# Patient Record
Sex: FEMALE | Race: WHITE | Employment: UNEMPLOYED | URBAN - METROPOLITAN AREA
[De-identification: names, ages, dates, MRNs, and addresses within clinical notes are randomized per-mention and may not be internally consistent; named-entity substitution may affect disease eponyms.]

---

## 2017-01-11 RX ORDER — MIRTAZAPINE 45 MG/1
TABLET, FILM COATED ORAL
Qty: 90 TABLET | Refills: 0 | Status: SHIPPED | OUTPATIENT
Start: 2017-01-11 | End: 2017-04-24 | Stop reason: SDUPTHER

## 2017-01-13 ENCOUNTER — TELEPHONE (OUTPATIENT)
Dept: FAMILY MEDICINE CLINIC | Age: 52
End: 2017-01-13

## 2017-01-18 RX ORDER — OMEPRAZOLE 40 MG/1
CAPSULE, DELAYED RELEASE ORAL
Qty: 90 CAPSULE | Refills: 1 | Status: SHIPPED | OUTPATIENT
Start: 2017-01-18 | End: 2017-07-27 | Stop reason: SDUPTHER

## 2017-01-24 RX ORDER — NABUMETONE 750 MG/1
TABLET, FILM COATED ORAL
Qty: 60 TABLET | Refills: 5 | Status: SHIPPED | OUTPATIENT
Start: 2017-01-24 | End: 2017-07-25 | Stop reason: SDUPTHER

## 2017-04-15 ENCOUNTER — OFFICE VISIT (OUTPATIENT)
Dept: FAMILY MEDICINE CLINIC | Age: 52
End: 2017-04-15

## 2017-04-15 VITALS
TEMPERATURE: 97.8 F | SYSTOLIC BLOOD PRESSURE: 120 MMHG | BODY MASS INDEX: 26.85 KG/M2 | WEIGHT: 156.4 LBS | OXYGEN SATURATION: 99 % | HEART RATE: 98 BPM | DIASTOLIC BLOOD PRESSURE: 86 MMHG

## 2017-04-15 DIAGNOSIS — B35.9 TINEA: Primary | ICD-10-CM

## 2017-04-15 DIAGNOSIS — M19.90 ARTHRITIS: ICD-10-CM

## 2017-04-15 DIAGNOSIS — F41.9 ANXIETY: ICD-10-CM

## 2017-04-15 DIAGNOSIS — J01.00 ACUTE NON-RECURRENT MAXILLARY SINUSITIS: ICD-10-CM

## 2017-04-15 PROCEDURE — 99214 OFFICE O/P EST MOD 30 MIN: CPT | Performed by: FAMILY MEDICINE

## 2017-04-15 RX ORDER — DOXYCYCLINE 100 MG/1
100 CAPSULE ORAL 2 TIMES DAILY
Qty: 20 CAPSULE | Refills: 0 | Status: SHIPPED | OUTPATIENT
Start: 2017-04-15 | End: 2017-06-19 | Stop reason: ALTCHOICE

## 2017-04-15 RX ORDER — CLOTRIMAZOLE AND BETAMETHASONE DIPROPIONATE 10; .64 MG/G; MG/G
CREAM TOPICAL
Qty: 15 G | Refills: 2 | Status: SHIPPED | OUTPATIENT
Start: 2017-04-15 | End: 2017-06-19 | Stop reason: ALTCHOICE

## 2017-04-15 RX ORDER — HYDROCODONE BITARTRATE AND ACETAMINOPHEN 5; 325 MG/1; MG/1
1 TABLET ORAL 2 TIMES DAILY PRN
Qty: 60 TABLET | Refills: 0 | Status: SHIPPED | OUTPATIENT
Start: 2017-04-15 | End: 2017-04-15 | Stop reason: SDUPTHER

## 2017-04-15 RX ORDER — HYDROCODONE BITARTRATE AND ACETAMINOPHEN 5; 325 MG/1; MG/1
1 TABLET ORAL 2 TIMES DAILY PRN
Qty: 60 TABLET | Refills: 0 | Status: SHIPPED | OUTPATIENT
Start: 2017-04-15 | End: 2018-07-02 | Stop reason: SDUPTHER

## 2017-04-19 RX ORDER — DULOXETIN HYDROCHLORIDE 60 MG/1
CAPSULE, DELAYED RELEASE ORAL
Qty: 60 CAPSULE | Refills: 5 | Status: SHIPPED | OUTPATIENT
Start: 2017-04-19 | End: 2017-10-28 | Stop reason: SDUPTHER

## 2017-04-24 RX ORDER — MIRTAZAPINE 45 MG/1
TABLET, FILM COATED ORAL
Qty: 90 TABLET | Refills: 0 | Status: SHIPPED | OUTPATIENT
Start: 2017-04-24 | End: 2017-07-25 | Stop reason: SDUPTHER

## 2017-04-24 RX ORDER — AZITHROMYCIN 250 MG/1
TABLET, FILM COATED ORAL
Qty: 1 PACKET | Refills: 0 | Status: SHIPPED | OUTPATIENT
Start: 2017-04-24 | End: 2017-05-04

## 2017-06-19 ENCOUNTER — OFFICE VISIT (OUTPATIENT)
Dept: FAMILY MEDICINE CLINIC | Age: 52
End: 2017-06-19

## 2017-06-19 VITALS
SYSTOLIC BLOOD PRESSURE: 102 MMHG | TEMPERATURE: 97.6 F | DIASTOLIC BLOOD PRESSURE: 68 MMHG | BODY MASS INDEX: 25.23 KG/M2 | WEIGHT: 147 LBS

## 2017-06-19 DIAGNOSIS — J02.0 PHARYNGITIS DUE TO STREPTOCOCCUS SPECIES: Primary | ICD-10-CM

## 2017-06-19 PROCEDURE — 99213 OFFICE O/P EST LOW 20 MIN: CPT | Performed by: FAMILY MEDICINE

## 2017-06-19 RX ORDER — AMOXICILLIN 500 MG/1
1000 CAPSULE ORAL 2 TIMES DAILY
Qty: 40 CAPSULE | Refills: 0 | Status: SHIPPED | OUTPATIENT
Start: 2017-06-19 | End: 2017-06-29

## 2017-07-05 ENCOUNTER — COMMUNITY OUTREACH (OUTPATIENT)
Dept: OTHER | Facility: CLINIC | Age: 52
End: 2017-07-05

## 2017-07-06 ENCOUNTER — HOSPITAL ENCOUNTER (OUTPATIENT)
Dept: MAMMOGRAPHY | Age: 52
Discharge: HOME | End: 2017-07-06
Attending: FAMILY MEDICINE | Admitting: FAMILY MEDICINE

## 2017-07-06 DIAGNOSIS — Z12.31 ENCOUNTER FOR SCREENING MAMMOGRAM FOR BREAST CANCER: ICD-10-CM

## 2017-07-25 RX ORDER — MIRTAZAPINE 45 MG/1
TABLET, FILM COATED ORAL
Qty: 30 TABLET | Refills: 5 | Status: SHIPPED | OUTPATIENT
Start: 2017-07-25 | End: 2018-02-08 | Stop reason: SDUPTHER

## 2017-07-25 RX ORDER — NABUMETONE 750 MG/1
TABLET, FILM COATED ORAL
Qty: 60 TABLET | Refills: 5 | Status: SHIPPED | OUTPATIENT
Start: 2017-07-25 | End: 2017-08-19

## 2017-07-27 RX ORDER — OMEPRAZOLE 40 MG/1
CAPSULE, DELAYED RELEASE ORAL
Qty: 90 CAPSULE | Refills: 1 | Status: SHIPPED | OUTPATIENT
Start: 2017-07-27 | End: 2017-12-22 | Stop reason: SDUPTHER

## 2017-08-19 ENCOUNTER — OFFICE VISIT (OUTPATIENT)
Dept: FAMILY MEDICINE CLINIC | Age: 52
End: 2017-08-19

## 2017-08-19 VITALS
OXYGEN SATURATION: 96 % | DIASTOLIC BLOOD PRESSURE: 76 MMHG | BODY MASS INDEX: 25.92 KG/M2 | WEIGHT: 151.8 LBS | HEART RATE: 93 BPM | TEMPERATURE: 97.8 F | HEIGHT: 64 IN | SYSTOLIC BLOOD PRESSURE: 120 MMHG

## 2017-08-19 DIAGNOSIS — M19.90 ARTHRITIS: ICD-10-CM

## 2017-08-19 DIAGNOSIS — F41.9 ANXIETY: Primary | ICD-10-CM

## 2017-08-19 DIAGNOSIS — K21.9 GASTROESOPHAGEAL REFLUX DISEASE WITHOUT ESOPHAGITIS: ICD-10-CM

## 2017-08-19 DIAGNOSIS — F51.01 PRIMARY INSOMNIA: ICD-10-CM

## 2017-08-19 DIAGNOSIS — Z51.81 MEDICATION MONITORING ENCOUNTER: ICD-10-CM

## 2017-08-19 LAB
ALT SERPL-CCNC: 25 U/L (ref 10–40)
ANION GAP SERPL CALCULATED.3IONS-SCNC: 13 MMOL/L (ref 3–16)
BUN BLDV-MCNC: 12 MG/DL (ref 7–20)
CALCIUM SERPL-MCNC: 9.9 MG/DL (ref 8.3–10.6)
CHLORIDE BLD-SCNC: 104 MMOL/L (ref 99–110)
CHOLESTEROL, TOTAL: 313 MG/DL (ref 0–199)
CO2: 25 MMOL/L (ref 21–32)
CREAT SERPL-MCNC: 0.7 MG/DL (ref 0.6–1.1)
GFR AFRICAN AMERICAN: >60
GFR NON-AFRICAN AMERICAN: >60
GLUCOSE BLD-MCNC: 93 MG/DL (ref 70–99)
HDLC SERPL-MCNC: 53 MG/DL (ref 40–60)
LDL CHOLESTEROL CALCULATED: 222 MG/DL
POTASSIUM SERPL-SCNC: 5.1 MMOL/L (ref 3.5–5.1)
SODIUM BLD-SCNC: 142 MMOL/L (ref 136–145)
TRIGL SERPL-MCNC: 188 MG/DL (ref 0–150)
VLDLC SERPL CALC-MCNC: 38 MG/DL

## 2017-08-19 PROCEDURE — 36415 COLL VENOUS BLD VENIPUNCTURE: CPT | Performed by: FAMILY MEDICINE

## 2017-08-19 PROCEDURE — 99214 OFFICE O/P EST MOD 30 MIN: CPT | Performed by: FAMILY MEDICINE

## 2017-08-20 PROBLEM — E78.00 HYPERCHOLESTEREMIA: Status: ACTIVE | Noted: 2017-08-20

## 2017-10-28 RX ORDER — DULOXETIN HYDROCHLORIDE 60 MG/1
CAPSULE, DELAYED RELEASE ORAL
Qty: 60 CAPSULE | Refills: 2 | Status: SHIPPED | OUTPATIENT
Start: 2017-10-28 | End: 2018-01-31 | Stop reason: SDUPTHER

## 2017-10-28 NOTE — TELEPHONE ENCOUNTER
Refilled medication per verbal order from MD.  Future appt scheduled 01/13/2018  Last appt 08/19/2017

## 2017-11-21 ENCOUNTER — TELEPHONE (OUTPATIENT)
Dept: FAMILY MEDICINE CLINIC | Age: 52
End: 2017-11-21

## 2017-11-21 NOTE — TELEPHONE ENCOUNTER
Patient has not had a menstrual period in a year. She is having vaginal dryness and pain during intercourse. Is there anything she can try?

## 2017-11-22 NOTE — TELEPHONE ENCOUNTER
Pharmacy calling need to know how many gms of the premarin the patient is suppose to use 2 times a week

## 2017-11-27 ENCOUNTER — TELEPHONE (OUTPATIENT)
Dept: FAMILY MEDICINE CLINIC | Age: 52
End: 2017-11-27

## 2017-12-05 ENCOUNTER — COMMUNITY OUTREACH (OUTPATIENT)
Dept: OTHER | Facility: CLINIC | Age: 52
End: 2017-12-05

## 2017-12-05 NOTE — PROGRESS NOTES
YOLI was contacted by Jewels Lerma at the patient's PCP office regarding assistance with a medication fill that the patient reports was denied at the pharmacy. MAURO placed a call to our [employer] contact to research and await a return call. MAURO made the office aware that we will follow up with them on 12/6/17.

## 2017-12-06 RX ORDER — ESTRADIOL 0.1 MG/G
CREAM VAGINAL
Qty: 1 TUBE | Refills: 5 | Status: SHIPPED | OUTPATIENT
Start: 2017-12-06 | End: 2017-12-07

## 2017-12-06 NOTE — TELEPHONE ENCOUNTER
Spoke to Neyda Patricia from [facility] and the price of this medication is very expensive so they will let the patient know. Do you know if there is an alternative that you want to try?     Please advise    Neyda Patricia: [phone]

## 2017-12-06 NOTE — TELEPHONE ENCOUNTER
Rx sent to pharmacy per Dr. Pichardo Call' instructions. I spoke with Dixie Waldrop at [facility] and she will let us know if it is covered or if there are any issues before the patient is contacted.

## 2017-12-06 NOTE — TELEPHONE ENCOUNTER
According to [facility] - patient would have to pay $56 out of pocket every month for this medication. With this copay the patient will use up her benefit faster. [facility] will call the patient to explain this to her as well but we wanted to find out what you feel would be an option if she cannot afford these medications. Please advise. Thank you!

## 2017-12-07 ENCOUNTER — COMMUNITY OUTREACH (OUTPATIENT)
Dept: OTHER | Facility: CLINIC | Age: 52
End: 2017-12-07

## 2017-12-07 NOTE — PROGRESS NOTES
MARISOL is working with patient and patient's PCP  to assist patient in applying for prescription assistance for Premarin cream.  Based on patient's stated household income, she is likely eligible for [employer]'s program which would provide patient ongoing access to this medication at no cost.  Patient will need to reapply annually for the program.  APRYL spoke this date with both patient and [facility] , Joe Mcfadden, regarding the process. [employer] will send a program application to patient's home. Once application is fully completed and returned to [facility], it will be processed within 10 days. Patient encouraged to contact [facility] if in need of further assistance in this matter.

## 2017-12-22 RX ORDER — OMEPRAZOLE 40 MG/1
CAPSULE, DELAYED RELEASE ORAL
Qty: 90 CAPSULE | Refills: 1 | Status: SHIPPED | OUTPATIENT
Start: 2017-12-22 | End: 2018-02-08

## 2018-01-20 ENCOUNTER — OFFICE VISIT (OUTPATIENT)
Dept: FAMILY MEDICINE CLINIC | Age: 53
End: 2018-01-20

## 2018-01-20 VITALS
TEMPERATURE: 97.9 F | WEIGHT: 158 LBS | HEART RATE: 93 BPM | SYSTOLIC BLOOD PRESSURE: 122 MMHG | OXYGEN SATURATION: 97 % | BODY MASS INDEX: 27.55 KG/M2 | DIASTOLIC BLOOD PRESSURE: 82 MMHG

## 2018-01-20 DIAGNOSIS — J20.9 ACUTE BRONCHITIS, UNSPECIFIED ORGANISM: Primary | ICD-10-CM

## 2018-01-20 DIAGNOSIS — B35.4 TINEA CORPORIS: ICD-10-CM

## 2018-01-20 PROCEDURE — 99213 OFFICE O/P EST LOW 20 MIN: CPT | Performed by: FAMILY MEDICINE

## 2018-01-20 RX ORDER — KETOCONAZOLE 20 MG/G
CREAM TOPICAL
Qty: 30 G | Refills: 1 | Status: SHIPPED | OUTPATIENT
Start: 2018-01-20 | End: 2018-11-05

## 2018-01-20 RX ORDER — AZITHROMYCIN 250 MG/1
TABLET, FILM COATED ORAL
Qty: 6 TABLET | Refills: 0 | Status: SHIPPED | OUTPATIENT
Start: 2018-01-20 | End: 2018-07-16 | Stop reason: ALTCHOICE

## 2018-01-31 RX ORDER — DULOXETIN HYDROCHLORIDE 60 MG/1
CAPSULE, DELAYED RELEASE ORAL
Qty: 60 CAPSULE | Refills: 1 | Status: SHIPPED | OUTPATIENT
Start: 2018-01-31 | End: 2018-04-02 | Stop reason: SDUPTHER

## 2018-02-08 ENCOUNTER — OFFICE VISIT (OUTPATIENT)
Dept: FAMILY MEDICINE CLINIC | Age: 53
End: 2018-02-08

## 2018-02-08 VITALS
OXYGEN SATURATION: 95 % | SYSTOLIC BLOOD PRESSURE: 136 MMHG | DIASTOLIC BLOOD PRESSURE: 82 MMHG | HEART RATE: 108 BPM | WEIGHT: 158 LBS | BODY MASS INDEX: 27.55 KG/M2 | TEMPERATURE: 98.3 F

## 2018-02-08 DIAGNOSIS — J01.90 ACUTE BACTERIAL SINUSITIS: Primary | ICD-10-CM

## 2018-02-08 DIAGNOSIS — Z12.11 COLON CANCER SCREENING: ICD-10-CM

## 2018-02-08 DIAGNOSIS — E78.00 HYPERCHOLESTEREMIA: ICD-10-CM

## 2018-02-08 DIAGNOSIS — B96.89 ACUTE BACTERIAL SINUSITIS: Primary | ICD-10-CM

## 2018-02-08 DIAGNOSIS — S46.819A STRAIN OF TRAPEZIUS MUSCLE, UNSPECIFIED LATERALITY, INITIAL ENCOUNTER: ICD-10-CM

## 2018-02-08 DIAGNOSIS — F41.9 ANXIETY: ICD-10-CM

## 2018-02-08 DIAGNOSIS — K21.9 GASTROESOPHAGEAL REFLUX DISEASE WITHOUT ESOPHAGITIS: ICD-10-CM

## 2018-02-08 PROCEDURE — 99214 OFFICE O/P EST MOD 30 MIN: CPT | Performed by: FAMILY MEDICINE

## 2018-02-08 RX ORDER — PANTOPRAZOLE SODIUM 40 MG/1
40 TABLET, DELAYED RELEASE ORAL DAILY
Qty: 30 TABLET | Refills: 3 | Status: SHIPPED | OUTPATIENT
Start: 2018-02-08 | End: 2018-06-18 | Stop reason: SDUPTHER

## 2018-02-08 RX ORDER — MIRTAZAPINE 45 MG/1
TABLET, FILM COATED ORAL
Qty: 30 TABLET | Refills: 5 | Status: SHIPPED | OUTPATIENT
Start: 2018-02-08 | End: 2018-08-18 | Stop reason: SDUPTHER

## 2018-02-08 RX ORDER — AMOXICILLIN 500 MG/1
1000 CAPSULE ORAL 2 TIMES DAILY
Qty: 40 CAPSULE | Refills: 0 | Status: SHIPPED | OUTPATIENT
Start: 2018-02-08 | End: 2018-02-18

## 2018-02-14 ENCOUNTER — NURSE ONLY (OUTPATIENT)
Dept: FAMILY MEDICINE CLINIC | Age: 53
End: 2018-02-14

## 2018-02-14 DIAGNOSIS — Z12.11 COLON CANCER SCREENING: ICD-10-CM

## 2018-02-14 LAB
CONTROL: NORMAL
HEMOCCULT STL QL: NEGATIVE

## 2018-02-14 PROCEDURE — 82274 ASSAY TEST FOR BLOOD FECAL: CPT | Performed by: FAMILY MEDICINE

## 2018-04-02 RX ORDER — DULOXETIN HYDROCHLORIDE 60 MG/1
CAPSULE, DELAYED RELEASE ORAL
Qty: 60 CAPSULE | Refills: 3 | Status: SHIPPED | OUTPATIENT
Start: 2018-04-02 | End: 2018-07-29 | Stop reason: SDUPTHER

## 2018-06-18 RX ORDER — PANTOPRAZOLE SODIUM 40 MG/1
TABLET, DELAYED RELEASE ORAL
Qty: 30 TABLET | Refills: 2 | Status: SHIPPED | OUTPATIENT
Start: 2018-06-18 | End: 2018-07-02

## 2018-06-20 ENCOUNTER — HOSPITAL ENCOUNTER (OUTPATIENT)
Dept: MAMMOGRAPHY | Age: 53
Discharge: HOME | End: 2018-06-20
Attending: FAMILY MEDICINE | Admitting: FAMILY MEDICINE

## 2018-06-20 DIAGNOSIS — Z12.31 ENCOUNTER FOR SCREENING MAMMOGRAM FOR BREAST CANCER: ICD-10-CM

## 2018-06-21 ENCOUNTER — COMMUNITY OUTREACH (OUTPATIENT)
Dept: OTHER | Facility: CLINIC | Age: 53
End: 2018-06-21

## 2018-06-21 DIAGNOSIS — R92.8 ABNORMAL MAMMOGRAM OF LEFT BREAST: Primary | ICD-10-CM

## 2018-06-25 ENCOUNTER — HOSPITAL ENCOUNTER (OUTPATIENT)
Dept: MAMMOGRAPHY | Age: 53
Discharge: HOME | End: 2018-06-25
Admitting: FAMILY MEDICINE

## 2018-06-25 DIAGNOSIS — R92.8 OTHER ABNORMAL AND INCONCLUSIVE FINDINGS ON DIAGNOSTIC IMAGING OF BREAST: ICD-10-CM

## 2018-06-25 DIAGNOSIS — R92.8 ABNORMAL MAMMOGRAM: ICD-10-CM

## 2018-06-25 DIAGNOSIS — R92.8 ABNORMAL MAMMOGRAM OF LEFT BREAST: ICD-10-CM

## 2018-06-26 DIAGNOSIS — R92.8 ABNORMAL MAMMOGRAM: Primary | ICD-10-CM

## 2018-07-02 ENCOUNTER — OFFICE VISIT (OUTPATIENT)
Dept: FAMILY MEDICINE CLINIC | Age: 53
End: 2018-07-02

## 2018-07-02 VITALS
OXYGEN SATURATION: 98 % | HEIGHT: 63 IN | BODY MASS INDEX: 27.96 KG/M2 | WEIGHT: 157.8 LBS | HEART RATE: 98 BPM | SYSTOLIC BLOOD PRESSURE: 120 MMHG | TEMPERATURE: 97.4 F | DIASTOLIC BLOOD PRESSURE: 82 MMHG

## 2018-07-02 DIAGNOSIS — G89.29 CHRONIC BILATERAL LOW BACK PAIN WITHOUT SCIATICA: ICD-10-CM

## 2018-07-02 DIAGNOSIS — F41.9 ANXIETY: ICD-10-CM

## 2018-07-02 DIAGNOSIS — M19.90 ARTHRITIS: Primary | ICD-10-CM

## 2018-07-02 DIAGNOSIS — M54.50 CHRONIC BILATERAL LOW BACK PAIN WITHOUT SCIATICA: ICD-10-CM

## 2018-07-02 DIAGNOSIS — K21.9 GASTROESOPHAGEAL REFLUX DISEASE WITHOUT ESOPHAGITIS: ICD-10-CM

## 2018-07-02 PROCEDURE — 99214 OFFICE O/P EST MOD 30 MIN: CPT | Performed by: FAMILY MEDICINE

## 2018-07-02 RX ORDER — HYDROCODONE BITARTRATE AND ACETAMINOPHEN 5; 325 MG/1; MG/1
1 TABLET ORAL 2 TIMES DAILY PRN
Qty: 60 TABLET | Refills: 0 | Status: SHIPPED | OUTPATIENT
Start: 2018-07-02 | End: 2018-11-05 | Stop reason: SDUPTHER

## 2018-07-02 RX ORDER — ESOMEPRAZOLE MAGNESIUM 40 MG/1
40 CAPSULE, DELAYED RELEASE ORAL DAILY
Qty: 30 CAPSULE | Refills: 5 | Status: SHIPPED | OUTPATIENT
Start: 2018-07-02 | End: 2019-01-15 | Stop reason: SDUPTHER

## 2018-07-02 RX ORDER — CYCLOBENZAPRINE HCL 10 MG
10 TABLET ORAL NIGHTLY
Qty: 30 TABLET | Refills: 5 | Status: SHIPPED | OUTPATIENT
Start: 2018-07-02 | End: 2019-07-02 | Stop reason: SDUPTHER

## 2018-07-02 ASSESSMENT — PATIENT HEALTH QUESTIONNAIRE - PHQ9
2. FEELING DOWN, DEPRESSED OR HOPELESS: 0
SUM OF ALL RESPONSES TO PHQ9 QUESTIONS 1 & 2: 0
SUM OF ALL RESPONSES TO PHQ QUESTIONS 1-9: 0
1. LITTLE INTEREST OR PLEASURE IN DOING THINGS: 0

## 2018-07-02 NOTE — PROGRESS NOTES
indicated    Cathnoe Armstrong MD    This note was transcribed using a voice recognition software system. Proper technique and careful oversight were used to increase transcription accuracy but inadvertent errors may be present.

## 2018-07-12 ENCOUNTER — TELEPHONE (OUTPATIENT)
Dept: PRIMARY CARE CLINIC | Age: 53
End: 2018-07-12

## 2018-07-16 ENCOUNTER — OFFICE VISIT (OUTPATIENT)
Dept: FAMILY MEDICINE CLINIC | Age: 53
End: 2018-07-16

## 2018-07-16 VITALS
WEIGHT: 155.25 LBS | DIASTOLIC BLOOD PRESSURE: 80 MMHG | OXYGEN SATURATION: 98 % | BODY MASS INDEX: 27.5 KG/M2 | HEART RATE: 105 BPM | TEMPERATURE: 97.8 F | SYSTOLIC BLOOD PRESSURE: 116 MMHG

## 2018-07-16 DIAGNOSIS — F17.200 SMOKER: ICD-10-CM

## 2018-07-16 DIAGNOSIS — Z01.419 WELL WOMAN EXAM: Primary | ICD-10-CM

## 2018-07-16 PROCEDURE — 99396 PREV VISIT EST AGE 40-64: CPT | Performed by: NURSE PRACTITIONER

## 2018-07-16 ASSESSMENT — ENCOUNTER SYMPTOMS
ABDOMINAL PAIN: 0
BACK PAIN: 0

## 2018-07-16 NOTE — PATIENT INSTRUCTIONS
forms, many of them available over-the-counter:  ¨ Nicotine patches  ¨ Nicotine gum and lozenges  ¨ Nicotine inhaler  · Ask your doctor about bupropion (Wellbutrin) or varenicline (Chantix), which are prescription medicines. They do not contain nicotine. They help you by reducing withdrawal symptoms, such as stress and anxiety. · Some people find hypnosis, acupuncture, and massage helpful for ending the smoking habit. · Eat a healthy diet and get regular exercise. Having healthy habits will help your body move past its craving for nicotine. · Be prepared to keep trying. Most people are not successful the first few times they try to quit. Do not get mad at yourself if you smoke again. Make a list of things you learned and think about when you want to try again, such as next week, next month, or next year. Where can you learn more? Go to [email or web address]://[email or web address].[email or web address] org and sign in to your [employer] account. Enter F160 in the [employer] box to learn more about \"Stopping Smoking: Care Instructions. \"     If you do not have an account, please click on the \"Sign Up Now\" link. Current as of: November 29, 2017  Content Version: 11.6  © 2345-1999 [employer] Inc., Incorporated. Care instructions adapted under license by [facility] [employer] [facility] ([facility]). If you have questions about a medical condition or this instruction, always ask your healthcare professional. [address] any warranty or liability for your use of this information.

## 2018-07-16 NOTE — PROGRESS NOTES
Subjective:    Roxanne Hooper is a 50 y.o. female presents today for a well woman exam. She has no new complaints or  concerns. Last pelvic exam was 3 years and was normal. Last mammogram was 2018 and was positive for 5 mm left breast complex cyst versus fibroadenoma. She is to have repeat ultrasound in 6 months. Last menstrual period was 18 months ago. She is  postmenopausal.  A0. She is sexually active. Gender preference is male. 1 lifetime sexual partners. No history or concerns of STI. Does not use birth control. No personal history of cancers. No family history of gynecologic cancers. She does not have any concerns during this visit    No past medical history on file. Outpatient Prescriptions Marked as Taking for the 18 encounter (Office Visit) with SUJATA Martin CNP   Medication Sig Dispense Refill    cyclobenzaprine (FLEXERIL) 10 MG tablet Take 1 tablet by mouth nightly 30 tablet 5    HYDROcodone-acetaminophen (NORCO) 5-325 MG per tablet Take 1 tablet by mouth 2 times daily as needed for Pain for up to 30 days. . 60 tablet 0    esomeprazole (NEXIUM) 40 MG delayed release capsule Take 1 capsule by mouth daily 30 capsule 5    piroxicam (FELDENE) 20 MG capsule Take 1 capsule by mouth daily 30 capsule 5    DULoxetine (CYMBALTA) 60 MG extended release capsule TAKE ONE CAPSULE BY MOUTH TWICE A DAY 60 capsule 3    mirtazapine (REMERON) 45 MG tablet TAKE ONE TABLET BY MOUTH ONCE NIGHTLY 30 tablet 5    ketoconazole (NIZORAL) 2 % cream Apply topically daily. 30 g 1        No Known Allergies     Social History   Substance Use Topics    Smoking status: Current Every Day Smoker     Packs/day: 1.00     Years: 10.00     Types: Cigarettes    Smokeless tobacco: Never Used    Alcohol use No         Review of Systems   Constitutional: Negative for fatigue and unexpected weight change. Cardiovascular: Negative for chest pain and palpitations.    Gastrointestinal: Negative for efforts to edit errors.

## 2018-07-18 LAB
HPV COMMENT: NORMAL
HPV TYPE 16: NOT DETECTED
HPV TYPE 18: NOT DETECTED
HPVOH (OTHER TYPES): NOT DETECTED

## 2018-07-30 RX ORDER — DULOXETIN HYDROCHLORIDE 60 MG/1
CAPSULE, DELAYED RELEASE ORAL
Qty: 60 CAPSULE | Refills: 2 | Status: SHIPPED | OUTPATIENT
Start: 2018-07-30 | End: 2018-10-31 | Stop reason: SDUPTHER

## 2018-08-20 RX ORDER — MIRTAZAPINE 45 MG/1
TABLET, FILM COATED ORAL
Qty: 30 TABLET | Refills: 5 | Status: SHIPPED | OUTPATIENT
Start: 2018-08-20 | End: 2019-05-07 | Stop reason: SDUPTHER

## 2018-08-30 ENCOUNTER — OFFICE VISIT (OUTPATIENT)
Dept: FAMILY MEDICINE CLINIC | Age: 53
End: 2018-08-30

## 2018-08-30 VITALS
DIASTOLIC BLOOD PRESSURE: 78 MMHG | TEMPERATURE: 98.1 F | BODY MASS INDEX: 27.1 KG/M2 | OXYGEN SATURATION: 97 % | WEIGHT: 153 LBS | HEART RATE: 87 BPM | SYSTOLIC BLOOD PRESSURE: 114 MMHG

## 2018-08-30 DIAGNOSIS — M54.42 ACUTE LEFT-SIDED LOW BACK PAIN WITH LEFT-SIDED SCIATICA: Primary | ICD-10-CM

## 2018-08-30 PROCEDURE — 99213 OFFICE O/P EST LOW 20 MIN: CPT | Performed by: NURSE PRACTITIONER

## 2018-08-30 PROCEDURE — 96372 THER/PROPH/DIAG INJ SC/IM: CPT | Performed by: NURSE PRACTITIONER

## 2018-08-30 RX ORDER — PREDNISONE 20 MG/1
20 TABLET ORAL DAILY
Qty: 10 TABLET | Refills: 0 | Status: SHIPPED | OUTPATIENT
Start: 2018-08-30 | End: 2018-09-09

## 2018-08-30 RX ORDER — METHYLPREDNISOLONE ACETATE 40 MG/ML
40 INJECTION, SUSPENSION INTRA-ARTICULAR; INTRALESIONAL; INTRAMUSCULAR; SOFT TISSUE ONCE
Status: COMPLETED | OUTPATIENT
Start: 2018-08-30 | End: 2018-08-30

## 2018-08-30 RX ADMIN — METHYLPREDNISOLONE ACETATE 40 MG: 40 INJECTION, SUSPENSION INTRA-ARTICULAR; INTRALESIONAL; INTRAMUSCULAR; SOFT TISSUE at 10:40

## 2018-08-30 NOTE — PATIENT INSTRUCTIONS
forms, many of them available over-the-counter:  ¨ Nicotine patches  ¨ Nicotine gum and lozenges  ¨ Nicotine inhaler  · Ask your doctor about bupropion (Wellbutrin) or varenicline (Chantix), which are prescription medicines. They do not contain nicotine. They help you by reducing withdrawal symptoms, such as stress and anxiety. · Some people find hypnosis, acupuncture, and massage helpful for ending the smoking habit. · Eat a healthy diet and get regular exercise. Having healthy habits will help your body move past its craving for nicotine. · Be prepared to keep trying. Most people are not successful the first few times they try to quit. Do not get mad at yourself if you smoke again. Make a list of things you learned and think about when you want to try again, such as next week, next month, or next year. Where can you learn more? Go to https://[email or web address] Group.Sharely.Us. org and sign in to your [employer] account. Enter A881 in the [employer] box to learn more about \"Stopping Smoking: Care Instructions. \"     If you do not have an account, please click on the \"Sign Up Now\" link. Current as of: November 29, 2017  Content Version: 11.7  © 4715-1130 [employer]. Care instructions adapted under license by [facility] ([facility]). If you have questions about a medical condition or this instruction, always ask your healthcare professional. [place] [address] any warranty or liability for your use of this information. Patient Education        Back Pain: Care Instructions  Your Care Instructions    Back pain has many possible causes. It is often related to problems with muscles and ligaments of the back. It may also be related to problems with the nerves, discs, or bones of the back. Moving, lifting, standing, sitting, or sleeping in an awkward way can strain the back. Sometimes you don't notice the injury until later. Arthritis is another common cause of back pain.   Although it may a 90-degree angle (like the letter L). How to do the exercises  Seated position on ball    1. Use this exercise to get used to moving on the ball and to find your best sitting position. 2. Sit comfortably on the ball with your feet about hip-width apart. If you feel unsteady, rest your hands on the ball near your hips. 3. As you do this exercise, try to keep your shoulders and upper body relaxed and still. 4. Using your stomach and back muscles to move your pelvis, roll the ball forward. This will round your back. 5. Still using your stomach and back muscles, roll the ball back. You will arch your back. 6. Repeat this rounding-arching motion a few times. 7. Stop in between the two positions, where your back is not rounded or arched. This is called your neutral position. Pelvic rotation    1. Sit tall on the ball. 2. Slowly rotate your hips in a [place] pattern. Keep the movement focused at your hips. 3. Repeat, but [place] in the other direction. 4. Repeat 8 to 12 times. Postural sitting    1. Use this position to find a stable, relaxed posture on the ball. You can use this position as your starting point for other ball exercises. If you feel unsteady on the ball, start on a chair first.  2. Sit on a ball or chair, with your feet planted straight in front of you. 3. Imagine that a string at the top of your head is pulling you straight up. Think of yourself as 2 inches taller than you are.  4. Slightly tuck your chin. 5. Keep your shoulders back and relaxed. Knee extension    1. Sit tall on the ball with your feet planted in front of you, hip-width apart. As you do this exercise, avoid slumping your shoulders and arching your back. 2. Rest your hands on the ball near your hip or a steady object next to you. (If you feel very stable on the ball, rest your hands in your lap or at your side.)  3. Slowly straighten one leg at the knee. Slowly lower it back down. Repeat with the other leg.   4. Repeat this exercise 8 to 12 times. Roll-ups    1. Lie on your back with your knees bent, feet resting on the floor. 2. Lay the ball on your thighs. Rest your hands up high on the ball. 3. Raising your head and shoulder blades, roll the ball up your thighs. Exhale as you roll up. 4. If this is hard on your neck, gently support your lower head and upper neck with one hand. Don't use that hand to pull your head up. 5. Repeat 8 to 12 times. Ball curls    1. Lie on your back with your ankles resting on the ball, knees straight. 2. Use your legs to roll the exercise ball toward you. Allow your knees to bend and move closer to your chest.  3. Pause briefly, and then roll the ball to the starting position. Try to keep the ball rolling straight. You will feel the muscles in your lower belly working. 4. Repeat 8 to 12 times. Bridge with ball under legs    1. Lie on your back with your legs up, calves resting on the ball. For more challenge, rest your heels on the ball. 2. Look up at the ceiling, and keep your chin relaxed. You can place a small pillow under your head or neck for comfort. 3. With your arms by your side, press your hands onto the floor for stability. 4. Tighten your belly muscles by pulling in your belly button toward your spine. 5. Push your heels down toward the floor, squeeze your buttocks, and lift your hips off the floor until your shoulders, hips, and knees are all in a straight line. 6. Try to keep the ball steady. Hold for about 6 seconds as you continue to breathe normally. 7. Slowly lower your hips back down to the floor. 8. Repeat 8 to 12 times. Ball curls with bridge    1. Start flat on your back with your ankles resting on the ball. 2. Look up at the ceiling, and keep your chin relaxed. You can place a small pillow under your head or neck for comfort. 3. With your arms by your side, press your hands onto the floor for stability.   4. Tighten your belly muscles by pulling in your belly button you are having problems. It's also a good idea to know your test results and keep a list of the medicines you take. Where can you learn more? Go to https://Montalvo [email or web address].Peacock Parade. org and sign in to your S[employer]urance account. Enter R446 in the [employer] box to learn more about \"Therapeutic Ball: Back Exercises. \"     If you do not have an account, please click on the \"Sign Up Now\" link. Current as of: November 29, 2017  Content Version: 11.7  © 9155-9725 [employer]ition, Incorporated. Care instructions adapted under license by [facility] ([facility]). If you have questions about a medical condition or this instruction, always ask your healthcare professional. [address] any warranty or liability for your use of this information.

## 2018-10-04 ENCOUNTER — OFFICE VISIT (OUTPATIENT)
Dept: FAMILY MEDICINE CLINIC | Age: 53
End: 2018-10-04

## 2018-10-04 VITALS
WEIGHT: 153.38 LBS | SYSTOLIC BLOOD PRESSURE: 118 MMHG | OXYGEN SATURATION: 96 % | BODY MASS INDEX: 27.17 KG/M2 | HEART RATE: 96 BPM | DIASTOLIC BLOOD PRESSURE: 80 MMHG | TEMPERATURE: 98.3 F

## 2018-10-04 DIAGNOSIS — B02.9 HERPES ZOSTER WITHOUT COMPLICATION: Primary | ICD-10-CM

## 2018-10-04 PROCEDURE — 99214 OFFICE O/P EST MOD 30 MIN: CPT | Performed by: NURSE PRACTITIONER

## 2018-10-04 RX ORDER — PREDNISONE 20 MG/1
20 TABLET ORAL DAILY
Qty: 14 TABLET | Refills: 0 | Status: SHIPPED | OUTPATIENT
Start: 2018-10-04 | End: 2018-10-18

## 2018-10-04 NOTE — PATIENT INSTRUCTIONS
Please read the healthy family handout that you were given and share it with your family. Please compare this printed medication list with your medications at home to be sure they are the same. If you have any medications that are different please contact us immediately at [phone]. Also review your allergies that we have listed, these may also include medications that you have not been able to tolerate, make sure everything listed is correct. If you have any allergies that are different please contact us immediately at [phone]. Patient Education        Stopping Smoking: Care Instructions  Your Care Instructions  Cigarette smokers crave the nicotine in cigarettes. Giving it up is much harder than simply changing a habit. Your body has to stop craving the nicotine. It is hard to quit, but you can do it. There are many tools that people use to quit smoking. You may find that combining tools works best for you. There are several steps to quitting. First you get ready to quit. Then you get support to help you. After that, you learn new skills and behaviors to become a nonsmoker. For many people, a necessary step is getting and using medicine. Your doctor will help you set up the plan that best meets your needs. You may want to attend a smoking cessation program to help you quit smoking. When you choose a program, look for one that has proven success. Ask your doctor for ideas. You will greatly increase your chances of success if you take medicine as well as get counseling or join a cessation program.  Some of the changes you feel when you first quit tobacco are uncomfortable. Your body will miss the nicotine at first, and you may feel short-tempered and grumpy. You may have trouble sleeping or concentrating. Medicine can help you deal with these symptoms. You may struggle with changing your smoking habits and rituals. The last step is the tricky one:  Be prepared for the smoking urge to continue for a time. This is a lot to deal with, but keep at it. You will feel better. Follow-up care is a key part of your treatment and safety. Be sure to make and go to all appointments, and call your doctor if you are having problems. It's also a good idea to know your test results and keep a list of the medicines you take. How can you care for yourself at home? · Ask your family, friends, and coworkers for support. You have a better chance of quitting if you have help and support. · Join a support group, such as Nicotine Anonymous, for people who are trying to quit smoking. · Consider signing up for a smoking cessation program, such as the American Lung Association's Freedom from Smoking program.  · Get text messaging support. Go to the website at www.smokefree. gov to sign up for the [facility] program.  · Set a quit date. Pick your date carefully so that it is not right in the middle of a big deadline or stressful time. Once you quit, do not even take a puff. Get rid of all ashtrays and lighters after your last cigarette. Clean your house and your clothes so that they do not smell of smoke. · Learn how to be a nonsmoker. Think about ways you can avoid those things that make you reach for a cigarette. ¨ Avoid situations that put you at greatest risk for smoking. For some people, it is hard to have a drink with friends without smoking. For others, they might skip a coffee break with coworkers who smoke. ¨ Change your daily routine. Take a different route to work or eat a meal in a different place. · Cut down on stress. Calm yourself or release tension by doing an activity you enjoy, such as reading a book, taking a hot bath, or gardening. · Talk to your doctor or pharmacist about nicotine replacement therapy, which replaces the nicotine in your body. You still get nicotine but you do not use tobacco. Nicotine replacement products help you slowly reduce the amount of nicotine you need.  These products come in several you feel better and may help prevent more serious problems caused by shingles. Shingles is caused by the same virus that causes chickenpox. When you have chickenpox, the virus gets into your nerve roots and stays there (becomes dormant) long after you get over the chickenpox. If the virus becomes active again, it can cause shingles. Follow-up care is a key part of your treatment and safety. Be sure to make and go to all appointments, and call your doctor if you are having problems. It's also a good idea to know your test results and keep a list of the medicines you take. How can you care for yourself at home? · Be safe with medicines. Take your medicines exactly as prescribed. Call your doctor if you think you are having a problem with your medicine. Antiviral medicine helps you get better faster. · Try not to scratch or pick at the blisters. They will crust over and fall off on their own if you leave them alone. · Put cool, wet cloths on the area to relieve pain and itching. You can also use calamine lotion. Try not to use so much lotion that it cakes and is hard to get off. · Put cornstarch or baking soda on the sores to help dry them out so they heal faster. · Do not use thick ointment, such as petroleum jelly, on the sores. This will keep them from drying and healing. · To help remove loose crusts, soak them in tap water. This can help decrease oozing, and dry and soothe the skin. · Take an over-the-counter pain medicine, such as acetaminophen (Tylenol), ibuprofen (Advil, Motrin), or naproxen (Aleve). Read and follow all instructions on the label. · Avoid close contact with people until the blisters have healed. It is very important for you to avoid contact with anyone who has never had chickenpox or the chickenpox vaccine. Pregnant women, young babies, and anyone else who has a hard time fighting infection (such as someone with HIV, diabetes, or cancer) is especially at risk.   When should you call for help? Call your doctor now or seek immediate medical care if:    · You have a new or higher fever.     · You have a severe headache and a stiff neck.     · You lose the ability to think clearly.     · The rash spreads to your forehead, nose, eyes, or eyelids.     · You have eye pain, or your vision gets worse.     · You have new pain in your face, or you cannot move the muscles in your face.     · Blisters spread to new parts of your body.    Watch closely for changes in your health, and be sure to contact your doctor if:    · The rash has not healed after 2 to 4 weeks.     · You still have pain after the rash has healed. Where can you learn more? Go to https://chpepiceweb.PCT International. org and sign in to your [employer] account. Jay Kwok in the [facility] box to learn more about \"Shingles: Care Instructions. \"     If you do not have an account, please click on the \"Sign Up Now\" link. Current as of: November 18, 2017  Content Version: 11.7  © 4554-8120 [employer]ure[employer], Incorporated. Care instructions adapted under license by [facility] ([facility]). If you have questions about a medical condition or this instruction, always ask your healthcare professional. [address] any warranty or liability for your use of this information.

## 2018-10-05 ENCOUNTER — TELEPHONE (OUTPATIENT)
Dept: FAMILY MEDICINE CLINIC | Age: 53
End: 2018-10-05

## 2018-10-31 RX ORDER — DULOXETIN HYDROCHLORIDE 60 MG/1
CAPSULE, DELAYED RELEASE ORAL
Qty: 60 CAPSULE | Refills: 1 | Status: SHIPPED | OUTPATIENT
Start: 2018-10-31 | End: 2019-01-10 | Stop reason: SDUPTHER

## 2018-11-05 ENCOUNTER — OFFICE VISIT (OUTPATIENT)
Dept: FAMILY MEDICINE CLINIC | Age: 53
End: 2018-11-05

## 2018-11-05 VITALS
TEMPERATURE: 97.7 F | HEART RATE: 95 BPM | OXYGEN SATURATION: 95 % | WEIGHT: 160.6 LBS | BODY MASS INDEX: 28.45 KG/M2 | DIASTOLIC BLOOD PRESSURE: 77 MMHG | SYSTOLIC BLOOD PRESSURE: 119 MMHG

## 2018-11-05 DIAGNOSIS — F41.9 ANXIETY: ICD-10-CM

## 2018-11-05 DIAGNOSIS — M19.90 ARTHRITIS: Primary | ICD-10-CM

## 2018-11-05 DIAGNOSIS — J40 BRONCHITIS: ICD-10-CM

## 2018-11-05 DIAGNOSIS — B02.9 HERPES ZOSTER WITHOUT COMPLICATION: ICD-10-CM

## 2018-11-05 DIAGNOSIS — G89.29 CHRONIC BILATERAL LOW BACK PAIN WITHOUT SCIATICA: ICD-10-CM

## 2018-11-05 DIAGNOSIS — M54.50 CHRONIC BILATERAL LOW BACK PAIN WITHOUT SCIATICA: ICD-10-CM

## 2018-11-05 PROCEDURE — 99214 OFFICE O/P EST MOD 30 MIN: CPT | Performed by: FAMILY MEDICINE

## 2018-11-05 RX ORDER — AZITHROMYCIN 250 MG/1
TABLET, FILM COATED ORAL
Qty: 6 TABLET | Refills: 0 | Status: SHIPPED | OUTPATIENT
Start: 2018-11-05 | End: 2018-11-15

## 2018-11-05 RX ORDER — BENZONATATE 200 MG/1
200 CAPSULE ORAL 3 TIMES DAILY PRN
Qty: 21 CAPSULE | Refills: 1 | Status: SHIPPED | OUTPATIENT
Start: 2018-11-05 | End: 2018-11-12

## 2018-11-05 RX ORDER — GABAPENTIN 100 MG/1
100-200 CAPSULE ORAL 3 TIMES DAILY PRN
Qty: 120 CAPSULE | Refills: 3 | Status: SHIPPED | OUTPATIENT
Start: 2018-11-05 | End: 2019-02-12 | Stop reason: SDUPTHER

## 2018-11-05 RX ORDER — HYDROCODONE BITARTRATE AND ACETAMINOPHEN 5; 325 MG/1; MG/1
1 TABLET ORAL 2 TIMES DAILY PRN
Qty: 60 TABLET | Refills: 0 | Status: SHIPPED | OUTPATIENT
Start: 2018-11-05 | End: 2018-12-05

## 2019-01-10 RX ORDER — DULOXETIN HYDROCHLORIDE 60 MG/1
CAPSULE, DELAYED RELEASE ORAL
Qty: 60 CAPSULE | Refills: 5 | Status: SHIPPED | OUTPATIENT
Start: 2019-01-10 | End: 2019-07-24 | Stop reason: SDUPTHER

## 2019-01-15 RX ORDER — ESOMEPRAZOLE MAGNESIUM 40 MG/1
CAPSULE, DELAYED RELEASE ORAL
Qty: 30 CAPSULE | Refills: 5 | Status: SHIPPED | OUTPATIENT
Start: 2019-01-15 | End: 2019-07-02 | Stop reason: SDUPTHER

## 2019-01-25 ENCOUNTER — OFFICE VISIT (OUTPATIENT)
Dept: FAMILY MEDICINE CLINIC | Age: 54
End: 2019-01-25

## 2019-01-25 VITALS
WEIGHT: 155 LBS | HEART RATE: 120 BPM | SYSTOLIC BLOOD PRESSURE: 134 MMHG | BODY MASS INDEX: 27.46 KG/M2 | DIASTOLIC BLOOD PRESSURE: 78 MMHG | TEMPERATURE: 98.2 F | OXYGEN SATURATION: 97 %

## 2019-01-25 DIAGNOSIS — K52.9 ACUTE GASTROENTERITIS: ICD-10-CM

## 2019-01-25 DIAGNOSIS — J01.00 ACUTE NON-RECURRENT MAXILLARY SINUSITIS: Primary | ICD-10-CM

## 2019-01-25 LAB — HAV IGM SER IA-ACNC: NORMAL

## 2019-01-25 PROCEDURE — 99214 OFFICE O/P EST MOD 30 MIN: CPT | Performed by: NURSE PRACTITIONER

## 2019-01-25 RX ORDER — AZITHROMYCIN 250 MG/1
250 TABLET, FILM COATED ORAL DAILY
Qty: 6 TABLET | Refills: 0 | Status: SHIPPED | OUTPATIENT
Start: 2019-01-25 | End: 2019-09-13 | Stop reason: ALTCHOICE

## 2019-01-25 RX ORDER — ONDANSETRON 4 MG/1
4 TABLET, FILM COATED ORAL DAILY PRN
Qty: 30 TABLET | Refills: 0 | Status: SHIPPED | OUTPATIENT
Start: 2019-01-25 | End: 2020-03-19

## 2019-01-28 LAB — HAV AB SERPL IA-ACNC: POSITIVE

## 2019-02-06 ENCOUNTER — COMMUNITY OUTREACH (OUTPATIENT)
Dept: OTHER | Age: 54
End: 2019-02-06

## 2019-02-12 RX ORDER — GABAPENTIN 100 MG/1
CAPSULE ORAL
Qty: 120 CAPSULE | Refills: 2 | Status: SHIPPED | OUTPATIENT
Start: 2019-02-12 | End: 2019-05-10 | Stop reason: SDUPTHER

## 2019-05-07 RX ORDER — MIRTAZAPINE 45 MG/1
TABLET, FILM COATED ORAL
Qty: 30 TABLET | Refills: 1 | Status: SHIPPED | OUTPATIENT
Start: 2019-05-07 | End: 2019-07-09 | Stop reason: SDUPTHER

## 2019-05-07 NOTE — TELEPHONE ENCOUNTER
Refilled medication per verbal order from provider.   Future appt scheduled 05/10/2019  Last appt 11/05/2018

## 2019-05-10 ENCOUNTER — HOSPITAL ENCOUNTER (OUTPATIENT)
Dept: GENERAL RADIOLOGY | Age: 54
Discharge: HOME | End: 2019-05-10

## 2019-05-10 ENCOUNTER — HOSPITAL ENCOUNTER (OUTPATIENT)
Age: 54
Discharge: HOME | End: 2019-05-10

## 2019-05-10 ENCOUNTER — OFFICE VISIT (OUTPATIENT)
Dept: FAMILY MEDICINE CLINIC | Age: 54
End: 2019-05-10

## 2019-05-10 VITALS
HEIGHT: 64 IN | OXYGEN SATURATION: 96 % | TEMPERATURE: 97.7 F | HEART RATE: 103 BPM | WEIGHT: 163 LBS | DIASTOLIC BLOOD PRESSURE: 81 MMHG | SYSTOLIC BLOOD PRESSURE: 123 MMHG | BODY MASS INDEX: 27.83 KG/M2

## 2019-05-10 DIAGNOSIS — M54.16 LUMBAR RADICULOPATHY: Primary | ICD-10-CM

## 2019-05-10 DIAGNOSIS — M54.16 LUMBAR RADICULOPATHY: ICD-10-CM

## 2019-05-10 PROCEDURE — 99213 OFFICE O/P EST LOW 20 MIN: CPT | Performed by: FAMILY MEDICINE

## 2019-05-10 PROCEDURE — 72100 X-RAY EXAM L-S SPINE 2/3 VWS: CPT

## 2019-05-10 RX ORDER — GABAPENTIN 100 MG/1
CAPSULE ORAL
Qty: 180 CAPSULE | Refills: 5 | Status: SHIPPED | OUTPATIENT
Start: 2019-05-10 | End: 2019-09-13

## 2019-05-10 RX ORDER — PREDNISONE 20 MG/1
40 TABLET ORAL DAILY
Qty: 20 TABLET | Refills: 0 | Status: SHIPPED | OUTPATIENT
Start: 2019-05-10 | End: 2019-09-13 | Stop reason: SDUPTHER

## 2019-05-10 ASSESSMENT — PATIENT HEALTH QUESTIONNAIRE - PHQ9
2. FEELING DOWN, DEPRESSED OR HOPELESS: 1
SUM OF ALL RESPONSES TO PHQ9 QUESTIONS 1 & 2: 1
SUM OF ALL RESPONSES TO PHQ QUESTIONS 1-9: 1
SUM OF ALL RESPONSES TO PHQ QUESTIONS 1-9: 1
1. LITTLE INTEREST OR PLEASURE IN DOING THINGS: 0

## 2019-05-10 NOTE — PROGRESS NOTES
Subjective:  Mireya Vasquez is here to discuss the following issues. She has about one year of lumbar area back pain and radiation down her right leg to the foot. No bowel or bladder incontinence no fever sweats or chills no history of cancer no weight loss. She is using Neurontin 100 mg t.i.d. with minimal improvement. No sedation or other side effects. Social History     Tobacco Use   Smoking Status Current Every Day Smoker    Packs/day: 1.00    Years: 10.00    Pack years: 10.00    Types: Cigarettes   Smokeless Tobacco Never Used   Allergies:     Patient has no known allergies. Objective:  /81   Pulse 103   Temp 97.7 °F (36.5 °C) (Oral)   Ht 5' 4\" (1.626 m) Comment: with shoes  Wt 163 lb (73.9 kg)   LMP 11/01/2017 (Approximate)   SpO2 96%   BMI 27.98 kg/m²    No acute distress, heart regular rate and rhythm without murmur, lungs clear to auscultation easy effort, abdomen soft nondistended, no clubbing or cyanosis bilateral lumbar muscle spasm and tenderness positive right leg raise test    Assessment:  1. Lumbar radiculopathy            Plan:  Prednisone for 10 days  Increased Neurontin as needed for pain to maximum of 800 mg t.i.d. We discussed the possibility of sedation and other side effects  Advise me of effective dose of Neurontin for additional refills  Discuss the possibility of future lumbar MRI or epidural injections considering the severity of her pain  Follow-up in 4 months or p.r.n. \"Healthy Family Handout\" provided  Avoid exposure to all tobacco products.   Read and consider all information provided by the pharmacy regarding prescribed medications before use  Careful medical compliance  Proper diet and weight management   Otherwise continue current treatment plan  Call or return if symptoms are not well controlled  Go to ED if severe/significant symptoms occur    All medical conditions for this patient are stable unless otherwise indicated    Lizzie Olson MD    This note was transcribed using a voice recognition software system. Proper technique and careful oversight were used to increase transcription accuracy but inadvertent errors may be present.

## 2019-06-03 ENCOUNTER — TELEPHONE (OUTPATIENT)
Dept: FAMILY MEDICINE CLINIC | Age: 54
End: 2019-06-03

## 2019-06-03 NOTE — TELEPHONE ENCOUNTER
Noted. Please clarify that her pain is all in the low back and whether or not the Neurontin has helped at all.

## 2019-06-04 NOTE — TELEPHONE ENCOUNTER
Spoke to patient and she was taking the neurontin 100mg 6-8 tablets all at once in the morning.  I advised her she needs to take as instructed 1-2 capsules three times a day

## 2019-06-04 NOTE — TELEPHONE ENCOUNTER
Noted  Please send neurontin 100   May take up to 6 tid prn #100 plus 2  Higher doses may cause sedation

## 2019-06-27 ENCOUNTER — TELEPHONE (OUTPATIENT)
Dept: FAMILY MEDICINE CLINIC | Age: 54
End: 2019-06-27

## 2019-06-27 DIAGNOSIS — M54.16 LUMBAR RADICULOPATHY: Primary | ICD-10-CM

## 2019-06-27 DIAGNOSIS — M54.5 LOW BACK PAIN, UNSPECIFIED BACK PAIN LATERALITY, UNSPECIFIED CHRONICITY, WITH SCIATICA PRESENCE UNSPECIFIED: Primary | ICD-10-CM

## 2019-06-27 RX ORDER — HYDROCODONE BITARTRATE AND ACETAMINOPHEN 5; 325 MG/1; MG/1
1 TABLET ORAL EVERY 6 HOURS PRN
Qty: 20 TABLET | Refills: 0 | Status: SHIPPED | OUTPATIENT
Start: 2019-06-27 | End: 2019-07-02

## 2019-06-27 RX ORDER — TRAMADOL HYDROCHLORIDE 50 MG/1
50 TABLET ORAL 4 TIMES DAILY PRN
Qty: 40 TABLET | Refills: 0 | OUTPATIENT
Start: 2019-06-27 | End: 2019-06-27

## 2019-06-27 NOTE — TELEPHONE ENCOUNTER
Patient called back after I told her you were prescribing Tramadol for her and she said she has tried it in the past and it didn't help her. The last time it was prescribed was back in 2016.

## 2019-06-27 NOTE — TELEPHONE ENCOUNTER
Noted  Please add ultram 50 qid prn pain for 10 days   # 40   Please order lumbar mri without contrast

## 2019-06-27 NOTE — TELEPHONE ENCOUNTER
Patient said the Gabapentin is not helping her at all. She said she is taking 6 tablets tid.   Please advise

## 2019-07-02 RX ORDER — CYCLOBENZAPRINE HCL 10 MG
TABLET ORAL
Qty: 30 TABLET | Refills: 5 | Status: SHIPPED | OUTPATIENT
Start: 2019-07-02 | End: 2020-03-19 | Stop reason: ALTCHOICE

## 2019-07-02 RX ORDER — ESOMEPRAZOLE MAGNESIUM 40 MG/1
CAPSULE, DELAYED RELEASE ORAL
Qty: 30 CAPSULE | Refills: 5 | Status: SHIPPED | OUTPATIENT
Start: 2019-07-02 | End: 2019-12-30

## 2019-07-09 RX ORDER — MIRTAZAPINE 45 MG/1
TABLET, FILM COATED ORAL
Qty: 30 TABLET | Refills: 5 | Status: SHIPPED | OUTPATIENT
Start: 2019-07-09 | End: 2020-05-14

## 2019-07-11 ENCOUNTER — HOSPITAL ENCOUNTER (OUTPATIENT)
Dept: MRI IMAGING | Age: 54
Discharge: HOME | End: 2019-07-11

## 2019-07-11 DIAGNOSIS — M54.16 LUMBAR RADICULOPATHY: ICD-10-CM

## 2019-07-11 PROCEDURE — 72148 MRI LUMBAR SPINE W/O DYE: CPT

## 2019-07-15 ENCOUNTER — TELEPHONE (OUTPATIENT)
Dept: OTHER | Age: 54
End: 2019-07-15

## 2019-07-17 ENCOUNTER — TELEPHONE (OUTPATIENT)
Dept: OTHER | Age: 54
End: 2019-07-17

## 2019-07-17 DIAGNOSIS — M54.16 LUMBAR RADICULOPATHY: Primary | ICD-10-CM

## 2019-07-24 RX ORDER — DULOXETIN HYDROCHLORIDE 60 MG/1
CAPSULE, DELAYED RELEASE ORAL
Qty: 60 CAPSULE | Refills: 5 | Status: SHIPPED | OUTPATIENT
Start: 2019-07-24 | End: 2020-03-19 | Stop reason: ALTCHOICE

## 2019-08-13 ENCOUNTER — TELEPHONE (OUTPATIENT)
Dept: FAMILY MEDICINE CLINIC | Age: 54
End: 2019-08-13

## 2019-09-03 ENCOUNTER — HOSPITAL ENCOUNTER (OUTPATIENT)
Dept: MAMMOGRAPHY | Age: 54
Discharge: HOME | End: 2019-09-03

## 2019-09-03 DIAGNOSIS — Z12.31 ENCOUNTER FOR SCREENING MAMMOGRAM FOR BREAST CANCER: ICD-10-CM

## 2019-09-03 PROCEDURE — 77063 BREAST TOMOSYNTHESIS BI: CPT

## 2019-09-13 ENCOUNTER — OFFICE VISIT (OUTPATIENT)
Dept: FAMILY MEDICINE CLINIC | Age: 54
End: 2019-09-13

## 2019-09-13 VITALS
TEMPERATURE: 98.7 F | HEART RATE: 97 BPM | OXYGEN SATURATION: 92 % | DIASTOLIC BLOOD PRESSURE: 82 MMHG | SYSTOLIC BLOOD PRESSURE: 125 MMHG | BODY MASS INDEX: 27.46 KG/M2 | WEIGHT: 160 LBS

## 2019-09-13 DIAGNOSIS — S46.819A STRAIN OF TRAPEZIUS MUSCLE, UNSPECIFIED LATERALITY, INITIAL ENCOUNTER: ICD-10-CM

## 2019-09-13 DIAGNOSIS — Z12.11 COLON CANCER SCREENING: ICD-10-CM

## 2019-09-13 DIAGNOSIS — M54.16 LUMBAR RADICULOPATHY: Primary | ICD-10-CM

## 2019-09-13 PROCEDURE — 99213 OFFICE O/P EST LOW 20 MIN: CPT | Performed by: FAMILY MEDICINE

## 2019-09-13 RX ORDER — PREDNISONE 20 MG/1
40 TABLET ORAL DAILY
Qty: 20 TABLET | Refills: 0 | Status: SHIPPED | OUTPATIENT
Start: 2019-09-13 | End: 2020-03-19 | Stop reason: SDUPTHER

## 2019-09-13 NOTE — PROGRESS NOTES
Subjective:  Flex Lozada is here to discuss the following issues. She has lumbar radiculopathy and is working with [employer] toward epidural injections. She is aware she may require surgery. She would like to take prednisone again because it was beneficial in her pain currently is fairly severe. No incontinence. She has some trapezius muscle spasm bilaterally with no radicular arm symptoms  Social History     Tobacco Use   Smoking Status Current Every Day Smoker    Packs/day: 1.00    Years: 10.00    Pack years: 10.00    Types: Cigarettes   Smokeless Tobacco Never Used   Allergies:     Patient has no known allergies. Objective:  /82   Pulse 97   Temp 98.7 °F (37.1 °C) (Oral)   Wt 160 lb (72.6 kg)   LMP 11/01/2017 (Approximate)   SpO2 92%   BMI 27.46 kg/m²    Bilateral lumbar muscle spasm and tenderness bilateral trapezius spasm and tenderness    Assessment:  1. Lumbar radiculopathy    2. Strain of trapezius muscle, unspecified laterality, initial encounter            Plan:  Repeat course of prednisone  Continue to work with [employer] toward epidural injections and potentially surgery for her low back pain and radiculopathy  Educated on proper trapezius stretching  Follow-up in 6 months fasting or as needed  [employer]" provided  Avoid exposure to all tobacco products. Read and consider all information provided by the pharmacy regarding prescribed medications before use  Careful medical compliance  Proper diet and weight management   Otherwise continue current treatment plan  Call or return if symptoms are not well controlled  Go to ED if severe/significant symptoms occur    All medical conditions for this patient are stable unless otherwise indicated    Tram Sanchez MD    This note was transcribed using a voice recognition software system.   Proper technique and careful oversight were used to increase transcription accuracy but inadvertent

## 2019-09-13 NOTE — PATIENT INSTRUCTIONS
Please read the healthy family handout that you were given and share it with your family. Please compare this printed medication list with your medications at home to be sure they are the same. If you have any medications that are different please contact us immediately at [phone]. Also review your allergies that we have listed, these may also include medications that you have not been able to tolerate, make sure everything listed is correct. If you have any allergies that are different please contact us immediately at [phone].

## 2019-10-01 ENCOUNTER — TELEPHONE (OUTPATIENT)
Dept: FAMILY MEDICINE CLINIC | Age: 54
End: 2019-10-01

## 2019-10-04 ENCOUNTER — NURSE ONLY (OUTPATIENT)
Dept: FAMILY MEDICINE CLINIC | Age: 54
End: 2019-10-04

## 2019-10-04 DIAGNOSIS — Z12.11 COLON CANCER SCREENING: ICD-10-CM

## 2019-10-04 LAB
CONTROL: NORMAL
HEMOCCULT STL QL: NEGATIVE

## 2019-10-04 PROCEDURE — 82274 ASSAY TEST FOR BLOOD FECAL: CPT | Performed by: FAMILY MEDICINE

## 2019-12-30 RX ORDER — ESOMEPRAZOLE MAGNESIUM 40 MG/1
CAPSULE, DELAYED RELEASE ORAL
Qty: 30 CAPSULE | Refills: 4 | Status: SHIPPED | OUTPATIENT
Start: 2019-12-30 | End: 2020-03-19

## 2020-03-19 ENCOUNTER — OFFICE VISIT (OUTPATIENT)
Dept: FAMILY MEDICINE CLINIC | Age: 55
End: 2020-03-19

## 2020-03-19 VITALS
WEIGHT: 140 LBS | SYSTOLIC BLOOD PRESSURE: 111 MMHG | DIASTOLIC BLOOD PRESSURE: 73 MMHG | HEIGHT: 62 IN | HEART RATE: 96 BPM | BODY MASS INDEX: 25.76 KG/M2 | TEMPERATURE: 97.7 F | OXYGEN SATURATION: 96 %

## 2020-03-19 PROBLEM — F33.1 MODERATE EPISODE OF RECURRENT MAJOR DEPRESSIVE DISORDER (HCC): Status: ACTIVE | Noted: 2020-03-19

## 2020-03-19 PROCEDURE — G0444 DEPRESSION SCREEN ANNUAL: HCPCS | Performed by: FAMILY MEDICINE

## 2020-03-19 PROCEDURE — 99214 OFFICE O/P EST MOD 30 MIN: CPT | Performed by: FAMILY MEDICINE

## 2020-03-19 RX ORDER — TIZANIDINE 4 MG/1
4 TABLET ORAL 3 TIMES DAILY
COMMUNITY
Start: 2019-12-24 | End: 2020-06-15

## 2020-03-19 RX ORDER — DEXLANSOPRAZOLE 60 MG/1
60 CAPSULE, DELAYED RELEASE ORAL DAILY
Qty: 30 CAPSULE | Refills: 5 | Status: SHIPPED | OUTPATIENT
Start: 2020-03-19 | End: 2020-04-10

## 2020-03-19 RX ORDER — TRAMADOL HYDROCHLORIDE 50 MG/1
50-100 TABLET ORAL NIGHTLY
Qty: 60 TABLET | Refills: 3 | Status: SHIPPED | OUTPATIENT
Start: 2020-03-19 | End: 2020-04-18

## 2020-03-19 RX ORDER — FLUOXETINE HYDROCHLORIDE 20 MG/1
20 CAPSULE ORAL DAILY
Qty: 30 CAPSULE | Refills: 5 | Status: SHIPPED | OUTPATIENT
Start: 2020-03-19 | End: 2020-04-10 | Stop reason: SDUPTHER

## 2020-03-19 RX ORDER — PREDNISONE 20 MG/1
40 TABLET ORAL DAILY
Qty: 20 TABLET | Refills: 0 | Status: SHIPPED | OUTPATIENT
Start: 2020-03-19 | End: 2020-03-29

## 2020-03-19 ASSESSMENT — PATIENT HEALTH QUESTIONNAIRE - PHQ9
6. FEELING BAD ABOUT YOURSELF - OR THAT YOU ARE A FAILURE OR HAVE LET YOURSELF OR YOUR FAMILY DOWN: 1
5. POOR APPETITE OR OVEREATING: 2
SUM OF ALL RESPONSES TO PHQ9 QUESTIONS 1 & 2: 5
4. FEELING TIRED OR HAVING LITTLE ENERGY: 3
SUM OF ALL RESPONSES TO PHQ QUESTIONS 1-9: 13
SUM OF ALL RESPONSES TO PHQ QUESTIONS 1-9: 13
3. TROUBLE FALLING OR STAYING ASLEEP: 2
1. LITTLE INTEREST OR PLEASURE IN DOING THINGS: 2
9. THOUGHTS THAT YOU WOULD BE BETTER OFF DEAD, OR OF HURTING YOURSELF: 0
2. FEELING DOWN, DEPRESSED OR HOPELESS: 3
7. TROUBLE CONCENTRATING ON THINGS, SUCH AS READING THE NEWSPAPER OR WATCHING TELEVISION: 0
10. IF YOU CHECKED OFF ANY PROBLEMS, HOW DIFFICULT HAVE THESE PROBLEMS MADE IT FOR YOU TO DO YOUR WORK, TAKE CARE OF THINGS AT HOME, OR GET ALONG WITH OTHER PEOPLE: 3

## 2020-03-19 NOTE — PROGRESS NOTES
Subjective:  Alfonso Bowers is here to discuss the following issues. She has chronic bilateral low back pain and radiculopathy. Her radiculopathy has improved but her back pain continues and is severe and greatly limits her ADLs. She is no longer able to work. She cannot attend events with her grandchildren. She has received several epidural injections without much improvement. Gabapentin was not helpful. She has GERD and is tried numerous medications. Currently she is on Nexium. Occasionally she will have reflux so severe she vomits. No dysphasia. No fever sweats or chills. She has a history of depression and feels down and sad and tearful. No suicidal homicidal thoughts. Previous prescription medicines were not sufficiently beneficial  Social History     Tobacco Use   Smoking Status Current Every Day Smoker    Packs/day: 1.00    Years: 10.00    Pack years: 10.00    Types: Cigarettes   Smokeless Tobacco Never Used   Allergies:     Patient has no known allergies. Objective:  /73   Pulse 96   Temp 97.7 °F (36.5 °C) (Oral)   Ht 5' 2.25\" (1.581 m)   Wt 140 lb (63.5 kg)   LMP 11/01/2017 (Approximate)   SpO2 96%   BMI 25.40 kg/m²    No acute distress, heart regular rate and rhythm without murmur, lungs clear to auscultation easy effort, abdomen soft nondistended, no clubbing or cyanosis mood happy affect reactive bilateral lumbar muscle spasm and tenderness    Assessment:  1. Lumbar radiculopathy    2. Chronic bilateral low back pain without sciatica    3. Gastroesophageal reflux disease without esophagitis    4. Moderate episode of recurrent major depressive disorder ([address]            Plan:  Continue to work with pain specialist.  Epidural injections have not been sufficiently helpful. She will consider radioablation therapy.   If not she will agree to surgery referral  Tramadol 1 or 2 tablets at bedtime due to insomnia from pain  Prozac  Prednisone for 10 days which is typically

## 2020-03-24 ENCOUNTER — TELEPHONE (OUTPATIENT)
Dept: FAMILY MEDICINE CLINIC | Age: 55
End: 2020-03-24

## 2020-04-01 ENCOUNTER — TELEPHONE (OUTPATIENT)
Dept: FAMILY MEDICINE CLINIC | Age: 55
End: 2020-04-01

## 2020-04-01 RX ORDER — LANSOPRAZOLE 30 MG/1
30 CAPSULE, DELAYED RELEASE ORAL DAILY
Qty: 30 CAPSULE | Refills: 5 | Status: ON HOLD
Start: 2020-04-01 | End: 2020-06-05 | Stop reason: HOSPADM

## 2020-04-01 RX ORDER — FAMOTIDINE 40 MG/1
40 TABLET, FILM COATED ORAL EVERY EVENING
Qty: 30 TABLET | Refills: 5 | Status: SHIPPED | OUTPATIENT
Start: 2020-04-01 | End: 2020-08-18 | Stop reason: SDUPTHER

## 2020-04-10 ENCOUNTER — VIRTUAL VISIT (OUTPATIENT)
Dept: FAMILY MEDICINE CLINIC | Age: 55
End: 2020-04-10

## 2020-04-10 PROCEDURE — 99214 OFFICE O/P EST MOD 30 MIN: CPT | Performed by: FAMILY MEDICINE

## 2020-04-10 RX ORDER — FLUOXETINE HYDROCHLORIDE 40 MG/1
40 CAPSULE ORAL DAILY
Qty: 30 CAPSULE | Refills: 5 | Status: ON HOLD
Start: 2020-04-10 | End: 2020-06-05 | Stop reason: HOSPADM

## 2020-04-10 NOTE — PROGRESS NOTES
Drug use: No            PHYSICAL EXAMINATION:  [ INSTRUCTIONS:  \"[x]\" Indicates a positive item  \"[]\" Indicates a negative item  -- DELETE ALL ITEMS NOT EXAMINED]  Vital Signs: (As obtained by patient/caregiver or practitioner observation)    Blood pressure-  Heart rate-    Respiratory rate-    Temperature-  Pulse oximetry-     Constitutional: [x] Appears well-developed and well-nourished [] No apparent distress      [] Abnormal-   Mental status  [x] Alert and awake  [x] Oriented to person/place/time []Able to follow commands      Eyes:  EOM    [x]  Normal  [] Abnormal-  Sclera  [x]  Normal  [] Abnormal -         Discharge []  None visible  [] Abnormal -    HENT:   [] Normocephalic, atraumatic. [] Abnormal   [] Mouth/Throat: Mucous membranes are moist.     External Ears [] Normal  [] Abnormal-     Neck: [] No visualized mass     Pulmonary/Chest: [x] Respiratory effort normal.  [x] No visualized signs of difficulty breathing or respiratory distress        [] Abnormal-      Musculoskeletal:   [] Normal gait with no signs of ataxia         [] Normal range of motion of neck        [] Abnormal-       Neurological:        [x] No Facial Asymmetry (Cranial nerve 7 motor function) (limited exam to video visit)          [] No gaze palsy        [] Abnormal-         Skin:        [] No significant exanthematous lesions or discoloration noted on facial skin         [] Abnormal-            Psychiatric:       [x] Normal Affect [] No Hallucinations        [] Abnormal-     Other pertinent observable physical exam findings-     ASSESSMENT/PLAN:  1. Lumbar radiculopathy    2. Gastroesophageal reflux disease without esophagitis    3.  Moderate episode of recurrent major depressive disorder ([address]    Increase Prozac  Advise me if Prevacid plus Pepcid does not adequately control reflux  Keep upcoming appointment with pain specialist to consider ablation treatment since epidural injections were not especially beneficial  Continue tramadol and other medications  Call with an update in 3 weeks and follow-up in 2 months or as needed    Return in about 2 months (around 6/10/2020). Temitope Gregory is a 48 y.o. female being evaluated by a Virtual Visit (video visit) encounter to address concerns as mentioned above. A caregiver was present when appropriate. Due to this being a TeleHealth encounter (During GQW-92 public health emergency), evaluation of the following organ systems was limited: Vitals/Constitutional/EENT/Resp/CV/GI//MS/Neuro/Skin/Heme-Lymph-Imm. Pursuant to the emergency declaration under the [address] [place] [address], [address] [place] [address] authority and the [employer] and [employer] Act, this Virtual Visit was conducted with patient's (and/or legal guardian's) consent, to reduce the patient's risk of exposure to COVID-19 and provide necessary medical care. The patient (and/or legal guardian) has also been advised to contact this office for worsening conditions or problems, and seek emergency medical treatment and/or call 911 if deemed necessary. Services were provided through a video synchronous discussion virtually to substitute for in-person clinic visit. Patient and provider were located at their individual homes. --Ella Valdez MD on 4/10/2020 at 4:24 PM    An electronic signature was used to authenticate this note.

## 2020-05-14 ENCOUNTER — TELEPHONE (OUTPATIENT)
Dept: FAMILY MEDICINE CLINIC | Age: 55
End: 2020-05-14

## 2020-05-14 RX ORDER — MIRTAZAPINE 45 MG/1
TABLET, FILM COATED ORAL
Qty: 30 TABLET | Refills: 5 | Status: ON HOLD
Start: 2020-05-14 | End: 2020-06-05 | Stop reason: HOSPADM

## 2020-05-14 NOTE — TELEPHONE ENCOUNTER
Patient was started on Prevacid and Pepcid but she states it is not helping, wanting to know if there is something else you can prescribe for her

## 2020-05-19 ENCOUNTER — APPOINTMENT (OUTPATIENT)
Dept: GENERAL RADIOLOGY | Age: 55
End: 2020-05-19
Payer: MEDICAID

## 2020-05-19 ENCOUNTER — HOSPITAL ENCOUNTER (EMERGENCY)
Age: 55
Discharge: ANOTHER HOSPITAL | End: 2020-05-19
Payer: MEDICAID

## 2020-05-19 ENCOUNTER — HOSPITAL ENCOUNTER (INPATIENT)
Age: 55
LOS: 3 days | Discharge: HOME | DRG: 174 | End: 2020-05-22
Attending: INTERNAL MEDICINE | Admitting: INTERNAL MEDICINE
Payer: MEDICAID

## 2020-05-19 VITALS
OXYGEN SATURATION: 97 % | SYSTOLIC BLOOD PRESSURE: 116 MMHG | HEIGHT: 63 IN | RESPIRATION RATE: 20 BRPM | HEART RATE: 81 BPM | WEIGHT: 137 LBS | TEMPERATURE: 97.5 F | BODY MASS INDEX: 24.27 KG/M2 | DIASTOLIC BLOOD PRESSURE: 84 MMHG

## 2020-05-19 PROBLEM — I21.3 STEMI (ST ELEVATION MYOCARDIAL INFARCTION) (HCC): Status: ACTIVE | Noted: 2020-05-19

## 2020-05-19 LAB
A/G RATIO: 1.6 (ref 1.1–2.2)
ALBUMIN SERPL-MCNC: 4.6 G/DL (ref 3.4–5)
ALP BLD-CCNC: 101 U/L (ref 40–129)
ALT SERPL-CCNC: 21 U/L (ref 10–40)
ANION GAP SERPL CALCULATED.3IONS-SCNC: 16 MMOL/L (ref 3–16)
APTT: 28.7 SEC (ref 24.2–36.2)
AST SERPL-CCNC: 42 U/L (ref 15–37)
BILIRUB SERPL-MCNC: 0.5 MG/DL (ref 0–1)
BUN BLDV-MCNC: 15 MG/DL (ref 7–20)
CALCIUM SERPL-MCNC: 10.2 MG/DL (ref 8.3–10.6)
CHLORIDE BLD-SCNC: 101 MMOL/L (ref 99–110)
CO2: 24 MMOL/L (ref 21–32)
CREAT SERPL-MCNC: 0.6 MG/DL (ref 0.6–1.1)
EKG ATRIAL RATE: 71 BPM
EKG ATRIAL RATE: 73 BPM
EKG ATRIAL RATE: 76 BPM
EKG DIAGNOSIS: NORMAL
EKG P AXIS: 62 DEGREES
EKG P AXIS: 63 DEGREES
EKG P AXIS: 66 DEGREES
EKG P-R INTERVAL: 162 MS
EKG P-R INTERVAL: 168 MS
EKG P-R INTERVAL: 170 MS
EKG Q-T INTERVAL: 378 MS
EKG Q-T INTERVAL: 416 MS
EKG Q-T INTERVAL: 436 MS
EKG QRS DURATION: 90 MS
EKG QRS DURATION: 92 MS
EKG QRS DURATION: 96 MS
EKG QTC CALCULATION (BAZETT): 416 MS
EKG QTC CALCULATION (BAZETT): 452 MS
EKG QTC CALCULATION (BAZETT): 490 MS
EKG R AXIS: -8 DEGREES
EKG R AXIS: 33 DEGREES
EKG R AXIS: 45 DEGREES
EKG T AXIS: 107 DEGREES
EKG T AXIS: 111 DEGREES
EKG T AXIS: 56 DEGREES
EKG VENTRICULAR RATE: 71 BPM
EKG VENTRICULAR RATE: 73 BPM
EKG VENTRICULAR RATE: 76 BPM
GFR AFRICAN AMERICAN: >60
GFR NON-AFRICAN AMERICAN: >60
GLOBULIN: 2.8 G/DL
GLUCOSE BLD-MCNC: 167 MG/DL (ref 70–99)
HCT VFR BLD CALC: 47.1 % (ref 36–48)
HEMATOLOGY PATH CONSULT: NO
HEMOGLOBIN: 15.7 G/DL (ref 12–16)
LEFT VENTRICULAR EJECTION FRACTION MODE: NORMAL
LIPASE: 14 U/L (ref 13–60)
LV EF: 35 %
LV EF: 35 %
LV EF: 40 %
LVEF MODALITY: NORMAL
LVEF MODALITY: NORMAL
MCH RBC QN AUTO: 29.5 PG (ref 26–34)
MCHC RBC AUTO-ENTMCNC: 33.4 G/DL (ref 31–36)
MCV RBC AUTO: 88.5 FL (ref 80–100)
PDW BLD-RTO: 13.1 % (ref 12.4–15.4)
PLATELET # BLD: 184 K/UL (ref 135–450)
PLATELET SLIDE REVIEW: ADEQUATE
PMV BLD AUTO: 11.7 FL (ref 5–10.5)
POC ACT LR: 191 SEC
POC ACT LR: 250 SEC
POC ACT LR: 365 SEC
POTASSIUM SERPL-SCNC: 4 MMOL/L (ref 3.5–5.1)
PRO-BNP: 671 PG/ML (ref 0–124)
RBC # BLD: 5.32 M/UL (ref 4–5.2)
SLIDE REVIEW: ABNORMAL
SODIUM BLD-SCNC: 141 MMOL/L (ref 136–145)
TOTAL PROTEIN: 7.4 G/DL (ref 6.4–8.2)
TROPONIN: 0.25 NG/ML
TROPONIN: 0.46 NG/ML
TROPONIN: 11.1 NG/ML
WBC # BLD: 11.9 K/UL (ref 4–11)

## 2020-05-19 PROCEDURE — 71045 X-RAY EXAM CHEST 1 VIEW: CPT

## 2020-05-19 PROCEDURE — C1874 STENT, COATED/COV W/DEL SYS: HCPCS

## 2020-05-19 PROCEDURE — 2500000003 HC RX 250 WO HCPCS: Performed by: NURSE PRACTITIONER

## 2020-05-19 PROCEDURE — 85730 THROMBOPLASTIN TIME PARTIAL: CPT

## 2020-05-19 PROCEDURE — 36415 COLL VENOUS BLD VENIPUNCTURE: CPT

## 2020-05-19 PROCEDURE — 6370000000 HC RX 637 (ALT 250 FOR IP): Performed by: INTERNAL MEDICINE

## 2020-05-19 PROCEDURE — 6370000000 HC RX 637 (ALT 250 FOR IP): Performed by: NURSE PRACTITIONER

## 2020-05-19 PROCEDURE — 84484 ASSAY OF TROPONIN QUANT: CPT

## 2020-05-19 PROCEDURE — 027035Z DILATION OF CORONARY ARTERY, ONE ARTERY WITH TWO DRUG-ELUTING INTRALUMINAL DEVICES, PERCUTANEOUS APPROACH: ICD-10-PCS | Performed by: INTERNAL MEDICINE

## 2020-05-19 PROCEDURE — 6360000002 HC RX W HCPCS: Performed by: INTERNAL MEDICINE

## 2020-05-19 PROCEDURE — C1753 CATH, INTRAVAS ULTRASOUND: HCPCS

## 2020-05-19 PROCEDURE — 93458 L HRT ARTERY/VENTRICLE ANGIO: CPT | Performed by: INTERNAL MEDICINE

## 2020-05-19 PROCEDURE — C1887 CATHETER, GUIDING: HCPCS

## 2020-05-19 PROCEDURE — 99255 IP/OBS CONSLTJ NEW/EST HI 80: CPT | Performed by: THORACIC SURGERY (CARDIOTHORACIC VASCULAR SURGERY)

## 2020-05-19 PROCEDURE — B2111ZZ FLUOROSCOPY OF MULTIPLE CORONARY ARTERIES USING LOW OSMOLAR CONTRAST: ICD-10-PCS | Performed by: INTERNAL MEDICINE

## 2020-05-19 PROCEDURE — 99153 MOD SED SAME PHYS/QHP EA: CPT

## 2020-05-19 PROCEDURE — 92978 ENDOLUMINL IVUS OCT C 1ST: CPT

## 2020-05-19 PROCEDURE — 83690 ASSAY OF LIPASE: CPT

## 2020-05-19 PROCEDURE — 2100000000 HC CCU R&B

## 2020-05-19 PROCEDURE — 93010 ELECTROCARDIOGRAM REPORT: CPT | Performed by: INTERNAL MEDICINE

## 2020-05-19 PROCEDURE — 93005 ELECTROCARDIOGRAM TRACING: CPT | Performed by: NURSE PRACTITIONER

## 2020-05-19 PROCEDURE — 83880 ASSAY OF NATRIURETIC PEPTIDE: CPT

## 2020-05-19 PROCEDURE — 6360000004 HC RX CONTRAST MEDICATION: Performed by: INTERNAL MEDICINE

## 2020-05-19 PROCEDURE — C8929 TTE W OR WO FOL WCON,DOPPLER: HCPCS

## 2020-05-19 PROCEDURE — 85347 COAGULATION TIME ACTIVATED: CPT

## 2020-05-19 PROCEDURE — 96375 TX/PRO/DX INJ NEW DRUG ADDON: CPT

## 2020-05-19 PROCEDURE — 94761 N-INVAS EAR/PLS OXIMETRY MLT: CPT

## 2020-05-19 PROCEDURE — 2720000010 HC SURG SUPPLY STERILE

## 2020-05-19 PROCEDURE — 02C03ZZ EXTIRPATION OF MATTER FROM CORONARY ARTERY, ONE ARTERY, PERCUTANEOUS APPROACH: ICD-10-PCS | Performed by: INTERNAL MEDICINE

## 2020-05-19 PROCEDURE — B2151ZZ FLUOROSCOPY OF LEFT HEART USING LOW OSMOLAR CONTRAST: ICD-10-PCS | Performed by: INTERNAL MEDICINE

## 2020-05-19 PROCEDURE — 92973 PRQ TRLUML C MCHN ASP THRMBC: CPT

## 2020-05-19 PROCEDURE — 4A023N7 MEASUREMENT OF CARDIAC SAMPLING AND PRESSURE, LEFT HEART, PERCUTANEOUS APPROACH: ICD-10-PCS | Performed by: INTERNAL MEDICINE

## 2020-05-19 PROCEDURE — 85027 COMPLETE CBC AUTOMATED: CPT

## 2020-05-19 PROCEDURE — B240ZZ3 ULTRASONOGRAPHY OF SINGLE CORONARY ARTERY, INTRAVASCULAR: ICD-10-PCS | Performed by: INTERNAL MEDICINE

## 2020-05-19 PROCEDURE — 2580000003 HC RX 258: Performed by: INTERNAL MEDICINE

## 2020-05-19 PROCEDURE — C1894 INTRO/SHEATH, NON-LASER: HCPCS

## 2020-05-19 PROCEDURE — 99291 CRITICAL CARE FIRST HOUR: CPT | Performed by: INTERNAL MEDICINE

## 2020-05-19 PROCEDURE — 6360000002 HC RX W HCPCS: Performed by: NURSE PRACTITIONER

## 2020-05-19 PROCEDURE — 2709999900 HC NON-CHARGEABLE SUPPLY

## 2020-05-19 PROCEDURE — 6360000002 HC RX W HCPCS

## 2020-05-19 PROCEDURE — C1725 CATH, TRANSLUMIN NON-LASER: HCPCS

## 2020-05-19 PROCEDURE — 80053 COMPREHEN METABOLIC PANEL: CPT

## 2020-05-19 PROCEDURE — 96365 THER/PROPH/DIAG IV INF INIT: CPT

## 2020-05-19 PROCEDURE — 92978 ENDOLUMINL IVUS OCT C 1ST: CPT | Performed by: INTERNAL MEDICINE

## 2020-05-19 PROCEDURE — C1769 GUIDE WIRE: HCPCS

## 2020-05-19 PROCEDURE — 93458 L HRT ARTERY/VENTRICLE ANGIO: CPT

## 2020-05-19 PROCEDURE — 92973 PRQ TRLUML C MCHN ASP THRMBC: CPT | Performed by: INTERNAL MEDICINE

## 2020-05-19 PROCEDURE — 99152 MOD SED SAME PHYS/QHP 5/>YRS: CPT

## 2020-05-19 PROCEDURE — 2580000003 HC RX 258

## 2020-05-19 PROCEDURE — 6360000004 HC RX CONTRAST MEDICATION

## 2020-05-19 PROCEDURE — 92941 PRQ TRLML REVSC TOT OCCL AMI: CPT

## 2020-05-19 PROCEDURE — 92941 PRQ TRLML REVSC TOT OCCL AMI: CPT | Performed by: INTERNAL MEDICINE

## 2020-05-19 PROCEDURE — 2580000003 HC RX 258: Performed by: EMERGENCY MEDICINE

## 2020-05-19 PROCEDURE — 2700000000 HC OXYGEN THERAPY PER DAY

## 2020-05-19 PROCEDURE — 93005 ELECTROCARDIOGRAM TRACING: CPT | Performed by: INTERNAL MEDICINE

## 2020-05-19 PROCEDURE — 99291 CRITICAL CARE FIRST HOUR: CPT

## 2020-05-19 RX ORDER — POLYETHYLENE GLYCOL 3350 17 G/17G
17 POWDER, FOR SOLUTION ORAL DAILY PRN
Status: DISCONTINUED | OUTPATIENT
Start: 2020-05-19 | End: 2020-05-22 | Stop reason: HOSPADM

## 2020-05-19 RX ORDER — LISINOPRIL 2.5 MG/1
2.5 TABLET ORAL DAILY
Status: DISCONTINUED | OUTPATIENT
Start: 2020-05-19 | End: 2020-05-21

## 2020-05-19 RX ORDER — CARVEDILOL 3.12 MG/1
3.12 TABLET ORAL 2 TIMES DAILY WITH MEALS
Status: DISCONTINUED | OUTPATIENT
Start: 2020-05-19 | End: 2020-05-22 | Stop reason: HOSPADM

## 2020-05-19 RX ORDER — MIDAZOLAM HYDROCHLORIDE 5 MG/ML
INJECTION INTRAMUSCULAR; INTRAVENOUS
Status: COMPLETED | OUTPATIENT
Start: 2020-05-19 | End: 2020-05-19

## 2020-05-19 RX ORDER — FLUOXETINE HYDROCHLORIDE 20 MG/1
40 CAPSULE ORAL DAILY
Status: DISCONTINUED | OUTPATIENT
Start: 2020-05-19 | End: 2020-05-22 | Stop reason: HOSPADM

## 2020-05-19 RX ORDER — PROMETHAZINE HYDROCHLORIDE 25 MG/1
12.5 TABLET ORAL EVERY 6 HOURS PRN
Status: DISCONTINUED | OUTPATIENT
Start: 2020-05-19 | End: 2020-05-22 | Stop reason: HOSPADM

## 2020-05-19 RX ORDER — HEPARIN SODIUM 1000 [USP'U]/ML
30 INJECTION, SOLUTION INTRAVENOUS; SUBCUTANEOUS PRN
Status: DISCONTINUED | OUTPATIENT
Start: 2020-05-19 | End: 2020-05-19 | Stop reason: HOSPADM

## 2020-05-19 RX ORDER — FENTANYL CITRATE 50 UG/ML
INJECTION, SOLUTION INTRAMUSCULAR; INTRAVENOUS
Status: COMPLETED | OUTPATIENT
Start: 2020-05-19 | End: 2020-05-19

## 2020-05-19 RX ORDER — SODIUM CHLORIDE 0.9 % (FLUSH) 0.9 %
10 SYRINGE (ML) INJECTION PRN
Status: DISCONTINUED | OUTPATIENT
Start: 2020-05-19 | End: 2020-05-22 | Stop reason: HOSPADM

## 2020-05-19 RX ORDER — SODIUM CHLORIDE 0.9 % (FLUSH) 0.9 %
10 SYRINGE (ML) INJECTION EVERY 12 HOURS SCHEDULED
Status: DISCONTINUED | OUTPATIENT
Start: 2020-05-19 | End: 2020-05-22 | Stop reason: HOSPADM

## 2020-05-19 RX ORDER — SODIUM CHLORIDE 0.9 % (FLUSH) 0.9 %
10 SYRINGE (ML) INJECTION EVERY 12 HOURS SCHEDULED
Status: DISCONTINUED | OUTPATIENT
Start: 2020-05-19 | End: 2020-05-19 | Stop reason: SDUPTHER

## 2020-05-19 RX ORDER — ASPIRIN 81 MG/1
324 TABLET, CHEWABLE ORAL ONCE
Status: COMPLETED | OUTPATIENT
Start: 2020-05-19 | End: 2020-05-19

## 2020-05-19 RX ORDER — SODIUM CHLORIDE 0.9 % (FLUSH) 0.9 %
10 SYRINGE (ML) INJECTION PRN
Status: DISCONTINUED | OUTPATIENT
Start: 2020-05-19 | End: 2020-05-19 | Stop reason: SDUPTHER

## 2020-05-19 RX ORDER — NITROGLYCERIN 20 MG/100ML
5 INJECTION INTRAVENOUS CONTINUOUS
Status: DISCONTINUED | OUTPATIENT
Start: 2020-05-19 | End: 2020-05-19 | Stop reason: HOSPADM

## 2020-05-19 RX ORDER — ASPIRIN 81 MG/1
81 TABLET, CHEWABLE ORAL DAILY
Status: DISCONTINUED | OUTPATIENT
Start: 2020-05-20 | End: 2020-05-22 | Stop reason: HOSPADM

## 2020-05-19 RX ORDER — HEPARIN SODIUM 1000 [USP'U]/ML
INJECTION, SOLUTION INTRAVENOUS; SUBCUTANEOUS
Status: COMPLETED | OUTPATIENT
Start: 2020-05-19 | End: 2020-05-19

## 2020-05-19 RX ORDER — ACETAMINOPHEN 325 MG/1
650 TABLET ORAL EVERY 6 HOURS PRN
Status: DISCONTINUED | OUTPATIENT
Start: 2020-05-19 | End: 2020-05-22 | Stop reason: HOSPADM

## 2020-05-19 RX ORDER — ATORVASTATIN CALCIUM 80 MG/1
80 TABLET, FILM COATED ORAL NIGHTLY
Status: DISCONTINUED | OUTPATIENT
Start: 2020-05-19 | End: 2020-05-22 | Stop reason: HOSPADM

## 2020-05-19 RX ORDER — ASPIRIN 81 MG/1
81 TABLET, CHEWABLE ORAL DAILY
Status: DISCONTINUED | OUTPATIENT
Start: 2020-05-19 | End: 2020-05-19 | Stop reason: SDUPTHER

## 2020-05-19 RX ORDER — HEPARIN SODIUM 1000 [USP'U]/ML
60 INJECTION, SOLUTION INTRAVENOUS; SUBCUTANEOUS PRN
Status: DISCONTINUED | OUTPATIENT
Start: 2020-05-19 | End: 2020-05-19 | Stop reason: HOSPADM

## 2020-05-19 RX ORDER — ACETAMINOPHEN 650 MG/1
650 SUPPOSITORY RECTAL EVERY 6 HOURS PRN
Status: DISCONTINUED | OUTPATIENT
Start: 2020-05-19 | End: 2020-05-22 | Stop reason: HOSPADM

## 2020-05-19 RX ORDER — ATORVASTATIN CALCIUM 40 MG/1
40 TABLET, FILM COATED ORAL NIGHTLY
Status: DISCONTINUED | OUTPATIENT
Start: 2020-05-19 | End: 2020-05-19

## 2020-05-19 RX ORDER — HEPARIN SODIUM 10000 [USP'U]/100ML
12 INJECTION, SOLUTION INTRAVENOUS CONTINUOUS
Status: DISCONTINUED | OUTPATIENT
Start: 2020-05-19 | End: 2020-05-19 | Stop reason: HOSPADM

## 2020-05-19 RX ORDER — ONDANSETRON 2 MG/ML
4 INJECTION INTRAMUSCULAR; INTRAVENOUS ONCE
Status: COMPLETED | OUTPATIENT
Start: 2020-05-19 | End: 2020-05-19

## 2020-05-19 RX ORDER — ONDANSETRON 2 MG/ML
4 INJECTION INTRAMUSCULAR; INTRAVENOUS EVERY 6 HOURS PRN
Status: DISCONTINUED | OUTPATIENT
Start: 2020-05-19 | End: 2020-05-22 | Stop reason: HOSPADM

## 2020-05-19 RX ORDER — PANTOPRAZOLE SODIUM 40 MG/1
40 TABLET, DELAYED RELEASE ORAL
Status: DISCONTINUED | OUTPATIENT
Start: 2020-05-20 | End: 2020-05-22 | Stop reason: HOSPADM

## 2020-05-19 RX ORDER — 0.9 % SODIUM CHLORIDE 0.9 %
INTRAVENOUS SOLUTION INTRAVENOUS CONTINUOUS PRN
Status: COMPLETED | OUTPATIENT
Start: 2020-05-19 | End: 2020-05-19

## 2020-05-19 RX ORDER — 0.9 % SODIUM CHLORIDE 0.9 %
500 INTRAVENOUS SOLUTION INTRAVENOUS ONCE
Status: COMPLETED | OUTPATIENT
Start: 2020-05-19 | End: 2020-05-19

## 2020-05-19 RX ORDER — HEPARIN SODIUM 5000 [USP'U]/ML
60 INJECTION, SOLUTION INTRAVENOUS; SUBCUTANEOUS ONCE
Status: COMPLETED | OUTPATIENT
Start: 2020-05-19 | End: 2020-05-19

## 2020-05-19 RX ORDER — ACETAMINOPHEN 325 MG/1
650 TABLET ORAL EVERY 4 HOURS PRN
Status: DISCONTINUED | OUTPATIENT
Start: 2020-05-19 | End: 2020-05-19 | Stop reason: SDUPTHER

## 2020-05-19 RX ADMIN — Medication 500 ML: at 13:24

## 2020-05-19 RX ADMIN — CARVEDILOL 3.12 MG: 3.12 TABLET, FILM COATED ORAL at 17:24

## 2020-05-19 RX ADMIN — FENTANYL CITRATE 50 MCG: 50 INJECTION, SOLUTION INTRAMUSCULAR; INTRAVENOUS at 13:17

## 2020-05-19 RX ADMIN — MIDAZOLAM HYDROCHLORIDE 2 MG: 5 INJECTION INTRAMUSCULAR; INTRAVENOUS at 13:03

## 2020-05-19 RX ADMIN — TICAGRELOR 90 MG: 90 TABLET ORAL at 21:05

## 2020-05-19 RX ADMIN — FLUOXETINE HYDROCHLORIDE 40 MG: 20 CAPSULE ORAL at 17:23

## 2020-05-19 RX ADMIN — FENTANYL CITRATE 25 MCG: 50 INJECTION, SOLUTION INTRAMUSCULAR; INTRAVENOUS at 13:04

## 2020-05-19 RX ADMIN — MIDAZOLAM HYDROCHLORIDE 1 MG: 5 INJECTION INTRAMUSCULAR; INTRAVENOUS at 13:04

## 2020-05-19 RX ADMIN — HEPARIN SODIUM AND DEXTROSE 12 UNITS/KG/HR: 10000; 5 INJECTION INTRAVENOUS at 11:19

## 2020-05-19 RX ADMIN — LIDOCAINE HYDROCHLORIDE: 20 SOLUTION ORAL; TOPICAL at 10:09

## 2020-05-19 RX ADMIN — TICAGRELOR 180 MG: 90 TABLET ORAL at 12:05

## 2020-05-19 RX ADMIN — ONDANSETRON 4 MG: 2 INJECTION INTRAMUSCULAR; INTRAVENOUS at 20:20

## 2020-05-19 RX ADMIN — ONDANSETRON HYDROCHLORIDE 4 MG: 2 INJECTION, SOLUTION INTRAMUSCULAR; INTRAVENOUS at 10:09

## 2020-05-19 RX ADMIN — HEPARIN SODIUM 3750 UNITS: 5000 INJECTION INTRAVENOUS; SUBCUTANEOUS at 11:19

## 2020-05-19 RX ADMIN — HEPARIN SODIUM 2000 UNITS: 1000 INJECTION, SOLUTION INTRAVENOUS; SUBCUTANEOUS at 13:23

## 2020-05-19 RX ADMIN — Medication 10 ML: at 20:21

## 2020-05-19 RX ADMIN — PERFLUTREN 2 MG: 6.52 INJECTION, SUSPENSION INTRAVENOUS at 15:39

## 2020-05-19 RX ADMIN — SODIUM CHLORIDE 500 ML: 9 INJECTION, SOLUTION INTRAVENOUS at 11:18

## 2020-05-19 RX ADMIN — MIDAZOLAM HYDROCHLORIDE 1 MG: 5 INJECTION INTRAMUSCULAR; INTRAVENOUS at 13:17

## 2020-05-19 RX ADMIN — HEPARIN SODIUM 4000 UNITS: 1000 INJECTION, SOLUTION INTRAVENOUS; SUBCUTANEOUS at 13:09

## 2020-05-19 RX ADMIN — LISINOPRIL 2.5 MG: 2.5 TABLET ORAL at 17:23

## 2020-05-19 RX ADMIN — FENTANYL CITRATE 25 MCG: 50 INJECTION, SOLUTION INTRAMUSCULAR; INTRAVENOUS at 13:03

## 2020-05-19 RX ADMIN — NITROGLYCERIN 5 MCG/MIN: 20 INJECTION INTRAVENOUS at 12:05

## 2020-05-19 RX ADMIN — Medication 250 ML: at 13:06

## 2020-05-19 RX ADMIN — ASPIRIN 81 MG 324 MG: 81 TABLET ORAL at 10:09

## 2020-05-19 ASSESSMENT — PAIN DESCRIPTION - FREQUENCY
FREQUENCY: CONTINUOUS
FREQUENCY: CONTINUOUS

## 2020-05-19 ASSESSMENT — PAIN DESCRIPTION - ONSET
ONSET: SUDDEN
ONSET: SUDDEN

## 2020-05-19 ASSESSMENT — PAIN DESCRIPTION - DESCRIPTORS
DESCRIPTORS: ACHING;CONSTANT
DESCRIPTORS: ACHING;CONSTANT

## 2020-05-19 ASSESSMENT — PAIN DESCRIPTION - LOCATION
LOCATION: CHEST
LOCATION: CHEST

## 2020-05-19 ASSESSMENT — PAIN DESCRIPTION - PAIN TYPE
TYPE: ACUTE PAIN
TYPE: ACUTE PAIN

## 2020-05-19 ASSESSMENT — PAIN DESCRIPTION - ORIENTATION: ORIENTATION: MID

## 2020-05-19 ASSESSMENT — PAIN SCALES - GENERAL
PAINLEVEL_OUTOF10: 6
PAINLEVEL_OUTOF10: 0
PAINLEVEL_OUTOF10: 9

## 2020-05-19 NOTE — ED NOTES
Verbal order from Flakito Handy MD to this RN for repeat EKG and repeat trop.       Michelle Cristobal RN  05/19/20 [id number]

## 2020-05-19 NOTE — ED NOTES
Attempted to contact [facility] for interventionalist with no answer for 3 mins. Answered on second attempt.      Jossy Session  05/19/20 3100

## 2020-05-19 NOTE — PROGRESS NOTES
Provider, MD        Facility Administered Medications:    sodium chloride flush  10 mL Intravenous 2 times per day    aspirin  81 mg Oral Daily    carvedilol  3.125 mg Oral BID WC    lisinopril  2.5 mg Oral Daily    atorvastatin  80 mg Oral Nightly    ticagrelor  90 mg Oral BID       Allergies:  No Known Allergies     Social History:    Working:   Caffeine:   Lifestyle:    Social History     Socioeconomic History    Marital status:      Spouse name: Not on file    Number of children: Not on file    Years of education: Not on file    Highest education level: Not on file   Occupational History    Not on file   Social Needs    Financial resource strain: Not on file    Food insecurity     Worry: Not on file     Inability: Not on file    Transportation needs     Medical: Not on file     Non-medical: Not on file   Tobacco Use    Smoking status: Current Every Day Smoker     Packs/day: 1.00     Years: 10.00     Pack years: 10.00     Types: Cigarettes    Smokeless tobacco: Never Used   Substance and Sexual Activity    Alcohol use: No    Drug use: No    Sexual activity: Not on file   Lifestyle    Physical activity     Days per week: Not on file     Minutes per session: Not on file    Stress: Not on file   Relationships    Social connections     Talks on phone: Not on file     Gets together: Not on file     Attends Spiritism service: Not on file     Active member of club or organization: Not on file     Attends meetings of clubs or organizations: Not on file     Relationship status: Not on file    Intimate partner violence     Fear of current or ex partner: Not on file     Emotionally abused: Not on file     Physically abused: Not on file     Forced sexual activity: Not on file   Other Topics Concern    Not on file   Social History Narrative    Not on file       Family History:  No family history on file.     Review of Systems:  Reviewed, negative unless noted  Constitutional: weight change, catheterization  LVgram  Coronary angiogam  Coronary cath  Thrombectomy of RCA  IVUS of RCA  PCI of RCA with 2 drug-eluting stents              PROCEDURE DESCRIPTION   Risks/benefits/alternatives/outcomes were discussed with patient and/or family and informed consent was obtained. This was an emergency procedure. patient was prepped draped in the usual sterile fashion. Local anaesthetic was applied over puncture site. Using a back wall technique, a 6 English Terumo sheath was inserted into right radial artery. Verapamil, nitroglycerin, nicardipine were administered through the sheath. Heparin was administered. Diagnostic 5 [place] Medtronic pigtail and ultra catheters were used for diagnostic angiograms. Initially focus was on RCA angiography and PCI as noted below. At the conclusion of the procedure, a TR band was placed over the puncture site and hemostasis was obtained. There were no immediate complications. I supervised sedation with versed 4 mg/fentanyl 100 Mcg during the procedure. 180 cc contrast was utilized. <20cc EBL. I offered to call the patient's friends and family to give them updates, patient declined and says that she would prefer that we not call anybody with regard to updates.        FINDINGS         LVGRAM     LVEDP  29   GRADIENT ACROSS AORTIC VALVE  no gradient   LV FUNCTION EF 40 %   WALL MOTION  inferior hypokinesis   MITRAL REGURGITATION  mild         CORONARY ARTERIES     LM  Less than 10% proximal-mid stenosis, distally there is an eccentric 50% stenosis         LAD  Large vessel, proximal-mid 30% stenosis. Distally less than 10% stenosis.     D1 and D2 have a very high takeoff and D2 in particular has a patulous/aneurysmal segment with 70 to 80% proximal stenosis and less than 10% mid to distal stenosis. D3 has 10 to 20% mufhqbjx-cnn-upcqxp stenosis.       LCX  Small vessel, 10% ajbfwdgw-llb-yxighi stenosis.          RI  This vessel could also be described as the high first diagonal as noted above in the LAD section.         RCA Large vessel, dominant, proximal less than 10% stenosis, mid 30 to 40% stenosis, distal 90 to 100% stenosis.             PERCUTANEOUS INTERVENTION DESCRIPTION      Patient was preloaded with Brilinta, patient was given a single dose of Integrilin during the procedure and was treated otherwise with heparin for anticoagulation. A 6 [place] guiding catheter was taken upfront for PCI and a choice floppy wire was used to cross the lesion. Thrombectomy was then performed with the penumbra device and flow was restored. Lesion in the distal RCA was then dilated with a 3 mm balloon and then stented with Abbott Xience Becky 3.5 x 15 mm drug-eluting stent as well as a 3.0 x 18 mm Love Xience [address] drug-eluting stent. IVUS was performed and showed MLD was 3.5 mm. Stents were postdilated both a 3 and 3.5 mm noncompliant balloon. There was 0% residual stenosis. There was GAVIN 0 flow before and GAVIN-3 after PCI. During procedure, patient had hypotension which responded to vasopressors and these were able to be weaned partially at the end of the case and patient's EKG and symptoms had markedly improved at end of case.   Remaining CAD/ASHD was felt to be treated medically followed by possible CABG surgery for left main disease.       Echo:   Echo Complete   Order: [id number]   Status:  Final result   Visible to patient:  No (Not Released) Next appt:  06/11/2020 at 03:20 PM in Family Medicine Kenisha Saravia MD)   Details     Reading Physician Reading Date Result Priority   Elier Crain MD  [phone] 5/19/2020       Narrative & Impression     Transthoracic Echocardiography Report (TTE)      Demographics      Patient Name       Kathrin Parrish      Date of Study      05/19/2020        Gender                Female      Patient Number     [id number]        Date of Birth         1965      Visit Number       [id number]         Age                   48 limitations on driving/heavy lifting.         Denise Avila MD  Cardiothoracic Surgery

## 2020-05-19 NOTE — ED NOTES
RN rounded on pt. Second IV started, repeat trop drawn. VSS and updated, remains on cardiac monitor, pt updated on POC. Pt remains stable. Pt has no further needs at this time. Pt resting in bed, call light within reach. Pt denies any questions.       Karlee Lerma RN  05/19/20 8597

## 2020-05-19 NOTE — PROGRESS NOTES
Brief Pre-Op Note/Sedation Assessment      Reyes Pelletier  1965  Cath Pool Rm/NONE      [id number]  12:41 PM    Planned Procedure: Cardiac Catheterization Procedure    Post Procedure Plan: Return to same level of care    Consent: pt aware this is emergency procedure, consent briefly discussed, pt wishes to proceed. Chief Complaint: STEMI      Indications for Cath Procedure:  ACS <= 24 hrs  Anginal Classification within 2 weeks:  CCS IV - Inability to perform any activity without angina or angina at rest, i.e., severe limitation  NYHA Heart Failure Class within 2 weeks: No symptoms  Is Cath Lab Visit Valve-related?: No  Surgical Risk: N/A  Functional Type: N/A    Anti- Anginal Meds within 2 weeks:   Yes: Aspirin    Stress or Imaging Studies Performed:  None     Vital Signs:  LMP 2017 (Approximate)     Allergies:  No Known Allergies    Past Medical History:  Past Medical History:   Diagnosis Date    Depression     GERD (gastroesophageal reflux disease)          Surgical History:  Past Surgical History:   Procedure Laterality Date     SECTION      SINUS SURGERY           Medications:  No current facility-administered medications for this encounter. Pre-Sedation:    Pre-Sedation Documentation and Exam:  I have personally completed a history, physical exam & review of systems for this patient (see notes). Prior History of Anesthesia Complications:   none    Modified Mallampati:  III (soft palate, base of uvula visible)    ASA Classification:  Class 4 - A patient with an incapacitating systemic disease that is a constant threat to life      Mike Scale:   Activity:  2 - Able to move 4 extremities voluntarily on command  Respiration:  1 - Dyspnic, shallow, or limited breathing  Circulation:  2 - BP+/- 20mmHg of normal  Consciousness:  2 - Fully awake  Oxygen Saturation (color):  1 - Needs oxygen to maintain oxygen saturation >90%    Sedation/Anesthesia Plan:  Guard the

## 2020-05-19 NOTE — CONSULTS
[facility]  [phone]      Attending Physician: Rajwinder Escobar MD  Reason for Consultation/Chief Complaint: chest pain    Subjective   History of Present Illness:  Rico West is a 48 y.o. patient who presented to the hospital with complaints of chest pain, began in early AM hrs. Has had sob/n/v, came to [facility] ER and found to have inferior stemi on 2nd ekg at 11:30am.  Pt tx to [facility] AT [place] d/t stemi. Past Medical History:   has a past medical history of Depression and GERD (gastroesophageal reflux disease). Surgical History:   has a past surgical history that includes sinus surgery and  section. Social History:   reports that she has been smoking cigarettes. She has a 10.00 pack-year smoking history. She has never used smokeless tobacco. She reports that she does not drink alcohol or use drugs. Family History:  family history is not on file. Home Medications:  Were reviewed and are listed in nursing record and/or below  Prior to Admission medications    Medication Sig Start Date End Date Taking?  Authorizing Provider   mirtazapine (REMERON) 45 MG tablet TAKE ONE TABLET BY MOUTH ONCE NIGHTLY 20   Cris Glover MD   [facility]) 40 MG capsule Take 1 capsule by mouth daily 4/10/20   Cris Glover MD   lansoprazole (PREVACID) 30 MG delayed release capsule Take 1 capsule by mouth daily 20   Cris Glover MD   famotidine (PEPCID) 40 MG tablet Take 1 tablet by mouth every evening 20   Cris Glover MD   diclofenac (VOLTAREN) 50 MG EC tablet Take 50 mg by mouth 2 times daily 19   Historical Provider, MD   tiZANidine (ZANAFLEX) 4 MG tablet Take 4 mg by mouth 3 times daily 19   Historical Provider, MD        CURRENT Medications:  sodium chloride flush 0.9 % injection 10 mL, 2 times per day  sodium chloride flush 0.9 % injection 10 mL, PRN  acetaminophen (TYLENOL) tablet 650 mg, Q4H PRN  magnesium hydroxide (MILK OF

## 2020-05-19 NOTE — H&P
Андрей Najera MD   famotidine (PEPCID) 40 MG tablet Take 1 tablet by mouth every evening 4/1/20   Андрей Najera MD   diclofenac (VOLTAREN) 50 MG EC tablet Take 50 mg by mouth 2 times daily 12/24/19   Historical Provider, MD   tiZANidine (ZANAFLEX) 4 MG tablet Take 4 mg by mouth 3 times daily 12/24/19   Historical Provider, MD       Allergies:  Patient has no known allergies. Social History:      The patient currently lives at home    TOBACCO:   reports that she has been smoking cigarettes. She has a 10.00 pack-year smoking history. She has never used smokeless tobacco.  ETOH:   reports no history of alcohol use. E-Cigarettes Vaping or Juuling     Questions Responses    Vaping Use     Start Date     Does device contain nicotine? Quit Date     Vaping Type             Family History:       Reviewed in detail and negative for DM, CAD, Cancer, CVA. Positive as follows:    No family history on file. REVIEW OF SYSTEMS:   Pertinent positives as noted in the HPI. All other systems reviewed and negative. PHYSICAL EXAM PERFORMED:    /68   Pulse 72   Temp 97.8 °F (36.6 °C) (Oral)   Resp 18   LMP 11/01/2017 (Approximate)   SpO2 98%     General appearance:  No apparent distress, appears stated age and cooperative. On oxygen for comfort  HEENT:  Normal cephalic, atraumatic without obvious deformity. Pupils equal, round, and reactive to light. Extra ocular muscles intact. Conjunctivae/corneas clear. Neck: Supple, with full range of motion. No jugular venous distention. Trachea midline. Respiratory:  Normal respiratory effort. Clear to auscultation, bilaterally without Rales/Wheezes/Rhonchi. Cardiovascular:  Regular rate and rhythm with normal S1/S2 without murmurs, rubs or gallops. Abdomen: Soft, non-tender, non-distended with normal bowel sounds. Musculoskeletal:  No clubbing, cyanosis or edema bilaterally. Full range of motion without deformity.   Skin: Skin color, texture, turgor normal.

## 2020-05-19 NOTE — ED NOTES
Pt to ER via self, pt states constant mid sternal CP with nausea and emesis and SOB that started this AM around 0230, states nothing makes the pain worse or better, does not radiate. Pt states she ate pizza for dinner and states she tried to take her antacid this AM but was unable to keep it down. Pt alert and oriented, resps even/unlabored, lungs CTA throughout, spo2 WNL on RA, NSR on monitor, BP WNL. Pt alert and oriented and without distress, IV started and blood drawn and in lab. Pt given warm blanket, call light within reach, EKG completed.       Cheryle Steele RN  05/19/20 1008

## 2020-05-19 NOTE — ED PROVIDER NOTES
mouth daily    LANSOPRAZOLE (PREVACID) 30 MG DELAYED RELEASE CAPSULE    Take 1 capsule by mouth daily    MIRTAZAPINE (REMERON) 45 MG TABLET    TAKE ONE TABLET BY MOUTH ONCE NIGHTLY    TIZANIDINE (ZANAFLEX) 4 MG TABLET    Take 4 mg by mouth 3 times daily         ALLERGIES:    Patient has no known allergies. FAMILY HISTORY:     History reviewed. No pertinent family history.        SOCIAL HISTORY:       Social History     Socioeconomic History    Marital status:      Spouse name: None    Number of children: None    Years of education: None    Highest education level: None   Occupational History    None   Social Needs    Financial resource strain: None    Food insecurity     Worry: None     Inability: None    Transportation needs     Medical: None     Non-medical: None   Tobacco Use    Smoking status: Current Every Day Smoker     Packs/day: 1.00     Years: 10.00     Pack years: 10.00     Types: Cigarettes    Smokeless tobacco: Never Used   Substance and Sexual Activity    Alcohol use: No    Drug use: No    Sexual activity: None   Lifestyle    Physical activity     Days per week: None     Minutes per session: None    Stress: None   Relationships    Social connections     Talks on phone: None     Gets together: None     Attends Gnosticist service: None     Active member of club or organization: None     Attends meetings of clubs or organizations: None     Relationship status: None    Intimate partner violence     Fear of current or ex partner: None     Emotionally abused: None     Physically abused: None     Forced sexual activity: None   Other Topics Concern    None   Social History Narrative    None       SCREENINGS:   [place] Coma Scale  Eye Opening: Spontaneous  Best Verbal Response: Oriented  Best Motor Response: Obeys commands  [place] Coma Scale Score: 15        PHYSICAL EXAM:       ED Triage Vitals [05/19/20 0956]   BP Temp Temp Source Pulse Resp SpO2 Height Weight   105/75 97.2 °F (36.2 troponin at 0.25. I consulted the hospitalist who recommended I consult cardiology. I did place a page out to interventional cardiology, while we are waiting I did repeat EKG on the patient which now shows some elevation in lead III and aVF. Right-sided EKG does suggest some RV involvement. I consulted interventional cardiology and spoke with Dr. Sonia Fowler, he recommended nitro drip as well as Brilinta, patient already on heparin drip. We did repeated another troponin which showed an elevated troponin again at 0.46. Patient was transferred to [facility] for cardiac cath. Patient transported via ground to because air care was not flying. Patient was evaluated by the attending physician who agrees this plan of care. Vital signs are stable during ER visit. Patient laboratory studies, radiographic imaging, andassessment were all discussed with the patient and/or patient family. There was shared decision-making between myself, the attending physician, as well as the patient and/or their surrogate and we are all in agreement with transfer to [facility] Cath Lab. There was an opportunity for questions and all questions were answered to the best of my ability and to the satisfaction of the patient and/or patient family. FINAL IMPRESSION:      1. Chest pain, unspecified type    2. Elevated troponin    3. Shortness of breath    4. ST elevation myocardial infarction (STEMI), unspecified artery (HCC)          DISPOSITION/PLAN:   DISPOSITIONDecision To Transfer      PATIENT REFERRED TO:  No follow-up provider specified.     DISCHARGE MEDICATIONS:  New Prescriptions    No medications on file                  (Please note that portions of this note were completed with a voice recognition program.  Efforts were made to edit the dictations, but occasionally words are mis-transcribed.)    SUJATA Jackson CNP-C (electronically signed)        SUJATA Jackson CNP  05/19/20 [address]

## 2020-05-20 LAB
ANION GAP SERPL CALCULATED.3IONS-SCNC: 9 MMOL/L (ref 3–16)
BUN BLDV-MCNC: 8 MG/DL (ref 7–20)
CALCIUM SERPL-MCNC: 8.7 MG/DL (ref 8.3–10.6)
CHLORIDE BLD-SCNC: 106 MMOL/L (ref 99–110)
CHOLESTEROL, TOTAL: 164 MG/DL (ref 0–199)
CO2: 22 MMOL/L (ref 21–32)
CREAT SERPL-MCNC: <0.5 MG/DL (ref 0.6–1.1)
ESTIMATED AVERAGE GLUCOSE: 114 MG/DL
GFR AFRICAN AMERICAN: >60
GFR NON-AFRICAN AMERICAN: >60
GLUCOSE BLD-MCNC: 104 MG/DL (ref 70–99)
HBA1C MFR BLD: 5.6 %
HCT VFR BLD CALC: 36.1 % (ref 36–48)
HDLC SERPL-MCNC: 34 MG/DL (ref 40–60)
HEMOGLOBIN: 12.5 G/DL (ref 12–16)
LDL CHOLESTEROL CALCULATED: 107 MG/DL
MAGNESIUM: 2 MG/DL (ref 1.8–2.4)
MCH RBC QN AUTO: 30.3 PG (ref 26–34)
MCHC RBC AUTO-ENTMCNC: 34.7 G/DL (ref 31–36)
MCV RBC AUTO: 87.2 FL (ref 80–100)
PDW BLD-RTO: 13.3 % (ref 12.4–15.4)
PLATELET # BLD: 136 K/UL (ref 135–450)
PMV BLD AUTO: 11.1 FL (ref 5–10.5)
POTASSIUM REFLEX MAGNESIUM: 3.5 MMOL/L (ref 3.5–5.1)
POTASSIUM SERPL-SCNC: 3.5 MMOL/L (ref 3.5–5.1)
RBC # BLD: 4.14 M/UL (ref 4–5.2)
SODIUM BLD-SCNC: 137 MMOL/L (ref 136–145)
TRIGL SERPL-MCNC: 113 MG/DL (ref 0–150)
TROPONIN: 3.74 NG/ML
VLDLC SERPL CALC-MCNC: 23 MG/DL
WBC # BLD: 7.6 K/UL (ref 4–11)

## 2020-05-20 PROCEDURE — 83036 HEMOGLOBIN GLYCOSYLATED A1C: CPT

## 2020-05-20 PROCEDURE — 83735 ASSAY OF MAGNESIUM: CPT

## 2020-05-20 PROCEDURE — 99233 SBSQ HOSP IP/OBS HIGH 50: CPT | Performed by: NURSE PRACTITIONER

## 2020-05-20 PROCEDURE — 80061 LIPID PANEL: CPT

## 2020-05-20 PROCEDURE — 2580000003 HC RX 258: Performed by: INTERNAL MEDICINE

## 2020-05-20 PROCEDURE — 85027 COMPLETE CBC AUTOMATED: CPT

## 2020-05-20 PROCEDURE — 36415 COLL VENOUS BLD VENIPUNCTURE: CPT

## 2020-05-20 PROCEDURE — 2060000000 HC ICU INTERMEDIATE R&B

## 2020-05-20 PROCEDURE — 99232 SBSQ HOSP IP/OBS MODERATE 35: CPT | Performed by: THORACIC SURGERY (CARDIOTHORACIC VASCULAR SURGERY)

## 2020-05-20 PROCEDURE — 80048 BASIC METABOLIC PNL TOTAL CA: CPT

## 2020-05-20 PROCEDURE — 6370000000 HC RX 637 (ALT 250 FOR IP): Performed by: INTERNAL MEDICINE

## 2020-05-20 PROCEDURE — 6360000002 HC RX W HCPCS: Performed by: INTERNAL MEDICINE

## 2020-05-20 PROCEDURE — 84484 ASSAY OF TROPONIN QUANT: CPT

## 2020-05-20 RX ADMIN — LISINOPRIL 2.5 MG: 2.5 TABLET ORAL at 08:37

## 2020-05-20 RX ADMIN — Medication 10 ML: at 08:41

## 2020-05-20 RX ADMIN — Medication 10 ML: at 20:29

## 2020-05-20 RX ADMIN — ACETAMINOPHEN 650 MG: 325 TABLET ORAL at 15:30

## 2020-05-20 RX ADMIN — ASPIRIN 81 MG 81 MG: 81 TABLET ORAL at 08:38

## 2020-05-20 RX ADMIN — CARVEDILOL 3.12 MG: 3.12 TABLET, FILM COATED ORAL at 08:37

## 2020-05-20 RX ADMIN — PANTOPRAZOLE SODIUM 40 MG: 40 TABLET, DELAYED RELEASE ORAL at 08:42

## 2020-05-20 RX ADMIN — ATORVASTATIN CALCIUM 80 MG: 80 TABLET, FILM COATED ORAL at 20:29

## 2020-05-20 RX ADMIN — ENOXAPARIN SODIUM 40 MG: 40 INJECTION SUBCUTANEOUS at 08:42

## 2020-05-20 RX ADMIN — FLUOXETINE HYDROCHLORIDE 40 MG: 20 CAPSULE ORAL at 08:38

## 2020-05-20 RX ADMIN — MUPIROCIN: 20 OINTMENT TOPICAL at 08:37

## 2020-05-20 RX ADMIN — CARVEDILOL 3.12 MG: 3.12 TABLET, FILM COATED ORAL at 17:06

## 2020-05-20 RX ADMIN — TICAGRELOR 90 MG: 90 TABLET ORAL at 20:29

## 2020-05-20 RX ADMIN — TICAGRELOR 90 MG: 90 TABLET ORAL at 08:37

## 2020-05-20 ASSESSMENT — ENCOUNTER SYMPTOMS
GASTROINTESTINAL NEGATIVE: 1
RESPIRATORY NEGATIVE: 1

## 2020-05-20 ASSESSMENT — PAIN SCALES - GENERAL
PAINLEVEL_OUTOF10: 0
PAINLEVEL_OUTOF10: 3

## 2020-05-20 NOTE — PROGRESS NOTES
Hospitalist Progress Note    Date of Admission: 5/19/2020    Chief Complaint: Chest pain    Hospital Course: 48 y.o. female who presented to [facility] with chest pain. Found to have STEMI. Underwent urgent cardiac catheterization with placement of 2 drug-eluting stents in the RCA. She also had significant lesion of the left mainstem for which CT surgery was consulted. Subjective: She reports no further chest pain. Denies any shortness of breath. Labs:   Recent Labs     05/19/20  0959 05/20/20  0416   WBC 11.9* 7.6   HGB 15.7 12.5   HCT 47.1 36.1    136     Recent Labs     05/19/20  0959 05/20/20  0416    137   K 4.0 3.5  3.5    106   CO2 24 22   BUN 15 8   CREATININE 0.6 <0.5*   CALCIUM 10.2 8.7     Recent Labs     05/19/20  0959   AST 42*   ALT 21   BILITOT 0.5   ALKPHOS 101     No results for input(s): INR in the last 72 hours. Physical Exam Performed:    /73   Pulse 92   Temp 98.1 °F (36.7 °C) (Oral)   Resp 18   Ht 5' 3\" (1.6 m)   Wt 137 lb (62.1 kg)   LMP 11/01/2017 (Approximate)   SpO2 96%   BMI 24.27 kg/m²     General appearance: No apparent distress, appears stated age and cooperative. HEENT: Pupils equal, round, and reactive to light. Conjunctivae/corneas clear. Neck: Supple, no jugular venous distention. Trachea midline with full range of motion. Respiratory:  Normal respiratory effort. Clear to auscultation, bilaterally without Rales/Wheezes/Rhonchi. Cardiovascular: Regular rate and rhythm with normal S1/S2 without murmurs, rubs or gallops. Abdomen: Soft, non-tender, non-distended with normal bowel sounds. Musculoskelatal: No clubbing, cyanosis or edema bilaterally. Full range of motion without deformity. Neurologic:  Neurovascularly intact without any focal sensory/motor deficits.  Cranial nerves: II-XII intact, grossly non-focal.  Psychiatric: Alert and oriented, thought content appropriate, normal insight  Skin: Skin color, texture,

## 2020-05-20 NOTE — PROGRESS NOTES
Transfer to [facility] from [address] [place] [address] performed complete skin assessment on transfer. Assessment revealed skin intact. Upon transferring patient to ordered level of care, patients medications were gathered from the following locations and given to receiving nurse during bedside report:      Lock Box in room :     [x] Yes   [] No   Pyxis Bin:       [x] Yes   [] No      Pyxis Refrigerator:      [x] Yes   [] No   Tube System:       [] Yes   [] No   LDA's documented:  [] Yes   [] No          The following paperwork was transferred with patient:    Blue medication book:       [] Yes   [] No      12 hour chart check:       [] Yes   [] No   Patient belongings:       [x] Yes   [] No      Continuous pulse ox:   [] Yes   [] No      [] N/A      Tele monitor numberassigned to patient and placed on patient prior to transfer.     CMU Notified at Transfer: [x] Yes   [] No     Name of [facility] Staff:  ____________________________       MD notified via Perfect Serve:   [x] Yes   [] No    Family notified of transfer:    [] Yes   [x] No    Spoke with:  ___________________________________

## 2020-05-20 NOTE — PROGRESS NOTES
cor revascularization in about 1 month. Follow up in office.       Krystian Zuniga MD FACS  5/20/2020  9:26 AM

## 2020-05-20 NOTE — PROGRESS NOTES
Nightly Lyndon Grey MD        ticagrelor [facility]) tablet 90 mg  90 mg Oral BID Lyndon Grey MD   90 mg at 05/20/20 0837    FLUoxetine (PROZAC) capsule 40 mg  40 mg Oral Daily Milagros Cruz MD   40 mg at 05/20/20 0838    pantoprazole (PROTONIX) tablet 40 mg  40 mg Oral QAM AC Milagros Cruz MD   40 mg at 05/20/20 0842    sodium chloride flush 0.9 % injection 10 mL  10 mL Intravenous PRN Milagros Cruz MD        acetaminophen (TYLENOL) tablet 650 mg  650 mg Oral Q6H PRN Milagros Cruz MD        Or    acetaminophen (TYLENOL) suppository 650 mg  650 mg Rectal Q6H PRN Milagros Cruz MD        polyethylene glycol (GLYCOLAX) packet 17 g  17 g Oral Daily PRN Milagros Cruz MD        promethazine (PHENERGAN) tablet 12.5 mg  12.5 mg Oral Q6H PRN Milagros Cruz MD        Or    ondansetron (ZOFRAN) injection 4 mg  4 mg Intravenous Q6H PRN Milagros Cruz MD   4 mg at 05/19/20 2020    aspirin chewable tablet 81 mg  81 mg Oral Daily Milagros Cruz MD   81 mg at 05/20/20 0838    enoxaparin (LOVENOX) injection 40 mg  40 mg Subcutaneous Daily Milagros Cruz MD   40 mg at 05/20/20 7161     Review of Systems   Constitutional: Negative. Respiratory: Negative. Cardiovascular: Negative. Gastrointestinal: Negative. Neurological: Negative.       Objective:     Telemetry monitor: SR 70s    Physical Exam:  Constitutional:  Comfortable and alert, NAD, appears stated age  Eyes: PERRL, sclera nonicteric  Neck:  Supple, no masses, no thyroidmegaly, no JVD  Skin:  Warm and dry; no rash or lesions  Heart: Regular, normal apex, S1 and S2 normal, no M/G/R  Lungs:  Normal respiratory effort; clear; no wheezing/rhonchi/rales  Abdomen: soft, non tender, + bowel sounds  Extremities:  No edema or cyanosis; no clubbing  Neuro: alert and oriented, moves legs and arms equally, normal mood and affect  Right radial site soft, no hematoma, 2+ pulse    Data Reviewed:    Coronary angiogram 5/19/2020:  INDICATION   Acute inferior STEMI  PROCEDURES PERFORMED    Left heart catheterization  LVgram  Coronary angiogam  Coronary cath  Thrombectomy of RCA  IVUS of RCA  PCI of RCA with 2 drug-eluting stents  PROCEDURE DESCRIPTION   Risks/benefits/alternatives/outcomes were discussed with patient and/or family and informed consent was obtained. This was an emergency procedure. patient was prepped draped in the usual sterile fashion. Local anaesthetic was applied over puncture site. Using a back wall technique, a 6 Maltese Terumo sheath was inserted into right radial artery. Verapamil, nitroglycerin, nicardipine were administered through the sheath. Heparin was administered. Diagnostic 5 Serbian Medtronic pigtail and ultra catheters were used for diagnostic angiograms. Initially focus was on RCA angiography and PCI as noted below. At the conclusion of the procedure, a TR band was placed over the puncture site and hemostasis was obtained. There were no immediate complications. I supervised sedation with versed 4 mg/fentanyl 100 Mcg during the procedure. 180 cc contrast was utilized. <20cc EBL. I offered to call the patient's friends and family to give them updates, patient declined and says that she would prefer that we not call anybody with regard to updates. FINDINGS     LVEDP  29   GRADIENT ACROSS AORTIC VALVE  no gradient   LV FUNCTION EF 40 %   WALL MOTION  inferior hypokinesis   MITRAL REGURGITATION  mild      LM  Less than 10% proximal-mid stenosis, distally there is an eccentric 50% stenosis         LAD  Large vessel, proximal-mid 30% stenosis. Distally less than 10% stenosis. D1 and D2 have a very high takeoff and D2 in particular has a patulous/aneurysmal segment with 70 to 80% proximal stenosis and less than 10% mid to distal stenosis. D3 has 10 to 20% jflooehb-lby-lggevi stenosis. LCX  Small vessel, 10% ffmirzcf-lkn-vbqjkh stenosis.          RI  This vessel could also be described as the high first diagonal as noted above in the LAD section. RCA Large vessel, dominant, proximal less than 10% stenosis, mid 30 to 40% stenosis, distal 90 to 100% stenosis. PERCUTANEOUS INTERVENTION DESCRIPTION    Patient was preloaded with Brilinta, patient was given a single dose of Integrilin during the procedure and was treated otherwise with heparin for anticoagulation. A 6 Gonzales guiding catheter was taken upfront for PCI and a choice floppy wire was used to cross the lesion. Thrombectomy was then performed with the penumbra device and flow was restored. Lesion in the distal RCA was then dilated with a 3 mm balloon and then stented with Abbott Xience Becky 3.5 x 15 mm drug-eluting stent as well as a 3.0 x 18 mm Love Xience [address] drug-eluting stent. IVUS was performed and showed MLD was 3.5 mm. Stents were postdilated both a 3 and 3.5 mm noncompliant balloon. There was 0% residual stenosis. There was GAVIN 0 flow before and GAVIN-3 after PCI. During procedure, patient had hypotension which responded to vasopressors and these were able to be weaned partially at the end of the case and patient's EKG and symptoms had markedly improved at end of case. Remaining CAD/ASHD was felt to be treated medically followed by possible CABG surgery for left main disease. CONCLUSIONS:    Successful PCI of RCA with 2 drug-eluting stents  Will refer for consideration of CABG for left main disease  Addend, case d/w dr Diana Pelletier, plan for outpt cabg, order for life vest in interim, ef 35% on current review    Echo 5/19/2020: The left ventricular systolic function is moderately reduced with an   ejection fraction of 35 %. There is hypokinesis of the inferior, basal inferior and inferiolateral   walls. Grade I diastolic dysfunction with normal filling pressure. Mild mitral and aortic regurgitation.    Systolic pulmonic artery pressure (SPAP) is normal estimated at 38 mmHg   (Right atrial pressure of 8 mmHg). Lab Reviewed:     Renal Profile:  Lab Results   Component Value Date    CREATININE <0.5 05/20/2020    BUN 8 05/20/2020     05/20/2020    K 3.5 05/20/2020    K 3.5 05/20/2020     05/20/2020    CO2 22 05/20/2020     CBC:    Lab Results   Component Value Date    WBC 7.6 05/20/2020    RBC 4.14 05/20/2020    HGB 12.5 05/20/2020    HCT 36.1 05/20/2020    MCV 87.2 05/20/2020    RDW 13.3 05/20/2020     05/20/2020     BNP:    Lab Results   Component Value Date    PROBNP 671 05/19/2020     Fasting Lipid Panel:    Lab Results   Component Value Date    CHOL 313 08/19/2017    HDL 53 08/19/2017    HDL 45 05/28/2011    TRIG 188 08/19/2017     Cardiac Enzymes:  CK/MbTroponin  Lab Results   Component Value Date    TROPONINI 3.74 05/20/2020     PT/ INR No results found for: INR, PROTIME  PTT No results found for: PTT   Lab Results   Component Value Date    MG 2.00 05/20/2020      Lab Results   Component Value Date    TSH 1.47 05/28/2011     All labs and imaging reviewed today    Assessment:  STEMI/CAD: no current angina; s/p RONDA x2 RCA 5/19/2020   - LM disease; outpatient follow up with CT surgery for CABG in 1 month  Ischemic cardiomyopathy: EF 35% on echo 5/19/2020  Hypotension: improved  HLD: uncontrolled,  in 2017, statin initiated  Tobacco abuse: cessation recommended    Plan:   1. Lifevest pending  2. Continue aspirin, statin, carvedilol, lisinopril, brilinta  3. Monitor BP  4. Outpatient follow up with CT surgery  5. Ok to C4  6.  Discharge planning for tomorrow    SUJATA Myers-CNP  [facility]  [phone]

## 2020-05-21 PROCEDURE — 2580000003 HC RX 258: Performed by: INTERNAL MEDICINE

## 2020-05-21 PROCEDURE — 6360000002 HC RX W HCPCS: Performed by: INTERNAL MEDICINE

## 2020-05-21 PROCEDURE — 6370000000 HC RX 637 (ALT 250 FOR IP): Performed by: INTERNAL MEDICINE

## 2020-05-21 PROCEDURE — 99232 SBSQ HOSP IP/OBS MODERATE 35: CPT | Performed by: NURSE PRACTITIONER

## 2020-05-21 PROCEDURE — 2060000000 HC ICU INTERMEDIATE R&B

## 2020-05-21 RX ORDER — ATORVASTATIN CALCIUM 80 MG/1
80 TABLET, FILM COATED ORAL NIGHTLY
Qty: 30 TABLET | Refills: 11 | Status: SHIPPED | OUTPATIENT
Start: 2020-05-21 | End: 2021-04-29

## 2020-05-21 RX ORDER — CARVEDILOL 3.12 MG/1
3.12 TABLET ORAL 2 TIMES DAILY WITH MEALS
Qty: 60 TABLET | Refills: 11 | Status: ON HOLD
Start: 2020-05-21 | End: 2020-06-05 | Stop reason: HOSPADM

## 2020-05-21 RX ORDER — ASPIRIN 81 MG/1
81 TABLET, CHEWABLE ORAL DAILY
Qty: 30 TABLET | Refills: 11 | Status: ON HOLD
Start: 2020-05-21 | End: 2020-06-05 | Stop reason: HOSPADM

## 2020-05-21 RX ADMIN — Medication 10 ML: at 10:00

## 2020-05-21 RX ADMIN — TICAGRELOR 90 MG: 90 TABLET ORAL at 10:00

## 2020-05-21 RX ADMIN — ENOXAPARIN SODIUM 40 MG: 40 INJECTION SUBCUTANEOUS at 10:00

## 2020-05-21 RX ADMIN — ASPIRIN 81 MG 81 MG: 81 TABLET ORAL at 10:00

## 2020-05-21 RX ADMIN — ATORVASTATIN CALCIUM 80 MG: 80 TABLET, FILM COATED ORAL at 20:35

## 2020-05-21 RX ADMIN — FLUOXETINE HYDROCHLORIDE 40 MG: 20 CAPSULE ORAL at 10:00

## 2020-05-21 RX ADMIN — TICAGRELOR 90 MG: 90 TABLET ORAL at 20:35

## 2020-05-21 RX ADMIN — PANTOPRAZOLE SODIUM 40 MG: 40 TABLET, DELAYED RELEASE ORAL at 08:09

## 2020-05-21 RX ADMIN — Medication 10 ML: at 20:35

## 2020-05-21 ASSESSMENT — ENCOUNTER SYMPTOMS
GASTROINTESTINAL NEGATIVE: 1
RESPIRATORY NEGATIVE: 1

## 2020-05-21 ASSESSMENT — PAIN SCALES - GENERAL: PAINLEVEL_OUTOF10: 0

## 2020-05-21 NOTE — PROGRESS NOTES
[facility]  Cardiology  Progress Note    Admission date:  2020    Reason for follow up visit: STEMI     HPI/CC: Fermín Millan is a 48 y.o. female who presented to [place] [id number] 2020 for chest pain. Second EKG showed inferior STEMI. Emergent coronary angiography showed 100% dRCA treated with RONDA x2. LM disease noted, referred to CT surgery for possible CABG in future. Echo showed EF 35%. Rhythm overnight has been sinus. Lifevest pending. Subjective: Denies chest pain, shortness of breath, palpitations and dizziness. Vitals:  Blood pressure 94/61, pulse 86, temperature 97.4 °F (36.3 °C), temperature source Oral, resp. rate 16, height 5' 3\" (1.6 m), weight 136 lb (61.7 kg), last menstrual period 2017, SpO2 95 %.   Temp  Av.9 °F (36.6 °C)  Min: 97.3 °F (36.3 °C)  Max: 98.6 °F (37 °C)  Pulse  Av.6  Min: 86  Max: 104  BP  Min: 89/56  Max: 106/73  SpO2  Av.9 %  Min: 94 %  Max: 100 %    24 hour I/O    Intake/Output Summary (Last 24 hours) at 2020 0908  Last data filed at 2020 0800  Gross per 24 hour   Intake 960 ml   Output 2150 ml   Net -1190 ml     Current Facility-Administered Medications   Medication Dose Route Frequency Provider Last Rate Last Dose    sodium chloride flush 0.9 % injection 10 mL  10 mL Intravenous 2 times per day Ivan Zimmer MD   10 mL at 20    magnesium hydroxide (MILK OF MAGNESIA) 400 MG/5ML suspension 30 mL  30 mL Oral Daily PRN Ivan Zimmer MD        phenylephrine (JAMIN-SYNEPHRINE) 50 mg in dextrose 5 % 250 mL infusion  100 mcg/min Intravenous Continuous Ivan Zimmer MD   Stopped at 20 1724    carvedilol (COREG) tablet 3.125 mg  3.125 mg Oral BID WC Ivan Zimmer MD   3.125 mg at 20 1706    atorvastatin (LIPITOR) tablet 80 mg  80 mg Oral Nightly Ivan Zimmer MD   80 mg at 20    ticagrelor (BRILINTA) tablet 90 mg  90 mg Oral BID Ivan Zimmer MD   90 mg at 20    FLUoxetine (PROZAC) cath  Thrombectomy of RCA  IVUS of RCA  PCI of RCA with 2 drug-eluting stents  PROCEDURE DESCRIPTION   Risks/benefits/alternatives/outcomes were discussed with patient and/or family and informed consent was obtained. This was an emergency procedure. patient was prepped draped in the usual sterile fashion. Local anaesthetic was applied over puncture site. Using a back wall technique, a 6 Kenyan Terumo sheath was inserted into right radial artery. Verapamil, nitroglycerin, nicardipine were administered through the sheath. Heparin was administered. Diagnostic 5 Lao Medtronic pigtail and ultra catheters were used for diagnostic angiograms. Initially focus was on RCA angiography and PCI as noted below. At the conclusion of the procedure, a TR band was placed over the puncture site and hemostasis was obtained. There were no immediate complications. I supervised sedation with versed 4 mg/fentanyl 100 Mcg during the procedure. 180 cc contrast was utilized. <20cc EBL. I offered to call the patient's friends and family to give them updates, patient declined and says that she would prefer that we not call anybody with regard to updates. FINDINGS     LVEDP  29   GRADIENT ACROSS AORTIC VALVE  no gradient   LV FUNCTION EF 40 %   WALL MOTION  inferior hypokinesis   MITRAL REGURGITATION  mild      LM  Less than 10% proximal-mid stenosis, distally there is an eccentric 50% stenosis         LAD  Large vessel, proximal-mid 30% stenosis. Distally less than 10% stenosis. D1 and D2 have a very high takeoff and D2 in particular has a patulous/aneurysmal segment with 70 to 80% proximal stenosis and less than 10% mid to distal stenosis. D3 has 10 to 20% qrkytgga-iai-nlwuvj stenosis. LCX  Small vessel, 10% qvwkzmzq-nzu-xraxhr stenosis. RI  This vessel could also be described as the high first diagonal as noted above in the LAD section.          RCA Large vessel, dominant, proximal less than 10% stenosis, 05/20/2020     05/20/2020    K 3.5 05/20/2020    K 3.5 05/20/2020     05/20/2020    CO2 22 05/20/2020     CBC:    Lab Results   Component Value Date    WBC 7.6 05/20/2020    RBC 4.14 05/20/2020    HGB 12.5 05/20/2020    HCT 36.1 05/20/2020    MCV 87.2 05/20/2020    RDW 13.3 05/20/2020     05/20/2020     BNP:    Lab Results   Component Value Date    PROBNP 671 05/19/2020     Fasting Lipid Panel:    Lab Results   Component Value Date    CHOL 164 05/20/2020    HDL 34 05/20/2020    HDL 45 05/28/2011    TRIG 113 05/20/2020     Cardiac Enzymes:  CK/MbTroponin  Lab Results   Component Value Date    TROPONINI 3.74 05/20/2020     PT/ INR No results found for: INR, PROTIME  PTT No results found for: PTT   Lab Results   Component Value Date    MG 2.00 05/20/2020      Lab Results   Component Value Date    TSH 1.47 05/28/2011     All labs and imaging reviewed today    Assessment:  STEMI/CAD: no current angina; s/p RONDA x2 RCA 5/19/2020   - LM disease; outpatient follow up with CT surgery for CABG in 1 month  Ischemic cardiomyopathy: EF 35% on echo 5/19/2020  Hypotension: improved, asymptomatic  HLD: uncontrolled, , statin initiated  Tobacco abuse: cessation recommended    Plan:   1. Hold lisinopril due to hypotension, attempt to restart as outpatient  2. Lifevest pending  3. Continue aspirin, statin, carvedilol, brilinta  4. Outpatient follow up with CT surgery  5. Activity restrictions reviewed  6. Ok to discharge from cardiology perspective once Lifevest placed, please call with any questions. Follow up at [facility] in 1 week.      Trish Vargas, APRN-CNP  [address]  [phone]

## 2020-05-21 NOTE — FLOWSHEET NOTE
05/20/20 2024   Assessment   Charting Type Shift assessment   Neurological   Neuro (WDL) WDL   Level of Consciousness 0   [place] Coma Scale   Eye Opening 4   Best Verbal Response 5   Best Motor Response 6   [place] Coma Scale Score 15   HEENT   HEENT (WDL) WDL   Respiratory   Respiratory (WDL) WDL   Cardiac   Cardiac (WDL) X   Cardiac Regularity Regular   Heart Sounds S1, S2   Cardiac Rhythm NSR   Cardiac Monitor   Telemetry Monitor On Yes   Telemetry Audible Yes   Telemetry Alarms Set Yes   Gastrointestinal   Abdominal (WDL) WDL   RUQ Bowel Sounds Active   LUQ Bowel Sounds Active   RLQ Bowel Sounds Active   LLQ Bowel Sounds Active   Peripheral Vascular   Peripheral Vascular (WDL) WDL   RUE Neurovascular Assessment   Capillary Refill Less than/equal to 3 seconds   Color Appropriate for ethnicity   Temperature Warm   Sensation RUE Full sensation   R Radial Pulse +2   Puncture Site Assessment 1   Location Radial - right   Site Assessment No redness, drainage, swelling or hematoma   Skin Color/Condition   Skin Color/Condition (WDL) WDL   Skin Integrity   Skin Integrity (WDL) WDL   Musculoskeletal   Musculoskeletal (WDL) WDL   Genitourinary   Genitourinary (WDL) WDL   Anus/Rectum   Anus/Rectum (WDL) WDL   Psychosocial   Psychosocial (WDL) WDL

## 2020-05-22 VITALS
BODY MASS INDEX: 23.8 KG/M2 | OXYGEN SATURATION: 98 % | SYSTOLIC BLOOD PRESSURE: 95 MMHG | DIASTOLIC BLOOD PRESSURE: 60 MMHG | HEART RATE: 88 BPM | HEIGHT: 63 IN | TEMPERATURE: 97.3 F | RESPIRATION RATE: 16 BRPM | WEIGHT: 134.3 LBS

## 2020-05-22 PROCEDURE — 6370000000 HC RX 637 (ALT 250 FOR IP): Performed by: INTERNAL MEDICINE

## 2020-05-22 PROCEDURE — 2580000003 HC RX 258: Performed by: INTERNAL MEDICINE

## 2020-05-22 PROCEDURE — 99232 SBSQ HOSP IP/OBS MODERATE 35: CPT | Performed by: NURSE PRACTITIONER

## 2020-05-22 RX ADMIN — Medication 10 ML: at 08:27

## 2020-05-22 RX ADMIN — PANTOPRAZOLE SODIUM 40 MG: 40 TABLET, DELAYED RELEASE ORAL at 08:26

## 2020-05-22 RX ADMIN — TICAGRELOR 90 MG: 90 TABLET ORAL at 08:26

## 2020-05-22 RX ADMIN — CARVEDILOL 3.12 MG: 3.12 TABLET, FILM COATED ORAL at 08:26

## 2020-05-22 RX ADMIN — ASPIRIN 81 MG 81 MG: 81 TABLET ORAL at 08:27

## 2020-05-22 RX ADMIN — FLUOXETINE HYDROCHLORIDE 40 MG: 20 CAPSULE ORAL at 08:26

## 2020-05-22 ASSESSMENT — ENCOUNTER SYMPTOMS
RESPIRATORY NEGATIVE: 1
GASTROINTESTINAL NEGATIVE: 1

## 2020-05-22 ASSESSMENT — PAIN SCALES - GENERAL: PAINLEVEL_OUTOF10: 0

## 2020-05-22 NOTE — PROGRESS NOTES
cath  Thrombectomy of RCA  IVUS of RCA  PCI of RCA with 2 drug-eluting stents  PROCEDURE DESCRIPTION   Risks/benefits/alternatives/outcomes were discussed with patient and/or family and informed consent was obtained. This was an emergency procedure. patient was prepped draped in the usual sterile fashion. Local anaesthetic was applied over puncture site. Using a back wall technique, a 6 [place] Terumo sheath was inserted into right radial artery. Verapamil, nitroglycerin, nicardipine were administered through the sheath. Heparin was administered. Diagnostic 5 Yi Medtronic pigtail and ultra catheters were used for diagnostic angiograms. Initially focus was on RCA angiography and PCI as noted below. At the conclusion of the procedure, a TR band was placed over the puncture site and hemostasis was obtained. There were no immediate complications. I supervised sedation with versed 4 mg/fentanyl 100 Mcg during the procedure. 180 cc contrast was utilized. <20cc EBL. I offered to call the patient's friends and family to give them updates, patient declined and says that she would prefer that we not call anybody with regard to updates. FINDINGS     LVEDP  29   GRADIENT ACROSS AORTIC VALVE  no gradient   LV FUNCTION EF 40 %   WALL MOTION  inferior hypokinesis   MITRAL REGURGITATION  mild      LM  Less than 10% proximal-mid stenosis, distally there is an eccentric 50% stenosis         LAD  Large vessel, proximal-mid 30% stenosis. Distally less than 10% stenosis. D1 and D2 have a very high takeoff and D2 in particular has a patulous/aneurysmal segment with 70 to 80% proximal stenosis and less than 10% mid to distal stenosis. D3 has 10 to 20% otzkrrol-rqp-sgdiqx stenosis. LCX  Small vessel, 10% zwbbcsse-zib-xnzime stenosis. RI  This vessel could also be described as the high first diagonal as noted above in the LAD section.          RCA Large vessel, dominant, proximal less than 10% stenosis, mid 30 to 40% stenosis, distal 90 to 100% stenosis. PERCUTANEOUS INTERVENTION DESCRIPTION    Patient was preloaded with Brilinta, patient was given a single dose of Integrilin during the procedure and was treated otherwise with heparin for anticoagulation. A 6 [place] guiding catheter was taken upfront for PCI and a choice floppy wire was used to cross the lesion. Thrombectomy was then performed with the penumbra device and flow was restored. Lesion in the distal RCA was then dilated with a 3 mm balloon and then stented with Abbott Xience Becky 3.5 x 15 mm drug-eluting stent as well as a 3.0 x 18 mm Love Xience [facility] drug-eluting stent. IVUS was performed and showed MLD was 3.5 mm. Stents were postdilated both a 3 and 3.5 mm noncompliant balloon. There was 0% residual stenosis. There was GAVIN 0 flow before and GAVIN-3 after PCI. During procedure, patient had hypotension which responded to vasopressors and these were able to be weaned partially at the end of the case and patient's EKG and symptoms had markedly improved at end of case. Remaining CAD/ASHD was felt to be treated medically followed by possible CABG surgery for left main disease. CONCLUSIONS:    Successful PCI of RCA with 2 drug-eluting stents  Will refer for consideration of CABG for left main disease  Addend, case d/w dr Candido Montiel, plan for outpt cabg, order for life vest in interim, ef 35% on current review    Echo 5/19/2020: The left ventricular systolic function is moderately reduced with an   ejection fraction of 35 %. There is hypokinesis of the inferior, basal inferior and inferiolateral   walls. Grade I diastolic dysfunction with normal filling pressure. Mild mitral and aortic regurgitation. Systolic pulmonic artery pressure (SPAP) is normal estimated at 38 mmHg   (Right atrial pressure of 8 mmHg).      Lab Reviewed:     Renal Profile:  Lab Results   Component Value Date    CREATININE <0.5 05/20/2020    BUN 8

## 2020-05-22 NOTE — DISCHARGE SUMMARY
Disp-30 capsule, R-5Normal      famotidine (PEPCID) 40 MG tablet Take 1 tablet by mouth every evening, Disp-30 tablet, R-5Normal      diclofenac (VOLTAREN) 50 MG EC tablet Take 50 mg by mouth 2 times dailyHistorical Med      tiZANidine (ZANAFLEX) 4 MG tablet Take 4 mg by mouth 3 times dailyHistorical Med           Discharge Medication List as of 5/22/2020  2:12 PM            Significant Test Results    Xr Chest Portable    Result Date: 5/19/2020  EXAMINATION: ONE XRAY VIEW OF THE CHEST 5/19/2020 11:56 am COMPARISON: None. HISTORY: ORDERING SYSTEM PROVIDED HISTORY: pain or SOB TECHNOLOGIST PROVIDED HISTORY: Reason for exam:->pain or SOB Reason for Exam: stemi today, Acuity: Acute Type of Exam: Initial FINDINGS: There is vascular congestion. Perihilar and basilar faint reticular opacity present as well. No consolidation. No pneumothorax or evidence of pleural effusion. Cardiac size within limits. Vascular congestion possible perihilar and bibasilar edema         Consults:     IP CONSULT TO CARDIAC REHAB  IP CONSULT TO HOSPITALIST  IP CONSULT TO CARDIOTHORACIC SURGERY  IP CONSULT TO CARDIOLOGY    Labs:  For convenience and continuity at follow-up the following most recent labs are provided:    Lab Results   Component Value Date    WBC 7.6 05/20/2020    HGB 12.5 05/20/2020    HCT 36.1 05/20/2020    MCV 87.2 05/20/2020     05/20/2020     05/20/2020    K 3.5 05/20/2020    K 3.5 05/20/2020     05/20/2020    CO2 22 05/20/2020    BUN 8 05/20/2020    CREATININE <0.5 05/20/2020    CALCIUM 8.7 05/20/2020    TROPONINI 3.74 05/20/2020    ALKPHOS 101 05/19/2020    ALT 21 05/19/2020    AST 42 05/19/2020    BILITOT 0.5 05/19/2020    LABALBU 4.6 05/19/2020    LDLCALC 107 05/20/2020    TRIG 113 05/20/2020    LABA1C 5.6 05/20/2020     No results found for: INR      The patient was seen and examined on day of discharge and this discharge summary is in conjunction with any daily progress note from day of

## 2020-05-22 NOTE — FLOWSHEET NOTE
05/21/20 2110   Assessment   Charting Type Shift assessment   Neurological   Neuro (WDL) WDL   Level of Consciousness 0   [place] Coma Scale   Eye Opening 4   Best Verbal Response 5   Best Motor Response 6   [place]anu Coma Scale Score 15   HEENT   HEENT (WDL) WDL   Respiratory   Respiratory (WDL) WDL   Cardiac   Cardiac (WDL) X   Cardiac Regularity Regular   Heart Sounds S1, S2   Cardiac Rhythm NSR   Cardiac Monitor   Telemetry Monitor On Yes   Telemetry Audible Yes   Telemetry Alarms Set Yes   Gastrointestinal   Abdominal (WDL) WDL   Peripheral Vascular   Peripheral Vascular (WDL) WDL   RUE Neurovascular Assessment   Capillary Refill Less than/equal to 3 seconds   Color Appropriate for ethnicity   Temperature Warm   Sensation RUE Full sensation   R Radial Pulse +2   Puncture Site Assessment 1   Location Radial - right   Site Assessment No redness, drainage, swelling or hematoma   Skin Color/Condition   Skin Color/Condition (WDL) WDL   Skin Integrity   Skin Integrity (WDL) WDL   Musculoskeletal   Musculoskeletal (WDL) WDL   Genitourinary   Genitourinary (WDL) WDL   Anus/Rectum   Anus/Rectum (WDL) WDL   Psychosocial   Psychosocial (WDL) WDL

## 2020-05-26 ENCOUNTER — TELEPHONE (OUTPATIENT)
Dept: FAMILY MEDICINE CLINIC | Age: 55
End: 2020-05-26

## 2020-05-26 NOTE — PROGRESS NOTES
[address]   Cardiology Note              Date:  May 27, 2020  Patientname: Rico West  YOB: 1965    Chief Complaint   Patient presents with    Follow-up     STEMI       Primary Care physician: Cris Glover MD    HISTORY OF PRESENT ILLNESS: Rico West is a 48 y.o. female who presented to [address] [place] [facility] on 5/19/2020 with complaints of chest pain. Second EKG showed interior STEMI. She was transferred to [facility] and underwent emergent coronary angiography which showed 100% dRCA treated with RONDA x2. LM disease noted, referred to CT surgery for possible CABG in the future. EF 35%. Pt was discharged on [address]. Lisinopril was stopped while in the hospital due to hypotension and to re-evaluate as outpatient. Today she presents for follow up as mentioned above. This is the first time seeing this patient. Overall she stated she has her good days and bad days. She is wearing her LIFEVEST. She stated she has not heard any alarms go off on the device. She stated some days she sleeps more than others. She has not been monitoring blood pressure at home. She stated she had one episode of chest pain last night while sitting, she denies any associated symptoms with this episode of pain. It did not radiate. Stated it resolved quickly. ? Anxious at that time. Not like the pain she had when she was admitted. Pt denies any chest pain with exertion, only minimal shortness of breath with exertion, but overall improving. Her weight was 134 when she came home from the hospital and 132 today. She had many questions regarding diet and activity. She has not smoked since last Tuesday. Denies myalgias after starting Lipitor. Taking all medications as prescribed, no refills needed at this time. Rico West describes symptoms including chest pain x1 episode, dyspnea, fatigue but denies palpitations, orthopnea, PND, exertional chest pressure/discomfort, early saiety, edema, syncope. Past Medical History:   has a past medical history of Depression and GERD (gastroesophageal reflux disease). Past Surgical History:   has a past surgical history that includes sinus surgery and  section. Home Medications:    Prior to Admission medications    Medication Sig Start Date End Date Taking? Authorizing Provider   aspirin 81 MG chewable tablet Take 1 tablet by mouth daily 20  Yes SUJATA Boston CNP   atorvastatin (LIPITOR) 80 MG tablet Take 1 tablet by mouth nightly 20  Yes SUJATA Boston CNP   carvedilol (COREG) 3.125 MG tablet Take 1 tablet by mouth 2 times daily (with meals) 20  Yes SUJATA Boston CNP   ticagrelor (BRILINTA) 90 MG TABS tablet Take 1 tablet by mouth 2 times daily 20  Yes SUJATA Boston CNP   mirtazapine (REMERON) 45 MG tablet TAKE ONE TABLET BY MOUTH ONCE NIGHTLY 20  Yes Manasa Bangura MD   FLUoxetine (PROZAC) 40 MG capsule Take 1 capsule by mouth daily 4/10/20  Yes Manasa Bangura MD   famotidine (PEPCID) 40 MG tablet Take 1 tablet by mouth every evening 20  Yes Manasa Bangura MD   lansoprazole (PREVACID) 30 MG delayed release capsule Take 1 capsule by mouth daily 20   Manasa Bangura MD   diclofenac (VOLTAREN) 50 MG EC tablet Take 50 mg by mouth 2 times daily 19   Historical Provider, MD   tiZANidine (ZANAFLEX) 4 MG tablet Take 4 mg by mouth 3 times daily 19   Historical Provider, MD       Allergies:  Patient has no known allergies. Social History:   reports that she has quit smoking. Her smoking use included cigarettes. She has a 10.00 pack-year smoking history. She has never used smokeless tobacco. She reports that she does not drink alcohol or use drugs. Family History: family history is not on file. Review of Systems   Review of Systems   Constitutional: Positive for activity change and fatigue. Respiratory: Positive for shortness of breath. Negative for cough.     Cardiovascular: TR band was placed over the puncture site and hemostasis was obtained. There were no immediate complications. I supervised sedation with versed 4 mg/fentanyl 100 Mcg during the procedure. 180 cc contrast was utilized. <20cc EBL. I offered to call the patient's friends and family to give them updates, patient declined and says that she would prefer that we not call anybody with regard to updates. FINDINGS      LVEDP  29   GRADIENT ACROSS AORTIC VALVE  no gradient   LV FUNCTION EF 40 %   WALL MOTION  inferior hypokinesis   MITRAL REGURGITATION  mild      LM  Less than 10% proximal-mid stenosis, distally there is an eccentric 50% stenosis         LAD  Large vessel, proximal-mid 30% stenosis. Distally less than 10% stenosis. D1 and D2 have a very high takeoff and D2 in particular has a patulous/aneurysmal segment with 70 to 80% proximal stenosis and less than 10% mid to distal stenosis. D3 has 10 to 20% oglipdik-jzq-iehxam stenosis. LCX  Small vessel, 10% bnfeelvf-ors-ohbpkd stenosis. RI  This vessel could also be described as the high first diagonal as noted above in the LAD section. RCA Large vessel, dominant, proximal less than 10% stenosis, mid 30 to 40% stenosis, distal 90 to 100% stenosis. PERCUTANEOUS INTERVENTION DESCRIPTION    Patient was preloaded with Brilinta, patient was given a single dose of Integrilin during the procedure and was treated otherwise with heparin for anticoagulation. A 6 [place] guiding catheter was taken upfront for PCI and a choice floppy wire was used to cross the lesion. Thrombectomy was then performed with the penumbra device and flow was restored. Lesion in the distal RCA was then dilated with a 3 mm balloon and then stented with Abbott Xience Becky 3.5 x 15 mm drug-eluting stent as well as a 3.0 x 18 mm Love Xience [address] drug-eluting stent. IVUS was performed and showed MLD was 3.5 mm.   Stents were postdilated both a 3 and 3.5 mm noncompliant balloon. There was 0% residual stenosis. There was GAVIN 0 flow before and GAVIN-3 after PCI. During procedure, patient had hypotension which responded to vasopressors and these were able to be weaned partially at the end of the case and patient's EKG and symptoms had markedly improved at end of case. Remaining CAD/ASHD was felt to be treated medically followed by possible CABG surgery for left main disease. CONCLUSIONS:    Successful PCI of RCA with 2 drug-eluting stents  Will refer for consideration of CABG for left main disease  Addend, case d/w dr Ted Dang, plan for outpt cabg, order for life vest in interim, ef 35% on current review     Echo 5/19/2020: The left ventricular systolic function is moderately reduced with an   ejection fraction of 35 %. There is hypokinesis of the inferior, basal inferior and inferiolateral   walls. Grade I diastolic dysfunction with normal filling pressure. Mild mitral and aortic regurgitation. Systolic pulmonic artery pressure (SPAP) is normal estimated at 38 mmHg   (Right atrial pressure of 8 mmHg).        Cardiology Labs Reviewed:     Lab Data:  Most recent lab results below reviewed in office    CBC:   Lab Results   Component Value Date    WBC 7.6 05/20/2020    WBC 11.9 05/19/2020    WBC 9.1 06/30/2012    RBC 4.14 05/20/2020    RBC 5.32 05/19/2020    RBC 4.07 06/30/2012    HGB 12.5 05/20/2020    HGB 15.7 05/19/2020    HGB 12.8 06/30/2012    HCT 36.1 05/20/2020    HCT 47.1 05/19/2020    HCT 37.4 06/30/2012    MCV 87.2 05/20/2020    MCV 88.5 05/19/2020    MCV 92.0 06/30/2012    RDW 13.3 05/20/2020    RDW 13.1 05/19/2020    RDW 12.9 06/30/2012     05/20/2020     05/19/2020     06/30/2012     BMP:  Lab Results   Component Value Date     05/20/2020     05/19/2020     08/19/2017    K 3.5 05/20/2020    K 3.5 05/20/2020    K 4.0 05/19/2020    K 5.1 08/19/2017     05/20/2020     05/19/2020     08/19/2017 CO2 22 05/20/2020    CO2 24 05/19/2020    CO2 25 08/19/2017    BUN 8 05/20/2020    BUN 15 05/19/2020    BUN 12 08/19/2017    CREATININE <0.5 05/20/2020    CREATININE 0.6 05/19/2020    CREATININE 0.7 08/19/2017     BNP:   Lab Results   Component Value Date    PROBNP 671 05/19/2020     FASTING LIPID PANEL:  Lab Results   Component Value Date    HDL 34 05/20/2020    HDL 45 05/28/2011    LDLCALC 107 05/20/2020    TRIG 113 05/20/2020     LIVER PROFILE:No results for input(s): AST, ALT, ALB in the last 72 hours. Reviewed all labs and imaging today    Assessment:   1. CAD/STEMI: stable    -s/p RONDA x2 to RCA 5/19/2020   -LM disease, outpatient follow up with CT surgery for CABG  2. Ischemic cardiomyopathy: EF 35% on echo 5/19/2020   -LifeVest in place   -not on diuretic, not needed at this time  3. Hypotension: ongoing- unable to add ACE/ARB/ARNI, pt asymptomatic   4. HLD: uncontrolled, , statin started while in hospital   5. Tobacco abuse: stopped last Tuesday, continued cessation dicussed    Plan:   1. Unable to start Lisinopril due to pt blood pressure remaining low at this time,  monitor blood pressure x1 week and call me with readings. [phone]  If systolic consistently over 100- can consider adding low dose Lisinopril at night. 2. No change in medication at this time  3. Will discuss cardiac rehab after CABG- has appointment with Dr. Monica Ormond on 6/16, please call office after appointment to update. 4. Daily weights, low sodium diet, 2000 ml fluid restriction, if you gain 3 lbs in a day 5 lbs in a week, increased swelling, or increased shortness of breath please call the office. No diuretic needed at this time  5. Follow up after CABG, or sooner if needed. Please call with any questions or concerns  6. Continue LifeVest as ordered, discussed reason for LifeVest, when to call 911 or go to ER  7. Diet and activity reviewed with patient.    8.  Continue to not smoke    Blenda Folds, APRN-CNP  [facility]

## 2020-05-27 ENCOUNTER — OFFICE VISIT (OUTPATIENT)
Dept: CARDIOLOGY CLINIC | Age: 55
End: 2020-05-27
Payer: MEDICAID

## 2020-05-27 VITALS
BODY MASS INDEX: 23.39 KG/M2 | HEART RATE: 80 BPM | TEMPERATURE: 98 F | HEIGHT: 63 IN | DIASTOLIC BLOOD PRESSURE: 60 MMHG | WEIGHT: 132 LBS | SYSTOLIC BLOOD PRESSURE: 92 MMHG | OXYGEN SATURATION: 97 %

## 2020-05-27 PROCEDURE — 99214 OFFICE O/P EST MOD 30 MIN: CPT | Performed by: NURSE PRACTITIONER

## 2020-05-27 ASSESSMENT — ENCOUNTER SYMPTOMS
GASTROINTESTINAL NEGATIVE: 1
SHORTNESS OF BREATH: 1
COUGH: 0

## 2020-05-27 NOTE — LETTER
[id number]  [address] [address]  Phone: [phone]  Fax: [phone]     Denise Dial APRN - CNP     5/28/2020     MD V[address] [address]     Patient: Sulma Centeno   MR Number: [id number]   YOB: 1965   Date of Visit: 5/27/2020     Dear Dr. Efe Harris     Today I saw our mutual patient named above. Below are the relevant portions of my assessment and plan of care. If you have questions, please do not hesitate to call me. I look forward to following Yung Brumfield along with you. [facility]   Cardiology Note              Date:  May 27, 2020  Patientname: Sulma Centeno  YOB: 1965    Chief Complaint   Patient presents with    Follow-up     STEMI       Primary Care physician: Efe Harris MD    HISTORY OF PRESENT ILLNESS: Sulma Centeno is a 48 y.o. female who presented to [facility] ER on 5/19/2020 with complaints of chest pain. Second EKG showed interior STEMI. She was transferred to [facility] and underwent emergent coronary angiography which showed 100% dRCA treated with RONDA x2. LM disease noted, referred to CT surgery for possible CABG in the future. EF 35%. Pt was discharged on [address]. Lisinopril was stopped while in the hospital due to hypotension and to re-evaluate as outpatient. Today she presents for follow up as mentioned above. This is the first time seeing this patient. Overall she stated she has her good days and bad days. She is wearing her LIFEVEST. She stated she has not heard any alarms go off on the device. She stated some days she sleeps more than others. She has not been monitoring blood pressure at home. She stated she had one episode of chest pain last night while sitting, she denies any associated symptoms with this episode of pain. It did not radiate. Stated it resolved quickly. ? Anxious at that time.  Not like the pain she had Risks/benefits/alternatives/outcomes were discussed with patient and/or family and informed consent was obtained. This was an emergency procedure. patient was prepped draped in the usual sterile fashion. Local anaesthetic was applied over puncture site. Using a back wall technique, a 6 [place] Terumo sheath was inserted into right radial artery. Verapamil, nitroglycerin, nicardipine were administered through the sheath. Heparin was administered. Diagnostic 5 [place] Medtronic pigtail and ultra catheters were used for diagnostic angiograms. Initially focus was on RCA angiography and PCI as noted below. At the conclusion of the procedure, a TR band was placed over the puncture site and hemostasis was obtained. There were no immediate complications. I supervised sedation with versed 4 mg/fentanyl 100 Mcg during the procedure. 180 cc contrast was utilized. <20cc EBL. I offered to call the patient's friends and family to give them updates, patient declined and says that she would prefer that we not call anybody with regard to updates. FINDINGS      LVEDP  29   GRADIENT ACROSS AORTIC VALVE  no gradient   LV FUNCTION EF 40 %   WALL MOTION  inferior hypokinesis   MITRAL REGURGITATION  mild      LM  Less than 10% proximalmid stenosis, distally there is an eccentric 50% stenosis         LAD  Large vessel, proximalmid 30% stenosis. Distally less than 10% stenosis. D1 and D2 have a very high takeoff and D2 in particular has a patulous/aneurysmal segment with 70 to 80% proximal stenosis and less than 10% mid to distal stenosis. D3 has 10 to 20% proximalmiddistal stenosis. LCX  Small vessel, 10% proximalmiddistal stenosis. RI  This vessel could also be described as the high first diagonal as noted above in the LAD section. RCA Large vessel, dominant, proximal less than 10% stenosis, mid 30 to 40% stenosis, distal 90 to 100% stenosis.       PERCUTANEOUS INTERVENTION DESCRIPTION 2. No change in medication at this time  3. Will discuss cardiac rehab after CABG- has appointment with Dr. Monica Ormond on 6/16, please call office after appointment to update. 4. Daily weights, low sodium diet, 2000 ml fluid restriction, if you gain 3 lbs in a day 5 lbs in a week, increased swelling, or increased shortness of breath please call the office. No diuretic needed at this time  5. Follow up after CABG, or sooner if needed. Please call with any questions or concerns  6. Continue LifeVest as ordered, discussed reason for LifeVest, when to call 911 or go to ER  7. Diet and activity reviewed with patient. 8.  Continue to not smoke    Nolberto Michele, [address] [address]  [phone]      QUALITY MEASURES  1. Tobacco Cessation Counseling: Yes  2. Retake of BP if >140/90:   NA  3. Documentation to PCP/referring for new patient:  Sent to PCP at close of office visit  4. CAD patient on anti-platelet: Yes  5. CAD patient on STATIN therapy:  Yes  6.  Patient with CHF and aFib on anticoagulation:  NA      Sincerely,      Nolberto Michele, APRN - CNP

## 2020-05-28 ENCOUNTER — OFFICE VISIT (OUTPATIENT)
Dept: FAMILY MEDICINE CLINIC | Age: 55
End: 2020-05-28
Payer: MEDICAID

## 2020-05-28 VITALS
DIASTOLIC BLOOD PRESSURE: 68 MMHG | SYSTOLIC BLOOD PRESSURE: 102 MMHG | OXYGEN SATURATION: 97 % | HEART RATE: 73 BPM | TEMPERATURE: 97.8 F

## 2020-05-28 PROBLEM — I25.10 CORONARY ARTERY DISEASE INVOLVING NATIVE CORONARY ARTERY OF NATIVE HEART: Status: ACTIVE | Noted: 2020-05-28

## 2020-05-28 PROBLEM — I50.41 ACUTE COMBINED SYSTOLIC AND DIASTOLIC CONGESTIVE HEART FAILURE (HCC): Status: ACTIVE | Noted: 2020-05-28

## 2020-05-28 PROCEDURE — 99214 OFFICE O/P EST MOD 30 MIN: CPT | Performed by: FAMILY MEDICINE

## 2020-05-28 RX ORDER — NITROGLYCERIN 0.4 MG/1
0.4 TABLET SUBLINGUAL EVERY 5 MIN PRN
Qty: 25 TABLET | Refills: 3 | Status: ON HOLD
Start: 2020-05-28 | End: 2020-06-05 | Stop reason: HOSPADM

## 2020-05-28 NOTE — PROGRESS NOTES
Subjective:  Vinicio Luz is here to discuss the following issues. She has coronary artery disease. She had a recent hospitalization for chest pain vomiting and diaphoresis and ER evaluation revealed an acute MI. She had no prior history of heart disease. She does have an extensive family history. Her evaluation included catheterization and placement of 2 drug-eluting stents. Additional significant obstructions were noted and cardiothoracic surgery was consulted. An echo showed a diminished ejection fraction and surgery was deferred until an echo can be repeated as an outpatient in hopes that will show some improvement. She has an appointment scheduled with her surgeon. She had some diastolic dysfunction as well. She is not on a diuretic and having no edema orthopnea. No further chest pain or pressure. No lightheadedness or syncope. No fever sweats or chills. She has GERD and on a combination of medications has no mid chest burning. She has chronic bilateral low back pain and radiculopathy and was scheduled for an epidural steroid injection. She has postponed this due to the above. No bowel or bladder incontinence. Social History     Tobacco Use   Smoking Status Former Smoker    Packs/day: 1.00    Years: 10.00    Pack years: 10.00    Types: Cigarettes   Smokeless Tobacco Never Used   Allergies:     Patient has no known allergies. Objective:  /68   Pulse 73   Temp 97.8 °F (36.6 °C) (Oral)   LMP 11/01/2017 (Approximate)   SpO2 97%    No acute distress, heart regular rate and rhythm without murmur, lungs clear to auscultation easy effort, abdomen soft nondistended, no clubbing or cyanosis    Assessment:  1. Coronary artery disease involving native coronary artery of native heart without angina pectoris    2. Acute combined systolic and diastolic congestive heart failure ([address]    3. Gastroesophageal reflux disease without esophagitis    4.  Chronic bilateral low back pain without sciatica            Plan:  Discussed pathophysiology and treatment options for coronary artery disease post MI  Continue plans for repeat echo and eventual cardiothoracic intervention  Continue current medications  Follow-up in 1 month or as needed  \"Healthy Family Handout\" provided  Avoid exposure to all tobacco products. Read and consider all information provided by the pharmacy regarding prescribed medications before use  Careful medical compliance  Proper diet and weight management   Otherwise continue current treatment plan  Call or return if symptoms are not well controlled  Go to ED if severe/significant symptoms occur    All medical conditions for this patient are stable unless otherwise indicated    Deanna Iniguez MD    This note was transcribed using a voice recognition software system. Proper technique and careful oversight were used to increase transcription accuracy but inadvertent errors may be present.

## 2020-05-31 ENCOUNTER — HOSPITAL ENCOUNTER (INPATIENT)
Age: 55
LOS: 5 days | Discharge: HOME HEALTH | DRG: 166 | End: 2020-06-05
Attending: EMERGENCY MEDICINE | Admitting: THORACIC SURGERY (CARDIOTHORACIC VASCULAR SURGERY)
Payer: MEDICAID

## 2020-05-31 ENCOUNTER — APPOINTMENT (OUTPATIENT)
Dept: GENERAL RADIOLOGY | Age: 55
DRG: 166 | End: 2020-05-31
Payer: MEDICAID

## 2020-05-31 PROBLEM — I24.9 ACS (ACUTE CORONARY SYNDROME) (HCC): Status: ACTIVE | Noted: 2020-05-31

## 2020-05-31 LAB
A/G RATIO: 1.4 (ref 1.1–2.2)
ABO/RH: NORMAL
ALBUMIN SERPL-MCNC: 4.2 G/DL (ref 3.4–5)
ALP BLD-CCNC: 115 U/L (ref 40–129)
ALT SERPL-CCNC: 22 U/L (ref 10–40)
ANION GAP SERPL CALCULATED.3IONS-SCNC: 11 MMOL/L (ref 3–16)
ANTIBODY SCREEN: NORMAL
APTT: 29.4 SEC (ref 24.2–36.2)
APTT: 48 SEC (ref 24.2–36.2)
APTT: 59.7 SEC (ref 24.2–36.2)
AST SERPL-CCNC: 25 U/L (ref 15–37)
BASOPHILS ABSOLUTE: 0.2 K/UL (ref 0–0.2)
BASOPHILS RELATIVE PERCENT: 1.4 %
BILIRUB SERPL-MCNC: 0.5 MG/DL (ref 0–1)
BUN BLDV-MCNC: 11 MG/DL (ref 7–20)
CALCIUM SERPL-MCNC: 9.7 MG/DL (ref 8.3–10.6)
CHLORIDE BLD-SCNC: 106 MMOL/L (ref 99–110)
CO2: 21 MMOL/L (ref 21–32)
CREAT SERPL-MCNC: 0.6 MG/DL (ref 0.6–1.1)
EKG ATRIAL RATE: 76 BPM
EKG DIAGNOSIS: NORMAL
EKG P AXIS: 57 DEGREES
EKG P-R INTERVAL: 156 MS
EKG Q-T INTERVAL: 388 MS
EKG QRS DURATION: 90 MS
EKG QTC CALCULATION (BAZETT): 436 MS
EKG R AXIS: -3 DEGREES
EKG T AXIS: -41 DEGREES
EKG VENTRICULAR RATE: 76 BPM
EOSINOPHILS ABSOLUTE: 0.3 K/UL (ref 0–0.6)
EOSINOPHILS RELATIVE PERCENT: 2.5 %
GFR AFRICAN AMERICAN: >60
GFR NON-AFRICAN AMERICAN: >60
GLOBULIN: 2.9 G/DL
GLUCOSE BLD-MCNC: 122 MG/DL (ref 70–99)
HCT VFR BLD CALC: 43.3 % (ref 36–48)
HCT VFR BLD CALC: 43.8 % (ref 36–48)
HEMOGLOBIN: 14.6 G/DL (ref 12–16)
HEMOGLOBIN: 15 G/DL (ref 12–16)
INR BLD: 1.09 (ref 0.86–1.14)
LYMPHOCYTES ABSOLUTE: 1.6 K/UL (ref 1–5.1)
LYMPHOCYTES RELATIVE PERCENT: 14 %
MCH RBC QN AUTO: 29.6 PG (ref 26–34)
MCH RBC QN AUTO: 30.1 PG (ref 26–34)
MCHC RBC AUTO-ENTMCNC: 33.6 G/DL (ref 31–36)
MCHC RBC AUTO-ENTMCNC: 34.3 G/DL (ref 31–36)
MCV RBC AUTO: 87.9 FL (ref 80–100)
MCV RBC AUTO: 88.2 FL (ref 80–100)
MONOCYTES ABSOLUTE: 1.2 K/UL (ref 0–1.3)
MONOCYTES RELATIVE PERCENT: 10.4 %
NEUTROPHILS ABSOLUTE: 8 K/UL (ref 1.7–7.7)
NEUTROPHILS RELATIVE PERCENT: 71.7 %
PDW BLD-RTO: 13.1 % (ref 12.4–15.4)
PDW BLD-RTO: 13.2 % (ref 12.4–15.4)
PLATELET # BLD: 223 K/UL (ref 135–450)
PLATELET # BLD: 234 K/UL (ref 135–450)
PMV BLD AUTO: 11 FL (ref 5–10.5)
PMV BLD AUTO: 11.4 FL (ref 5–10.5)
POTASSIUM SERPL-SCNC: 3.8 MMOL/L (ref 3.5–5.1)
PRO-BNP: 2240 PG/ML (ref 0–124)
PROTHROMBIN TIME: 12.7 SEC (ref 10–13.2)
RBC # BLD: 4.91 M/UL (ref 4–5.2)
RBC # BLD: 4.98 M/UL (ref 4–5.2)
SODIUM BLD-SCNC: 138 MMOL/L (ref 136–145)
TOTAL PROTEIN: 7.1 G/DL (ref 6.4–8.2)
TROPONIN: 0.01 NG/ML
WBC # BLD: 11.1 K/UL (ref 4–11)
WBC # BLD: 11.2 K/UL (ref 4–11)

## 2020-05-31 PROCEDURE — 36415 COLL VENOUS BLD VENIPUNCTURE: CPT

## 2020-05-31 PROCEDURE — 2709999900 HC NON-CHARGEABLE SUPPLY

## 2020-05-31 PROCEDURE — 99222 1ST HOSP IP/OBS MODERATE 55: CPT | Performed by: THORACIC SURGERY (CARDIOTHORACIC VASCULAR SURGERY)

## 2020-05-31 PROCEDURE — 96374 THER/PROPH/DIAG INJ IV PUSH: CPT

## 2020-05-31 PROCEDURE — 33967 INSERT I-AORT PERCUT DEVICE: CPT

## 2020-05-31 PROCEDURE — 6370000000 HC RX 637 (ALT 250 FOR IP): Performed by: INTERNAL MEDICINE

## 2020-05-31 PROCEDURE — C9113 INJ PANTOPRAZOLE SODIUM, VIA: HCPCS | Performed by: THORACIC SURGERY (CARDIOTHORACIC VASCULAR SURGERY)

## 2020-05-31 PROCEDURE — 6360000002 HC RX W HCPCS

## 2020-05-31 PROCEDURE — 86850 RBC ANTIBODY SCREEN: CPT

## 2020-05-31 PROCEDURE — 6360000002 HC RX W HCPCS: Performed by: INTERNAL MEDICINE

## 2020-05-31 PROCEDURE — 6370000000 HC RX 637 (ALT 250 FOR IP): Performed by: EMERGENCY MEDICINE

## 2020-05-31 PROCEDURE — 85347 COAGULATION TIME ACTIVATED: CPT

## 2020-05-31 PROCEDURE — 2580000003 HC RX 258

## 2020-05-31 PROCEDURE — 99291 CRITICAL CARE FIRST HOUR: CPT | Performed by: INTERNAL MEDICINE

## 2020-05-31 PROCEDURE — 5A02210 ASSISTANCE WITH CARDIAC OUTPUT USING BALLOON PUMP, CONTINUOUS: ICD-10-PCS | Performed by: INTERNAL MEDICINE

## 2020-05-31 PROCEDURE — 85027 COMPLETE CBC AUTOMATED: CPT

## 2020-05-31 PROCEDURE — 2100000000 HC CCU R&B

## 2020-05-31 PROCEDURE — 84484 ASSAY OF TROPONIN QUANT: CPT

## 2020-05-31 PROCEDURE — 83880 ASSAY OF NATRIURETIC PEPTIDE: CPT

## 2020-05-31 PROCEDURE — 80053 COMPREHEN METABOLIC PANEL: CPT

## 2020-05-31 PROCEDURE — 33967 INSERT I-AORT PERCUT DEVICE: CPT | Performed by: INTERNAL MEDICINE

## 2020-05-31 PROCEDURE — 86900 BLOOD TYPING SEROLOGIC ABO: CPT

## 2020-05-31 PROCEDURE — 93458 L HRT ARTERY/VENTRICLE ANGIO: CPT

## 2020-05-31 PROCEDURE — 85610 PROTHROMBIN TIME: CPT

## 2020-05-31 PROCEDURE — 6360000002 HC RX W HCPCS: Performed by: THORACIC SURGERY (CARDIOTHORACIC VASCULAR SURGERY)

## 2020-05-31 PROCEDURE — 85730 THROMBOPLASTIN TIME PARTIAL: CPT

## 2020-05-31 PROCEDURE — 71045 X-RAY EXAM CHEST 1 VIEW: CPT

## 2020-05-31 PROCEDURE — 99291 CRITICAL CARE FIRST HOUR: CPT

## 2020-05-31 PROCEDURE — C1725 CATH, TRANSLUMIN NON-LASER: HCPCS

## 2020-05-31 PROCEDURE — 96375 TX/PRO/DX INJ NEW DRUG ADDON: CPT

## 2020-05-31 PROCEDURE — C1769 GUIDE WIRE: HCPCS

## 2020-05-31 PROCEDURE — 2580000003 HC RX 258: Performed by: INTERNAL MEDICINE

## 2020-05-31 PROCEDURE — P9035 PLATELET PHERES LEUKOREDUCED: HCPCS

## 2020-05-31 PROCEDURE — 93458 L HRT ARTERY/VENTRICLE ANGIO: CPT | Performed by: INTERNAL MEDICINE

## 2020-05-31 PROCEDURE — C1894 INTRO/SHEATH, NON-LASER: HCPCS

## 2020-05-31 PROCEDURE — 94761 N-INVAS EAR/PLS OXIMETRY MLT: CPT

## 2020-05-31 PROCEDURE — B2111ZZ FLUOROSCOPY OF MULTIPLE CORONARY ARTERIES USING LOW OSMOLAR CONTRAST: ICD-10-PCS | Performed by: INTERNAL MEDICINE

## 2020-05-31 PROCEDURE — 2500000003 HC RX 250 WO HCPCS

## 2020-05-31 PROCEDURE — 6360000002 HC RX W HCPCS: Performed by: NURSE PRACTITIONER

## 2020-05-31 PROCEDURE — 86901 BLOOD TYPING SEROLOGIC RH(D): CPT

## 2020-05-31 PROCEDURE — 93005 ELECTROCARDIOGRAM TRACING: CPT | Performed by: EMERGENCY MEDICINE

## 2020-05-31 PROCEDURE — 85025 COMPLETE CBC W/AUTO DIFF WBC: CPT

## 2020-05-31 PROCEDURE — 99153 MOD SED SAME PHYS/QHP EA: CPT

## 2020-05-31 PROCEDURE — B2151ZZ FLUOROSCOPY OF LEFT HEART USING LOW OSMOLAR CONTRAST: ICD-10-PCS | Performed by: INTERNAL MEDICINE

## 2020-05-31 PROCEDURE — 86923 COMPATIBILITY TEST ELECTRIC: CPT

## 2020-05-31 PROCEDURE — 4A023N7 MEASUREMENT OF CARDIAC SAMPLING AND PRESSURE, LEFT HEART, PERCUTANEOUS APPROACH: ICD-10-PCS | Performed by: INTERNAL MEDICINE

## 2020-05-31 PROCEDURE — 6360000002 HC RX W HCPCS: Performed by: EMERGENCY MEDICINE

## 2020-05-31 PROCEDURE — 99152 MOD SED SAME PHYS/QHP 5/>YRS: CPT

## 2020-05-31 PROCEDURE — C9460 INJECTION, CANGRELOR: HCPCS | Performed by: INTERNAL MEDICINE

## 2020-05-31 PROCEDURE — 93010 ELECTROCARDIOGRAM REPORT: CPT | Performed by: INTERNAL MEDICINE

## 2020-05-31 RX ORDER — PROMETHAZINE HYDROCHLORIDE 25 MG/1
12.5 TABLET ORAL EVERY 6 HOURS PRN
Status: DISCONTINUED | OUTPATIENT
Start: 2020-05-31 | End: 2020-06-02

## 2020-05-31 RX ORDER — CARVEDILOL 3.12 MG/1
3.12 TABLET ORAL 2 TIMES DAILY WITH MEALS
Status: DISCONTINUED | OUTPATIENT
Start: 2020-05-31 | End: 2020-06-02

## 2020-05-31 RX ORDER — HEPARIN SODIUM 10000 [USP'U]/100ML
8.4 INJECTION, SOLUTION INTRAVENOUS CONTINUOUS
Status: DISCONTINUED | OUTPATIENT
Start: 2020-05-31 | End: 2020-06-02

## 2020-05-31 RX ORDER — FUROSEMIDE 10 MG/ML
20 INJECTION INTRAMUSCULAR; INTRAVENOUS ONCE
Status: COMPLETED | OUTPATIENT
Start: 2020-05-31 | End: 2020-05-31

## 2020-05-31 RX ORDER — TIZANIDINE 4 MG/1
4 TABLET ORAL 3 TIMES DAILY
Status: DISCONTINUED | OUTPATIENT
Start: 2020-05-31 | End: 2020-06-02

## 2020-05-31 RX ORDER — ATORVASTATIN CALCIUM 80 MG/1
80 TABLET, FILM COATED ORAL NIGHTLY
Status: DISCONTINUED | OUTPATIENT
Start: 2020-05-31 | End: 2020-06-02

## 2020-05-31 RX ORDER — ASPIRIN 81 MG/1
243 TABLET, CHEWABLE ORAL ONCE
Status: COMPLETED | OUTPATIENT
Start: 2020-05-31 | End: 2020-05-31

## 2020-05-31 RX ORDER — HEPARIN SODIUM 1000 [USP'U]/ML
60 INJECTION, SOLUTION INTRAVENOUS; SUBCUTANEOUS PRN
Status: DISCONTINUED | OUTPATIENT
Start: 2020-05-31 | End: 2020-06-02

## 2020-05-31 RX ORDER — ONDANSETRON 2 MG/ML
4 INJECTION INTRAMUSCULAR; INTRAVENOUS
Status: COMPLETED | OUTPATIENT
Start: 2020-05-31 | End: 2020-05-31

## 2020-05-31 RX ORDER — PANTOPRAZOLE SODIUM 40 MG/1
40 TABLET, DELAYED RELEASE ORAL
Status: DISCONTINUED | OUTPATIENT
Start: 2020-06-01 | End: 2020-05-31

## 2020-05-31 RX ORDER — ASPIRIN 81 MG/1
81 TABLET, CHEWABLE ORAL DAILY
Status: DISCONTINUED | OUTPATIENT
Start: 2020-05-31 | End: 2020-05-31 | Stop reason: SDUPTHER

## 2020-05-31 RX ORDER — ONDANSETRON 2 MG/ML
4 INJECTION INTRAMUSCULAR; INTRAVENOUS EVERY 6 HOURS PRN
Status: DISCONTINUED | OUTPATIENT
Start: 2020-05-31 | End: 2020-06-02

## 2020-05-31 RX ORDER — FENTANYL CITRATE 50 UG/ML
INJECTION, SOLUTION INTRAMUSCULAR; INTRAVENOUS
Status: COMPLETED | OUTPATIENT
Start: 2020-05-31 | End: 2020-05-31

## 2020-05-31 RX ORDER — FLUOXETINE HYDROCHLORIDE 20 MG/1
40 CAPSULE ORAL DAILY
Status: DISCONTINUED | OUTPATIENT
Start: 2020-05-31 | End: 2020-06-02

## 2020-05-31 RX ORDER — HEPARIN SODIUM 1000 [USP'U]/ML
30 INJECTION, SOLUTION INTRAVENOUS; SUBCUTANEOUS PRN
Status: DISCONTINUED | OUTPATIENT
Start: 2020-05-31 | End: 2020-06-02

## 2020-05-31 RX ORDER — MORPHINE SULFATE 2 MG/ML
2 INJECTION, SOLUTION INTRAMUSCULAR; INTRAVENOUS
Status: DISCONTINUED | OUTPATIENT
Start: 2020-05-31 | End: 2020-06-02

## 2020-05-31 RX ORDER — MORPHINE SULFATE 4 MG/ML
4 INJECTION, SOLUTION INTRAMUSCULAR; INTRAVENOUS
Status: COMPLETED | OUTPATIENT
Start: 2020-05-31 | End: 2020-05-31

## 2020-05-31 RX ORDER — HALOPERIDOL 5 MG/ML
INJECTION INTRAMUSCULAR
Status: DISPENSED
Start: 2020-05-31 | End: 2020-05-31

## 2020-05-31 RX ORDER — ASPIRIN 81 MG/1
81 TABLET, CHEWABLE ORAL DAILY
Status: DISCONTINUED | OUTPATIENT
Start: 2020-05-31 | End: 2020-06-02

## 2020-05-31 RX ORDER — HEPARIN SODIUM 10000 [USP'U]/100ML
12 INJECTION, SOLUTION INTRAVENOUS CONTINUOUS
Status: DISCONTINUED | OUTPATIENT
Start: 2020-05-31 | End: 2020-05-31

## 2020-05-31 RX ORDER — MIDAZOLAM HYDROCHLORIDE 5 MG/ML
INJECTION INTRAMUSCULAR; INTRAVENOUS
Status: COMPLETED | OUTPATIENT
Start: 2020-05-31 | End: 2020-05-31

## 2020-05-31 RX ORDER — HEPARIN SODIUM 1000 [USP'U]/ML
4000 INJECTION, SOLUTION INTRAVENOUS; SUBCUTANEOUS ONCE
Status: COMPLETED | OUTPATIENT
Start: 2020-05-31 | End: 2020-05-31

## 2020-05-31 RX ORDER — PANTOPRAZOLE SODIUM 40 MG/10ML
40 INJECTION, POWDER, LYOPHILIZED, FOR SOLUTION INTRAVENOUS 2 TIMES DAILY
Status: DISCONTINUED | OUTPATIENT
Start: 2020-05-31 | End: 2020-06-02

## 2020-05-31 RX ADMIN — FENTANYL CITRATE 25 MCG: 50 INJECTION, SOLUTION INTRAMUSCULAR; INTRAVENOUS at 08:02

## 2020-05-31 RX ADMIN — ATORVASTATIN CALCIUM 80 MG: 80 TABLET, FILM COATED ORAL at 20:04

## 2020-05-31 RX ADMIN — ONDANSETRON 4 MG: 2 INJECTION INTRAMUSCULAR; INTRAVENOUS at 09:16

## 2020-05-31 RX ADMIN — TIZANIDINE 4 MG: 4 TABLET ORAL at 20:04

## 2020-05-31 RX ADMIN — ASPIRIN 81 MG 243 MG: 81 TABLET ORAL at 07:28

## 2020-05-31 RX ADMIN — PANTOPRAZOLE SODIUM 40 MG: 40 INJECTION, POWDER, FOR SOLUTION INTRAVENOUS at 20:04

## 2020-05-31 RX ADMIN — CARVEDILOL 3.12 MG: 3.12 TABLET, FILM COATED ORAL at 11:28

## 2020-05-31 RX ADMIN — ONDANSETRON 4 MG: 2 INJECTION INTRAMUSCULAR; INTRAVENOUS at 07:30

## 2020-05-31 RX ADMIN — CANGRELOR 0.75 MCG/KG/MIN: 50 INJECTION, POWDER, LYOPHILIZED, FOR SOLUTION INTRAVENOUS at 11:12

## 2020-05-31 RX ADMIN — FUROSEMIDE 20 MG: 10 INJECTION, SOLUTION INTRAVENOUS at 11:28

## 2020-05-31 RX ADMIN — HEPARIN SODIUM 8.4 ML/HR: 10000 INJECTION, SOLUTION INTRAVENOUS at 16:41

## 2020-05-31 RX ADMIN — ASPIRIN 81 MG 81 MG: 81 TABLET ORAL at 11:28

## 2020-05-31 RX ADMIN — MORPHINE SULFATE 4 MG: 4 INJECTION, SOLUTION INTRAMUSCULAR; INTRAVENOUS at 07:30

## 2020-05-31 RX ADMIN — PROMETHAZINE HYDROCHLORIDE 12.5 MG: 25 TABLET ORAL at 20:06

## 2020-05-31 RX ADMIN — PANTOPRAZOLE SODIUM 40 MG: 40 INJECTION, POWDER, FOR SOLUTION INTRAVENOUS at 09:51

## 2020-05-31 RX ADMIN — HEPARIN SODIUM 12 UNITS/KG/HR: 10000 INJECTION, SOLUTION INTRAVENOUS at 09:04

## 2020-05-31 RX ADMIN — FLUOXETINE 40 MG: 20 CAPSULE ORAL at 11:28

## 2020-05-31 RX ADMIN — CARVEDILOL 3.12 MG: 3.12 TABLET, FILM COATED ORAL at 16:25

## 2020-05-31 RX ADMIN — MIDAZOLAM HYDROCHLORIDE 2 MG: 5 INJECTION INTRAMUSCULAR; INTRAVENOUS at 08:02

## 2020-05-31 RX ADMIN — MORPHINE SULFATE 2 MG: 2 INJECTION, SOLUTION INTRAMUSCULAR; INTRAVENOUS at 22:07

## 2020-05-31 RX ADMIN — TIZANIDINE 4 MG: 4 TABLET ORAL at 13:29

## 2020-05-31 RX ADMIN — HEPARIN SODIUM 1800 UNITS: 1000 INJECTION INTRAVENOUS; SUBCUTANEOUS at 16:25

## 2020-05-31 RX ADMIN — TICAGRELOR 180 MG: 90 TABLET ORAL at 07:29

## 2020-05-31 RX ADMIN — HEPARIN SODIUM 4000 UNITS: 1000 INJECTION INTRAVENOUS; SUBCUTANEOUS at 07:28

## 2020-05-31 RX ADMIN — MORPHINE SULFATE 4 MG: 4 INJECTION, SOLUTION INTRAMUSCULAR; INTRAVENOUS at 16:26

## 2020-05-31 ASSESSMENT — PAIN DESCRIPTION - LOCATION
LOCATION: CHEST
LOCATION: CHEST

## 2020-05-31 ASSESSMENT — PAIN - FUNCTIONAL ASSESSMENT: PAIN_FUNCTIONAL_ASSESSMENT: ACTIVITIES ARE NOT PREVENTED

## 2020-05-31 ASSESSMENT — PAIN SCALES - GENERAL
PAINLEVEL_OUTOF10: 0
PAINLEVEL_OUTOF10: 5
PAINLEVEL_OUTOF10: 6
PAINLEVEL_OUTOF10: 6
PAINLEVEL_OUTOF10: 0
PAINLEVEL_OUTOF10: 1
PAINLEVEL_OUTOF10: 0

## 2020-05-31 ASSESSMENT — PAIN DESCRIPTION - ONSET: ONSET: ON-GOING

## 2020-05-31 ASSESSMENT — PAIN DESCRIPTION - DESCRIPTORS: DESCRIPTORS: ACHING

## 2020-05-31 ASSESSMENT — PAIN DESCRIPTION - FREQUENCY: FREQUENCY: CONTINUOUS

## 2020-05-31 ASSESSMENT — PAIN DESCRIPTION - PAIN TYPE: TYPE: ACUTE PAIN

## 2020-05-31 ASSESSMENT — PAIN DESCRIPTION - PROGRESSION: CLINICAL_PROGRESSION: GRADUALLY IMPROVING

## 2020-05-31 ASSESSMENT — PAIN DESCRIPTION - ORIENTATION: ORIENTATION: MID

## 2020-05-31 NOTE — PLAN OF CARE
Problem: Falls - Risk of:  Goal: Will remain free from falls  Description: Will remain free from falls  Outcome: Ongoing     Problem: Safety:  Goal: Free from accidental physical injury  Description: Free from accidental physical injury  Outcome: Ongoing     Problem: Daily Care:  Goal: Daily care needs are met  Description: Daily care needs are met  Outcome: Ongoing     Problem: Skin Integrity:  Goal: Skin integrity will stabilize  Description: Skin integrity will stabilize  Outcome: Ongoing     Problem: Cardiac:  Goal: Hemodynamic stability will improve  Description: Hemodynamic stability will improve  Outcome: Ongoing

## 2020-05-31 NOTE — H&P
strain: Not on file    Food insecurity     Worry: Not on file     Inability: Not on file    Transportation needs     Medical: Not on file     Non-medical: Not on file   Tobacco Use    Smoking status: Former Smoker     Packs/day: 1.00     Years: 10.00     Pack years: 10.00     Types: Cigarettes    Smokeless tobacco: Never Used   Substance and Sexual Activity    Alcohol use: No    Drug use: No    Sexual activity: Not on file   Lifestyle    Physical activity     Days per week: Not on file     Minutes per session: Not on file    Stress: Not on file   Relationships    Social connections     Talks on phone: Not on file     Gets together: Not on file     Attends Jainism service: Not on file     Active member of club or organization: Not on file     Attends meetings of clubs or organizations: Not on file     Relationship status: Not on file    Intimate partner violence     Fear of current or ex partner: Not on file     Emotionally abused: Not on file     Physically abused: Not on file     Forced sexual activity: Not on file   Other Topics Concern    Not on file   Social History Narrative    Not on file        Family History:  No evidence for sudden cardiac death or premature CAD. History reviewed. No pertinent family history. Medications:  Prior to Admission medications    Medication Sig Start Date End Date Taking? Authorizing Provider   nitroGLYCERIN (NITROSTAT) 0.4 MG SL tablet Place 1 tablet under the tongue every 5 minutes as needed for Chest pain up to max of 3 total doses.  If no relief after 1 dose, call 911. 5/28/20   Danae Parks MD   aspirin 81 MG chewable tablet Take 1 tablet by mouth daily 5/21/20   Roseann Mendez APRN - CNP   atorvastatin (LIPITOR) 80 MG tablet Take 1 tablet by mouth nightly 5/21/20   Roseann Mendez APRN - CNP   carvedilol (COREG) 3.125 MG tablet Take 1 tablet by mouth 2 times daily (with meals) 5/21/20   Roseann Mendez APRN - GIL   ticagrelor (BRILINTA) 90 MG TABS tablet Take 1 tablet by mouth 2 times daily 5/21/20   Wan Ignacio, APRN - CNP   mirtazapine (REMERON) 45 MG tablet TAKE ONE TABLET BY MOUTH ONCE NIGHTLY 5/14/20   Puneet Parker MD   FLUoxetine [facility]) 40 MG capsule Take 1 capsule by mouth daily 4/10/20   Puneet Parker MD   lansoprazole (PREVACID) 30 MG delayed release capsule Take 1 capsule by mouth daily 4/1/20   Puneet Parker MD   famotidine (PEPCID) 40 MG tablet Take 1 tablet by mouth every evening 4/1/20   Puneet Parker MD   diclofenac (VOLTAREN) 50 MG EC tablet Take 50 mg by mouth 2 times daily 12/24/19   Historical Provider, MD   tiZANidine (ZANAFLEX) 4 MG tablet Take 4 mg by mouth 3 times daily 12/24/19   Historical Provider, MD       Allergies:  Patient has no known allergies.      Review of Systems:   ?Full ROS obtained and negative except as mentioned in HPI    Physical Examination:    /89   Pulse 82   Temp 98 °F (36.7 °C) (Oral)   Resp 20   Ht 5' 3\" (1.6 m)   Wt 132 lb (59.9 kg)   LMP 11/01/2017 (Approximate)   SpO2 99%   BMI 23.38 kg/m²   Wt Readings from Last 3 Encounters:   05/31/20 132 lb (59.9 kg)   05/27/20 132 lb (59.9 kg)   05/22/20 134 lb 4.8 oz (60.9 kg)       GENERAL: Well developed, well nourished, no acute distress  NEUROLOGICAL: Alert and oriented x3  PSYCH: Normal mood and affect   SKIN: Warm and dry, without lesions  HEENT: Normocephalic, atraumatic, Sclera non-icteric, mucous membranes moist  NECK: supple, JVP normal, thyroid not enlarged   CAROTID: Normal upstroke, no bruits  CARDIAC: Normal PMI, regular rate and rhythm, normal S1S2, no murmur, rub  RESPIRATORY: Normal respiratory effort, clear to auscultation bilaterally  EXTREMITIES: No cyanosis, clubbing or edema, palpable pulses bilaterally   MUSCULOSKELETAL: No joint swelling or tenderness, no chest wall tenderness  GASTROINTESTINAL:  soft, non-tender, no bruit    Labs:  Lab Review   Lab Results   Component Value Date     05/31/2020    K 3.8 05/31/2020    K 3.5 30 to 40% stenosis, distal 90 to 100% stenosis.       Impression/Recommendations    Ms. Vandana Baron is a 48 y.o. female patient    ACS  CAD:Distal RCA stents 5/19/20 in setting of Inferior STEMI; residual disease notable for 50% distal LM, 70-80% proximal D2  ICMP  HFrEF: LVEF 35-40%   Hypercholesterolemia  GERD  Former tobacco        Risks, benefits, goals, and alternative of cardiac catheterization discussed with patient. All questions answered and informed consent obtained. Further recommendations pending emergent coronary angiography and clinical course. Discussed with Dr. Everardo Santos, Cardiothoracic Surgery in advance. Critical care time spent in direct management of this patient was 45 minutes, exclusive of separately documented procedures. Time spent includes but was not limited to directly managing the unstable patient, reviewing diagnostics, speaking with medical staff, and developing a treatment plan.       Ari Cooley D.O., [facility]  Interventional Cardiology     o: [phone]  [address] [place] [address]., [address], [address] [place] [address]

## 2020-05-31 NOTE — PRE SEDATION
Documentation and Exam:  I have assessed the patient and agree with the H&P present on the chart. Prior History of Anesthesia Complications:   none    Modified Mallampati:  II (soft palate, uvula, fauces visible)    ASA Classification:  E Status - the procedure is performed on an Emergency basis      Mike Scale: Activity:  2 - Able to move 4 extremities voluntarily on command  Respiration:  2 - Able to breathe deeply and cough freely  Circulation:  2 - BP+/- 20mmHg of normal  Consciousness:  2 - Fully awake  Oxygen Saturation (color):  2 - Able to maintain oxygen saturation >92% on room air    Sedation/Anesthesia Plan:  Guard the patient's safety and welfare. Minimize physical discomfort and pain. Minimize negative psychological responses to treatment by providing sedation and analgesia and maximize the potential amnesia. Patient to meet pre-procedure discharge plan. Medication Planned:  midazolam intravenously and fentanyl intravenously    Patient is an appropriate candidate for plan of sedation: yes    The risks, benefits, goals, and alternatives of the procedure were discussed in detail with the patient. Informed consent was obtained and further recommendations will be made following the procedure.      Britt Bearden D.O., [facility]  Interventional Cardiology     o: [phone]  [address] [place], [place] [address]olia [facility] [address], [address] [facility]

## 2020-05-31 NOTE — CONSULTS
Consultation H&P    Date of Admission:  2020  6:54 AM  Date of Consultation:  2020    PCP:  Yogesh Negron MD      Chief Complaint: ST MI, complex coronary artery disease    History of Present Illness: We are asked to see this patient in consultation by Dr. tafoya    MsLucian Aguilar is a 48 y.o. female patient with known CAD and recent RCA stenting in the setting of inferior STEMI on May 19, 2020, presented to emergency room this morning with recurrent angina. Eugene Rainey was seen by cardio thoracic surgery during her admission 2 weeks ago. Discharged on  [facility] after stent placement planned coronary artery bypass in 6-week came in through the emergency room with ST MI T wave inversions anterolateral underwent catheterizations showed no significant change from previous disease   Past Medical History:  Past Medical History:   Diagnosis Date    CAD (coronary artery disease)     Depression     GERD (gastroesophageal reflux disease)        Past Surgical History:  Past Surgical History:   Procedure Laterality Date     SECTION      SINUS SURGERY         Home Medications:   Prior to Admission medications    Medication Sig Start Date End Date Taking? Authorizing Provider   nitroGLYCERIN (NITROSTAT) 0.4 MG SL tablet Place 1 tablet under the tongue every 5 minutes as needed for Chest pain up to max of 3 total doses.  If no relief after 1 dose, call 911. 20   Yogesh Negron MD   aspirin 81 MG chewable tablet Take 1 tablet by mouth daily 20   SUJATA Tierney CNP   atorvastatin (LIPITOR) 80 MG tablet Take 1 tablet by mouth nightly 20   SUJATA Tierney CNP   carvedilol (COREG) 3.125 MG tablet Take 1 tablet by mouth 2 times daily (with meals) 20   SUJATA Tierney CNP   ticagrelor (BRILINTA) 90 MG TABS tablet Take 1 tablet by mouth 2 times daily 20   SUJATA Tierney CNP   mirtazapine (REMERON) 45 MG tablet TAKE ONE TABLET BY MOUTH ONCE NIGHTLY member of club or organization: Not on file     Attends meetings of clubs or organizations: Not on file     Relationship status: Not on file    Intimate partner violence     Fear of current or ex partner: Not on file     Emotionally abused: Not on file     Physically abused: Not on file     Forced sexual activity: Not on file   Other Topics Concern    Not on file   Social History Narrative    Not on file       Family History:  Heart Disease:   Stroke:   Cancer:   Diabetes:   Hypertension:   Aneurysm/PVD:     History reviewed. No pertinent family history. Review of Systems:  Constitutional:  No night sweats, headaches, weight loss. Eyes:  No glaucoma, cataracts. ENMT:  No nosebleeds, deviated septum. Cardiac:  No arrhythmias, previous MI. Vascular:  No claudication, varicosities. GI:  No PUD, heartburn. :  No kidney stones, frequent UTIs  Musculoskeletal:  No arthritis, gout. Respiratory:  No SOB, emphysema, asthma. Integumentary:  No dermatitis, itching, rash. Neurological:  No stroke, TIAs, seizures. Psychiatric:  No depression, anxiety. Endocrine: No diabetes, thyroid issues. Hematologic:  No bleeding, easy bruising. Immunologic:  No known cancer, steroid therapies. Physical Examination:    /85   Pulse 75   Temp 97.9 °F (36.6 °C) (Oral)   Resp 18   Ht 5' 3\" (1.6 m)   Wt 132 lb (59.9 kg)   LMP 11/01/2017 (Approximate)   SpO2 98%   BMI 23.38 kg/m²      BP RUE:  BP LUE:   Admission Weight: 132 lb (59.9 kg)   Hand dominance:    General appearance: NAD, well nourished  Eyes: anicteric, PERRLA  ENMT: no scars or lesions, no nasal deformity, normal dentition, no cyanosis of oral mucosa  Neck: no masses, no thyroid enlargement, no JVD. Respiratory: effort is unlabored, symmetric, no crackles, wheezes or rubs. No palpable/percussable abnormalities. Cardiovascular: regular, no murmur. PMI normal, no thrill. No carotid bruits. No edema or varicosities.  Abdominal aorta cannot be appreciated given body habitus. GI: abdomen soft, nondistended, no organomegaly. No masses. Lymphatic: no cervical/supraclavicular adenopathy  Musculoskeletal: strength and tone normal. Full ROM. No scoliosis. Extremities: warm and pink. No clubbing or petechiae. Skin: no dermatitis or ulceration. No nodularity or induration. Neuro: CN grossly intact. Sensation and motor function grossly intact. Psychiatric: oriented, appropriate mood/affect. MEDICAL DECISION MAKING/TESTING  Studies personally reviewed. Cath:  LM  Less than 10% proximal-mid stenosis, distally there is an   eccentric 50% stenosis       LAD  Large vessel, proximal-mid 30% stenosis.  Distally less than   10% stenosis. D1 and D2 have a very high takeoff and D2 in particular has a   patulous/aneurysmal segment with 70 to 80% proximal stenosis and   less than 10% mid to distal stenosis. D3 has 10 to 20% [id number]-[id number]-rexpkd stenosis.       LCX  Small vessel, 10% elpkkgzl-yvj-ffktap stenosis.     RI  This vessel could also be described as the high first   diagonal as noted above in the LAD section.       RCA Large vessel, dominant, proximal less than 10% stenosis, mid   30 to 40% stenosis, distal 90 to 100% stenosis. Echo:   Summary   The left ventricular systolic function is moderately reduced with an   ejection fraction of 35 %. There is hypokinesis of the inferior, basal inferior and inferiolateral   walls. Grade I diastolic dysfunction with normal filling pressure. Mild mitral and aortic regurgitation. Systolic pulmonic artery pressure (SPAP) is normal estimated at 38 mmHg   (Right atrial pressure of 8 mmHg).       Signature  CXR:     CT:     Carotid duplex:     PFT      Labs:   CBC:   Recent Labs     05/31/20  0730   WBC 11.2*   HGB 15.0   HCT 43.8   MCV 87.9        BMP:   Recent Labs     05/31/20  0730      K 3.8      CO2 21   BUN 11   CREATININE 0.6   CALCIUM 9.7     Cardiac Enzymes:   Recent Labs 05/31/20  0730   TROPONINI 0.01     PT/INR:   Recent Labs     05/31/20  0730   PROTIME 12.7   INR 1.09     APTT:   Recent Labs     05/31/20  0730   APTT 29.4     Liver Profile:  Lab Results   Component Value Date    AST 25 05/31/2020    ALT 22 05/31/2020    BILITOT 0.5 05/31/2020    ALKPHOS 115 05/31/2020     Lab Results   Component Value Date    CHOL 164 05/20/2020    HDL 34 05/20/2020    HDL 45 05/28/2011    TRIG 113 05/20/2020     UA:   Lab Results   Component Value Date    NITRITE neg 07/17/2015    COLORU dark yellow 07/17/2015    PHUR 7.0 07/17/2015    CLARITYU cloudy 07/17/2015    SPECGRAV 1.020 07/17/2015    LEUKOCYTESUR 2+ 07/17/2015    BILIRUBINUR neg 07/17/2015    BLOODU trace 07/17/2015    GLUCOSEU neg 07/17/2015       History obtained: chart, pt    Risk factors: Smoker hypercholesterolemia previous PCI low ejection fraction and left main disease female    STS Cardiac Surgery Risk profile:  CABG     Mortality: 5.941%  Morbidity and Mortality:   31.256%    Diagnosis: Complex coronary artery disease status post stent placement 2 weeks ago    Plan: Left main, PCI 2 weeks ago right coronary, smoker, hypercholesteremia, reflux disease,    Patient will require coronary artery bypass currently with intra-aortic balloon pump I will plan to do LIMA to LAD off-pump Tuesday although he understand the risk of bleeding a little bit higher on the present of Brilinta but I think it is worth the risk especially if it is off-pump with her unstable left main disease    Typical periop/postop course reviewed including initial limitations on driving/heavy lifting. Risks, benefits and postoperative complications discussed including bleeding, infection, stroke, death, postop pulmonary and renal issues. I spent 60 minutes of care and visit with this patient, greater than 50% of time was spent in counseling and coordinating care, image interpretation, discussion with other caregiver.   And future planning    Gerhard Sahu MD FACS

## 2020-05-31 NOTE — ED PROVIDER NOTES
Occupational History    Not on file   Social Needs    Financial resource strain: Not on file    Food insecurity     Worry: Not on file     Inability: Not on file    Transportation needs     Medical: Not on file     Non-medical: Not on file   Tobacco Use    Smoking status: Former Smoker     Packs/day: 1.00     Years: 10.00     Pack years: 10.00     Types: Cigarettes    Smokeless tobacco: Never Used   Substance and Sexual Activity    Alcohol use: No    Drug use: No    Sexual activity: Not on file   Lifestyle    Physical activity     Days per week: Not on file     Minutes per session: Not on file    Stress: Not on file   Relationships    Social connections     Talks on phone: Not on file     Gets together: Not on file     Attends Shinto service: Not on file     Active member of club or organization: Not on file     Attends meetings of clubs or organizations: Not on file     Relationship status: Not on file    Intimate partner violence     Fear of current or ex partner: Not on file     Emotionally abused: Not on file     Physically abused: Not on file     Forced sexual activity: Not on file   Other Topics Concern    Not on file   Social History Narrative    Not on file     Current Facility-Administered Medications   Medication Dose Route Frequency Provider Last Rate Last Dose    morphine (PF) injection 4 mg  4 mg Intravenous Q1H PRN Isaías Don MD   4 mg at 05/31/20 0730    ondansetron (ZOFRAN) injection 4 mg  4 mg Intravenous Q1H PRN Isaías Don MD   4 mg at 05/31/20 0730     No Known Allergies    REVIEW OF SYSTEMS  10 systems reviewed, pertinent positives per HPI otherwise noted to be negative. PHYSICAL EXAM  /89   Pulse 82   Temp 98 °F (36.7 °C) (Oral)   Resp 20   Ht 5' 3\" (1.6 m)   Wt 132 lb (59.9 kg)   LMP 11/01/2017 (Approximate)   SpO2 99%   BMI 23.38 kg/m²    GENERAL APPEARANCE: Awake and alert. Cooperative. No distress. HENT: Normocephalic. Atraumatic. Mucous membranes are moist.  NECK: Supple. EYES: PERRL. EOM's grossly intact. HEART/CHEST: RRR. No murmurs. No chest wall tenderness. LUNGS: Respirations unlabored. CTAB. Good air exchange. Speaking comfortably in full sentences. ABDOMEN: No tenderness. Soft. Non-distended. No masses. No organomegaly. No guarding or rebound. Normal bowel sounds throughout. MUSCULOSKELETAL: No extremity edema. Compartments soft. No deformity. No tenderness in the extremities. All extremities neurovascularly intact. SKIN: Warm and dry. No acute rashes. NEUROLOGICAL: Alert and oriented. CN's 2-12 intact. No gross facial drooping. Strength 5/5, sensation intact. 2 plus DTR's in knees bilaterally. Gait normal.  PSYCHIATRIC: Normal mood and affect. LABS  I have reviewed all labs for this visit.    Results for orders placed or performed during the hospital encounter of 05/31/20   CBC Auto Differential   Result Value Ref Range    WBC 11.2 (H) 4.0 - 11.0 K/uL    RBC 4.98 4.00 - 5.20 M/uL    Hemoglobin 15.0 12.0 - 16.0 g/dL    Hematocrit 43.8 36.0 - 48.0 %    MCV 87.9 80.0 - 100.0 fL    MCH 30.1 26.0 - 34.0 pg    MCHC 34.3 31.0 - 36.0 g/dL    RDW 13.1 12.4 - 15.4 %    Platelets 600 901 - 895 K/uL    MPV 11.0 (H) 5.0 - 10.5 fL    Neutrophils % 71.7 %    Lymphocytes % 14.0 %    Monocytes % 10.4 %    Eosinophils % 2.5 %    Basophils % 1.4 %    Neutrophils Absolute 8.0 (H) 1.7 - 7.7 K/uL    Lymphocytes Absolute 1.6 1.0 - 5.1 K/uL    Monocytes Absolute 1.2 0.0 - 1.3 K/uL    Eosinophils Absolute 0.3 0.0 - 0.6 K/uL    Basophils Absolute 0.2 0.0 - 0.2 K/uL   Comprehensive Metabolic Panel   Result Value Ref Range    Sodium 138 136 - 145 mmol/L    Potassium 3.8 3.5 - 5.1 mmol/L    Chloride 106 99 - 110 mmol/L    CO2 21 21 - 32 mmol/L    Anion Gap 11 3 - 16    Glucose 122 (H) 70 - 99 mg/dL    BUN 11 7 - 20 mg/dL    CREATININE 0.6 0.6 - 1.1 mg/dL    GFR Non-African American >60 >60    GFR African American >60 >60    Calcium 9.7 8.3 - 10.6 mg/dL    Total Protein 7.1 6.4 - 8.2 g/dL    Alb 4.2 3.4 - 5.0 g/dL    Albumin/Globulin Ratio 1.4 1.1 - 2.2    Total Bilirubin 0.5 0.0 - 1.0 mg/dL    Alkaline Phosphatase 115 40 - 129 U/L    ALT 22 10 - 40 U/L    AST 25 15 - 37 U/L    Globulin 2.9 g/dL   Troponin   Result Value Ref Range    Troponin 0.01 <0.01 ng/mL   Protime-INR   Result Value Ref Range    Protime 12.7 10.0 - 13.2 sec    INR 1.09 0.86 - 1.14   APTT   Result Value Ref Range    aPTT 29.4 24.2 - 36.2 sec   Brain Natriuretic Peptide   Result Value Ref Range    Pro-BNP 2,240 (H) 0 - 124 pg/mL   EKG 12 Lead   Result Value Ref Range    Ventricular Rate 76 BPM    Atrial Rate 76 BPM    P-R Interval 156 ms    QRS Duration 90 ms    Q-T Interval 388 ms    QTc Calculation (Bazett) 436 ms    P Axis 57 degrees    R Axis -3 degrees    T Axis -41 degrees    Diagnosis       ** Age and gender specific ECG analysis **Normal sinus rhythmInferior infarct (cited on or before 19-MAY-2020)T wave abnormality, consider lateral ischemia** ** ** ** * ACUTE MI  ** ** ** **Consider right ventricular involvement in acute inferior infarctAbnormal ECGWhen compared with ECG of 19-MAY-2020 15:45,Significant changes have occurred       The 12 lead EKG was interpreted by me as follows:  Rate: normal with a rate of 76  Rhythm: sinus  Axis: normal  Intervals: normal ME, narrow QRS, normal QTc  ST segments: ST elevations inferiorly with lateral and anterior subtle ST depressions  T waves: inverted inferolaterally  Non-specific T wave changes: present  Prior EKG comparison: EKG dated 5/19/20 is significantly different due to lateral TWI. Prior EKG (which is only available for comparison) is also a STEMI    RADIOLOGY    Xr Chest Portable    Result Date: 5/31/2020  EXAMINATION: ONE XRAY VIEW OF THE CHEST 5/31/2020 7:07 am COMPARISON: 05/19/2020.  HISTORY: ORDERING SYSTEM PROVIDED HISTORY: chest pain TECHNOLOGIST PROVIDED HISTORY: Reason for exam:->chest pain Reason for Exam: Patient had multiple monitors on - going to cardiac cath lab FINDINGS: The cardiac silhouette and mediastinal contours are normal.  The lungs are clear. No focal lung infiltrate. No pleural effusion or pneumothorax. The visualized osseous structures are unremarkable. 1. No acute cardiopulmonary disease. Xr Chest Portable    Result Date: 5/19/2020  EXAMINATION: ONE XRAY VIEW OF THE CHEST 5/19/2020 11:56 am COMPARISON: None. HISTORY: ORDERING SYSTEM PROVIDED HISTORY: pain or SOB TECHNOLOGIST PROVIDED HISTORY: Reason for exam:->pain or SOB Reason for Exam: stemi today, Acuity: Acute Type of Exam: Initial FINDINGS: There is vascular congestion. Perihilar and basilar faint reticular opacity present as well. No consolidation. No pneumothorax or evidence of pleural effusion. Cardiac size within limits. Vascular congestion possible perihilar and bibasilar edema     ED COURSE/MDM  Patient seen and evaluated. Old records reviewed. Labs and imaging reviewed and results discussed with patient. After initial evaluation, differential diagnostic considerations included: acute coronary syndrome, pulmonary embolism, COPD/asthma, pneumonia, musculoskeletal, reflux/PUD/gastritis, pneumothorax, CHF, thoracic aortic dissection, anxiety    The patient's ED workup was notable for EKG initially showing inferior STEMI changes. Although the patient did have a recent inferior STEMI also in May 19 this is the only previous EKG. Lateral T wave inversions are new. Based on her history, I do still have a high clinical suspicion for STEMI after evaluating the patient and therefore did activate the STEMI alert protocol. Dr. Rosana Hernández from cardiology was consulted about the patient's ED history, physical, workup, and course so far. Recommendations from this consultant included care for the equivalent of 180 mg of Brilinta, 4000 units of heparin, and she will activate the Cath Lab.   These medications were ordered in addition to

## 2020-06-01 ENCOUNTER — APPOINTMENT (OUTPATIENT)
Dept: VASCULAR LAB | Age: 55
DRG: 166 | End: 2020-06-01
Payer: MEDICAID

## 2020-06-01 ENCOUNTER — ANESTHESIA EVENT (OUTPATIENT)
Dept: OPERATING ROOM | Age: 55
DRG: 166 | End: 2020-06-01
Payer: MEDICAID

## 2020-06-01 LAB
A/G RATIO: 1.4 (ref 1.1–2.2)
ALBUMIN SERPL-MCNC: 3.7 G/DL (ref 3.4–5)
ALP BLD-CCNC: 196 U/L (ref 40–129)
ALT SERPL-CCNC: 68 U/L (ref 10–40)
ANION GAP SERPL CALCULATED.3IONS-SCNC: 9 MMOL/L (ref 3–16)
APTT: 51.9 SEC (ref 24.2–36.2)
APTT: 54.4 SEC (ref 24.2–36.2)
AST SERPL-CCNC: 89 U/L (ref 15–37)
BACTERIA: ABNORMAL /HPF
BILIRUB SERPL-MCNC: 0.7 MG/DL (ref 0–1)
BILIRUBIN URINE: NEGATIVE
BLOOD, URINE: ABNORMAL
BUN BLDV-MCNC: 7 MG/DL (ref 7–20)
CALCIUM SERPL-MCNC: 9 MG/DL (ref 8.3–10.6)
CHLORIDE BLD-SCNC: 99 MMOL/L (ref 99–110)
CHOLESTEROL, TOTAL: 139 MG/DL (ref 0–199)
CLARITY: CLEAR
CO2: 23 MMOL/L (ref 21–32)
COLOR: YELLOW
CREAT SERPL-MCNC: <0.5 MG/DL (ref 0.6–1.1)
EKG ATRIAL RATE: 77 BPM
EKG DIAGNOSIS: NORMAL
EKG P AXIS: 63 DEGREES
EKG P-R INTERVAL: 158 MS
EKG Q-T INTERVAL: 400 MS
EKG QRS DURATION: 92 MS
EKG QTC CALCULATION (BAZETT): 452 MS
EKG R AXIS: 4 DEGREES
EKG T AXIS: -45 DEGREES
EKG VENTRICULAR RATE: 77 BPM
EPITHELIAL CELLS, UA: ABNORMAL /HPF (ref 0–5)
FIBRINOGEN: 642 MG/DL (ref 200–397)
GFR AFRICAN AMERICAN: >60
GFR NON-AFRICAN AMERICAN: >60
GLOBULIN: 2.7 G/DL
GLUCOSE BLD-MCNC: 142 MG/DL (ref 70–99)
GLUCOSE URINE: NEGATIVE MG/DL
HCT VFR BLD CALC: 39.8 % (ref 36–48)
HCT VFR BLD CALC: 41.4 % (ref 36–48)
HDLC SERPL-MCNC: 48 MG/DL (ref 40–60)
HEMOGLOBIN: 13.6 G/DL (ref 12–16)
HEMOGLOBIN: 14.1 G/DL (ref 12–16)
INR BLD: 1.14 (ref 0.86–1.14)
KETONES, URINE: NEGATIVE MG/DL
LDL CHOLESTEROL CALCULATED: 71 MG/DL
LEUKOCYTE ESTERASE, URINE: ABNORMAL
MAGNESIUM: 1.9 MG/DL (ref 1.8–2.4)
MCH RBC QN AUTO: 30.3 PG (ref 26–34)
MCH RBC QN AUTO: 30.3 PG (ref 26–34)
MCHC RBC AUTO-ENTMCNC: 34.2 G/DL (ref 31–36)
MCHC RBC AUTO-ENTMCNC: 34.3 G/DL (ref 31–36)
MCV RBC AUTO: 88.3 FL (ref 80–100)
MCV RBC AUTO: 88.6 FL (ref 80–100)
MICROSCOPIC EXAMINATION: YES
NITRITE, URINE: NEGATIVE
PDW BLD-RTO: 12.9 % (ref 12.4–15.4)
PDW BLD-RTO: 13.7 % (ref 12.4–15.4)
PH UA: 7.5 (ref 5–8)
PLATELET # BLD: 207 K/UL (ref 135–450)
PLATELET # BLD: 231 K/UL (ref 135–450)
PMV BLD AUTO: 10.9 FL (ref 5–10.5)
PMV BLD AUTO: 11.2 FL (ref 5–10.5)
POTASSIUM REFLEX MAGNESIUM: 3.8 MMOL/L (ref 3.5–5.1)
PRO-BNP: 1567 PG/ML (ref 0–124)
PROTEIN UA: NEGATIVE MG/DL
PROTHROMBIN TIME: 13.3 SEC (ref 10–13.2)
RBC # BLD: 4.5 M/UL (ref 4–5.2)
RBC # BLD: 4.67 M/UL (ref 4–5.2)
RBC UA: ABNORMAL /HPF (ref 0–4)
SODIUM BLD-SCNC: 131 MMOL/L (ref 136–145)
SPECIFIC GRAVITY UA: <=1.005 (ref 1–1.03)
TOTAL PROTEIN: 6.4 G/DL (ref 6.4–8.2)
TRIGL SERPL-MCNC: 102 MG/DL (ref 0–150)
URINE TYPE: ABNORMAL
UROBILINOGEN, URINE: 1 E.U./DL
VLDLC SERPL CALC-MCNC: 20 MG/DL
WBC # BLD: 11.2 K/UL (ref 4–11)
WBC # BLD: 9.8 K/UL (ref 4–11)
WBC UA: ABNORMAL /HPF (ref 0–5)

## 2020-06-01 PROCEDURE — 87186 SC STD MICRODIL/AGAR DIL: CPT

## 2020-06-01 PROCEDURE — 94799 UNLISTED PULMONARY SVC/PX: CPT

## 2020-06-01 PROCEDURE — 6360000002 HC RX W HCPCS: Performed by: THORACIC SURGERY (CARDIOTHORACIC VASCULAR SURGERY)

## 2020-06-01 PROCEDURE — 2580000003 HC RX 258: Performed by: INTERNAL MEDICINE

## 2020-06-01 PROCEDURE — 85610 PROTHROMBIN TIME: CPT

## 2020-06-01 PROCEDURE — 81001 URINALYSIS AUTO W/SCOPE: CPT

## 2020-06-01 PROCEDURE — 6360000002 HC RX W HCPCS: Performed by: NURSE PRACTITIONER

## 2020-06-01 PROCEDURE — 6370000000 HC RX 637 (ALT 250 FOR IP): Performed by: THORACIC SURGERY (CARDIOTHORACIC VASCULAR SURGERY)

## 2020-06-01 PROCEDURE — 6360000002 HC RX W HCPCS: Performed by: INTERNAL MEDICINE

## 2020-06-01 PROCEDURE — 99233 SBSQ HOSP IP/OBS HIGH 50: CPT | Performed by: INTERNAL MEDICINE

## 2020-06-01 PROCEDURE — 2100000000 HC CCU R&B

## 2020-06-01 PROCEDURE — 36415 COLL VENOUS BLD VENIPUNCTURE: CPT

## 2020-06-01 PROCEDURE — 2580000003 HC RX 258: Performed by: THORACIC SURGERY (CARDIOTHORACIC VASCULAR SURGERY)

## 2020-06-01 PROCEDURE — 80061 LIPID PANEL: CPT

## 2020-06-01 PROCEDURE — 93971 EXTREMITY STUDY: CPT

## 2020-06-01 PROCEDURE — 80053 COMPREHEN METABOLIC PANEL: CPT

## 2020-06-01 PROCEDURE — 93010 ELECTROCARDIOGRAM REPORT: CPT | Performed by: INTERNAL MEDICINE

## 2020-06-01 PROCEDURE — 85730 THROMBOPLASTIN TIME PARTIAL: CPT

## 2020-06-01 PROCEDURE — 87077 CULTURE AEROBIC IDENTIFY: CPT

## 2020-06-01 PROCEDURE — C9113 INJ PANTOPRAZOLE SODIUM, VIA: HCPCS | Performed by: THORACIC SURGERY (CARDIOTHORACIC VASCULAR SURGERY)

## 2020-06-01 PROCEDURE — 83735 ASSAY OF MAGNESIUM: CPT

## 2020-06-01 PROCEDURE — 83036 HEMOGLOBIN GLYCOSYLATED A1C: CPT

## 2020-06-01 PROCEDURE — 83880 ASSAY OF NATRIURETIC PEPTIDE: CPT

## 2020-06-01 PROCEDURE — C9460 INJECTION, CANGRELOR: HCPCS | Performed by: INTERNAL MEDICINE

## 2020-06-01 PROCEDURE — 87086 URINE CULTURE/COLONY COUNT: CPT

## 2020-06-01 PROCEDURE — 6370000000 HC RX 637 (ALT 250 FOR IP): Performed by: INTERNAL MEDICINE

## 2020-06-01 PROCEDURE — 85027 COMPLETE CBC AUTOMATED: CPT

## 2020-06-01 PROCEDURE — 93005 ELECTROCARDIOGRAM TRACING: CPT | Performed by: INTERNAL MEDICINE

## 2020-06-01 PROCEDURE — 85384 FIBRINOGEN ACTIVITY: CPT

## 2020-06-01 PROCEDURE — 93880 EXTRACRANIAL BILAT STUDY: CPT

## 2020-06-01 RX ORDER — SODIUM CHLORIDE 0.9 % (FLUSH) 0.9 %
10 SYRINGE (ML) INJECTION PRN
Status: DISCONTINUED | OUTPATIENT
Start: 2020-06-01 | End: 2020-06-02

## 2020-06-01 RX ORDER — SODIUM CHLORIDE, SODIUM LACTATE, POTASSIUM CHLORIDE, CALCIUM CHLORIDE 600; 310; 30; 20 MG/100ML; MG/100ML; MG/100ML; MG/100ML
INJECTION, SOLUTION INTRAVENOUS CONTINUOUS
Status: DISCONTINUED | OUTPATIENT
Start: 2020-06-01 | End: 2020-06-01

## 2020-06-01 RX ORDER — SODIUM CHLORIDE, SODIUM LACTATE, POTASSIUM CHLORIDE, CALCIUM CHLORIDE 600; 310; 30; 20 MG/100ML; MG/100ML; MG/100ML; MG/100ML
INJECTION, SOLUTION INTRAVENOUS CONTINUOUS
Status: DISCONTINUED | OUTPATIENT
Start: 2020-06-02 | End: 2020-06-02

## 2020-06-01 RX ORDER — CHLORHEXIDINE GLUCONATE 4 G/100ML
SOLUTION TOPICAL ONCE
Status: COMPLETED | OUTPATIENT
Start: 2020-06-02 | End: 2020-06-02

## 2020-06-01 RX ORDER — SODIUM CHLORIDE 0.9 % (FLUSH) 0.9 %
10 SYRINGE (ML) INJECTION EVERY 12 HOURS SCHEDULED
Status: DISCONTINUED | OUTPATIENT
Start: 2020-06-01 | End: 2020-06-02

## 2020-06-01 RX ORDER — MIDAZOLAM HYDROCHLORIDE 1 MG/ML
2 INJECTION INTRAMUSCULAR; INTRAVENOUS ONCE
Status: COMPLETED | OUTPATIENT
Start: 2020-06-02 | End: 2020-06-02

## 2020-06-01 RX ORDER — CHLORHEXIDINE GLUCONATE 4 G/100ML
SOLUTION TOPICAL ONCE
Status: COMPLETED | OUTPATIENT
Start: 2020-06-01 | End: 2020-06-02

## 2020-06-01 RX ORDER — LISINOPRIL 2.5 MG/1
2.5 TABLET ORAL DAILY
Status: DISCONTINUED | OUTPATIENT
Start: 2020-06-01 | End: 2020-06-02

## 2020-06-01 RX ADMIN — MORPHINE SULFATE 2 MG: 2 INJECTION, SOLUTION INTRAMUSCULAR; INTRAVENOUS at 00:10

## 2020-06-01 RX ADMIN — Medication 10 ML: at 20:43

## 2020-06-01 RX ADMIN — PANTOPRAZOLE SODIUM 40 MG: 40 INJECTION, POWDER, FOR SOLUTION INTRAVENOUS at 08:37

## 2020-06-01 RX ADMIN — TIZANIDINE 4 MG: 4 TABLET ORAL at 20:41

## 2020-06-01 RX ADMIN — PANTOPRAZOLE SODIUM 40 MG: 40 INJECTION, POWDER, FOR SOLUTION INTRAVENOUS at 20:41

## 2020-06-01 RX ADMIN — CARVEDILOL 3.12 MG: 3.12 TABLET, FILM COATED ORAL at 08:36

## 2020-06-01 RX ADMIN — TIZANIDINE 4 MG: 4 TABLET ORAL at 08:35

## 2020-06-01 RX ADMIN — HEPARIN SODIUM 8.4 ML/HR: 10000 INJECTION, SOLUTION INTRAVENOUS at 13:06

## 2020-06-01 RX ADMIN — MUPIROCIN: 20 OINTMENT TOPICAL at 11:15

## 2020-06-01 RX ADMIN — ASPIRIN 81 MG 81 MG: 81 TABLET ORAL at 08:35

## 2020-06-01 RX ADMIN — Medication 10 ML: at 20:42

## 2020-06-01 RX ADMIN — CANGRELOR 0.75 MCG/KG/MIN: 50 INJECTION, POWDER, LYOPHILIZED, FOR SOLUTION INTRAVENOUS at 06:13

## 2020-06-01 RX ADMIN — FLUOXETINE 40 MG: 20 CAPSULE ORAL at 08:35

## 2020-06-01 RX ADMIN — ATORVASTATIN CALCIUM 80 MG: 80 TABLET, FILM COATED ORAL at 20:41

## 2020-06-01 RX ADMIN — MORPHINE SULFATE 2 MG: 2 INJECTION, SOLUTION INTRAMUSCULAR; INTRAVENOUS at 02:00

## 2020-06-01 RX ADMIN — MUPIROCIN: 20 OINTMENT TOPICAL at 20:41

## 2020-06-01 ASSESSMENT — PAIN DESCRIPTION - PROGRESSION
CLINICAL_PROGRESSION: NOT CHANGED

## 2020-06-01 ASSESSMENT — PAIN DESCRIPTION - ONSET
ONSET: UNABLE TO TELL
ONSET: AWAKENED FROM SLEEP

## 2020-06-01 ASSESSMENT — PAIN DESCRIPTION - ORIENTATION
ORIENTATION: MID
ORIENTATION: MID

## 2020-06-01 ASSESSMENT — PAIN SCALES - GENERAL
PAINLEVEL_OUTOF10: 6
PAINLEVEL_OUTOF10: 0
PAINLEVEL_OUTOF10: 4
PAINLEVEL_OUTOF10: 0
PAINLEVEL_OUTOF10: 0

## 2020-06-01 ASSESSMENT — PAIN DESCRIPTION - PAIN TYPE
TYPE: ACUTE PAIN
TYPE: ACUTE PAIN

## 2020-06-01 ASSESSMENT — PAIN DESCRIPTION - DESCRIPTORS
DESCRIPTORS: ACHING
DESCRIPTORS: ACHING

## 2020-06-01 ASSESSMENT — PAIN DESCRIPTION - LOCATION
LOCATION: CHEST
LOCATION: CHEST

## 2020-06-01 ASSESSMENT — PAIN DESCRIPTION - FREQUENCY
FREQUENCY: INTERMITTENT
FREQUENCY: CONTINUOUS

## 2020-06-01 NOTE — PROGRESS NOTES
SpO2 99%   BMI 23.43 kg/m²    Vitals:    06/01/20 0400 06/01/20 0500 06/01/20 0600 06/01/20 0700   BP: (!) 111/53 (!) 109/57 (!) 110/48 (!) 113/49   Pulse: 81 77 87 89   Resp: 18      Temp: 98 °F (36.7 °C)      TempSrc: Oral      SpO2: 98% 95% 96% 99%   Weight:  132 lb 4.4 oz (60 kg)     Height:             Intake/Output Summary (Last 24 hours) at 6/1/2020 0830  Last data filed at 6/1/2020 0600  Gross per 24 hour   Intake 1139.7 ml   Output 3105 ml   Net -1965.3 ml     Wt Readings from Last 3 Encounters:   06/01/20 132 lb 4.4 oz (60 kg)   05/27/20 132 lb (59.9 kg)   05/22/20 134 lb 4.8 oz (60.9 kg)         Gen: Patient in NAD, resting comfortably  Neck: No JVD or bruits  Respiratory: CTAB no WRR  Chest: normal without deformity  Cardiovascular:RRR, S1S2, no mrg, normal PMI, auscultation done while balloon pump was in pause mode and then balloon pump was restarted thereafter  Abdomen: Soft, NTND, Normal BS, right groin has a balloon pump and there is no bleeding or hematoma noted  Extremities: No clubbing, cyanosis, or edema  Neurological/Psychiatric: AxO x4, No gross motors/sensory deficits  Skin:  Warm and dry      Labs:  CBC:   Recent Labs     05/31/20  0730 05/31/20  1046 06/01/20  0433   WBC 11.2* 11.1* 11.2*   HGB 15.0 14.6 13.6   HCT 43.8 43.3 39.8   MCV 87.9 88.2 88.3    234 207     BMP:   Recent Labs     05/31/20  0730 06/01/20  0433    131*   K 3.8 3.8    99   CO2 21 23   BUN 11 7   CREATININE 0.6 <0.5*     MG:  No results for input(s): MG in the last 72 hours.    PT/INR:   Recent Labs     05/31/20  0730   PROTIME 12.7   INR 1.09     APTT:   Recent Labs     05/31/20  1529 05/31/20  2143 06/01/20  0433   APTT 48.0* 59.7* 51.9*     Cardiac Enzymes:   Recent Labs     05/31/20  0730   TROPONINI 0.01       Cardiac Studies:    Cath May 31, 2020:    Cath  Dominance:Right      LM: 50% distal stenosis unchanged from prior  LAD: larger vessel with mild plaquing; gives off three diagonals of which the second is with aneursym and 70% proximal stenosis  LCx: smaller caliber with minimal plaque disease   RCA: mild plaquing proximal to mid vessel with widely patent distal stents and no significant disease of RPLB or RPDA      LVEDP: 19 mmHg , no gradient upon pullback   LVEF: 35%     Impression/Recommendations:     Discussed films and distal LM disease with CTS Dr. Effie Jaimes in person postprocedure. Plan is for continuation of IABP with Heparin and Cangrelor gtts as bridge to CABG this week. Last dose of Ticagrelor earlier this morning. Continue aspirin, high intensity statin, low dose beta blocker, and diurese as see fit.        I have reviewed labs and imaging/xray/diagnostic testing in this note. Assessment and Plan       Patient Active Problem List   Diagnosis    Anxiety    Primary insomnia    Arthritis    Gastroesophageal reflux disease without esophagitis    Hypercholesteremia    Chronic bilateral low back pain without sciatica    Lumbar radiculopathy    Moderate episode of recurrent major depressive disorder (HCC)    [facility] [address]    STEMI (ST elevation myocardial infarction) ([facility] [address]    Coronary artery disease involving native coronary artery of native heart    Acute combined systolic and diastolic congestive heart failure (HCC)    ACS (acute coronary syndrome) (HCC)     Unstable angina, status post cath balloon pump placement, plan for CABG tomorrow with CT surgery, will defer further management as per their service, will ask hospitalist to be involved in care of patient for admission to hospital as well. Continue anticoagulation in interim. Patient is on aspirin, Cangrelor, beta-blocker and statin, and will add ACE inhibitor. We will sign off as patient will be transitioning to CABG, and expect further management as per CT surgery. Thank you for allowing me to participate in the care of your patient. Please call me with any questions [phone].       Kenia Marsh

## 2020-06-01 NOTE — ANESTHESIA PRE PROCEDURE
Lab Results   Component Value Date     06/01/2020    K 3.8 06/01/2020    CL 99 06/01/2020    CO2 23 06/01/2020    BUN 7 06/01/2020    CREATININE <0.5 06/01/2020    GFRAA >60 06/01/2020    GFRAA >60 05/28/2011    AGRATIO 1.4 06/01/2020    LABGLOM >60 06/01/2020    GLUCOSE 142 06/01/2020    PROT 6.4 06/01/2020    CALCIUM 9.0 06/01/2020    BILITOT 0.7 06/01/2020    ALKPHOS 196 06/01/2020    AST 89 06/01/2020    ALT 68 06/01/2020       POC Tests: No results for input(s): POCGLU, POCNA, POCK, POCCL, POCBUN, POCHEMO, POCHCT in the last 72 hours.     Coags:   Lab Results   Component Value Date    PROTIME 12.7 05/31/2020    INR 1.09 05/31/2020    APTT 51.9 06/01/2020       HCG (If Applicable): No results found for: PREGTESTUR, PREGSERUM, HCG, HCGQUANT     ABGs: No results found for: PHART, PO2ART, QQN4ANX, XNQ2JEO, BEART, G5VOYITJ     Type & Screen (If Applicable):  No results found for: LABABO, LABRH    Drug/Infectious Status (If Applicable):  No results found for: HIV, HEPCAB    COVID-19 Screening (If Applicable): No results found for: COVID19      Anesthesia Evaluation    Airway: Mallampati: I  TM distance: >3 FB   Neck ROM: full  Mouth opening: > = 3 FB Dental:    (+) upper dentures and lower dentures      Pulmonary:                             ROS comment: Social History    Tobacco Use      Smoking status: Former Smoker        Packs/day: 1.00        Years: 10.00        Pack years: 10        Types: Cigarettes      Smokeless tobacco: Never Used     Cardiovascular:    (+) past MI: < 1 month, CAD: obstructive, CHF: systolic and diastolic, hyperlipidemia                  Neuro/Psych:   (+) neuromuscular disease:, psychiatric history:            GI/Hepatic/Renal:   (+) GERD:,           Endo/Other:                     Abdominal:           Vascular:                                    Anesthesia Plan      general     ASA 4     (  [address] [address] EVALUATION FORM       Name:  Wally Hernandez Age:  48 y.o. MRN:  [id number]           I discussed intravenous with inhalational endotracheal anesthesia with pulmonary artery catheter, radial ( or other artery if required) catheter, possible transesophageal echocardiography and post-operative ventilation including risks and alternatives with the patient . The patient has no further questions. Maxx Morgan MD  June 1, 2020  9:35 AM)  Induction: inhalational and intravenous. arterial line, BIS, PA catheter, DINH and central line    Anesthetic plan and risks discussed with patient.                       Maxx Morgan MD   6/1/2020

## 2020-06-02 ENCOUNTER — APPOINTMENT (OUTPATIENT)
Dept: GENERAL RADIOLOGY | Age: 55
DRG: 166 | End: 2020-06-02
Payer: MEDICAID

## 2020-06-02 ENCOUNTER — ANESTHESIA (OUTPATIENT)
Dept: OPERATING ROOM | Age: 55
DRG: 166 | End: 2020-06-02
Payer: MEDICAID

## 2020-06-02 VITALS — OXYGEN SATURATION: 97 % | TEMPERATURE: 98.6 F | DIASTOLIC BLOOD PRESSURE: 59 MMHG | SYSTOLIC BLOOD PRESSURE: 91 MMHG

## 2020-06-02 LAB
ACTIVATED CLOTTING TIME: 103 SEC (ref 99–130)
ACTIVATED CLOTTING TIME: 115 SEC (ref 99–130)
ACTIVATED CLOTTING TIME: 363 SEC (ref 99–130)
ANION GAP SERPL CALCULATED.3IONS-SCNC: 9 MMOL/L (ref 3–16)
ANION GAP SERPL CALCULATED.3IONS-SCNC: 9 MMOL/L (ref 3–16)
APTT: 48.5 SEC (ref 24.2–36.2)
BASE EXCESS ARTERIAL: -1 (ref -3–3)
BASE EXCESS ARTERIAL: -3 (ref -3–3)
BASE EXCESS ARTERIAL: -3 (ref -3–3)
BASE EXCESS ARTERIAL: -4 (ref -3–3)
BASE EXCESS ARTERIAL: -6 (ref -3–3)
BASE EXCESS ARTERIAL: 0 (ref -3–3)
BASE EXCESS ARTERIAL: 2 (ref -3–3)
BLOOD BANK DISPENSE STATUS: NORMAL
BLOOD BANK PRODUCT CODE: NORMAL
BPU ID: NORMAL
BUN BLDV-MCNC: 6 MG/DL (ref 7–20)
BUN BLDV-MCNC: 7 MG/DL (ref 7–20)
CALCIUM IONIZED: 1.17 MMOL/L (ref 1.12–1.32)
CALCIUM SERPL-MCNC: 7.8 MG/DL (ref 8.3–10.6)
CALCIUM SERPL-MCNC: 7.9 MG/DL (ref 8.3–10.6)
CHLORIDE BLD-SCNC: 103 MMOL/L (ref 99–110)
CHLORIDE BLD-SCNC: 104 MMOL/L (ref 99–110)
CO2: 22 MMOL/L (ref 21–32)
CO2: 27 MMOL/L (ref 21–32)
CREAT SERPL-MCNC: <0.5 MG/DL (ref 0.6–1.1)
CREAT SERPL-MCNC: <0.5 MG/DL (ref 0.6–1.1)
DESCRIPTION BLOOD BANK: NORMAL
EKG ATRIAL RATE: 77 BPM
EKG DIAGNOSIS: NORMAL
EKG P AXIS: 67 DEGREES
EKG P-R INTERVAL: 172 MS
EKG Q-T INTERVAL: 352 MS
EKG QRS DURATION: 90 MS
EKG QTC CALCULATION (BAZETT): 398 MS
EKG R AXIS: 31 DEGREES
EKG T AXIS: 116 DEGREES
EKG VENTRICULAR RATE: 77 BPM
GFR AFRICAN AMERICAN: >60
GFR AFRICAN AMERICAN: >60
GFR NON-AFRICAN AMERICAN: >60
GFR NON-AFRICAN AMERICAN: >60
GLUCOSE BLD-MCNC: 107 MG/DL (ref 70–99)
GLUCOSE BLD-MCNC: 108 MG/DL (ref 70–99)
GLUCOSE BLD-MCNC: 112 MG/DL (ref 70–99)
GLUCOSE BLD-MCNC: 117 MG/DL (ref 70–99)
GLUCOSE BLD-MCNC: 143 MG/DL (ref 70–99)
GLUCOSE BLD-MCNC: 147 MG/DL (ref 70–99)
GLUCOSE BLD-MCNC: 157 MG/DL (ref 70–99)
GLUCOSE BLD-MCNC: 158 MG/DL (ref 70–99)
GLUCOSE BLD-MCNC: 160 MG/DL (ref 70–99)
GLUCOSE BLD-MCNC: 171 MG/DL (ref 70–99)
GLUCOSE BLD-MCNC: 99 MG/DL (ref 70–99)
HCO3 ARTERIAL: 19.8 MMOL/L (ref 21–29)
HCO3 ARTERIAL: 21.5 MMOL/L (ref 21–29)
HCO3 ARTERIAL: 22.1 MMOL/L (ref 21–29)
HCO3 ARTERIAL: 23 MMOL/L (ref 21–29)
HCO3 ARTERIAL: 23.7 MMOL/L (ref 21–29)
HCO3 ARTERIAL: 25.1 MMOL/L (ref 21–29)
HCO3 ARTERIAL: 26.2 MMOL/L (ref 21–29)
HCT VFR BLD CALC: 29.7 % (ref 36–48)
HCT VFR BLD CALC: 33 % (ref 36–48)
HEMOGLOBIN: 10.1 G/DL (ref 12–16)
HEMOGLOBIN: 11.2 G/DL (ref 12–16)
HEMOGLOBIN: 11.6 GM/DL (ref 12–16)
HEMOGLOBIN: 12.4 GM/DL (ref 12–16)
HEMOGLOBIN: 9.8 GM/DL (ref 12–16)
LACTATE: 0.58 MMOL/L (ref 0.4–2)
LACTATE: 1.04 MMOL/L (ref 0.4–2)
LACTATE: 1.91 MMOL/L (ref 0.4–2)
MAGNESIUM: 1.5 MG/DL (ref 1.8–2.4)
MAGNESIUM: 2.7 MG/DL (ref 1.8–2.4)
MAGNESIUM: 3.3 MG/DL (ref 1.8–2.4)
MCH RBC QN AUTO: 29.6 PG (ref 26–34)
MCH RBC QN AUTO: 30 PG (ref 26–34)
MCHC RBC AUTO-ENTMCNC: 33.9 G/DL (ref 31–36)
MCHC RBC AUTO-ENTMCNC: 33.9 G/DL (ref 31–36)
MCV RBC AUTO: 87.4 FL (ref 80–100)
MCV RBC AUTO: 88.5 FL (ref 80–100)
O2 SAT, ARTERIAL: 100 % (ref 93–100)
O2 SAT, ARTERIAL: 95 % (ref 93–100)
O2 SAT, ARTERIAL: 97 % (ref 93–100)
O2 SAT, ARTERIAL: 98 % (ref 93–100)
O2 SAT, ARTERIAL: 98 % (ref 93–100)
PCO2 ARTERIAL: 33.9 MM HG (ref 35–45)
PCO2 ARTERIAL: 37.8 MM HG (ref 35–45)
PCO2 ARTERIAL: 38.2 MM HG (ref 35–45)
PCO2 ARTERIAL: 40.2 MM HG (ref 35–45)
PCO2 ARTERIAL: 41.5 MM HG (ref 35–45)
PCO2 ARTERIAL: 41.7 MM HG (ref 35–45)
PCO2 ARTERIAL: 43.4 MM HG (ref 35–45)
PDW BLD-RTO: 12.9 % (ref 12.4–15.4)
PDW BLD-RTO: 13.3 % (ref 12.4–15.4)
PERFORMED ON: ABNORMAL
PH ARTERIAL: 7.32 (ref 7.35–7.45)
PH ARTERIAL: 7.35 (ref 7.35–7.45)
PH ARTERIAL: 7.35 (ref 7.35–7.45)
PH ARTERIAL: 7.37 (ref 7.35–7.45)
PH ARTERIAL: 7.41 (ref 7.35–7.45)
PLATELET # BLD: 202 K/UL (ref 135–450)
PLATELET # BLD: 213 K/UL (ref 135–450)
PLATELET # BLD: 233 K/UL (ref 135–450)
PMV BLD AUTO: 11 FL (ref 5–10.5)
PMV BLD AUTO: 11 FL (ref 5–10.5)
PO2 ARTERIAL: 112.1 MM HG (ref 75–108)
PO2 ARTERIAL: 173.6 MM HG (ref 75–108)
PO2 ARTERIAL: 436.9 MM HG (ref 75–108)
PO2 ARTERIAL: 490 MM HG (ref 75–108)
PO2 ARTERIAL: 77 MM HG (ref 75–108)
PO2 ARTERIAL: 94.2 MM HG (ref 75–108)
PO2 ARTERIAL: 98.6 MM HG (ref 75–108)
POC ACT LR: 203 SEC
POC HEMATOCRIT: 29 % (ref 36–48)
POC HEMATOCRIT: 34 % (ref 36–48)
POC HEMATOCRIT: 36 % (ref 36–48)
POC POTASSIUM: 3.4 MMOL/L (ref 3.5–5.1)
POC POTASSIUM: 3.5 MMOL/L (ref 3.5–5.1)
POC POTASSIUM: 3.6 MMOL/L (ref 3.5–5.1)
POC SAMPLE TYPE: ABNORMAL
POC SODIUM: 135 MMOL/L (ref 136–145)
POC SODIUM: 137 MMOL/L (ref 136–145)
POC SODIUM: 141 MMOL/L (ref 136–145)
POTASSIUM SERPL-SCNC: 3.6 MMOL/L (ref 3.5–5.1)
POTASSIUM SERPL-SCNC: 4.4 MMOL/L (ref 3.5–5.1)
POTASSIUM SERPL-SCNC: 4.8 MMOL/L (ref 3.5–5.1)
RBC # BLD: 3.4 M/UL (ref 4–5.2)
RBC # BLD: 3.73 M/UL (ref 4–5.2)
SODIUM BLD-SCNC: 134 MMOL/L (ref 136–145)
SODIUM BLD-SCNC: 140 MMOL/L (ref 136–145)
TCO2 ARTERIAL: 21 MMOL/L
TCO2 ARTERIAL: 23 MMOL/L
TCO2 ARTERIAL: 23 MMOL/L
TCO2 ARTERIAL: 24 MMOL/L
TCO2 ARTERIAL: 25 MMOL/L
TCO2 ARTERIAL: 27 MMOL/L
TCO2 ARTERIAL: 28 MMOL/L
WBC # BLD: 15.8 K/UL (ref 4–11)
WBC # BLD: 17 K/UL (ref 4–11)

## 2020-06-02 PROCEDURE — 6370000000 HC RX 637 (ALT 250 FOR IP): Performed by: THORACIC SURGERY (CARDIOTHORACIC VASCULAR SURGERY)

## 2020-06-02 PROCEDURE — 83735 ASSAY OF MAGNESIUM: CPT

## 2020-06-02 PROCEDURE — 83605 ASSAY OF LACTIC ACID: CPT

## 2020-06-02 PROCEDURE — 94669 MECHANICAL CHEST WALL OSCILL: CPT

## 2020-06-02 PROCEDURE — 6360000002 HC RX W HCPCS: Performed by: THORACIC SURGERY (CARDIOTHORACIC VASCULAR SURGERY)

## 2020-06-02 PROCEDURE — 3E0T3BZ INTRODUCTION OF ANESTHETIC AGENT INTO PERIPHERAL NERVES AND PLEXI, PERCUTANEOUS APPROACH: ICD-10-PCS | Performed by: THORACIC SURGERY (CARDIOTHORACIC VASCULAR SURGERY)

## 2020-06-02 PROCEDURE — 3600000008 HC SURGERY OHS BASE: Performed by: THORACIC SURGERY (CARDIOTHORACIC VASCULAR SURGERY)

## 2020-06-02 PROCEDURE — 2500000003 HC RX 250 WO HCPCS: Performed by: ANESTHESIOLOGY

## 2020-06-02 PROCEDURE — 2500000003 HC RX 250 WO HCPCS: Performed by: THORACIC SURGERY (CARDIOTHORACIC VASCULAR SURGERY)

## 2020-06-02 PROCEDURE — 85049 AUTOMATED PLATELET COUNT: CPT

## 2020-06-02 PROCEDURE — 80048 BASIC METABOLIC PNL TOTAL CA: CPT

## 2020-06-02 PROCEDURE — 94640 AIRWAY INHALATION TREATMENT: CPT

## 2020-06-02 PROCEDURE — P9045 ALBUMIN (HUMAN), 5%, 250 ML: HCPCS | Performed by: THORACIC SURGERY (CARDIOTHORACIC VASCULAR SURGERY)

## 2020-06-02 PROCEDURE — 2580000003 HC RX 258: Performed by: THORACIC SURGERY (CARDIOTHORACIC VASCULAR SURGERY)

## 2020-06-02 PROCEDURE — 85347 COAGULATION TIME ACTIVATED: CPT

## 2020-06-02 PROCEDURE — 85730 THROMBOPLASTIN TIME PARTIAL: CPT

## 2020-06-02 PROCEDURE — 82947 ASSAY GLUCOSE BLOOD QUANT: CPT

## 2020-06-02 PROCEDURE — C1729 CATH, DRAINAGE: HCPCS | Performed by: THORACIC SURGERY (CARDIOTHORACIC VASCULAR SURGERY)

## 2020-06-02 PROCEDURE — 36415 COLL VENOUS BLD VENIPUNCTURE: CPT

## 2020-06-02 PROCEDURE — 2100000000 HC CCU R&B

## 2020-06-02 PROCEDURE — 3700000001 HC ADD 15 MINUTES (ANESTHESIA): Performed by: THORACIC SURGERY (CARDIOTHORACIC VASCULAR SURGERY)

## 2020-06-02 PROCEDURE — 3600000018 HC SURGERY OHS ADDTL 15MIN: Performed by: THORACIC SURGERY (CARDIOTHORACIC VASCULAR SURGERY)

## 2020-06-02 PROCEDURE — B24BZZ4 ULTRASONOGRAPHY OF HEART WITH AORTA, TRANSESOPHAGEAL: ICD-10-PCS | Performed by: THORACIC SURGERY (CARDIOTHORACIC VASCULAR SURGERY)

## 2020-06-02 PROCEDURE — 85014 HEMATOCRIT: CPT

## 2020-06-02 PROCEDURE — 71045 X-RAY EXAM CHEST 1 VIEW: CPT

## 2020-06-02 PROCEDURE — 2700000000 HC OXYGEN THERAPY PER DAY

## 2020-06-02 PROCEDURE — 33533 CABG ARTERIAL SINGLE: CPT | Performed by: THORACIC SURGERY (CARDIOTHORACIC VASCULAR SURGERY)

## 2020-06-02 PROCEDURE — 85027 COMPLETE CBC AUTOMATED: CPT

## 2020-06-02 PROCEDURE — 82803 BLOOD GASES ANY COMBINATION: CPT

## 2020-06-02 PROCEDURE — 2709999900 HC NON-CHARGEABLE SUPPLY: Performed by: THORACIC SURGERY (CARDIOTHORACIC VASCULAR SURGERY)

## 2020-06-02 PROCEDURE — 3700000000 HC ANESTHESIA ATTENDED CARE: Performed by: THORACIC SURGERY (CARDIOTHORACIC VASCULAR SURGERY)

## 2020-06-02 PROCEDURE — 2720000010 HC SURG SUPPLY STERILE: Performed by: THORACIC SURGERY (CARDIOTHORACIC VASCULAR SURGERY)

## 2020-06-02 PROCEDURE — 94761 N-INVAS EAR/PLS OXIMETRY MLT: CPT

## 2020-06-02 PROCEDURE — 84132 ASSAY OF SERUM POTASSIUM: CPT

## 2020-06-02 PROCEDURE — 2580000003 HC RX 258: Performed by: ANESTHESIOLOGY

## 2020-06-02 PROCEDURE — 82330 ASSAY OF CALCIUM: CPT

## 2020-06-02 PROCEDURE — 6360000002 HC RX W HCPCS: Performed by: ANESTHESIOLOGY

## 2020-06-02 PROCEDURE — 02100Z9 BYPASS CORONARY ARTERY, ONE ARTERY FROM LEFT INTERNAL MAMMARY, OPEN APPROACH: ICD-10-PCS | Performed by: THORACIC SURGERY (CARDIOTHORACIC VASCULAR SURGERY)

## 2020-06-02 PROCEDURE — C9290 INJ, BUPIVACAINE LIPOSOME: HCPCS | Performed by: THORACIC SURGERY (CARDIOTHORACIC VASCULAR SURGERY)

## 2020-06-02 PROCEDURE — 84295 ASSAY OF SERUM SODIUM: CPT

## 2020-06-02 PROCEDURE — 37799 UNLISTED PX VASCULAR SURGERY: CPT

## 2020-06-02 PROCEDURE — 33968 REMOVE AORTIC ASSIST DEVICE: CPT | Performed by: THORACIC SURGERY (CARDIOTHORACIC VASCULAR SURGERY)

## 2020-06-02 RX ORDER — KETAMINE HYDROCHLORIDE 100 MG/ML
INJECTION, SOLUTION INTRAMUSCULAR; INTRAVENOUS PRN
Status: DISCONTINUED | OUTPATIENT
Start: 2020-06-02 | End: 2020-06-02 | Stop reason: SDUPTHER

## 2020-06-02 RX ORDER — FENTANYL CITRATE 50 UG/ML
INJECTION, SOLUTION INTRAMUSCULAR; INTRAVENOUS PRN
Status: DISCONTINUED | OUTPATIENT
Start: 2020-06-02 | End: 2020-06-02 | Stop reason: SDUPTHER

## 2020-06-02 RX ORDER — ALBUMIN, HUMAN INJ 5% 5 %
25 SOLUTION INTRAVENOUS PRN
Status: DISCONTINUED | OUTPATIENT
Start: 2020-06-02 | End: 2020-06-03

## 2020-06-02 RX ORDER — DOBUTAMINE HYDROCHLORIDE 200 MG/100ML
INJECTION INTRAVENOUS CONTINUOUS PRN
Status: DISCONTINUED | OUTPATIENT
Start: 2020-06-02 | End: 2020-06-02 | Stop reason: SDUPTHER

## 2020-06-02 RX ORDER — SODIUM CHLORIDE 0.9 % (FLUSH) 0.9 %
10 SYRINGE (ML) INJECTION PRN
Status: DISCONTINUED | OUTPATIENT
Start: 2020-06-02 | End: 2020-06-05 | Stop reason: HOSPADM

## 2020-06-02 RX ORDER — HYDRALAZINE HYDROCHLORIDE 20 MG/ML
INJECTION INTRAMUSCULAR; INTRAVENOUS PRN
Status: DISCONTINUED | OUTPATIENT
Start: 2020-06-02 | End: 2020-06-02 | Stop reason: SDUPTHER

## 2020-06-02 RX ORDER — ALBUMIN, HUMAN INJ 5% 5 %
25 SOLUTION INTRAVENOUS ONCE
Status: COMPLETED | OUTPATIENT
Start: 2020-06-02 | End: 2020-06-02

## 2020-06-02 RX ORDER — NEOSTIGMINE METHYLSULFATE 5 MG/5 ML
SYRINGE (ML) INTRAVENOUS PRN
Status: DISCONTINUED | OUTPATIENT
Start: 2020-06-02 | End: 2020-06-02 | Stop reason: SDUPTHER

## 2020-06-02 RX ORDER — NICOTINE POLACRILEX 4 MG
15 LOZENGE BUCCAL PRN
Status: DISCONTINUED | OUTPATIENT
Start: 2020-06-02 | End: 2020-06-05 | Stop reason: HOSPADM

## 2020-06-02 RX ORDER — ASPIRIN 300 MG/1
300 SUPPOSITORY RECTAL DAILY
Status: COMPLETED | OUTPATIENT
Start: 2020-06-02 | End: 2020-06-02

## 2020-06-02 RX ORDER — INSULIN GLARGINE 100 [IU]/ML
0.25 INJECTION, SOLUTION SUBCUTANEOUS NIGHTLY
Status: DISCONTINUED | OUTPATIENT
Start: 2020-06-03 | End: 2020-06-03

## 2020-06-02 RX ORDER — DEXTROSE AND SODIUM CHLORIDE 5; .45 G/100ML; G/100ML
INJECTION, SOLUTION INTRAVENOUS CONTINUOUS
Status: DISCONTINUED | OUTPATIENT
Start: 2020-06-02 | End: 2020-06-04

## 2020-06-02 RX ORDER — DEXTROSE MONOHYDRATE 25 G/50ML
12.5 INJECTION, SOLUTION INTRAVENOUS PRN
Status: DISCONTINUED | OUTPATIENT
Start: 2020-06-02 | End: 2020-06-05 | Stop reason: HOSPADM

## 2020-06-02 RX ORDER — SODIUM BICARBONATE 42 MG/ML
5 INJECTION, SOLUTION INTRAVENOUS ONCE
Status: DISCONTINUED | OUTPATIENT
Start: 2020-06-02 | End: 2020-06-02

## 2020-06-02 RX ORDER — CHLORHEXIDINE GLUCONATE 0.12 MG/ML
15 RINSE ORAL 2 TIMES DAILY
Status: DISCONTINUED | OUTPATIENT
Start: 2020-06-02 | End: 2020-06-05 | Stop reason: HOSPADM

## 2020-06-02 RX ORDER — OXYCODONE HYDROCHLORIDE 5 MG/1
10 TABLET ORAL EVERY 4 HOURS PRN
Status: DISCONTINUED | OUTPATIENT
Start: 2020-06-02 | End: 2020-06-05 | Stop reason: HOSPADM

## 2020-06-02 RX ORDER — ASPIRIN 81 MG/1
81 TABLET ORAL DAILY
Status: DISCONTINUED | OUTPATIENT
Start: 2020-06-03 | End: 2020-06-05 | Stop reason: HOSPADM

## 2020-06-02 RX ORDER — FAMOTIDINE 20 MG/1
20 TABLET, FILM COATED ORAL 2 TIMES DAILY
Status: DISCONTINUED | OUTPATIENT
Start: 2020-06-03 | End: 2020-06-05 | Stop reason: HOSPADM

## 2020-06-02 RX ORDER — POTASSIUM CHLORIDE 29.8 MG/ML
20 INJECTION INTRAVENOUS PRN
Status: DISCONTINUED | OUTPATIENT
Start: 2020-06-02 | End: 2020-06-05 | Stop reason: HOSPADM

## 2020-06-02 RX ORDER — MIDAZOLAM HYDROCHLORIDE 1 MG/ML
1 INJECTION INTRAMUSCULAR; INTRAVENOUS
Status: DISCONTINUED | OUTPATIENT
Start: 2020-06-02 | End: 2020-06-04

## 2020-06-02 RX ORDER — MAGNESIUM SULFATE IN WATER 40 MG/ML
2 INJECTION, SOLUTION INTRAVENOUS PRN
Status: DISCONTINUED | OUTPATIENT
Start: 2020-06-02 | End: 2020-06-05 | Stop reason: HOSPADM

## 2020-06-02 RX ORDER — ONDANSETRON 2 MG/ML
4 INJECTION INTRAMUSCULAR; INTRAVENOUS EVERY 8 HOURS PRN
Status: DISCONTINUED | OUTPATIENT
Start: 2020-06-02 | End: 2020-06-05 | Stop reason: HOSPADM

## 2020-06-02 RX ORDER — ESMOLOL HYDROCHLORIDE 10 MG/ML
INJECTION INTRAVENOUS PRN
Status: DISCONTINUED | OUTPATIENT
Start: 2020-06-02 | End: 2020-06-02 | Stop reason: SDUPTHER

## 2020-06-02 RX ORDER — ALBUTEROL SULFATE 2.5 MG/3ML
2.5 SOLUTION RESPIRATORY (INHALATION)
Status: DISCONTINUED | OUTPATIENT
Start: 2020-06-02 | End: 2020-06-05 | Stop reason: HOSPADM

## 2020-06-02 RX ORDER — MORPHINE SULFATE 2 MG/ML
2 INJECTION, SOLUTION INTRAMUSCULAR; INTRAVENOUS
Status: DISCONTINUED | OUTPATIENT
Start: 2020-06-02 | End: 2020-06-05 | Stop reason: HOSPADM

## 2020-06-02 RX ORDER — PROTAMINE SULFATE 10 MG/ML
50 INJECTION, SOLUTION INTRAVENOUS
Status: ACTIVE | OUTPATIENT
Start: 2020-06-02 | End: 2020-06-02

## 2020-06-02 RX ORDER — DEXTROSE MONOHYDRATE 50 MG/ML
100 INJECTION, SOLUTION INTRAVENOUS PRN
Status: DISCONTINUED | OUTPATIENT
Start: 2020-06-02 | End: 2020-06-05 | Stop reason: HOSPADM

## 2020-06-02 RX ORDER — ACETAMINOPHEN 650 MG/1
650 SUPPOSITORY RECTAL EVERY 4 HOURS PRN
Status: DISCONTINUED | OUTPATIENT
Start: 2020-06-02 | End: 2020-06-05 | Stop reason: HOSPADM

## 2020-06-02 RX ORDER — METOPROLOL TARTRATE 5 MG/5ML
2.5 INJECTION INTRAVENOUS EVERY 10 MIN PRN
Status: DISCONTINUED | OUTPATIENT
Start: 2020-06-02 | End: 2020-06-05 | Stop reason: HOSPADM

## 2020-06-02 RX ORDER — POTASSIUM CHLORIDE 750 MG/1
10 TABLET, EXTENDED RELEASE ORAL
Status: DISCONTINUED | OUTPATIENT
Start: 2020-06-03 | End: 2020-06-05 | Stop reason: HOSPADM

## 2020-06-02 RX ORDER — SODIUM CHLORIDE, SODIUM LACTATE, POTASSIUM CHLORIDE, CALCIUM CHLORIDE 600; 310; 30; 20 MG/100ML; MG/100ML; MG/100ML; MG/100ML
INJECTION, SOLUTION INTRAVENOUS CONTINUOUS PRN
Status: DISCONTINUED | OUTPATIENT
Start: 2020-06-02 | End: 2020-06-02 | Stop reason: SDUPTHER

## 2020-06-02 RX ORDER — DOBUTAMINE HYDROCHLORIDE 200 MG/100ML
2 INJECTION INTRAVENOUS CONTINUOUS PRN
Status: DISCONTINUED | OUTPATIENT
Start: 2020-06-02 | End: 2020-06-04

## 2020-06-02 RX ORDER — OXYCODONE HYDROCHLORIDE 5 MG/1
5 TABLET ORAL EVERY 4 HOURS PRN
Status: DISCONTINUED | OUTPATIENT
Start: 2020-06-02 | End: 2020-06-05 | Stop reason: HOSPADM

## 2020-06-02 RX ORDER — ATORVASTATIN CALCIUM 40 MG/1
40 TABLET, FILM COATED ORAL NIGHTLY
Status: DISCONTINUED | OUTPATIENT
Start: 2020-06-03 | End: 2020-06-05 | Stop reason: HOSPADM

## 2020-06-02 RX ORDER — MIDAZOLAM HYDROCHLORIDE 1 MG/ML
INJECTION INTRAMUSCULAR; INTRAVENOUS PRN
Status: DISCONTINUED | OUTPATIENT
Start: 2020-06-02 | End: 2020-06-02 | Stop reason: SDUPTHER

## 2020-06-02 RX ORDER — CISATRACURIUM BESYLATE 2 MG/ML
INJECTION, SOLUTION INTRAVENOUS PRN
Status: DISCONTINUED | OUTPATIENT
Start: 2020-06-02 | End: 2020-06-02 | Stop reason: SDUPTHER

## 2020-06-02 RX ORDER — FUROSEMIDE 10 MG/ML
20 INJECTION INTRAMUSCULAR; INTRAVENOUS 4 TIMES DAILY
Status: DISCONTINUED | OUTPATIENT
Start: 2020-06-03 | End: 2020-06-04

## 2020-06-02 RX ORDER — AMINOCAPROIC ACID 250 MG/ML
INJECTION, SOLUTION INTRAVENOUS PRN
Status: DISCONTINUED | OUTPATIENT
Start: 2020-06-02 | End: 2020-06-02 | Stop reason: SDUPTHER

## 2020-06-02 RX ORDER — CLOPIDOGREL BISULFATE 75 MG/1
75 TABLET ORAL DAILY
Status: DISCONTINUED | OUTPATIENT
Start: 2020-06-03 | End: 2020-06-04

## 2020-06-02 RX ORDER — ACETAMINOPHEN 325 MG/1
650 TABLET ORAL EVERY 4 HOURS PRN
Status: DISCONTINUED | OUTPATIENT
Start: 2020-06-02 | End: 2020-06-05 | Stop reason: HOSPADM

## 2020-06-02 RX ORDER — MORPHINE SULFATE 4 MG/ML
4 INJECTION, SOLUTION INTRAMUSCULAR; INTRAVENOUS
Status: DISCONTINUED | OUTPATIENT
Start: 2020-06-02 | End: 2020-06-05 | Stop reason: HOSPADM

## 2020-06-02 RX ORDER — GLYCOPYRROLATE 0.2 MG/ML
INJECTION INTRAMUSCULAR; INTRAVENOUS PRN
Status: DISCONTINUED | OUTPATIENT
Start: 2020-06-02 | End: 2020-06-02 | Stop reason: SDUPTHER

## 2020-06-02 RX ORDER — ACETAMINOPHEN 10 MG/ML
INJECTION, SOLUTION INTRAVENOUS PRN
Status: DISCONTINUED | OUTPATIENT
Start: 2020-06-02 | End: 2020-06-02 | Stop reason: SDUPTHER

## 2020-06-02 RX ORDER — HYDRALAZINE HYDROCHLORIDE 20 MG/ML
5 INJECTION INTRAMUSCULAR; INTRAVENOUS EVERY 5 MIN PRN
Status: DISCONTINUED | OUTPATIENT
Start: 2020-06-02 | End: 2020-06-05 | Stop reason: HOSPADM

## 2020-06-02 RX ORDER — PROTAMINE SULFATE 10 MG/ML
INJECTION, SOLUTION INTRAVENOUS PRN
Status: DISCONTINUED | OUTPATIENT
Start: 2020-06-02 | End: 2020-06-02 | Stop reason: SDUPTHER

## 2020-06-02 RX ORDER — POLYETHYLENE GLYCOL 3350 17 G/17G
17 POWDER, FOR SOLUTION ORAL DAILY
Status: DISCONTINUED | OUTPATIENT
Start: 2020-06-03 | End: 2020-06-05 | Stop reason: HOSPADM

## 2020-06-02 RX ORDER — SODIUM CHLORIDE 9 MG/ML
INJECTION, SOLUTION INTRAVENOUS CONTINUOUS PRN
Status: DISCONTINUED | OUTPATIENT
Start: 2020-06-02 | End: 2020-06-02 | Stop reason: SDUPTHER

## 2020-06-02 RX ORDER — HEPARIN SODIUM 1000 [USP'U]/ML
INJECTION, SOLUTION INTRAVENOUS; SUBCUTANEOUS PRN
Status: DISCONTINUED | OUTPATIENT
Start: 2020-06-02 | End: 2020-06-02 | Stop reason: SDUPTHER

## 2020-06-02 RX ORDER — SODIUM CHLORIDE 0.9 % (FLUSH) 0.9 %
10 SYRINGE (ML) INJECTION EVERY 12 HOURS SCHEDULED
Status: DISCONTINUED | OUTPATIENT
Start: 2020-06-02 | End: 2020-06-05 | Stop reason: HOSPADM

## 2020-06-02 RX ADMIN — MORPHINE SULFATE 4 MG: 4 INJECTION, SOLUTION INTRAMUSCULAR; INTRAVENOUS at 17:54

## 2020-06-02 RX ADMIN — SODIUM CHLORIDE, POTASSIUM CHLORIDE, SODIUM LACTATE AND CALCIUM CHLORIDE: 600; 310; 30; 20 INJECTION, SOLUTION INTRAVENOUS at 07:25

## 2020-06-02 RX ADMIN — ALBUMIN (HUMAN) 12.5 G: 12.5 INJECTION, SOLUTION INTRAVENOUS at 23:00

## 2020-06-02 RX ADMIN — MAGNESIUM SULFATE HEPTAHYDRATE 2 G: 40 INJECTION, SOLUTION INTRAVENOUS at 11:19

## 2020-06-02 RX ADMIN — CISATRACURIUM BESYLATE 5 MG: 2 INJECTION INTRAVENOUS at 08:25

## 2020-06-02 RX ADMIN — AMINOCAPROIC ACID 2500 MG: 250 INJECTION, SOLUTION INTRAVENOUS at 08:38

## 2020-06-02 RX ADMIN — MIDAZOLAM HYDROCHLORIDE 4 MG: 2 INJECTION, SOLUTION INTRAMUSCULAR; INTRAVENOUS at 07:30

## 2020-06-02 RX ADMIN — ALBUTEROL SULFATE 2.5 MG: 2.5 SOLUTION RESPIRATORY (INHALATION) at 16:14

## 2020-06-02 RX ADMIN — ESMOLOL HYDROCHLORIDE 20 MG: 10 INJECTION, SOLUTION INTRAVENOUS at 09:42

## 2020-06-02 RX ADMIN — DEXTROSE AND SODIUM CHLORIDE: 5; 450 INJECTION, SOLUTION INTRAVENOUS at 11:15

## 2020-06-02 RX ADMIN — ACETAMINOPHEN 1000 MG: 10 INJECTION, SOLUTION INTRAVENOUS at 07:45

## 2020-06-02 RX ADMIN — CEFAZOLIN 2 G: 10 INJECTION, POWDER, FOR SOLUTION INTRAVENOUS at 06:01

## 2020-06-02 RX ADMIN — SODIUM CHLORIDE 3 UNITS/HR: 9 INJECTION, SOLUTION INTRAVENOUS at 16:01

## 2020-06-02 RX ADMIN — ALBUMIN (HUMAN) 12.5 G: 12.5 INJECTION, SOLUTION INTRAVENOUS at 10:42

## 2020-06-02 RX ADMIN — GLYCOPYRROLATE 0.4 MG: 0.2 INJECTION, SOLUTION INTRAMUSCULAR; INTRAVENOUS at 09:45

## 2020-06-02 RX ADMIN — Medication 10 ML: at 10:52

## 2020-06-02 RX ADMIN — ONDANSETRON 4 MG: 2 INJECTION INTRAMUSCULAR; INTRAVENOUS at 10:58

## 2020-06-02 RX ADMIN — POTASSIUM CHLORIDE 20 MEQ: 400 INJECTION, SOLUTION INTRAVENOUS at 11:32

## 2020-06-02 RX ADMIN — POTASSIUM CHLORIDE 20 MEQ: 400 INJECTION, SOLUTION INTRAVENOUS at 12:32

## 2020-06-02 RX ADMIN — CEFAZOLIN 1 G: 10 INJECTION, POWDER, FOR SOLUTION INTRAVENOUS at 07:45

## 2020-06-02 RX ADMIN — MAGNESIUM SULFATE HEPTAHYDRATE 2 G: 40 INJECTION, SOLUTION INTRAVENOUS at 12:50

## 2020-06-02 RX ADMIN — ALBUTEROL SULFATE 2.5 MG: 2.5 SOLUTION RESPIRATORY (INHALATION) at 11:49

## 2020-06-02 RX ADMIN — MIDAZOLAM 2 MG: 1 INJECTION INTRAMUSCULAR; INTRAVENOUS at 06:06

## 2020-06-02 RX ADMIN — CEFAZOLIN SODIUM 2 G: 10 INJECTION, POWDER, FOR SOLUTION INTRAVENOUS at 16:30

## 2020-06-02 RX ADMIN — HEPARIN SODIUM 10000 UNITS: 1000 INJECTION, SOLUTION INTRAVENOUS; SUBCUTANEOUS at 08:33

## 2020-06-02 RX ADMIN — FENTANYL CITRATE 250 MCG: 50 INJECTION, SOLUTION INTRAMUSCULAR; INTRAVENOUS at 08:10

## 2020-06-02 RX ADMIN — Medication 10 ML: at 21:30

## 2020-06-02 RX ADMIN — MUPIROCIN: 20 OINTMENT TOPICAL at 21:30

## 2020-06-02 RX ADMIN — FENTANYL CITRATE 250 MCG: 50 INJECTION, SOLUTION INTRAMUSCULAR; INTRAVENOUS at 07:30

## 2020-06-02 RX ADMIN — DEXTROSE MONOHYDRATE 0.07 MCG/KG/MIN: 50 INJECTION, SOLUTION INTRAVENOUS at 08:27

## 2020-06-02 RX ADMIN — DEXTROSE MONOHYDRATE 0.26 MCG/KG/MIN: 50 INJECTION, SOLUTION INTRAVENOUS at 08:53

## 2020-06-02 RX ADMIN — VANCOMYCIN HYDROCHLORIDE 1 G: 10 INJECTION, POWDER, LYOPHILIZED, FOR SOLUTION INTRAVENOUS at 07:45

## 2020-06-02 RX ADMIN — DOBUTAMINE HYDROCHLORIDE 2 MCG/KG/MIN: 200 INJECTION INTRAVENOUS at 07:45

## 2020-06-02 RX ADMIN — HYDRALAZINE HYDROCHLORIDE 4 MG: 20 INJECTION INTRAMUSCULAR; INTRAVENOUS at 09:35

## 2020-06-02 RX ADMIN — PROTAMINE SULFATE 25 MG: 10 INJECTION, SOLUTION INTRAVENOUS at 09:20

## 2020-06-02 RX ADMIN — PROTAMINE SULFATE 150 MG: 10 INJECTION, SOLUTION INTRAVENOUS at 09:12

## 2020-06-02 RX ADMIN — ALBUTEROL SULFATE 2.5 MG: 2.5 SOLUTION RESPIRATORY (INHALATION) at 20:13

## 2020-06-02 RX ADMIN — ALBUMIN (HUMAN) 12.5 G: 12.5 INJECTION, SOLUTION INTRAVENOUS at 10:40

## 2020-06-02 RX ADMIN — SODIUM BICARBONATE 100 MEQ: 84 INJECTION INTRAVENOUS at 10:41

## 2020-06-02 RX ADMIN — SODIUM CHLORIDE, POTASSIUM CHLORIDE, SODIUM LACTATE AND CALCIUM CHLORIDE: 600; 310; 30; 20 INJECTION, SOLUTION INTRAVENOUS at 09:35

## 2020-06-02 RX ADMIN — ANTISEPTIC SURGICAL SCRUB: 0.04 SOLUTION TOPICAL at 05:00

## 2020-06-02 RX ADMIN — FAMOTIDINE 20 MG: 10 INJECTION, SOLUTION INTRAVENOUS at 23:00

## 2020-06-02 RX ADMIN — CISATRACURIUM BESYLATE 20 MG: 2 INJECTION INTRAVENOUS at 07:31

## 2020-06-02 RX ADMIN — ALBUMIN (HUMAN) 12.5 G: 12.5 INJECTION, SOLUTION INTRAVENOUS at 20:30

## 2020-06-02 RX ADMIN — FENTANYL CITRATE 250 MCG: 50 INJECTION, SOLUTION INTRAMUSCULAR; INTRAVENOUS at 08:20

## 2020-06-02 RX ADMIN — VANCOMYCIN HYDROCHLORIDE 1000 MG: 10 INJECTION, POWDER, LYOPHILIZED, FOR SOLUTION INTRAVENOUS at 19:10

## 2020-06-02 RX ADMIN — ASPIRIN 300 MG: 300 SUPPOSITORY RECTAL at 16:37

## 2020-06-02 RX ADMIN — CHLORHEXIDINE GLUCONATE: 4 SOLUTION TOPICAL at 04:35

## 2020-06-02 RX ADMIN — POTASSIUM CHLORIDE 20 MEQ: 400 INJECTION, SOLUTION INTRAVENOUS at 15:59

## 2020-06-02 RX ADMIN — SODIUM CHLORIDE: 9 INJECTION, SOLUTION INTRAVENOUS at 07:45

## 2020-06-02 RX ADMIN — SODIUM CHLORIDE, POTASSIUM CHLORIDE, SODIUM LACTATE AND CALCIUM CHLORIDE: 600; 310; 30; 20 INJECTION, SOLUTION INTRAVENOUS at 04:15

## 2020-06-02 RX ADMIN — ESMOLOL HYDROCHLORIDE 30 MG: 10 INJECTION, SOLUTION INTRAVENOUS at 08:55

## 2020-06-02 RX ADMIN — KETAMINE HYDROCHLORIDE 75 MG: 100 INJECTION, SOLUTION INTRAMUSCULAR; INTRAVENOUS at 07:30

## 2020-06-02 RX ADMIN — Medication 4 MG: at 09:45

## 2020-06-02 ASSESSMENT — PULMONARY FUNCTION TESTS
PIF_VALUE: 19
PIF_VALUE: 17
PIF_VALUE: 18
PIF_VALUE: 25
PIF_VALUE: 1
PIF_VALUE: 23
PIF_VALUE: 18
PIF_VALUE: 6
PIF_VALUE: 23
PIF_VALUE: 22
PIF_VALUE: 18
PIF_VALUE: 17
PIF_VALUE: 18
PIF_VALUE: 0
PIF_VALUE: 0
PIF_VALUE: 17
PIF_VALUE: 24
PIF_VALUE: 22
PIF_VALUE: 20
PIF_VALUE: 0
PIF_VALUE: 18
PIF_VALUE: 17
PIF_VALUE: 21
PIF_VALUE: 17
PIF_VALUE: 21
PIF_VALUE: 23
PIF_VALUE: 19
PIF_VALUE: 17
PIF_VALUE: 18
PIF_VALUE: 17
PIF_VALUE: 20
PIF_VALUE: 21
PIF_VALUE: 19
PIF_VALUE: 21
PIF_VALUE: 0
PIF_VALUE: 18
PIF_VALUE: 22
PIF_VALUE: 23
PIF_VALUE: 21
PIF_VALUE: 20
PIF_VALUE: 24
PIF_VALUE: 25
PIF_VALUE: 16
PIF_VALUE: 24
PIF_VALUE: 0
PIF_VALUE: 17
PIF_VALUE: 18
PIF_VALUE: 0
PIF_VALUE: 23
PIF_VALUE: 19
PIF_VALUE: 18
PIF_VALUE: 18
PIF_VALUE: 16
PIF_VALUE: 22
PIF_VALUE: 16
PIF_VALUE: 19
PIF_VALUE: 0
PIF_VALUE: 18
PIF_VALUE: 27
PIF_VALUE: 20
PIF_VALUE: 16
PIF_VALUE: 17
PIF_VALUE: 3
PIF_VALUE: 24
PIF_VALUE: 17
PIF_VALUE: 19
PIF_VALUE: 21
PIF_VALUE: 20
PIF_VALUE: 18
PIF_VALUE: 24
PIF_VALUE: 18
PIF_VALUE: 23
PIF_VALUE: 19
PIF_VALUE: 20
PIF_VALUE: 17
PIF_VALUE: 15
PIF_VALUE: 18
PIF_VALUE: 20
PIF_VALUE: 18
PIF_VALUE: 3
PIF_VALUE: 24
PIF_VALUE: 19
PIF_VALUE: 18
PIF_VALUE: 19
PIF_VALUE: 17
PIF_VALUE: 0
PIF_VALUE: 24
PIF_VALUE: 19
PIF_VALUE: 19
PIF_VALUE: 22
PIF_VALUE: 18
PIF_VALUE: 17
PIF_VALUE: 25
PIF_VALUE: 18
PIF_VALUE: 17
PIF_VALUE: 21
PIF_VALUE: 17
PIF_VALUE: 16
PIF_VALUE: 19
PIF_VALUE: 20
PIF_VALUE: 17
PIF_VALUE: 23
PIF_VALUE: 17
PIF_VALUE: 19
PIF_VALUE: 18
PIF_VALUE: 19
PIF_VALUE: 0
PIF_VALUE: 16
PIF_VALUE: 23
PIF_VALUE: 24
PIF_VALUE: 16
PIF_VALUE: 21
PIF_VALUE: 19
PIF_VALUE: 17
PIF_VALUE: 24
PIF_VALUE: 18
PIF_VALUE: 24
PIF_VALUE: 21
PIF_VALUE: 17
PIF_VALUE: 19
PIF_VALUE: 20
PIF_VALUE: 20
PIF_VALUE: 19
PIF_VALUE: 20
PIF_VALUE: 18
PIF_VALUE: 19
PIF_VALUE: 19
PIF_VALUE: 16
PIF_VALUE: 25
PIF_VALUE: 3
PIF_VALUE: 16
PIF_VALUE: 23
PIF_VALUE: 18
PIF_VALUE: 19
PIF_VALUE: 21
PIF_VALUE: 20
PIF_VALUE: 17
PIF_VALUE: 19
PIF_VALUE: 24
PIF_VALUE: 23
PIF_VALUE: 1
PIF_VALUE: 18
PIF_VALUE: 7

## 2020-06-02 ASSESSMENT — PAIN DESCRIPTION - PAIN TYPE: TYPE: SURGICAL PAIN

## 2020-06-02 ASSESSMENT — PAIN SCALES - GENERAL
PAINLEVEL_OUTOF10: 2
PAINLEVEL_OUTOF10: 7

## 2020-06-02 NOTE — PROGRESS NOTES
4 Eyes Skin Assessment     The patient is being assess for   Shift Handoff    I agree that 2 RN's have performed a thorough Head to Toe Skin Assessment on the patient. ALL assessment sites listed below have been assessed. Areas assessed by both nurses:   [x]   Head, Face, and Ears   [x]   Shoulders, Back, and Chest, Abdomen  [x]   Arms, Elbows, and Hands   [x]   Coccyx, Sacrum, and Ischium  [x]   Legs, Feet, and Heels            **SHARE this note so that the co-signing nurse is able to place an eSignature**    Co-signer eSignature: Electronically signed by Araceli Morgan RN on 6/1/20 at 8:28 PM EDT    Does the Patient have Skin Breakdown?   No          Robby Prevention initiated:  Yes   Wound Care Orders initiated:  No      C nurse consulted for Pressure Injury (Stage 3,4, Unstageable, DTI, NWPT, Complex wounds)and New or Established Ostomies:  No      Primary Nurse eSignature: Electronically signed by Araceli Morgan RN on 6/1/20 at 8:28 PM EDT

## 2020-06-02 NOTE — PROGRESS NOTES
Mrs. Jun Steiner a 48 y. o. female patient with known CAD and recent RCA stenting in the setting of inferior STEMI on May 19, 2020, presented to emergency room this morning with recurrent angina. Eloise was seen by cardio thoracic surgery during her admission 2 weeks ago. Discharged on Brilinta after stent placement planned coronary artery bypass in 6-week came in through the emergency room with ST MI T wave inversions anterolateral underwent catheterizations showed no significant change from previous disease. Pt currently on Balloon Pump Therapy, pt due to have minimally invasive CT procedure in the A.M. Pt resting comfortably. Will continue to monitor.

## 2020-06-03 ENCOUNTER — APPOINTMENT (OUTPATIENT)
Dept: GENERAL RADIOLOGY | Age: 55
DRG: 166 | End: 2020-06-03
Payer: MEDICAID

## 2020-06-03 LAB
ANION GAP SERPL CALCULATED.3IONS-SCNC: 11 MMOL/L (ref 3–16)
BUN BLDV-MCNC: 8 MG/DL (ref 7–20)
CALCIUM SERPL-MCNC: 8.1 MG/DL (ref 8.3–10.6)
CHLORIDE BLD-SCNC: 100 MMOL/L (ref 99–110)
CO2: 21 MMOL/L (ref 21–32)
CREAT SERPL-MCNC: <0.5 MG/DL (ref 0.6–1.1)
ESTIMATED AVERAGE GLUCOSE: 111.2 MG/DL
GFR AFRICAN AMERICAN: >60
GFR NON-AFRICAN AMERICAN: >60
GLUCOSE BLD-MCNC: 107 MG/DL (ref 70–99)
GLUCOSE BLD-MCNC: 107 MG/DL (ref 70–99)
GLUCOSE BLD-MCNC: 123 MG/DL (ref 70–99)
GLUCOSE BLD-MCNC: 124 MG/DL (ref 70–99)
GLUCOSE BLD-MCNC: 125 MG/DL (ref 70–99)
GLUCOSE BLD-MCNC: 129 MG/DL (ref 70–99)
GLUCOSE BLD-MCNC: 56 MG/DL (ref 70–99)
GLUCOSE BLD-MCNC: 98 MG/DL (ref 70–99)
HBA1C MFR BLD: 5.5 %
HCT VFR BLD CALC: 24.5 % (ref 36–48)
HEMOGLOBIN: 8.5 G/DL (ref 12–16)
MAGNESIUM: 2.3 MG/DL (ref 1.8–2.4)
MCH RBC QN AUTO: 30.4 PG (ref 26–34)
MCHC RBC AUTO-ENTMCNC: 34.7 G/DL (ref 31–36)
MCV RBC AUTO: 87.6 FL (ref 80–100)
ORGANISM: ABNORMAL
PDW BLD-RTO: 13 % (ref 12.4–15.4)
PERFORMED ON: ABNORMAL
PERFORMED ON: NORMAL
PLATELET # BLD: 166 K/UL (ref 135–450)
PMV BLD AUTO: 10.6 FL (ref 5–10.5)
POTASSIUM SERPL-SCNC: 4.1 MMOL/L (ref 3.5–5.1)
POTASSIUM SERPL-SCNC: 4.2 MMOL/L (ref 3.5–5.1)
RBC # BLD: 2.8 M/UL (ref 4–5.2)
SODIUM BLD-SCNC: 132 MMOL/L (ref 136–145)
URINE CULTURE, ROUTINE: ABNORMAL
WBC # BLD: 9.2 K/UL (ref 4–11)

## 2020-06-03 PROCEDURE — 2580000003 HC RX 258: Performed by: THORACIC SURGERY (CARDIOTHORACIC VASCULAR SURGERY)

## 2020-06-03 PROCEDURE — 7100000011 HC PHASE II RECOVERY - ADDTL 15 MIN

## 2020-06-03 PROCEDURE — 2700000000 HC OXYGEN THERAPY PER DAY

## 2020-06-03 PROCEDURE — 83735 ASSAY OF MAGNESIUM: CPT

## 2020-06-03 PROCEDURE — 80048 BASIC METABOLIC PNL TOTAL CA: CPT

## 2020-06-03 PROCEDURE — 94669 MECHANICAL CHEST WALL OSCILL: CPT

## 2020-06-03 PROCEDURE — 99024 POSTOP FOLLOW-UP VISIT: CPT | Performed by: THORACIC SURGERY (CARDIOTHORACIC VASCULAR SURGERY)

## 2020-06-03 PROCEDURE — 94761 N-INVAS EAR/PLS OXIMETRY MLT: CPT

## 2020-06-03 PROCEDURE — 6360000002 HC RX W HCPCS: Performed by: THORACIC SURGERY (CARDIOTHORACIC VASCULAR SURGERY)

## 2020-06-03 PROCEDURE — 85027 COMPLETE CBC AUTOMATED: CPT

## 2020-06-03 PROCEDURE — 71045 X-RAY EXAM CHEST 1 VIEW: CPT

## 2020-06-03 PROCEDURE — 84132 ASSAY OF SERUM POTASSIUM: CPT

## 2020-06-03 PROCEDURE — 6370000000 HC RX 637 (ALT 250 FOR IP): Performed by: THORACIC SURGERY (CARDIOTHORACIC VASCULAR SURGERY)

## 2020-06-03 PROCEDURE — P9045 ALBUMIN (HUMAN), 5%, 250 ML: HCPCS | Performed by: THORACIC SURGERY (CARDIOTHORACIC VASCULAR SURGERY)

## 2020-06-03 PROCEDURE — 2100000000 HC CCU R&B

## 2020-06-03 PROCEDURE — 7100000010 HC PHASE II RECOVERY - FIRST 15 MIN

## 2020-06-03 PROCEDURE — 94640 AIRWAY INHALATION TREATMENT: CPT

## 2020-06-03 PROCEDURE — 2580000003 HC RX 258

## 2020-06-03 RX ORDER — ALBUMIN, HUMAN INJ 5% 5 %
12.5 SOLUTION INTRAVENOUS PRN
Status: DISCONTINUED | OUTPATIENT
Start: 2020-06-03 | End: 2020-06-05 | Stop reason: HOSPADM

## 2020-06-03 RX ORDER — SODIUM CHLORIDE 9 MG/ML
INJECTION, SOLUTION INTRAVENOUS
Status: COMPLETED
Start: 2020-06-03 | End: 2020-06-03

## 2020-06-03 RX ADMIN — OXYCODONE 5 MG: 5 TABLET ORAL at 01:33

## 2020-06-03 RX ADMIN — ALBUMIN (HUMAN) 12.5 G: 12.5 INJECTION, SOLUTION INTRAVENOUS at 03:40

## 2020-06-03 RX ADMIN — FUROSEMIDE 20 MG: 10 INJECTION, SOLUTION INTRAMUSCULAR; INTRAVENOUS at 13:56

## 2020-06-03 RX ADMIN — CEFAZOLIN SODIUM 2 G: 10 INJECTION, POWDER, FOR SOLUTION INTRAVENOUS at 17:39

## 2020-06-03 RX ADMIN — SODIUM CHLORIDE: 9 INJECTION, SOLUTION INTRAVENOUS at 17:51

## 2020-06-03 RX ADMIN — FUROSEMIDE 20 MG: 10 INJECTION, SOLUTION INTRAMUSCULAR; INTRAVENOUS at 22:51

## 2020-06-03 RX ADMIN — ALBUTEROL SULFATE 2.5 MG: 2.5 SOLUTION RESPIRATORY (INHALATION) at 08:16

## 2020-06-03 RX ADMIN — POTASSIUM CHLORIDE 10 MEQ: 750 TABLET, EXTENDED RELEASE ORAL at 13:55

## 2020-06-03 RX ADMIN — VANCOMYCIN HYDROCHLORIDE 1000 MG: 10 INJECTION, POWDER, LYOPHILIZED, FOR SOLUTION INTRAVENOUS at 22:50

## 2020-06-03 RX ADMIN — POTASSIUM CHLORIDE 10 MEQ: 750 TABLET, EXTENDED RELEASE ORAL at 17:50

## 2020-06-03 RX ADMIN — MUPIROCIN: 20 OINTMENT TOPICAL at 08:54

## 2020-06-03 RX ADMIN — CEFAZOLIN SODIUM 2 G: 10 INJECTION, POWDER, FOR SOLUTION INTRAVENOUS at 01:11

## 2020-06-03 RX ADMIN — METOPROLOL TARTRATE 25 MG: 25 TABLET, FILM COATED ORAL at 22:51

## 2020-06-03 RX ADMIN — Medication 10 ML: at 08:53

## 2020-06-03 RX ADMIN — ASPIRIN 81 MG: 81 TABLET, COATED ORAL at 08:52

## 2020-06-03 RX ADMIN — FUROSEMIDE 20 MG: 10 INJECTION, SOLUTION INTRAMUSCULAR; INTRAVENOUS at 17:31

## 2020-06-03 RX ADMIN — POTASSIUM CHLORIDE 10 MEQ: 750 TABLET, EXTENDED RELEASE ORAL at 08:52

## 2020-06-03 RX ADMIN — METOPROLOL TARTRATE 25 MG: 25 TABLET, FILM COATED ORAL at 11:12

## 2020-06-03 RX ADMIN — FAMOTIDINE 20 MG: 20 TABLET, FILM COATED ORAL at 08:52

## 2020-06-03 RX ADMIN — ALBUTEROL SULFATE 2.5 MG: 2.5 SOLUTION RESPIRATORY (INHALATION) at 11:57

## 2020-06-03 RX ADMIN — POLYETHYLENE GLYCOL 3350 17 G: 17 POWDER, FOR SOLUTION ORAL at 08:53

## 2020-06-03 RX ADMIN — MUPIROCIN: 20 OINTMENT TOPICAL at 22:57

## 2020-06-03 RX ADMIN — ALBUTEROL SULFATE 2.5 MG: 2.5 SOLUTION RESPIRATORY (INHALATION) at 19:29

## 2020-06-03 RX ADMIN — FAMOTIDINE 20 MG: 20 TABLET, FILM COATED ORAL at 22:51

## 2020-06-03 RX ADMIN — Medication 10 ML: at 22:55

## 2020-06-03 RX ADMIN — CEFAZOLIN SODIUM 2 G: 10 INJECTION, POWDER, FOR SOLUTION INTRAVENOUS at 08:52

## 2020-06-03 RX ADMIN — OXYCODONE 5 MG: 5 TABLET ORAL at 12:12

## 2020-06-03 RX ADMIN — VANCOMYCIN HYDROCHLORIDE 1000 MG: 10 INJECTION, POWDER, LYOPHILIZED, FOR SOLUTION INTRAVENOUS at 06:43

## 2020-06-03 RX ADMIN — SODIUM CHLORIDE: 900 INJECTION, SOLUTION INTRAVENOUS at 17:51

## 2020-06-03 RX ADMIN — ATORVASTATIN CALCIUM 40 MG: 40 TABLET, FILM COATED ORAL at 22:52

## 2020-06-03 ASSESSMENT — PAIN DESCRIPTION - LOCATION: LOCATION: CHEST

## 2020-06-03 ASSESSMENT — PAIN SCALES - GENERAL
PAINLEVEL_OUTOF10: 6
PAINLEVEL_OUTOF10: 0
PAINLEVEL_OUTOF10: 4

## 2020-06-03 ASSESSMENT — PAIN DESCRIPTION - PAIN TYPE: TYPE: SURGICAL PAIN

## 2020-06-03 ASSESSMENT — PAIN DESCRIPTION - ORIENTATION: ORIENTATION: MID

## 2020-06-03 NOTE — PROGRESS NOTES
Spoke with Dr. Effie Jaimes to clarify BP treatment if MAP < 60. MD order for albumin 250cc x 1 prn and may repeat if MAP consistently stays low.  Wes Palacios

## 2020-06-03 NOTE — CONSULTS
Nutrition Assessment    Type and Reason for Visit: Initial, Consult    Nutrition Recommendations:   1. Continue Cardiac diet with FR per MD  2. Add High Protein ONS to promote nutrition intake  3. Education initiated, monitor further needs on f/u  4. Monitor nutrition adequacy, pertinent labs, bowel habits, wt changes, and clinical progress      Nutrition Assessment: Consult for diet education. Pt is s/p CABG x1 on 6/2/20. Currently ordered on a Cardiac Diet with 1500 mL FR. Pt seen in room. Reported decreased appetite Post-op. Endorses tiredness and nausea. Minimal PO at this time. Discussed importance of adequate nutrition and protein intake to preserve LBM. Initiated cardiac diet education. Wants to review on follow up when feeling better. Malnutrition Assessment:  · Malnutrition Status: At risk for malnutrition    Nutrition Risk Level: Moderate    Nutrient Needs:  · Estimated Daily Total Kcal: 9230-3958 kcals  · Estimated Daily Protein (g): 78-91 g   · Estimated Daily Total Fluid (ml/day): 1 mL/kcal    Nutrition Diagnosis:   · Problem: Inadequate energy intake  · Etiology: related to Insufficient energy/nutrient consumption     Signs and symptoms:  as evidenced by Intake 0-25%    Objective Information:  · Nutrition-Focused Physical Findings: Emesis on 6/2   · Wound Type: (Surgical incicions )  · Current Nutrition Therapies:  · Oral Diet Orders: Fluid Restriction   · Oral Diet intake: Unable to assess  · Oral Nutrition Supplement (ONS) Orders: None  · Anthropometric Measures:  · Ht: 5' 3\" (160 cm)   · Current Body Wt: 142 lb (64.4 kg)  · Admission Body Wt: 132 lb (59.9 kg)  · Ideal Body Wt: 115 lb (52.2 kg)   · BMI Classification: BMI 25.0 - 29.9 Overweight    Nutrition Interventions:   Continue current diet, Start ONS  Continued Inpatient Monitoring    Nutrition Evaluation:   · Evaluation: Goals set   · Goals:  Tolerate diet and consume greater than 50% of meals and ONS this admission    · Monitoring:

## 2020-06-03 NOTE — CARE COORDINATION
CASE MANAGEMENT INITIAL ASSESSMENT      Reviewed chart and completed assessment with: patient   Explained Case Management role/services. Primary contact information: Jair Brochure [phone]    Admit date/status: 5/31/20  Diagnosis: ACS  Is this a Readmission?:  No    Insurance: pending Medicaid   Precert required for SNF - no 3 night stay required -no    Living arrangements, Adls, care needs, prior to admission: pt lives in a single story house with her . Independent in ADL's     Transportation: private    [address] at home: none      Services in the home and/or outpatient, prior to admission: none    PT/OT recs: [facility] [place] Notification (HEN): not initiated    Barriers to discharge: none    Plan/comments:   Spoke to patient at bedside regarding discharge needs. Pt states her daughter is going to stay with her at discharge for her 24 hour care needs. Agreeable to [facility] without preference on agency. Referral given to Héctor Chisholm with [facility] who will follow with patient.  Dia Ramirez RN   ECOC on chart for MD signature

## 2020-06-03 NOTE — PROGRESS NOTES
polyethylene glycol  17 g Oral Daily    famotidine  20 mg Oral BID    chlorhexidine  15 mL Mouth/Throat BID    furosemide  20 mg Intravenous 4x Daily    magnesium oxide  400 mg Oral BID    mupirocin   Nasal BID    potassium chloride  10 mEq Oral TID     atorvastatin  40 mg Oral Nightly    aspirin  81 mg Oral Daily    clopidogrel  75 mg Oral Daily    albuterol  2.5 mg Nebulization Q4H WA    insulin glargine  0.25 Units/kg Subcutaneous Nightly    [START ON 6/4/2020] insulin lispro  0-6 Units Subcutaneous TID     [START ON 6/4/2020] insulin lispro  0-3 Units Subcutaneous Nightly     Continuous Infusions:    dextrose 5 % and 0.45 % NaCl 75 mL/hr at 06/02/20 1115    norepinephrine 10 mcg/min (06/03/20 0230)    niCARdipine      insulin 0.94 Units/hr (06/03/20 0900)    dextrose      DOBUTamine Stopped (06/02/20 1105)         Patient Active Problem List   Diagnosis    Anxiety    Primary insomnia    Arthritis    Gastroesophageal reflux disease without esophagitis    Hypercholesteremia    Chronic bilateral low back pain without sciatica    Lumbar radiculopathy    Moderate episode of recurrent major depressive disorder (HCC)    Acute [facility] infarction [facility] [address]    STEMI (ST elevation myocardial infarction) ([facility] [address]    Coronary artery disease involving native coronary artery of native heart    Acute combined systolic and diastolic congestive heart failure ([facility] [address]    ACS (acute coronary syndrome) ([facility]           Assessment  ST MI status post coronary artery bypass with removal of balloon pump  COPD  Acute combined systolic and diastolic congestive heart failure  PCI less than 6-week right coronary  Hypercholesterolemia      Plan:     #1 DC pacing wire, DC both chest tube 2-hour later  #2 metoprolol 25 mg twice daily  #3 we will keep art line for today  #4 out of bed as tolerated  #5 we will start Lasix late this afternoon    Allison Partida MD FACS

## 2020-06-03 NOTE — PROGRESS NOTES
This note also relates to the following rows which could not be included:  Resp - Cannot attach notes to unvalidated device data  SpO2 - Cannot attach notes to unvalidated device data       06/02/20 2013   Treatment   Treatment Type HHN   $Bronchial Hygiene $Oscillatory therapy   $Treatment Type $Inhaled Therapy/Meds   Medications Albuterol   Pre-Tx Pulse 99   Pre-Tx Resps 14   Breath Sounds Pre-Tx COY Clear   Breath Sounds Pre-Tx LLL Clear   Breath Sounds Pre-Tx RUL Clear   Breath Sounds Pre-Tx RML Clear   Breath Sounds Pre-Tx RLL Clear   Breath Sounds Post-Tx COY Clear   Breath Sounds Post-Tx LLL Clear   Breath Sounds Post-Tx RUL Clear   Breath Sounds Post-Tx RML Clear   Breath Sounds Post-Tx RLL Clear   Post-Tx Pulse 94   Delivery Source Air   Tx Tolerance Well   Duration 5   Is patient on O2?  Y   Oxygen Therapy/Pulse Ox   O2 Therapy Oxygen   O2 Device Nasal cannula   O2 Flow Rate (L/min) 3 L/min   Cough/Sputum   Sputum How Obtained Cough on request   Cough None   Patient Observation   Observations acapella x 10 breaths

## 2020-06-03 NOTE — CARE COORDINATION
[facility]    Referral received from  to follow for home care services.      [facility] unable to staff timely  [facility] accepted referral    Kate Levy RN, BSN CTN  [facility] [phone]

## 2020-06-04 ENCOUNTER — APPOINTMENT (OUTPATIENT)
Dept: GENERAL RADIOLOGY | Age: 55
DRG: 166 | End: 2020-06-04
Payer: MEDICAID

## 2020-06-04 LAB
ANION GAP SERPL CALCULATED.3IONS-SCNC: 9 MMOL/L (ref 3–16)
BLOOD BANK DISPENSE STATUS: NORMAL
BLOOD BANK DISPENSE STATUS: NORMAL
BLOOD BANK PRODUCT CODE: NORMAL
BLOOD BANK PRODUCT CODE: NORMAL
BPU ID: NORMAL
BPU ID: NORMAL
BUN BLDV-MCNC: 9 MG/DL (ref 7–20)
CALCIUM SERPL-MCNC: 8.5 MG/DL (ref 8.3–10.6)
CHLORIDE BLD-SCNC: 96 MMOL/L (ref 99–110)
CO2: 25 MMOL/L (ref 21–32)
CREAT SERPL-MCNC: <0.5 MG/DL (ref 0.6–1.1)
DESCRIPTION BLOOD BANK: NORMAL
DESCRIPTION BLOOD BANK: NORMAL
GFR AFRICAN AMERICAN: >60
GFR NON-AFRICAN AMERICAN: >60
GLUCOSE BLD-MCNC: 110 MG/DL (ref 70–99)
GLUCOSE BLD-MCNC: 113 MG/DL (ref 70–99)
GLUCOSE BLD-MCNC: 120 MG/DL (ref 70–99)
GLUCOSE BLD-MCNC: 122 MG/DL (ref 70–99)
HCT VFR BLD CALC: 22.4 % (ref 36–48)
HEMOGLOBIN: 7.9 G/DL (ref 12–16)
MAGNESIUM: 1.9 MG/DL (ref 1.8–2.4)
MCH RBC QN AUTO: 30.7 PG (ref 26–34)
MCHC RBC AUTO-ENTMCNC: 35 G/DL (ref 31–36)
MCV RBC AUTO: 87.6 FL (ref 80–100)
PDW BLD-RTO: 13.1 % (ref 12.4–15.4)
PERFORMED ON: ABNORMAL
PLATELET # BLD: 159 K/UL (ref 135–450)
PMV BLD AUTO: 10.4 FL (ref 5–10.5)
POTASSIUM SERPL-SCNC: 3.7 MMOL/L (ref 3.5–5.1)
RBC # BLD: 2.56 M/UL (ref 4–5.2)
SODIUM BLD-SCNC: 130 MMOL/L (ref 136–145)
WBC # BLD: 10.4 K/UL (ref 4–11)

## 2020-06-04 PROCEDURE — 80048 BASIC METABOLIC PNL TOTAL CA: CPT

## 2020-06-04 PROCEDURE — 94669 MECHANICAL CHEST WALL OSCILL: CPT

## 2020-06-04 PROCEDURE — 2700000000 HC OXYGEN THERAPY PER DAY

## 2020-06-04 PROCEDURE — 97530 THERAPEUTIC ACTIVITIES: CPT

## 2020-06-04 PROCEDURE — 2100000000 HC CCU R&B

## 2020-06-04 PROCEDURE — 94640 AIRWAY INHALATION TREATMENT: CPT

## 2020-06-04 PROCEDURE — 6370000000 HC RX 637 (ALT 250 FOR IP): Performed by: THORACIC SURGERY (CARDIOTHORACIC VASCULAR SURGERY)

## 2020-06-04 PROCEDURE — 2580000003 HC RX 258: Performed by: THORACIC SURGERY (CARDIOTHORACIC VASCULAR SURGERY)

## 2020-06-04 PROCEDURE — 97166 OT EVAL MOD COMPLEX 45 MIN: CPT

## 2020-06-04 PROCEDURE — 6360000002 HC RX W HCPCS: Performed by: THORACIC SURGERY (CARDIOTHORACIC VASCULAR SURGERY)

## 2020-06-04 PROCEDURE — 99024 POSTOP FOLLOW-UP VISIT: CPT | Performed by: THORACIC SURGERY (CARDIOTHORACIC VASCULAR SURGERY)

## 2020-06-04 PROCEDURE — 97116 GAIT TRAINING THERAPY: CPT

## 2020-06-04 PROCEDURE — 94761 N-INVAS EAR/PLS OXIMETRY MLT: CPT

## 2020-06-04 PROCEDURE — 71045 X-RAY EXAM CHEST 1 VIEW: CPT

## 2020-06-04 PROCEDURE — 97162 PT EVAL MOD COMPLEX 30 MIN: CPT

## 2020-06-04 PROCEDURE — 85027 COMPLETE CBC AUTOMATED: CPT

## 2020-06-04 PROCEDURE — 83735 ASSAY OF MAGNESIUM: CPT

## 2020-06-04 RX ORDER — FUROSEMIDE 20 MG/1
20 TABLET ORAL 2 TIMES DAILY
Status: DISCONTINUED | OUTPATIENT
Start: 2020-06-04 | End: 2020-06-05 | Stop reason: HOSPADM

## 2020-06-04 RX ADMIN — ALBUTEROL SULFATE 2.5 MG: 2.5 SOLUTION RESPIRATORY (INHALATION) at 16:07

## 2020-06-04 RX ADMIN — FAMOTIDINE 20 MG: 20 TABLET, FILM COATED ORAL at 09:37

## 2020-06-04 RX ADMIN — OXYCODONE 5 MG: 5 TABLET ORAL at 22:31

## 2020-06-04 RX ADMIN — FUROSEMIDE 20 MG: 20 TABLET ORAL at 16:50

## 2020-06-04 RX ADMIN — ALBUTEROL SULFATE 2.5 MG: 2.5 SOLUTION RESPIRATORY (INHALATION) at 20:28

## 2020-06-04 RX ADMIN — METOPROLOL TARTRATE 25 MG: 25 TABLET, FILM COATED ORAL at 22:31

## 2020-06-04 RX ADMIN — MAGNESIUM GLUCONATE 500 MG ORAL TABLET 400 MG: 500 TABLET ORAL at 09:37

## 2020-06-04 RX ADMIN — Medication 10 ML: at 09:40

## 2020-06-04 RX ADMIN — ASPIRIN 81 MG: 81 TABLET, COATED ORAL at 09:38

## 2020-06-04 RX ADMIN — POTASSIUM CHLORIDE 10 MEQ: 750 TABLET, EXTENDED RELEASE ORAL at 16:50

## 2020-06-04 RX ADMIN — CEFAZOLIN SODIUM 2 G: 10 INJECTION, POWDER, FOR SOLUTION INTRAVENOUS at 00:29

## 2020-06-04 RX ADMIN — Medication 10 ML: at 22:32

## 2020-06-04 RX ADMIN — FUROSEMIDE 20 MG: 20 TABLET ORAL at 11:01

## 2020-06-04 RX ADMIN — POTASSIUM CHLORIDE 10 MEQ: 750 TABLET, EXTENDED RELEASE ORAL at 12:13

## 2020-06-04 RX ADMIN — IRON SUCROSE 300 MG: 20 INJECTION, SOLUTION INTRAVENOUS at 13:48

## 2020-06-04 RX ADMIN — POTASSIUM CHLORIDE 10 MEQ: 750 TABLET, EXTENDED RELEASE ORAL at 08:27

## 2020-06-04 RX ADMIN — ALBUTEROL SULFATE 2.5 MG: 2.5 SOLUTION RESPIRATORY (INHALATION) at 11:48

## 2020-06-04 RX ADMIN — FAMOTIDINE 20 MG: 20 TABLET, FILM COATED ORAL at 22:31

## 2020-06-04 RX ADMIN — MAGNESIUM GLUCONATE 500 MG ORAL TABLET 400 MG: 500 TABLET ORAL at 22:31

## 2020-06-04 RX ADMIN — Medication 15 ML: at 22:31

## 2020-06-04 RX ADMIN — METOPROLOL TARTRATE 25 MG: 25 TABLET, FILM COATED ORAL at 09:38

## 2020-06-04 RX ADMIN — MUPIROCIN: 20 OINTMENT TOPICAL at 09:45

## 2020-06-04 RX ADMIN — ATORVASTATIN CALCIUM 40 MG: 40 TABLET, FILM COATED ORAL at 22:31

## 2020-06-04 RX ADMIN — Medication 15 ML: at 11:12

## 2020-06-04 RX ADMIN — CLOPIDOGREL BISULFATE 75 MG: 75 TABLET ORAL at 09:38

## 2020-06-04 RX ADMIN — POLYETHYLENE GLYCOL 3350 17 G: 17 POWDER, FOR SOLUTION ORAL at 09:44

## 2020-06-04 RX ADMIN — ACETAMINOPHEN 650 MG: 325 TABLET ORAL at 09:53

## 2020-06-04 ASSESSMENT — PAIN DESCRIPTION - LOCATION
LOCATION: STERNUM
LOCATION: CHEST;BACK
LOCATION: CHEST

## 2020-06-04 ASSESSMENT — PAIN SCALES - GENERAL
PAINLEVEL_OUTOF10: 5
PAINLEVEL_OUTOF10: 5
PAINLEVEL_OUTOF10: 3
PAINLEVEL_OUTOF10: 6
PAINLEVEL_OUTOF10: 3
PAINLEVEL_OUTOF10: 5

## 2020-06-04 ASSESSMENT — PAIN DESCRIPTION - PROGRESSION
CLINICAL_PROGRESSION: GRADUALLY IMPROVING

## 2020-06-04 ASSESSMENT — PAIN DESCRIPTION - PAIN TYPE
TYPE: SURGICAL PAIN

## 2020-06-04 ASSESSMENT — PAIN DESCRIPTION - ORIENTATION
ORIENTATION: MID

## 2020-06-04 ASSESSMENT — PAIN DESCRIPTION - ONSET: ONSET: ON-GOING

## 2020-06-04 ASSESSMENT — PAIN - FUNCTIONAL ASSESSMENT: PAIN_FUNCTIONAL_ASSESSMENT: ACTIVITIES ARE NOT PREVENTED

## 2020-06-04 ASSESSMENT — PAIN DESCRIPTION - FREQUENCY: FREQUENCY: CONTINUOUS

## 2020-06-04 NOTE — PROGRESS NOTES
Within functional limits     Subjective  General  Chart Reviewed: Yes  Patient assessed for rehabilitation services?: Yes  Family / Caregiver Present: No  Referring Practitioner: Jose Abraham MD  Referral Date : 06/02/20  Diagnosis: ACS, s/p CABG x1 6/02  Follows Commands: Within Functional Limits  General Comment  Comments: Pt sitting up in chair upon arrival. RN cleared pt for therapy. Subjective  Subjective: Pt agreeable to evaluation.   Pain Screening  Patient Currently in Pain: Yes  Pain Assessment  Pain Assessment: 0-10  Pain Level: 5  Pain Type: Surgical pain  Pain Location: Sternum  Non-Pharmaceutical Pain Intervention(s): Ambulation/Increased Activity  Vital Signs  Patient Currently in Pain: Yes     Social/Functional History  Social/Functional History  Lives With: Spouse  Type of Home: House  Home Layout: One level  Home Access: Stairs to enter with rails  Entrance Stairs - Number of Steps: 3 YENI  Entrance Stairs - Rails: Both  Bathroom Shower/Tub: Tub/Shower unit  Bathroom Toilet: Standard  ADL Assistance: Independent  Homemaking Assistance: Independent  Homemaking Responsibilities: Yes  Ambulation Assistance: Independent  Transfer Assistance: Independent  Active : Yes  Leisure & Hobbies: spend time with grandchildren  Objective  AROM RLE (degrees)  RLE AROM: WFL  AROM LLE (degrees)  LLE AROM : WFL  Strength RLE  Strength RLE: WFL  Strength LLE  Strength LLE: WFL     Sensation  Overall Sensation Status: WFL  Bed mobility  Supine to Sit: Unable to assess  Sit to Supine: Unable to assess  Comment: up in chair upon arrival and exit  Transfers  Sit to Stand: Minimal Assistance(cues for sternal precautions)  Stand to sit: Minimal Assistance(cues for sternal precautions)  Ambulation  Ambulation?: Yes  Ambulation 1  Surface: level tile  Device: Rollator  Other Apparatus: O2  Assistance: Contact guard assistance  Quality of Gait: step through gait pattern, decreased william, narrow MICHELLE, steady with

## 2020-06-04 NOTE — PROGRESS NOTES
[facility]iac, [facility] Thor[facility]geons  Progress Note    6/4/2020 12:10 PM  Surgeon: Edil Watts       POD# 1 S/P : Coronary artery bypass    Chief complaint: Postop follow-up    Subjective: No major complaint or event this morning     Hospital course:  6/3 2020 of all drips, transition, minimal chest tube output sinus tach  6/4 no major complaint or event all tubes is out, pacing wires out  Vital Signs: BP (!) 81/55   Pulse 87   Temp 98.4 °F (36.9 °C) (Oral)   Resp 18   Ht 5' 3\" (1.6 m)   Wt 140 lb 14 oz (63.9 kg)   LMP 11/01/2017 (Approximate)   SpO2 95%   BMI 24.95 kg/m²  O2 Flow Rate (L/min): 3 L/min(decreased to 2L)     I/O:      Intake/Output Summary (Last 24 hours) at 6/4/2020 1210  Last data filed at 6/4/2020 0950  Gross per 24 hour   Intake 1501 ml   Output 4125 ml   Net -2624 ml       Exam:   Cardiovascular: S1 plus S2 +0 no additional sound  Pulmonary: Bilaterally clear no additional sound      Incision: Dry and clean      Radiology  Lines and tubes are stable.       Bilateral pleural effusions with bibasilar atelectasis versus airspace   disease. Labs:   CBC:   Recent Labs     06/02/20  1455 06/03/20  0520 06/04/20  0504   WBC 15.8* 9.2 10.4   HGB 10.1* 8.5* 7.9*   HCT 29.7* 24.5* 22.4*   MCV 87.4 87.6 87.6    166 159     BMP:   Recent Labs     06/02/20  1455  06/03/20  0235 06/03/20  0520 06/04/20  0504   *  --   --  132* 130*   K 4.4   < > 4.2 4.1 3.7     --   --  100 96*   CO2 22  --   --  21 25   BUN 7  --   --  8 9   CREATININE <0.5*  --   --  <0.5* <0.5*    < > = values in this interval not displayed.      PT/INR:   Recent Labs     06/01/20  1253   PROTIME 13.3*   INR 1.14     APTT:   Recent Labs     06/01/20  1253 06/02/20  0423   APTT 54.4* 48.5*       Scheduled Meds:    furosemide  20 mg Oral BID    [START ON 6/5/2020] ticagrelor  90 mg Oral BID    metoprolol tartrate  25 mg Oral BID    insulin lispro  0-3 Units Subcutaneous Nightly    insulin lispro  0-6 Units Subcutaneous TID     sodium chloride flush  10 mL Intravenous 2 times per day    polyethylene glycol  17 g Oral Daily    famotidine  20 mg Oral BID    chlorhexidine  15 mL Mouth/Throat BID    magnesium oxide  400 mg Oral BID    mupirocin   Nasal BID    potassium chloride  10 mEq Oral TID     atorvastatin  40 mg Oral Nightly    aspirin  81 mg Oral Daily    albuterol  2.5 mg Nebulization Q4H WA     Continuous Infusions:    norepinephrine 10 mcg/min (06/03/20 0230)    dextrose           Patient Active Problem List   Diagnosis    Anxiety    Primary insomnia    Arthritis    Gastroesophageal reflux disease without esophagitis    Hypercholesteremia    Chronic bilateral low back pain without sciatica    Lumbar radiculopathy    Moderate episode of recurrent major depressive disorder (HCC)    [facility] infarction [facility] [address]    STEMI (ST elevation myocardial infarction) ([facility] [address]    Coronary artery disease involving native coronary artery of native heart    Acute combined systolic and diastolic congestive heart failure ([facility] [address]    ACS (acute coronary syndrome) ([facility])           Assessment  ST MI status post coronary artery bypass with removal of balloon pump  COPD  Acute combined systolic and diastolic congestive heart failure  PCI less than 6-week right coronary  Hypercholesterolemia      Plan:   #1 we will change her Plavix to Brilinta, decrease Lasix to twice a day  #2 iron IV  #3 wean oxygen as tolerated  #4 possible discharge home tomorrow  Vinnie Peres MD FACS

## 2020-06-04 NOTE — PROGRESS NOTES
4 Eyes Skin Assessment     The patient is being assess for   Shift Handoff    I agree that 2 RN's have performed a thorough Head to Toe Skin Assessment on the patient. ALL assessment sites listed below have been assessed. Areas assessed by both nurses:   [x]   Head, Face, and Ears   [x]   Shoulders, Back, and Chest, Abdomen  [x]   Arms, Elbows, and Hands   [x]   Coccyx, Sacrum, and Ischium  [x]   Legs, Feet, and Heels            **SHARE this note so that the co-signing nurse is able to place an eSignature**    Co-signer eSignature: Electronically signed by Jesse Gerardo RN on 6/5/20 at 7:55 AM EDT    Does the Patient have Skin Breakdown?   No          Robby Prevention initiated:  Yes   Wound Care Orders initiated:  No      WOC nurse consulted for Pressure Injury (Stage 3,4, Unstageable, DTI, NWPT, Complex wounds)and New or Established Ostomies:  NA      Primary Nurse eSignature: Electronically signed by Lani Coats RN on 6/4/20 at 7:33 PM EDT

## 2020-06-04 NOTE — PROGRESS NOTES
0720 Shift hand off done with night shift RN at bedside. [id number] Dr Edil Eppss into see patient & updated RN with new orders. Possible discharge home for 6/5/20.  Migue Kurtz

## 2020-06-04 NOTE — PROGRESS NOTES
02 sat 96% on 2L while sitting up in chair. Ambulated with rolling walker on 3L 03 & 02 sat dropped to 73%. Ambulated 125 ft. Returned to chair & on 2L & 02 sat slowly increased from 73% to 78% to 85% to 92% to 96% while sitting in chair.   Ar Deutsch

## 2020-06-04 NOTE — PROGRESS NOTES
sinus surgery;  section; and Coronary artery bypass graft (N/A, 2020). Restrictions  Restrictions/Precautions  Restrictions/Precautions: General Precautions, Fall Risk  Position Activity Restriction  Sternal Precautions: No Pushing, No Pulling, 5# Lifting Restrictions  Other position/activity restrictions: ambulate, sternal px, pierre    Subjective   General  Chart Reviewed: Yes, Imaging, Labs, Orders, History and Physical  Patient assessed for rehabilitation services?: Yes  Family / Caregiver Present: No  Referring Practitioner: Dr. Delmar Hemphill  Diagnosis: CABG x1 POD #2  Subjective  Subjective: Pt in chair, pleasant and agreeable to OT. General Comment  Comments: RN cleared pt for tx/eval  Patient Currently in Pain: Yes  Pain Assessment  Pain Assessment: 0-10  Pain Level: 5  Pain Type: Surgical pain  Pain Location: Chest  Pain Orientation: Mid  Pain Frequency: Continuous  Pain Onset: On-going  Clinical Progression: Gradually improving  Functional Pain Assessment: Activities are not prevented  Non-Pharmaceutical Pain Intervention(s): Distraction; Therapeutic touch; Therapeutic presence; Ambulation/Increased Activity;Repositioned  Response to Pain Intervention: Patient Satisfied  Pre Treatment Pain Screening  Intervention List: Patient able to continue with treatment  Vital Signs  Temp: 98.4 °F (36.9 °C)  Temp Source: Oral  Pulse: 87  Heart Rate Source: Monitor  Resp: 18  BP: (!) 81/55  BP Location: Left upper arm  BP Upper/Lower: Upper  MAP (mmHg): (!) 60  Patient Position: Sitting;Up in chair  Level of Consciousness: Alert  MEWS Score: 2  Patient Currently in Pain: Yes  Oxygen Therapy  SpO2: 95 %  Pulse Oximeter Device Mode: Continuous  Pulse Oximeter Device Location: Finger  O2 Device: Nasal cannula  O2 Flow Rate (L/min): 3 L/min(decreased to 2L)  Patient Observation  Observations: hourly rounds  Social/Functional History  Social/Functional History  Lives With: Spouse  Type of Home: [facility] Layout: One level  Home Access: Stairs to enter with rails  Entrance Stairs - Number of Steps: 3 YENI  Entrance Stairs - Rails: Both  Bathroom Shower/Tub: Tub/Shower unit  Bathroom Toilet: Standard  ADL Assistance: Independent  Homemaking Assistance: Independent  Homemaking Responsibilities: Yes  Ambulation Assistance: Independent  Transfer Assistance: Independent  Active : Yes  Leisure & Hobbies: spend time with grandchildren       Objective        Orientation  Overall Orientation Status: Within Functional Limits  Observation/Palpation  Posture: Fair  Balance  Sitting Balance: Supervision  Standing Balance: Contact guard assistance(4WW, line management)  Standing Balance  Time: x5 minutes, x7 minutes  Activity: mobility in room, mobility in hallway  Comment: 4WW, cues for safety and line management  Functional Mobility  Functional - Mobility Device: 4-Wheeled Walker  Activity: Other  Assist Level: Contact guard assistance  Functional Mobility Comments: cues for safety  ADL  Feeding: Beverage management;Setup  Grooming: Setup;Supervision; Increased time to complete  Toileting: Dependent/Total(pierre)  Additional Comments: Pt declined further ADL, setup for grooming later  Tone RUE  RUE Tone: Normotonic  Tone LUE  LUE Tone: Normotonic  Coordination  Movements Are Fluid And Coordinated: Yes     Bed mobility  Supine to Sit: Unable to assess  Sit to Supine: Unable to assess  Scooting: Unable to assess  Comment: Pt in chair upon arrival and departure from room  Transfers  Sit to stand: Contact guard assistance  Stand to sit: Contact guard assistance  Transfer Comments: cues for rocking and sternal px  Vision - Basic Assessment  Prior Vision: No visual deficits  Visual History: No significant visual history  Patient Visual Report: No visual complaint reported. Visual Field Cut: No  Oculo Motor Control:  WNL  Cognition  Overall Cognitive Status: WFL  Perception  Overall Perceptual Status: WFL     Sensation  Overall

## 2020-06-05 VITALS
SYSTOLIC BLOOD PRESSURE: 86 MMHG | OXYGEN SATURATION: 75 % | HEART RATE: 96 BPM | WEIGHT: 134.04 LBS | TEMPERATURE: 98.5 F | RESPIRATION RATE: 18 BRPM | HEIGHT: 63 IN | DIASTOLIC BLOOD PRESSURE: 67 MMHG | BODY MASS INDEX: 23.75 KG/M2

## 2020-06-05 LAB
ANION GAP SERPL CALCULATED.3IONS-SCNC: 11 MMOL/L (ref 3–16)
BUN BLDV-MCNC: 10 MG/DL (ref 7–20)
CALCIUM SERPL-MCNC: 8.8 MG/DL (ref 8.3–10.6)
CHLORIDE BLD-SCNC: 99 MMOL/L (ref 99–110)
CO2: 26 MMOL/L (ref 21–32)
CREAT SERPL-MCNC: <0.5 MG/DL (ref 0.6–1.1)
GFR AFRICAN AMERICAN: >60
GFR NON-AFRICAN AMERICAN: >60
GLUCOSE BLD-MCNC: 100 MG/DL (ref 70–99)
GLUCOSE BLD-MCNC: 111 MG/DL (ref 70–99)
GLUCOSE BLD-MCNC: 122 MG/DL (ref 70–99)
HCT VFR BLD CALC: 24.4 % (ref 36–48)
HEMOGLOBIN: 8.5 G/DL (ref 12–16)
MAGNESIUM: 2.2 MG/DL (ref 1.8–2.4)
MCH RBC QN AUTO: 30.6 PG (ref 26–34)
MCHC RBC AUTO-ENTMCNC: 34.8 G/DL (ref 31–36)
MCV RBC AUTO: 87.9 FL (ref 80–100)
PDW BLD-RTO: 12.8 % (ref 12.4–15.4)
PERFORMED ON: ABNORMAL
PERFORMED ON: ABNORMAL
PLATELET # BLD: 212 K/UL (ref 135–450)
PMV BLD AUTO: 10.6 FL (ref 5–10.5)
POTASSIUM SERPL-SCNC: 3.6 MMOL/L (ref 3.5–5.1)
POTASSIUM SERPL-SCNC: 4.1 MMOL/L (ref 3.5–5.1)
RBC # BLD: 2.78 M/UL (ref 4–5.2)
SODIUM BLD-SCNC: 136 MMOL/L (ref 136–145)
WBC # BLD: 9.6 K/UL (ref 4–11)

## 2020-06-05 PROCEDURE — 2580000003 HC RX 258: Performed by: THORACIC SURGERY (CARDIOTHORACIC VASCULAR SURGERY)

## 2020-06-05 PROCEDURE — 80048 BASIC METABOLIC PNL TOTAL CA: CPT

## 2020-06-05 PROCEDURE — 97530 THERAPEUTIC ACTIVITIES: CPT

## 2020-06-05 PROCEDURE — 2700000000 HC OXYGEN THERAPY PER DAY

## 2020-06-05 PROCEDURE — 6370000000 HC RX 637 (ALT 250 FOR IP): Performed by: THORACIC SURGERY (CARDIOTHORACIC VASCULAR SURGERY)

## 2020-06-05 PROCEDURE — 94669 MECHANICAL CHEST WALL OSCILL: CPT

## 2020-06-05 PROCEDURE — 2580000003 HC RX 258

## 2020-06-05 PROCEDURE — 97535 SELF CARE MNGMENT TRAINING: CPT

## 2020-06-05 PROCEDURE — 6360000002 HC RX W HCPCS: Performed by: THORACIC SURGERY (CARDIOTHORACIC VASCULAR SURGERY)

## 2020-06-05 PROCEDURE — 97110 THERAPEUTIC EXERCISES: CPT

## 2020-06-05 PROCEDURE — 85027 COMPLETE CBC AUTOMATED: CPT

## 2020-06-05 PROCEDURE — 94761 N-INVAS EAR/PLS OXIMETRY MLT: CPT

## 2020-06-05 PROCEDURE — 83735 ASSAY OF MAGNESIUM: CPT

## 2020-06-05 PROCEDURE — 36592 COLLECT BLOOD FROM PICC: CPT

## 2020-06-05 PROCEDURE — 97116 GAIT TRAINING THERAPY: CPT

## 2020-06-05 PROCEDURE — 84132 ASSAY OF SERUM POTASSIUM: CPT

## 2020-06-05 PROCEDURE — 99024 POSTOP FOLLOW-UP VISIT: CPT | Performed by: THORACIC SURGERY (CARDIOTHORACIC VASCULAR SURGERY)

## 2020-06-05 PROCEDURE — 94640 AIRWAY INHALATION TREATMENT: CPT

## 2020-06-05 RX ORDER — ASPIRIN 81 MG/1
81 TABLET ORAL DAILY
Qty: 30 TABLET | Refills: 3 | Status: SHIPPED | OUTPATIENT
Start: 2020-06-06 | End: 2020-09-04

## 2020-06-05 RX ORDER — SODIUM CHLORIDE 9 MG/ML
INJECTION, SOLUTION INTRAVENOUS
Status: COMPLETED
Start: 2020-06-05 | End: 2020-06-05

## 2020-06-05 RX ORDER — FUROSEMIDE 20 MG/1
20 TABLET ORAL 2 TIMES DAILY
Qty: 60 TABLET | Refills: 3 | Status: ON HOLD | OUTPATIENT
Start: 2020-06-05 | End: 2020-07-17 | Stop reason: SDUPTHER

## 2020-06-05 RX ORDER — OXYCODONE HYDROCHLORIDE 5 MG/1
5 TABLET ORAL EVERY 4 HOURS PRN
Qty: 28 TABLET | Refills: 0 | Status: SHIPPED | OUTPATIENT
Start: 2020-06-05 | End: 2020-06-12

## 2020-06-05 RX ORDER — POTASSIUM CHLORIDE 750 MG/1
10 TABLET, EXTENDED RELEASE ORAL
Qty: 60 TABLET | Refills: 3 | Status: SHIPPED | OUTPATIENT
Start: 2020-06-05 | End: 2020-07-06

## 2020-06-05 RX ADMIN — POTASSIUM CHLORIDE 10 MEQ: 750 TABLET, EXTENDED RELEASE ORAL at 12:56

## 2020-06-05 RX ADMIN — TICAGRELOR 90 MG: 90 TABLET ORAL at 09:12

## 2020-06-05 RX ADMIN — ASPIRIN 81 MG: 81 TABLET, COATED ORAL at 09:12

## 2020-06-05 RX ADMIN — MAGNESIUM GLUCONATE 500 MG ORAL TABLET 400 MG: 500 TABLET ORAL at 09:12

## 2020-06-05 RX ADMIN — POTASSIUM CHLORIDE 10 MEQ: 750 TABLET, EXTENDED RELEASE ORAL at 08:27

## 2020-06-05 RX ADMIN — FUROSEMIDE 20 MG: 20 TABLET ORAL at 08:27

## 2020-06-05 RX ADMIN — ALBUTEROL SULFATE 2.5 MG: 2.5 SOLUTION RESPIRATORY (INHALATION) at 11:57

## 2020-06-05 RX ADMIN — Medication 15 ML: at 09:12

## 2020-06-05 RX ADMIN — POTASSIUM CHLORIDE 20 MEQ: 400 INJECTION, SOLUTION INTRAVENOUS at 10:07

## 2020-06-05 RX ADMIN — FAMOTIDINE 20 MG: 20 TABLET, FILM COATED ORAL at 09:11

## 2020-06-05 RX ADMIN — POTASSIUM CHLORIDE 10 MEQ: 750 TABLET, EXTENDED RELEASE ORAL at 18:08

## 2020-06-05 RX ADMIN — MUPIROCIN: 20 OINTMENT TOPICAL at 09:12

## 2020-06-05 RX ADMIN — METOPROLOL TARTRATE 25 MG: 25 TABLET, FILM COATED ORAL at 10:52

## 2020-06-05 RX ADMIN — FUROSEMIDE 20 MG: 20 TABLET ORAL at 18:08

## 2020-06-05 RX ADMIN — SODIUM CHLORIDE: 900 INJECTION, SOLUTION INTRAVENOUS at 08:41

## 2020-06-05 RX ADMIN — Medication 10 ML: at 08:36

## 2020-06-05 RX ADMIN — POTASSIUM CHLORIDE 20 MEQ: 400 INJECTION, SOLUTION INTRAVENOUS at 08:31

## 2020-06-05 RX ADMIN — ALBUTEROL SULFATE 2.5 MG: 2.5 SOLUTION RESPIRATORY (INHALATION) at 07:52

## 2020-06-05 ASSESSMENT — PAIN DESCRIPTION - PROGRESSION

## 2020-06-05 ASSESSMENT — PAIN DESCRIPTION - LOCATION
LOCATION: CHEST
LOCATION: CHEST

## 2020-06-05 ASSESSMENT — PAIN DESCRIPTION - ORIENTATION: ORIENTATION: MID

## 2020-06-05 ASSESSMENT — PAIN SCALES - GENERAL
PAINLEVEL_OUTOF10: 5
PAINLEVEL_OUTOF10: 5

## 2020-06-05 ASSESSMENT — PAIN - FUNCTIONAL ASSESSMENT: PAIN_FUNCTIONAL_ASSESSMENT: ACTIVITIES ARE NOT PREVENTED

## 2020-06-05 ASSESSMENT — PAIN DESCRIPTION - DESCRIPTORS: DESCRIPTORS: ACHING

## 2020-06-05 ASSESSMENT — PAIN DESCRIPTION - PAIN TYPE
TYPE: SURGICAL PAIN
TYPE: ACUTE PAIN;SURGICAL PAIN

## 2020-06-05 ASSESSMENT — PAIN DESCRIPTION - ONSET: ONSET: ON-GOING

## 2020-06-05 ASSESSMENT — PAIN DESCRIPTION - FREQUENCY: FREQUENCY: CONTINUOUS

## 2020-06-05 NOTE — DISCHARGE SUMMARY
DISCHARGE SUMMARY    Admission Date:  2020  6:54 AM  Discharge Date:      PCP:  Randy Valente MD    Cardiologist: Piero Keys    Principle Diagnosis:   Non-ST MI complex coronary artery disease  Acute Pulmonary Insufficiency following surgery   Past Medical History:  Past Medical History:   Diagnosis Date    CAD (coronary artery disease)     Depression     GERD (gastroesophageal reflux disease)     On intra-aortic balloon pump assist 2020    Removed during OHS on 20       Past Surgical History:  Past Surgical History:   Procedure Laterality Date     SECTION      CORONARY ARTERY BYPASS GRAFT N/A 2020    CORONARY ARTERY BYPASS GRAFTING X1, INTERNAL MAMMARY ARTERY, OFF PUMP (LIMA TO LAD), 5 LEVEL BILATERAL INTERCOSTAL NERVE BLOCK, BALLOON PUMP REMOVAL performed by Shelly Adan MD at [place] [address]         Procedure:  Procedure(s):  CORONARY ARTERY BYPASS GRAFTING X1, INTERNAL MAMMARY ARTERY, OFF PUMP (LIMA TO LAD), 5 LEVEL BILATERAL INTERCOSTAL NERVE BLOCK, BALLOON PUMP REMOVAL [unfilled]  Stage:     History:  The patient is a 48 y.o. female non-ST MI after PCI 2 weeks ago, proximal LAD not amenable for PCI    Hospital Course:   The patient underwent Procedure(s): Coronary artery bypass x1  6/3 2020 of all drips, transition, minimal chest tube output sinus tach   no major complaint or event all tubes is out, pacing wires out   no major complaint or event patients still on oxygen require 1 L dropped to 77% without oxygen  Weight:   Preop  Day of discharge 2020    Disposition: stable home with oxygen    Patient Instructions:   Robin Hammonds \"Eloise\"   Home Medication Instructions [id number]    Printed on:20 8607   Medication Information                      aspirin 81 MG chewable tablet  Take 1 tablet by mouth daily             atorvastatin (LIPITOR) 80 MG tablet  Take 1 tablet by mouth nightly             carvedilol (COREG) 3.125 MG tablet  Take 1 tablet by mouth 2 times daily (with meals)             diclofenac (VOLTAREN) 50 MG EC tablet  Take 50 mg by mouth 2 times daily             famotidine (PEPCID) 40 MG tablet  Take 1 tablet by mouth every evening             FLUoxetine (PROZAC) 40 MG capsule  Take 1 capsule by mouth daily             lansoprazole (PREVACID) 30 MG delayed release capsule  Take 1 capsule by mouth daily             mirtazapine (REMERON) 45 MG tablet  TAKE ONE TABLET BY MOUTH ONCE NIGHTLY             nitroGLYCERIN (NITROSTAT) 0.4 MG SL tablet  Place 1 tablet under the tongue every 5 minutes as needed for Chest pain up to max of 3 total doses. If no relief after 1 dose, call 911. ticagrelor (BRILINTA) 90 MG TABS tablet  Take 1 tablet by mouth 2 times daily             tiZANidine (ZANAFLEX) 4 MG tablet  Take 4 mg by mouth 3 times daily               Activity:  No heavy lifting (over 10 lbs) or driving until seen in the office  Diet: cardiac  Wound Care: none    Follow up with Dr. Elida Rodriguez in 1 week with CXR  and 3 week . Please call the office at [phone] to make an appointment.     MD Héctor Haas MD FACS  6/5/2020  3:50 PM

## 2020-06-05 NOTE — CARE COORDINATION
Writer notified by nursing of oxygen needs. Pt decreased SpO2 with room air at rest.  Spoke to patient at bedside and no preference on DME company. Call placed and message left for [facility] with [employer] to get oxygen needs. Writer aware of potential discharge today and will make arrangements [facility] when ready to go.  Raul Sprague RN

## 2020-06-05 NOTE — PROGRESS NOTES
Cardiac, Vascular and Thoracic Surgeons  Progress Note    6/5/2020 3:14 PM  Surgeon: Eda Barron       POD# 1 S/P : Coronary artery bypass    Chief complaint: Postop follow-up    Subjective: No major complaint or event this morning     Hospital course:  6/3 2020 of all drips, transition, minimal chest tube output sinus tach  6/4 no major complaint or event all tubes is out, pacing wires out  6/5 no major complaint or event patients still on oxygen require 1 L dropped to 77% without oxygen      Vital Signs: BP (!) 90/57   Pulse 97   Temp 98.5 °F (36.9 °C)   Resp 16   Ht 5' 3\" (1.6 m)   Wt 134 lb 0.6 oz (60.8 kg)   LMP 11/01/2017 (Approximate)   SpO2 100%   BMI 23.74 kg/m²  O2 Flow Rate (L/min): 2 L/min     I/O:      Intake/Output Summary (Last 24 hours) at 6/5/2020 1514  Last data filed at 6/5/2020 1336  Gross per 24 hour   Intake 880 ml   Output 2600 ml   Net -1720 ml       Exam:   Cardiovascular: S1 plus S2 +0 no additional sound  Pulmonary: Bilaterally clear no additional sound      Incision: Dry and clean      Radiology  Lines and tubes are stable.       Bilateral pleural effusions with bibasilar atelectasis versus airspace   disease. Labs:   CBC:   Recent Labs     06/03/20  0520 06/04/20  0504 06/05/20  0550   WBC 9.2 10.4 9.6   HGB 8.5* 7.9* 8.5*   HCT 24.5* 22.4* 24.4*   MCV 87.6 87.6 87.9    159 212     BMP:   Recent Labs     06/03/20  0520 06/04/20  0504 06/05/20  0550   * 130* 136   K 4.1 3.7 3.6    96* 99   CO2 21 25 26   BUN 8 9 10   CREATININE <0.5* <0.5* <0.5*     PT/INR:   No results for input(s): PROTIME, INR in the last 72 hours. APTT:   No results for input(s): APTT in the last 72 hours.     Scheduled Meds:    furosemide  20 mg Oral BID    ticagrelor  90 mg Oral BID    metoprolol tartrate  25 mg Oral BID    insulin lispro  0-3 Units Subcutaneous Nightly    insulin lispro  0-6 Units Subcutaneous TID     sodium chloride flush  10 mL Intravenous 2 times per day

## 2020-06-05 NOTE — PROGRESS NOTES
Physical Therapy  Facility/Department: [facility] B2 - ICU  Daily Treatment Note  NAME: Faye Epps  : 1965  MRN: [id number]    Date of Service: 2020    Discharge Recommendations:  24 hour supervision or assist, Home with Home health PT   PT Equipment Recommendations  Equipment Needed: No    Assessment   Body structures, Functions, Activity limitations: Decreased functional mobility ; Decreased endurance  Assessment: Pt tolerated therapy well this date. Progressing to SBA for mobility. Desats with mobility on room air and pt asymptomatic. SPO2 >90% on 1.5L during gait training. Discussed getting home pulse oximeter to monitor oxygen levels and educated on goal to keep oxygen above 90%. Continue to recommend home with 24-hr sup and home PT at d/c. Treatment Diagnosis: impaired functional mobility  Prognosis: Good  Decision Making: Medium Complexity  PT Education: Goals;PT Role;General Safety;Precautions; Disease Specific Education;Transfer Training;Plan of Care;Gait Training  Patient Education: Pt verbalized understanding  REQUIRES PT FOLLOW UP: Yes  Activity Tolerance  Activity Tolerance: Patient Tolerated treatment well     Patient Diagnosis(es): The encounter diagnosis was Acute ST elevation myocardial infarction (STEMI) involving other coronary artery of inferior wall ([facility] [address] has a past medical history of CAD (coronary artery disease), Depression, GERD (gastroesophageal reflux disease), and On intra-aortic balloon pump assist.   has a past surgical history that includes sinus surgery;  section; and Coronary artery bypass graft (N/A, 2020).     Restrictions  Restrictions/Precautions  Restrictions/Precautions: General Precautions, Fall Risk  Position Activity Restriction  Sternal Precautions: No Pushing, No Pulling, 5# Lifting Restrictions  Other position/activity restrictions: ambulate, sternal px  Subjective   General  Chart Reviewed: Yes  Response To Previous Treatment: Patient with no complaints from previous session. Family / Caregiver Present: No  Referring Practitioner: Leyla Winkler MD  Subjective  Subjective: Pt agreeable to therapy  General Comment  Comments: Pt sitting up in chair upon arrival. RN cleared pt for therapy. Pain Screening  Patient Currently in Pain: Yes  Pain Assessment  Pain Assessment: 0-10  Pain Level: 5  Pain Type: Acute pain;Surgical pain  Pain Location: Chest  Non-Pharmaceutical Pain Intervention(s): Ambulation/Increased Activity;Repositioned  Response to Pain Intervention: Patient Satisfied       Objective   Bed mobility  Supine to Sit: Unable to assess  Sit to Supine: Unable to assess  Comment: Pt up in chair at start and end of session     Transfers  Sit to Stand: Stand by assistance  Stand to sit: Stand by assistance     Ambulation  Ambulation?: Yes  Ambulation 1  Surface: level tile  Device: Rollator  Other Apparatus: O2(1.5L)  Assistance: Stand by assistance  Quality of Gait: step through gait pattern, decreased william, narrow MICHELLE, steady with turns; Cues for posture  Distance: 10 ft x 2 + 150 ft  Comments: pt ambulated to/from bathroom on room air and desated. RN notified.  SpO2 >90% throughout gait training on 1.5L        Exercises  Hip Flexion: Seated marches x 15 BLE  Hip Abduction: Seated clamshell x 15 BLE  Knee Long Arc Quad: x 15 BLE  Ankle Pumps: x 15 BLE  Comments: Cues for technique                                           AM-PAC Score  AM-PAC Inpatient Mobility Raw Score : 19 (06/05/20 1246)  AM-PAC Inpatient T-Scale Score : 45.44 (06/05/20 1246)  Mobility Inpatient CMS 0-100% Score: 41.77 (06/05/20 1246)  Mobility Inpatient CMS G-Code Modifier : CK (06/05/20 1246)          Goals  Short term goals  Time Frame for Short term goals: 7 days (6/11)  Short term goal 1: Pt will perform bed mobility with min A  Short term goal 2: Pt will perform transfers with supervision  Short term goal 3: Pt will ambulate 200' with appropriate AD and supervision  Short

## 2020-06-05 NOTE — PLAN OF CARE
Problem: Falls - Risk of:  Goal: Will remain free from falls  Description: Will remain free from falls  Outcome: Ongoing     Problem: Safety:  Goal: Free from intentional harm  Description: Free from intentional harm  Outcome: Ongoing     Problem: Daily Care:  Goal: Daily care needs are met  Description: Daily care needs are met  Outcome: Ongoing     Problem: Pain:  Goal: Control of acute pain  Description: Control of acute pain  Outcome: Ongoing     Problem: Cardiac:  Goal: Hemodynamic stability will improve  Description: Hemodynamic stability will improve  Outcome: Ongoing  Goal: Ability to maintain an adequate cardiac output will improve  Description: Ability to maintain an adequate cardiac output will improve  Outcome: Ongoing

## 2020-06-11 ENCOUNTER — OFFICE VISIT (OUTPATIENT)
Dept: FAMILY MEDICINE CLINIC | Age: 55
End: 2020-06-11
Payer: COMMERCIAL

## 2020-06-11 ENCOUNTER — TELEPHONE (OUTPATIENT)
Dept: CARDIOTHORACIC SURGERY | Age: 55
End: 2020-06-11

## 2020-06-11 VITALS
HEART RATE: 91 BPM | SYSTOLIC BLOOD PRESSURE: 85 MMHG | OXYGEN SATURATION: 96 % | WEIGHT: 133 LBS | BODY MASS INDEX: 23.56 KG/M2 | DIASTOLIC BLOOD PRESSURE: 55 MMHG | TEMPERATURE: 98.1 F

## 2020-06-11 PROBLEM — I24.9 ACS (ACUTE CORONARY SYNDROME) (HCC): Status: RESOLVED | Noted: 2020-05-31 | Resolved: 2020-06-11

## 2020-06-11 PROCEDURE — 99214 OFFICE O/P EST MOD 30 MIN: CPT | Performed by: FAMILY MEDICINE

## 2020-06-11 NOTE — PATIENT INSTRUCTIONS
Please read the healthy family handout that you were given and share it with your family. Please compare this printed medication list with your medications at home to be sure they are the same. If you have any medications that are different please contact us immediately at [phone]. Also review your allergies that we have listed, these may also include medications that you have not been able to tolerate, make sure everything listed is correct. If you have any allergies that are different please contact us immediately at [phone].

## 2020-06-11 NOTE — PROGRESS NOTES
without  Continue current medicines  Keep upcoming appointments with cardiothoracic surgery and cardiologist  Follow-up in 2 months or as needed  \"Healthy Family Handout\" provided  Avoid exposure to all tobacco products. Read and consider all information provided by the pharmacy regarding prescribed medications before use  Careful medical compliance  Proper diet and weight management   Otherwise continue current treatment plan  Call or return if symptoms are not well controlled  Go to ED if severe/significant symptoms occur    All medical conditions for this patient are stable unless otherwise indicated    Nimisha Kam MD    This note was transcribed using a voice recognition software system. Proper technique and careful oversight were used to increase transcription accuracy but inadvertent errors may be present.

## 2020-06-15 ENCOUNTER — HOSPITAL ENCOUNTER (OUTPATIENT)
Age: 55
Discharge: HOME | End: 2020-06-15
Payer: COMMERCIAL

## 2020-06-15 ENCOUNTER — HOSPITAL ENCOUNTER (OUTPATIENT)
Dept: GENERAL RADIOLOGY | Age: 55
Discharge: HOME | End: 2020-06-15
Payer: COMMERCIAL

## 2020-06-15 ENCOUNTER — OFFICE VISIT (OUTPATIENT)
Dept: CARDIOTHORACIC SURGERY | Age: 55
End: 2020-06-15

## 2020-06-15 VITALS
DIASTOLIC BLOOD PRESSURE: 64 MMHG | HEART RATE: 81 BPM | BODY MASS INDEX: 23.39 KG/M2 | HEIGHT: 63 IN | TEMPERATURE: 98.2 F | WEIGHT: 132 LBS | SYSTOLIC BLOOD PRESSURE: 100 MMHG | OXYGEN SATURATION: 98 %

## 2020-06-15 PROCEDURE — 99024 POSTOP FOLLOW-UP VISIT: CPT | Performed by: THORACIC SURGERY (CARDIOTHORACIC VASCULAR SURGERY)

## 2020-06-15 PROCEDURE — 71046 X-RAY EXAM CHEST 2 VIEWS: CPT

## 2020-06-15 RX ORDER — OXYCODONE HYDROCHLORIDE 5 MG/1
5 TABLET ORAL EVERY 6 HOURS PRN
Qty: 27 TABLET | Refills: 0 | Status: SHIPPED | OUTPATIENT
Start: 2020-06-15 | End: 2020-06-22

## 2020-06-15 RX ORDER — OXYCODONE HYDROCHLORIDE 5 MG/1
5 TABLET ORAL EVERY 6 HOURS PRN
COMMUNITY
End: 2020-06-15 | Stop reason: SDUPTHER

## 2020-06-24 ENCOUNTER — TELEPHONE (OUTPATIENT)
Dept: CARDIOTHORACIC SURGERY | Age: 55
End: 2020-06-24

## 2020-06-24 RX ORDER — ESOMEPRAZOLE MAGNESIUM 40 MG/1
CAPSULE, DELAYED RELEASE ORAL
Qty: 30 CAPSULE | Refills: 3 | OUTPATIENT
Start: 2020-06-24

## 2020-06-24 NOTE — TELEPHONE ENCOUNTER
Nica Kendall with [facility] called with concerns about diminished breath sounds in left lower lung. She has done patient teaching and encouraged IS use and coughing and deep breathing. I reviewed with Dr. Irina Hernández. She does have a large left pleural effusion that we are aware of. He would like to go ahead and repeat at chest xray now to make sure things are not worsening. She may require a thoracentesis. I called and spoke to patient. She is agreeable to test. She will go tomorrow.

## 2020-06-25 ENCOUNTER — HOSPITAL ENCOUNTER (OUTPATIENT)
Age: 55
Discharge: HOME | End: 2020-06-25
Payer: COMMERCIAL

## 2020-06-25 ENCOUNTER — HOSPITAL ENCOUNTER (OUTPATIENT)
Dept: GENERAL RADIOLOGY | Age: 55
Discharge: HOME | End: 2020-06-25
Payer: COMMERCIAL

## 2020-06-25 PROCEDURE — 71046 X-RAY EXAM CHEST 2 VIEWS: CPT

## 2020-06-26 ENCOUNTER — TELEPHONE (OUTPATIENT)
Dept: CARDIOTHORACIC SURGERY | Age: 55
End: 2020-06-26

## 2020-06-29 ENCOUNTER — COMMUNITY OUTREACH (OUTPATIENT)
Dept: OTHER | Age: 55
End: 2020-06-29

## 2020-07-02 ENCOUNTER — HOSPITAL ENCOUNTER (OUTPATIENT)
Age: 55
Discharge: HOME | End: 2020-07-02
Payer: COMMERCIAL

## 2020-07-02 ENCOUNTER — HOSPITAL ENCOUNTER (OUTPATIENT)
Dept: GENERAL RADIOLOGY | Age: 55
Discharge: HOME | End: 2020-07-02
Payer: COMMERCIAL

## 2020-07-02 ENCOUNTER — HOSPITAL ENCOUNTER (OUTPATIENT)
Dept: ULTRASOUND IMAGING | Age: 55
Discharge: HOME | End: 2020-07-02
Payer: COMMERCIAL

## 2020-07-02 LAB
INR BLD: 1.37 (ref 0.86–1.14)
PROTHROMBIN TIME: 15.9 SEC (ref 10–13.2)

## 2020-07-02 PROCEDURE — 32555 ASPIRATE PLEURA W/ IMAGING: CPT

## 2020-07-02 PROCEDURE — 71045 X-RAY EXAM CHEST 1 VIEW: CPT

## 2020-07-02 PROCEDURE — 85610 PROTHROMBIN TIME: CPT

## 2020-07-02 PROCEDURE — 36415 COLL VENOUS BLD VENIPUNCTURE: CPT

## 2020-07-06 ENCOUNTER — HOSPITAL ENCOUNTER (OUTPATIENT)
Age: 55
Discharge: HOME | End: 2020-07-06
Payer: COMMERCIAL

## 2020-07-06 ENCOUNTER — HOSPITAL ENCOUNTER (OUTPATIENT)
Dept: GENERAL RADIOLOGY | Age: 55
Discharge: HOME | End: 2020-07-06
Payer: COMMERCIAL

## 2020-07-06 ENCOUNTER — OFFICE VISIT (OUTPATIENT)
Dept: CARDIOTHORACIC SURGERY | Age: 55
End: 2020-07-06

## 2020-07-06 ENCOUNTER — OFFICE VISIT (OUTPATIENT)
Dept: CARDIOLOGY CLINIC | Age: 55
End: 2020-07-06
Payer: COMMERCIAL

## 2020-07-06 VITALS
BODY MASS INDEX: 20.91 KG/M2 | TEMPERATURE: 98.1 F | WEIGHT: 118 LBS | SYSTOLIC BLOOD PRESSURE: 104 MMHG | HEART RATE: 73 BPM | DIASTOLIC BLOOD PRESSURE: 64 MMHG | HEIGHT: 63 IN | OXYGEN SATURATION: 97 %

## 2020-07-06 VITALS
HEIGHT: 63 IN | BODY MASS INDEX: 20.55 KG/M2 | WEIGHT: 116 LBS | DIASTOLIC BLOOD PRESSURE: 60 MMHG | OXYGEN SATURATION: 97 % | TEMPERATURE: 97.8 F | SYSTOLIC BLOOD PRESSURE: 94 MMHG | HEART RATE: 74 BPM

## 2020-07-06 LAB
ANION GAP SERPL CALCULATED.3IONS-SCNC: 17 MMOL/L (ref 3–16)
BUN BLDV-MCNC: 8 MG/DL (ref 7–20)
CALCIUM SERPL-MCNC: 9.5 MG/DL (ref 8.3–10.6)
CHLORIDE BLD-SCNC: 97 MMOL/L (ref 99–110)
CO2: 22 MMOL/L (ref 21–32)
CREAT SERPL-MCNC: 0.6 MG/DL (ref 0.6–1.1)
GFR AFRICAN AMERICAN: >60
GFR NON-AFRICAN AMERICAN: >60
GLUCOSE BLD-MCNC: 96 MG/DL (ref 70–99)
POTASSIUM SERPL-SCNC: 3.3 MMOL/L (ref 3.5–5.1)
SODIUM BLD-SCNC: 136 MMOL/L (ref 136–145)

## 2020-07-06 PROCEDURE — 3017F COLORECTAL CA SCREEN DOC REV: CPT | Performed by: NURSE PRACTITIONER

## 2020-07-06 PROCEDURE — 36415 COLL VENOUS BLD VENIPUNCTURE: CPT

## 2020-07-06 PROCEDURE — 1036F TOBACCO NON-USER: CPT | Performed by: NURSE PRACTITIONER

## 2020-07-06 PROCEDURE — 99214 OFFICE O/P EST MOD 30 MIN: CPT | Performed by: NURSE PRACTITIONER

## 2020-07-06 PROCEDURE — G8427 DOCREV CUR MEDS BY ELIG CLIN: HCPCS | Performed by: NURSE PRACTITIONER

## 2020-07-06 PROCEDURE — G8420 CALC BMI NORM PARAMETERS: HCPCS | Performed by: NURSE PRACTITIONER

## 2020-07-06 PROCEDURE — 99024 POSTOP FOLLOW-UP VISIT: CPT | Performed by: THORACIC SURGERY (CARDIOTHORACIC VASCULAR SURGERY)

## 2020-07-06 PROCEDURE — 71046 X-RAY EXAM CHEST 2 VIEWS: CPT

## 2020-07-06 PROCEDURE — 80048 BASIC METABOLIC PNL TOTAL CA: CPT

## 2020-07-06 RX ORDER — POTASSIUM CHLORIDE 20 MEQ/1
20 TABLET, EXTENDED RELEASE ORAL 2 TIMES DAILY WITH MEALS
Qty: 60 TABLET | Refills: 1 | Status: ON HOLD | OUTPATIENT
Start: 2020-07-06 | End: 2020-07-17 | Stop reason: SDUPTHER

## 2020-07-06 ASSESSMENT — ENCOUNTER SYMPTOMS
COUGH: 0
SHORTNESS OF BREATH: 0
GASTROINTESTINAL NEGATIVE: 1

## 2020-07-06 NOTE — PROGRESS NOTES
[facility]   Cardiology Note              Date:  July 6, 2020  Patientname: Francie Deshpande  YOB: 1965    Chief Complaint   Patient presents with    Follow-Up from Hospital     S/P CABG 06/02/2020 - Dr. Perry Montenegro      Primary Care physician: Mundo Catherine MD    HISTORY OF PRESENT ILLNESS: Francie Deshpande is a 48 y.o. female who presented to [address] ER on 5/19/2020 with complaints of chest pain. Second EKG showed interior STEMI. She was transferred to [facility] and underwent emergent coronary angiography which showed 100% dRCA treated with RONDA x2. LM disease noted, referred to CT surgery for possible CABG in the future. EF 35%. Pt was discharged on [address]. Unable to start Lisinopril due to hypotension. Pt was readmitted for chest pain on 5/31 underwent LHC which showed known CAD and she underwent CABG x 1 LIMA to LAD on 6/2/2020. On 7/2 she underwent left sided thoracentesis removed 1450 cc. Today she presents for follow up as mentioned above. Overall she stated she is doing well. She stated she has some mid sternal incision pain, now only taking pain mediation to help her sleep at night. She stated since they drained the fluid off of her lung her shortness of breath has resolved. Appetite has not improved, she stated she is drinking Ensure- 3 a day. She has been weighing herself daily and losing weight. Denies any chest pain, dizziness, or palpitations. She is trying to be more active, she stated she has been increasing her activity walking 4x a day and tolerating it well. She stated her fatigue has improved, just wish her appetite would improve. Blood pressure at home has been in the upper 90's/60-70. She remains not to smoke. She stated she continues with home care, nurse comes out once a week. Taking all medications as prescribed, no refills needed at this time.        Francie Deshpande describes symptoms including fatigue but denies chest pain,dyspnea, palpitations, shortness of breath. Cardiovascular: Negative for chest pain, palpitations and leg swelling. Gastrointestinal: Negative. Genitourinary: Negative. Musculoskeletal: Negative for myalgias. Neurological: Negative for dizziness and weakness. Psychiatric/Behavioral: Negative for sleep disturbance. OBJECTIVE:    Vital signs:    BP 94/60   Pulse 74   Temp 97.8 °F (36.6 °C)   Ht 5' 3\" (1.6 m)   Wt 116 lb (52.6 kg)   LMP 11/01/2017 (Approximate)   SpO2 97%   BMI 20.55 kg/m²      Physical Exam:  Constitutional:  Comfortable and alert, NAD, appears older than stated age  Eyes: PERRL, sclera nonicteric  Neck:  Supple, no masses, no thyroidmegaly, no JVD  Skin:  Warm and dry; no rash or lesions- mid sternal incision healing well  Heart:  Regular, normal apex, S1 and S2 normal, no M/G/R  Lungs:  Normal respiratory effort; diminished LLL, no wheezing/rhonchi/rales  Abdomen: soft, non tender, + bowel sounds  Extremities:  No edema or cyanosis; no clubbing  Neuro: alert and oriented, moves legs and arms equally, normal mood and affect  Right femoral site soft, no hematoma, 2+ R femoral pulse, 2+ R DP/PT pulse - noted stitch that was left in leg, she stated she tried to take it out- stitch removed today, no signs/sx of infection to area. Data Reviewed:    6/2/2020  CORONARY ARTERY BYPASS GRAFTING X1, INTERNAL MAMMARY ARTERY, OFF PUMP (LIMA TO LAD), 5 LEVEL BILATERAL INTERCOSTAL NERVE BLOCK, BALLOON PUMP REMOVAL  Transesophageal echo  Removal of intra-aortic balloon pump  Surgeon(s):  Lyle Anguiano MD      5/31/2020 Coronary angiogram  Procedure: Emergent LHC, LVG, Insertion of IABP   Complications: None  Medications: Procedural sedation with minimal conscious sedation (Versed/Fentanyl)  An independent trained observer pushed medications at my direction.  We monitored the patient's level of consciousness and vital signs/physiologic status throughout the procedure duration (see start and stop times in log).  Estimated Blood Loss: Less than 20 mL  Specimens: were not obtained  Access:  Fluoroscopic and US guided micropuncture access of right CFA   Closure: IABP sheath sutured in  Contrast:  73 cc  Findings:      Left Heart Cath  Dominance:Right      LM: 50% distal stenosis unchanged from prior  LAD: larger vessel with mild plaquing; gives off three diagonals of which the second is with aneursym and 70% proximal stenosis  LCx: smaller caliber with minimal plaque disease   RCA: mild plaquing proximal to mid vessel with widely patent distal stents and no significant disease of RPLB or RPDA      LVEDP: 19 mmHg , no gradient upon pullback   LVEF: 35%     Impression/Recommendations:     Discussed films and distal LM disease with CTS Dr. Dyan Tay in person postprocedure. Plan is for continuation of IABP with Heparin and Cangrelor gtts as bridge to CABG this week. Last dose of Ticagrelor earlier this morning. Continue aspirin, high intensity statin, low dose beta blocker, and diurese as see fit. Coronary angiogram 5/19/2020:  INDICATION   Acute inferior STEMI  PROCEDURES PERFORMED    Left heart catheterization  LVgram  Coronary angiogam  Coronary cath  Thrombectomy of RCA  IVUS of RCA  PCI of RCA with 2 drug-eluting stents  PROCEDURE DESCRIPTION   Risks/benefits/alternatives/outcomes were discussed with patient and/or family and informed consent was obtained. This was an emergency procedure. patient was prepped draped in the usual sterile fashion. Local anaesthetic was applied over puncture site. Using a back wall technique, a 6 Gabones[place] Terumo sheath was inserted into right radial artery. Verapamil, nitroglycerin, nicardipine were administered through the sheath. Heparin was administered. Diagnostic 5 Amharic Medtronic pigtail and ultra catheters were used for diagnostic angiograms. Initially focus was on RCA angiography and PCI as noted below.   At the conclusion of the procedure, a TR band was placed over the puncture site and hemostasis was obtained. There were no immediate complications. I supervised sedation with versed 4 mg/fentanyl 100 Mcg during the procedure. 180 cc contrast was utilized. <20cc EBL. I offered to call the patient's friends and family to give them updates, patient declined and says that she would prefer that we not call anybody with regard to updates. FINDINGS      LVEDP  29   GRADIENT ACROSS AORTIC VALVE  no gradient   LV FUNCTION EF 40 %   WALL MOTION  inferior hypokinesis   MITRAL REGURGITATION  mild      LM  Less than 10% proximal-mid stenosis, distally there is an eccentric 50% stenosis         LAD  Large vessel, proximal-mid 30% stenosis. Distally less than 10% stenosis. D1 and D2 have a very high takeoff and D2 in particular has a patulous/aneurysmal segment with 70 to 80% proximal stenosis and less than 10% mid to distal stenosis. D3 has 10 to 20% olfcaygx-hka-vdebmb stenosis. LCX  Small vessel, 10% etmoidno-qhe-qcehqo stenosis. RI  This vessel could also be described as the high first diagonal as noted above in the LAD section. RCA Large vessel, dominant, proximal less than 10% stenosis, mid 30 to 40% stenosis, distal 90 to 100% stenosis. PERCUTANEOUS INTERVENTION DESCRIPTION    Patient was preloaded with Brilinta, patient was given a single dose of Integrilin during the procedure and was treated otherwise with heparin for anticoagulation. A 6 [place] guiding catheter was taken upfront for PCI and a choice floppy wire was used to cross the lesion. Thrombectomy was then performed with the penumbra device and flow was restored. Lesion in the distal RCA was then dilated with a 3 mm balloon and then stented with Abbott Xience Becky 3.5 x 15 mm drug-eluting stent as well as a 3.0 x 18 mm Love Xience [address] drug-eluting stent. IVUS was performed and showed MLD was 3.5 mm. Stents were postdilated both a 3 and 3.5 mm noncompliant balloon.   There was 0% residual stenosis. There was GAVIN 0 flow before and GAVIN-3 after PCI. During procedure, patient had hypotension which responded to vasopressors and these were able to be weaned partially at the end of the case and patient's EKG and symptoms had markedly improved at end of case. Remaining CAD/ASHD was felt to be treated medically followed by possible CABG surgery for left main disease. CONCLUSIONS:    Successful PCI of RCA with 2 drug-eluting stents  Will refer for consideration of CABG for left main disease  Addend, case d/w dr Moreno Car, plan for outpt cabg, order for life vest in interim, ef 35% on current review     Echo 5/19/2020: The left ventricular systolic function is moderately reduced with an   ejection fraction of 35 %. There is hypokinesis of the inferior, basal inferior and inferiolateral   walls. Grade I diastolic dysfunction with normal filling pressure. Mild mitral and aortic regurgitation. Systolic pulmonic artery pressure (SPAP) is normal estimated at 38 mmHg   (Right atrial pressure of 8 mmHg).        Cardiology Labs Reviewed:     Lab Data:  Most recent lab results below reviewed in office    CBC:   Lab Results   Component Value Date    WBC 9.6 06/05/2020    WBC 10.4 06/04/2020    WBC 9.2 06/03/2020    RBC 2.78 06/05/2020    RBC 2.56 06/04/2020    RBC 2.80 06/03/2020    HGB 8.5 06/05/2020    HGB 7.9 06/04/2020    HGB 8.5 06/03/2020    HCT 24.4 06/05/2020    HCT 22.4 06/04/2020    HCT 24.5 06/03/2020    MCV 87.9 06/05/2020    MCV 87.6 06/04/2020    MCV 87.6 06/03/2020    RDW 12.8 06/05/2020    RDW 13.1 06/04/2020    RDW 13.0 06/03/2020     06/05/2020     06/04/2020     06/03/2020     BMP:  Lab Results   Component Value Date     06/05/2020     06/04/2020     06/03/2020    K 4.1 06/05/2020    K 3.6 06/05/2020    K 3.7 06/04/2020    K 3.8 06/01/2020    K 3.5 05/20/2020    CL 99 06/05/2020    CL 96 06/04/2020     06/03/2020    CO2 26 06/05/2020    CO2 25 06/04/2020    CO2 21 06/03/2020    BUN 10 06/05/2020    BUN 9 06/04/2020    BUN 8 06/03/2020    CREATININE <0.5 06/05/2020    CREATININE <0.5 06/04/2020    CREATININE <0.5 06/03/2020     BNP:   Lab Results   Component Value Date    PROBNP 1,567 06/01/2020    PROBNP 2,240 05/31/2020    PROBNP 671 05/19/2020     FASTING LIPID PANEL:  Lab Results   Component Value Date    HDL 48 06/01/2020    HDL 45 05/28/2011    LDLCALC 71 06/01/2020    TRIG 102 06/01/2020     LIVER PROFILE:No results for input(s): AST, ALT, ALB in the last 72 hours. Reviewed all labs and imaging today    Assessment:   1. CAD: stable    -s/p CABG x1 LIMA to LAD 6/2/2020   -s/p RONDA x2 to RCA 5/19/2020 in setting of STEMI  2. Ischemic cardiomyopathy: EF 35% on echo 5/19/2020  3. Chronic combined CHF: appears compensated   4. Hypotension: ongoing- unable to add ACE/ARB/ARNI, pt asymptomatic   5. HLD: almost at goal, LDL 71, on statin   6. Tobacco abuse: stopped last Tuesday, continued cessation dicussed  7. S/p thoracentesis for pleural effusion on 7/2/2020- 1450 cc nadeem colored- CT surgery following     Plan:   1. Unable to start Lisinopril due to pt blood pressure remaining low at this time  2. Recommend changing lopressor to Toprol xl for evidence based BB for cardiomyopathy-pt has follow up with Dr. Tal Hutchins today  3. Cardiac rehab- discussed with patient and order placed, ok to start with cleared by CT surgery   4. Continue Ensure 3 times a day. 5. Follow up in 3-4 weeks with blood pressure log- if able, will add lisinopril to regimen at that time  6. Recommend EF 90 days post revascularization and/ or optimal medical therapy. An ICD for primary prevention of sudden cardiac death is recommended if EF remains 35% or less   7. Diet and activity as discussed  8. Lab work today to check kidney function      SUJATA Florian-GIL  [address]  [phone]      QUALITY MEASURES  1. Tobacco Cessation Counseling: Yes  2. Retake of BP if >140/90:   NA  3. Documentation to PCP/referring for new patient:  Sent to PCP at close of office visit  4. CAD patient on anti-platelet: Yes  5. CAD patient on STATIN therapy:  Yes  6.  Patient with CHF and aFib on anticoagulation:  NA

## 2020-07-06 NOTE — PATIENT INSTRUCTIONS
Plan: Lab work today to check kidney function  1. Unable to start Lisinopril due to pt blood pressure remaining low at this time  2. Reccomend changing lopressor to Toprol xl for evidence based BB for cardiomyopathy-pt has follow up with Dr. Amador Goes today  3. Cardiac rehab- discussed with patient and order placed, ok to start with cleared by CT surgery   4. Continue Ensure 3 times a day. 5. Follow up in 3-4 weeks with blood pressure log- if able, will add lisinopril to regimen at that time  6. Recommend EF 90 days post revascularization and/ or optimal medical therapy. An ICD for primary prevention of sudden cardiac death is recommended if EF remains 35% or less   7.  Continue aspirin, Brilinta, Lasix, potassium, lipitor lopressor at this time

## 2020-07-09 ENCOUNTER — TELEPHONE (OUTPATIENT)
Dept: CARDIOTHORACIC SURGERY | Age: 55
End: 2020-07-09

## 2020-07-09 NOTE — TELEPHONE ENCOUNTER
Patient had follow up chest xray after office visit this week. I called to check in on her today. She reports that she feels like her shortness of breath is progressively worsening since her thoracentesis last week. I reviewed results and patient symptoms with Dr. Lewis Swartz. He would like to set the patient up for a CT Chest w/o contrast and then do a phone visit on Monday next week. Patient is agreeable to this plan .

## 2020-07-10 ENCOUNTER — APPOINTMENT (OUTPATIENT)
Dept: CT IMAGING | Age: 55
DRG: 206 | End: 2020-07-10
Payer: COMMERCIAL

## 2020-07-10 ENCOUNTER — APPOINTMENT (OUTPATIENT)
Dept: GENERAL RADIOLOGY | Age: 55
DRG: 206 | End: 2020-07-10
Payer: COMMERCIAL

## 2020-07-10 ENCOUNTER — HOSPITAL ENCOUNTER (INPATIENT)
Age: 55
LOS: 7 days | Discharge: HOME | DRG: 206 | End: 2020-07-17
Attending: EMERGENCY MEDICINE | Admitting: INTERNAL MEDICINE
Payer: COMMERCIAL

## 2020-07-10 ENCOUNTER — TELEPHONE (OUTPATIENT)
Dept: CARDIOTHORACIC SURGERY | Age: 55
End: 2020-07-10

## 2020-07-10 ENCOUNTER — NURSE TRIAGE (OUTPATIENT)
Dept: OTHER | Facility: CLINIC | Age: 55
End: 2020-07-10

## 2020-07-10 PROBLEM — T82.9XXA COMPLICATION OF CORONARY ARTERY BYPASS GRAFT SURGERY: Status: ACTIVE | Noted: 2020-07-10

## 2020-07-10 LAB
A/G RATIO: 1.1 (ref 1.1–2.2)
ALBUMIN SERPL-MCNC: 3.9 G/DL (ref 3.4–5)
ALP BLD-CCNC: 184 U/L (ref 40–129)
ALT SERPL-CCNC: 19 U/L (ref 10–40)
ANION GAP SERPL CALCULATED.3IONS-SCNC: 13 MMOL/L (ref 3–16)
AST SERPL-CCNC: 23 U/L (ref 15–37)
BASOPHILS ABSOLUTE: 0.1 K/UL (ref 0–0.2)
BASOPHILS RELATIVE PERCENT: 1.2 %
BILIRUB SERPL-MCNC: 0.7 MG/DL (ref 0–1)
BILIRUBIN URINE: NEGATIVE
BLOOD, URINE: NEGATIVE
BUN BLDV-MCNC: 11 MG/DL (ref 7–20)
CALCIUM SERPL-MCNC: 9.8 MG/DL (ref 8.3–10.6)
CHLORIDE BLD-SCNC: 96 MMOL/L (ref 99–110)
CLARITY: CLEAR
CO2: 25 MMOL/L (ref 21–32)
COLOR: NORMAL
CREAT SERPL-MCNC: 0.6 MG/DL (ref 0.6–1.1)
EKG ATRIAL RATE: 84 BPM
EKG DIAGNOSIS: NORMAL
EKG P AXIS: 49 DEGREES
EKG P-R INTERVAL: 164 MS
EKG Q-T INTERVAL: 348 MS
EKG QRS DURATION: 90 MS
EKG QTC CALCULATION (BAZETT): 411 MS
EKG R AXIS: 14 DEGREES
EKG T AXIS: -26 DEGREES
EKG VENTRICULAR RATE: 84 BPM
EOSINOPHILS ABSOLUTE: 0.2 K/UL (ref 0–0.6)
EOSINOPHILS RELATIVE PERCENT: 1.8 %
GFR AFRICAN AMERICAN: >60
GFR NON-AFRICAN AMERICAN: >60
GLOBULIN: 3.7 G/DL
GLUCOSE BLD-MCNC: 105 MG/DL (ref 70–99)
GLUCOSE URINE: NEGATIVE MG/DL
HCT VFR BLD CALC: 37.3 % (ref 36–48)
HEMOGLOBIN: 12.6 G/DL (ref 12–16)
KETONES, URINE: NEGATIVE MG/DL
LACTATE DEHYDROGENASE: 225 U/L (ref 100–190)
LACTIC ACID: 0.9 MMOL/L (ref 0.4–2)
LEUKOCYTE ESTERASE, URINE: NEGATIVE
LYMPHOCYTES ABSOLUTE: 1.1 K/UL (ref 1–5.1)
LYMPHOCYTES RELATIVE PERCENT: 10.2 %
MCH RBC QN AUTO: 27.9 PG (ref 26–34)
MCHC RBC AUTO-ENTMCNC: 33.7 G/DL (ref 31–36)
MCV RBC AUTO: 82.8 FL (ref 80–100)
MICROSCOPIC EXAMINATION: NORMAL
MONOCYTES ABSOLUTE: 1.1 K/UL (ref 0–1.3)
MONOCYTES RELATIVE PERCENT: 10.2 %
NEUTROPHILS ABSOLUTE: 8 K/UL (ref 1.7–7.7)
NEUTROPHILS RELATIVE PERCENT: 76.6 %
NITRITE, URINE: NEGATIVE
PDW BLD-RTO: 15.1 % (ref 12.4–15.4)
PH UA: 7.5 (ref 5–8)
PLATELET # BLD: 262 K/UL (ref 135–450)
PMV BLD AUTO: 10.6 FL (ref 5–10.5)
POTASSIUM SERPL-SCNC: 3.8 MMOL/L (ref 3.5–5.1)
PRO-BNP: 1069 PG/ML (ref 0–124)
PROCALCITONIN: 0.05 NG/ML (ref 0–0.15)
PROTEIN UA: NEGATIVE MG/DL
RBC # BLD: 4.51 M/UL (ref 4–5.2)
SARS-COV-2, NAAT: NOT DETECTED
SODIUM BLD-SCNC: 134 MMOL/L (ref 136–145)
SPECIFIC GRAVITY UA: 1.01 (ref 1–1.03)
TOTAL PROTEIN: 7.6 G/DL (ref 6.4–8.2)
TROPONIN: <0.01 NG/ML
URINE TYPE: NORMAL
UROBILINOGEN, URINE: 0.2 E.U./DL
WBC # BLD: 10.5 K/UL (ref 4–11)

## 2020-07-10 PROCEDURE — U0002 COVID-19 LAB TEST NON-CDC: HCPCS

## 2020-07-10 PROCEDURE — 96374 THER/PROPH/DIAG INJ IV PUSH: CPT

## 2020-07-10 PROCEDURE — 6370000000 HC RX 637 (ALT 250 FOR IP): Performed by: INTERNAL MEDICINE

## 2020-07-10 PROCEDURE — 87449 NOS EACH ORGANISM AG IA: CPT

## 2020-07-10 PROCEDURE — 87040 BLOOD CULTURE FOR BACTERIA: CPT

## 2020-07-10 PROCEDURE — 99285 EMERGENCY DEPT VISIT HI MDM: CPT

## 2020-07-10 PROCEDURE — 86140 C-REACTIVE PROTEIN: CPT

## 2020-07-10 PROCEDURE — 89051 BODY FLUID CELL COUNT: CPT

## 2020-07-10 PROCEDURE — 2060000000 HC ICU INTERMEDIATE R&B

## 2020-07-10 PROCEDURE — 99255 IP/OBS CONSLTJ NEW/EST HI 80: CPT | Performed by: INTERNAL MEDICINE

## 2020-07-10 PROCEDURE — 82728 ASSAY OF FERRITIN: CPT

## 2020-07-10 PROCEDURE — 94640 AIRWAY INHALATION TREATMENT: CPT

## 2020-07-10 PROCEDURE — 3E03317 INTRODUCTION OF OTHER THROMBOLYTIC INTO PERIPHERAL VEIN, PERCUTANEOUS APPROACH: ICD-10-PCS | Performed by: INTERNAL MEDICINE

## 2020-07-10 PROCEDURE — 83615 LACTATE (LD) (LDH) ENZYME: CPT

## 2020-07-10 PROCEDURE — 71260 CT THORAX DX C+: CPT

## 2020-07-10 PROCEDURE — 93010 ELECTROCARDIOGRAM REPORT: CPT | Performed by: INTERNAL MEDICINE

## 2020-07-10 PROCEDURE — 84484 ASSAY OF TROPONIN QUANT: CPT

## 2020-07-10 PROCEDURE — 6360000002 HC RX W HCPCS: Performed by: PHYSICIAN ASSISTANT

## 2020-07-10 PROCEDURE — 93005 ELECTROCARDIOGRAM TRACING: CPT | Performed by: EMERGENCY MEDICINE

## 2020-07-10 PROCEDURE — 6360000002 HC RX W HCPCS: Performed by: INTERNAL MEDICINE

## 2020-07-10 PROCEDURE — 6360000004 HC RX CONTRAST MEDICATION: Performed by: PHYSICIAN ASSISTANT

## 2020-07-10 PROCEDURE — 2580000003 HC RX 258: Performed by: PHYSICIAN ASSISTANT

## 2020-07-10 PROCEDURE — 81003 URINALYSIS AUTO W/O SCOPE: CPT

## 2020-07-10 PROCEDURE — 84145 PROCALCITONIN (PCT): CPT

## 2020-07-10 PROCEDURE — 83880 ASSAY OF NATRIURETIC PEPTIDE: CPT

## 2020-07-10 PROCEDURE — 83605 ASSAY OF LACTIC ACID: CPT

## 2020-07-10 PROCEDURE — 80053 COMPREHEN METABOLIC PANEL: CPT

## 2020-07-10 PROCEDURE — 71045 X-RAY EXAM CHEST 1 VIEW: CPT

## 2020-07-10 PROCEDURE — 85025 COMPLETE CBC W/AUTO DIFF WBC: CPT

## 2020-07-10 PROCEDURE — 2580000003 HC RX 258: Performed by: INTERNAL MEDICINE

## 2020-07-10 RX ORDER — ONDANSETRON 2 MG/ML
4 INJECTION INTRAMUSCULAR; INTRAVENOUS ONCE
Status: COMPLETED | OUTPATIENT
Start: 2020-07-10 | End: 2020-07-10

## 2020-07-10 RX ORDER — PROCHLORPERAZINE EDISYLATE 5 MG/ML
10 INJECTION INTRAMUSCULAR; INTRAVENOUS EVERY 6 HOURS PRN
Status: DISCONTINUED | OUTPATIENT
Start: 2020-07-10 | End: 2020-07-17 | Stop reason: HOSPADM

## 2020-07-10 RX ORDER — POTASSIUM CHLORIDE 20 MEQ/1
20 TABLET, EXTENDED RELEASE ORAL 2 TIMES DAILY WITH MEALS
Status: DISCONTINUED | OUTPATIENT
Start: 2020-07-10 | End: 2020-07-12

## 2020-07-10 RX ORDER — ALBUTEROL SULFATE 90 UG/1
2 AEROSOL, METERED RESPIRATORY (INHALATION) 4 TIMES DAILY
Status: DISCONTINUED | OUTPATIENT
Start: 2020-07-10 | End: 2020-07-14

## 2020-07-10 RX ORDER — ACETAMINOPHEN 325 MG/1
650 TABLET ORAL EVERY 6 HOURS PRN
Status: DISCONTINUED | OUTPATIENT
Start: 2020-07-10 | End: 2020-07-17 | Stop reason: HOSPADM

## 2020-07-10 RX ORDER — FUROSEMIDE 20 MG/1
20 TABLET ORAL 2 TIMES DAILY
Status: CANCELLED | OUTPATIENT
Start: 2020-07-10

## 2020-07-10 RX ORDER — ACETAMINOPHEN 650 MG/1
650 SUPPOSITORY RECTAL EVERY 6 HOURS PRN
Status: DISCONTINUED | OUTPATIENT
Start: 2020-07-10 | End: 2020-07-17 | Stop reason: HOSPADM

## 2020-07-10 RX ORDER — ATORVASTATIN CALCIUM 80 MG/1
80 TABLET, FILM COATED ORAL NIGHTLY
Status: DISCONTINUED | OUTPATIENT
Start: 2020-07-10 | End: 2020-07-17 | Stop reason: HOSPADM

## 2020-07-10 RX ORDER — POLYETHYLENE GLYCOL 3350 17 G/17G
17 POWDER, FOR SOLUTION ORAL DAILY PRN
Status: DISCONTINUED | OUTPATIENT
Start: 2020-07-10 | End: 2020-07-17 | Stop reason: HOSPADM

## 2020-07-10 RX ORDER — FAMOTIDINE 20 MG/1
40 TABLET, FILM COATED ORAL EVERY EVENING
Status: DISCONTINUED | OUTPATIENT
Start: 2020-07-10 | End: 2020-07-17 | Stop reason: HOSPADM

## 2020-07-10 RX ORDER — ASPIRIN 81 MG/1
81 TABLET ORAL DAILY
Status: DISCONTINUED | OUTPATIENT
Start: 2020-07-10 | End: 2020-07-17 | Stop reason: HOSPADM

## 2020-07-10 RX ORDER — SODIUM CHLORIDE 0.9 % (FLUSH) 0.9 %
10 SYRINGE (ML) INJECTION EVERY 12 HOURS SCHEDULED
Status: DISCONTINUED | OUTPATIENT
Start: 2020-07-10 | End: 2020-07-17 | Stop reason: HOSPADM

## 2020-07-10 RX ORDER — SODIUM CHLORIDE 0.9 % (FLUSH) 0.9 %
10 SYRINGE (ML) INJECTION PRN
Status: DISCONTINUED | OUTPATIENT
Start: 2020-07-10 | End: 2020-07-17 | Stop reason: HOSPADM

## 2020-07-10 RX ORDER — 0.9 % SODIUM CHLORIDE 0.9 %
500 INTRAVENOUS SOLUTION INTRAVENOUS ONCE
Status: COMPLETED | OUTPATIENT
Start: 2020-07-10 | End: 2020-07-10

## 2020-07-10 RX ADMIN — MAGNESIUM GLUCONATE 500 MG ORAL TABLET 400 MG: 500 TABLET ORAL at 21:32

## 2020-07-10 RX ADMIN — Medication 2 PUFF: at 15:51

## 2020-07-10 RX ADMIN — FAMOTIDINE 40 MG: 20 TABLET, FILM COATED ORAL at 18:51

## 2020-07-10 RX ADMIN — METOPROLOL TARTRATE 25 MG: 25 TABLET, FILM COATED ORAL at 21:30

## 2020-07-10 RX ADMIN — ATORVASTATIN CALCIUM 80 MG: 80 TABLET, FILM COATED ORAL at 21:30

## 2020-07-10 RX ADMIN — Medication 2 PUFF: at 19:24

## 2020-07-10 RX ADMIN — Medication 10 ML: at 21:33

## 2020-07-10 RX ADMIN — SODIUM CHLORIDE 500 ML: 9 INJECTION, SOLUTION INTRAVENOUS at 11:25

## 2020-07-10 RX ADMIN — POTASSIUM CHLORIDE 20 MEQ: 20 TABLET, EXTENDED RELEASE ORAL at 18:51

## 2020-07-10 RX ADMIN — IOPAMIDOL 75 ML: 755 INJECTION, SOLUTION INTRAVENOUS at 11:47

## 2020-07-10 RX ADMIN — ENOXAPARIN SODIUM 30 MG: 30 INJECTION SUBCUTANEOUS at 21:30

## 2020-07-10 RX ADMIN — TICAGRELOR 90 MG: 90 TABLET ORAL at 21:30

## 2020-07-10 RX ADMIN — Medication 2 PUFF: at 19:30

## 2020-07-10 RX ADMIN — ONDANSETRON 4 MG: 2 INJECTION INTRAMUSCULAR; INTRAVENOUS at 11:25

## 2020-07-10 ASSESSMENT — ENCOUNTER SYMPTOMS
COUGH: 1
VOMITING: 1
DIARRHEA: 0
VOICE CHANGE: 0
CONSTIPATION: 0
NAUSEA: 1
ABDOMINAL PAIN: 0
EYE PAIN: 0
SHORTNESS OF BREATH: 1
SORE THROAT: 0
CHOKING: 0
CHEST TIGHTNESS: 0
COLOR CHANGE: 0
EYE DISCHARGE: 0
STRIDOR: 0
EYE ITCHING: 0
EYES NEGATIVE: 1

## 2020-07-10 ASSESSMENT — PAIN SCALES - GENERAL: PAINLEVEL_OUTOF10: 0

## 2020-07-10 NOTE — H&P
Hospital Medicine History & Physical      PCP: Hema Thornton MD    Date of Admission: 7/10/2020    Date of Service: Pt seen/examined on 07/10/20 and Admitted to Inpatient with expected LOS greater than two midnights due to medical therapy of pleural effusion    Chief Complaint: Shortness of breath, fever      History Of Present Illness:      48 y.o. female who presented to [facility] with shortness of breath, dry cough for past few weeks. Patient is known to have coronary artery disease with coronary artery bypass graft at about 6 weeks ago. Developed postoperative left-sided pleural effusion that was drained as outpatient. However, patient's left-sided chest pain with shortness of breath and cough persisted. Because of recurrent symptoms, patient called cardiothoracic surgeon. Also noted that she had a temperature of 101.7 °F.  Initially, outpatient CT scan of the chest was ordered. Because of the fever, patient was directed to the emergency department where she had the CT scan of the chest and then proceeded with admission to hospitalist service. No other accompanying symptoms  Nothing else that makes the patient feel better or worse. Never had pericardial or pleural effusion prior to the surgery. Comfortable at the time of this evaluation.       Past Medical History:          Diagnosis Date    CAD (coronary artery disease)     Depression     GERD (gastroesophageal reflux disease)     On intra-aortic balloon pump assist 2020    Removed during [facility] on 20       Past Surgical History:          Procedure Laterality Date     SECTION      CORONARY ARTERY BYPASS GRAFT N/A 2020    CORONARY ARTERY BYPASS GRAFTING X1, INTERNAL MAMMARY ARTERY, OFF PUMP (LIMA TO LAD), 5 LEVEL BILATERAL INTERCOSTAL NERVE BLOCK, BALLOON PUMP REMOVAL performed by Ananda Reddy MD at [facility] 8         Medications Prior to Admission:      Prior to Admission medications Medication Sig Start Date End Date Taking? Authorizing Provider   potassium chloride (KLOR-CON M) 20 MEQ extended release tablet Take 1 tablet by mouth 2 times daily (with meals) 7/6/20  Yes SUJATA Lim CNP   aspirin 81 MG EC tablet Take 1 tablet by mouth daily 6/6/20  Yes Sandra Acevedo MD   metoprolol tartrate (LOPRESSOR) 25 MG tablet Take 1 tablet by mouth 2 times daily 6/5/20  Yes Sandra Acevedo MD   furosemide (LASIX) 20 MG tablet Take 1 tablet by mouth 2 times daily 6/5/20  Yes Sandra Acevedo MD   magnesium oxide (MAG-OX) 400 (241.3 Mg) MG TABS tablet Take 1 tablet by mouth 2 times daily 6/5/20  Yes Sandra Acevedo MD   atorvastatin (LIPITOR) 80 MG tablet Take 1 tablet by mouth nightly 5/21/20  Yes SUJATA Garcia CNP   ticagrelor (BRILINTA) 90 MG TABS tablet Take 1 tablet by mouth 2 times daily 5/21/20  Yes SUJATA Garcia CNP   famotidine (PEPCID) 40 MG tablet Take 1 tablet by mouth every evening 4/1/20  Yes Chantel George MD       Allergies:  Patient has no known allergies. Social History:      The patient currently lives at home    TOBACCO:   reports that she has quit smoking. Her smoking use included cigarettes. She has a 10.00 pack-year smoking history. She has never used smokeless tobacco.  ETOH:   reports no history of alcohol use. E-Cigarettes Vaping or Juuling     Questions Responses    Vaping Use Never User    Start Date     Does device contain nicotine? Quit Date     Vaping Type             Family History:      Reviewed in detail and negative for DM, CAD, Cancer, CVA. Positive as follows:    History reviewed. No pertinent family history. REVIEW OF SYSTEMS:   Pertinent positives as noted in the HPI. Shortness of breath, cough. All other systems reviewed and negative.     PHYSICAL EXAM PERFORMED:    BP 90/68   Pulse 88   Temp 97.9 °F (36.6 °C)   Resp 16   Ht 5' 3\" (1.6 m)   Wt 118 lb (53.5 kg)   LMP 11/01/2017 (Approximate)   SpO2 98%   BMI 20.90 kg/m²     General appearance:  No apparent distress, appears stated age and cooperative. HEENT:  Normal cephalic, atraumatic without obvious deformity. Pupils equal, round, and reactive to light. Extra ocular muscles intact. Conjunctivae/corneas clear. Neck: Supple, with full range of motion. No jugular venous distention. Trachea midline. Respiratory:  Normal respiratory effort. Diminished breath sound on the left posterior. Cardiovascular:  Regular rate and rhythm with normal S1/S2 without murmurs, rubs or gallops. Abdomen: Soft, non-tender, non-distended with normal bowel sounds. Musculoskeletal:  No clubbing, cyanosis or edema bilaterally. Full range of motion without deformity. Skin: Skin color, texture, turgor normal.  No rashes or lesions. Neurologic:  Neurovascularly intact without any focal sensory/motor deficits. Cranial nerves: II-XII intact, grossly non-focal.  Psychiatric:  Alert and oriented, thought content appropriate, normal insight  Capillary Refill: Brisk,< 3 seconds   Peripheral Pulses: +2 palpable, equal bilaterally       Labs:     Recent Labs     07/10/20  1049   WBC 10.5   HGB 12.6   HCT 37.3        Recent Labs     07/10/20  1049   *   K 3.8   CL 96*   CO2 25   BUN 11   CREATININE 0.6   CALCIUM 9.8     Recent Labs     07/10/20  1049   AST 23   ALT 19   BILITOT 0.7   ALKPHOS 184*     No results for input(s): INR in the last 72 hours. Recent Labs     07/10/20  1049   TROPONINI <0.01       Urinalysis:      Lab Results   Component Value Date    NITRU Negative 06/01/2020    WBCUA 10-20 06/01/2020    BACTERIA 3+ 06/01/2020    RBCUA 0-2 06/01/2020    BLOODU SMALL 06/01/2020    SPECGRAV <=1.005 06/01/2020    GLUCOSEU Negative 06/01/2020       Radiology:     CXR: I have reviewed the CXR with the following interpretation: Left pleural effusion  EKG:  I have reviewed the EKG with the following interpretation: Normal sinus rhythm, old inferior infarct.     CT CHEST PULMONARY EMBOLISM W CONTRAST Final Result   1. Negative for pulmonary embolus. 2. Moderate-sized left effusion with associated underlying left basilar   compressive atelectasis. 3. Advanced emphysema. XR CHEST PORTABLE   Final Result   Persistent large left pleural effusion. Left-sided airspace disease in part   represents associated compressive atelectasis             ASSESSMENT:    Active Hospital Problems    Diagnosis Date Noted    Complication of coronary artery bypass graft surgery [T82. 9XXA] 07/10/2020         PLAN:    Large pleural effusion as postoperative complication of coronary artery bypass graft surgery  Cardiothoracic surgery notified. Will probably need thoracentesis and possibly chest tube placement. Unclear at this time. I will ask pulmonology for determination of best course of action. Patient also noted to be febrile. Could be empyema, or could be a separate infection. I also ordered COVID-19 to complete work-up    Coronary artery disease  Post CABG. Continue home medications. No separate symptoms that could be attributed to coronary artery disease at this time. Troponin is negative. GERD  Continue proton pump inhibitors. Hypertension  Blood pressure is on the low side. Continue beta-blockers with hold parameters. Hyponatremia  Noted to be on Lasix, blood pressure is on the low side. I will hold Lasix for now and monitor basic metabolic panel    Discussed with the patient    Discussed with emergency room physician    DVT Prophylaxis: Lovenox  Diet: General diet  Code Status: Full code    PT/OT Eval Status: Will be requested when stable. Dispo -inpatient, unclear length of stay. Ld Lamb MD    Thank you Gerhardt Anton, MD for the opportunity to be involved in this patient's care. If you have any questions or concerns please feel free to contact me at [phone].

## 2020-07-10 NOTE — ED PROVIDER NOTES
All other systems reviewed and are negative. Except as noted above in the ROS, all other systems were reviewed and negative. PAST MEDICAL HISTORY:     Past Medical History:   Diagnosis Date    CAD (coronary artery disease)     Depression     GERD (gastroesophageal reflux disease)     On intra-aortic balloon pump assist 2020    Removed during O[facility] on 20         SURGICAL HISTORY:      Past Surgical History:   Procedure Laterality Date     SECTION      CORONARY ARTERY BYPASS GRAFT N/A 2020    CORONARY ARTERY BYPASS GRAFTING X1, INTERNAL MAMMARY ARTERY, OFF PUMP (LIMA TO LAD), 5 LEVEL BILATERAL INTERCOSTAL NERVE BLOCK, BALLOON PUMP REMOVAL performed by Ananda Reddy MD at [address]:       Previous Medications    ASPIRIN 81 MG EC TABLET    Take 1 tablet by mouth daily    ATORVASTATIN (LIPITOR) 80 MG TABLET    Take 1 tablet by mouth nightly    FAMOTIDINE (PEPCID) 40 MG TABLET    Take 1 tablet by mouth every evening    FUROSEMIDE (LASIX) 20 MG TABLET    Take 1 tablet by mouth 2 times daily    MAGNESIUM OXIDE (MAG-OX) 400 (241.3 MG) MG TABS TABLET    Take 1 tablet by mouth 2 times daily    METOPROLOL TARTRATE (LOPRESSOR) 25 MG TABLET    Take 1 tablet by mouth 2 times daily    POTASSIUM CHLORIDE (KLOR-CON M) 20 MEQ EXTENDED RELEASE TABLET    Take 1 tablet by mouth 2 times daily (with meals)    TICAGRELOR (BRILINTA) 90 MG TABS TABLET    Take 1 tablet by mouth 2 times daily         ALLERGIES:    Patient has no known allergies. FAMILY HISTORY:     History reviewed. No pertinent family history.        SOCIAL HISTORY:       Social History     Socioeconomic History    Marital status:      Spouse name: None    Number of children: None    Years of education: None    Highest education level: None   Occupational History    None   Social Needs    Financial resource strain: None    Food insecurity     Worry: None     Inability: and dry. No rash. NEUROLOGICAL: Alert and oriented x 3. GCS 15. CN II-XII grossly intact. Strength is 5/5 in all extremities and sensation is intact.    PSYCHIATRIC: Normal affect        DIAGNOSTICRESULTS:     LABS:    Results for orders placed or performed during the hospital encounter of 07/10/20   CBC Auto Differential   Result Value Ref Range    WBC 10.5 4.0 - 11.0 K/uL    RBC 4.51 4.00 - 5.20 M/uL    Hemoglobin 12.6 12.0 - 16.0 g/dL    Hematocrit 37.3 36.0 - 48.0 %    MCV 82.8 80.0 - 100.0 fL    MCH 27.9 26.0 - 34.0 pg    MCHC 33.7 31.0 - 36.0 g/dL    RDW 15.1 12.4 - 15.4 %    Platelets 326 372 - 015 K/uL    MPV 10.6 (H) 5.0 - 10.5 fL    Neutrophils % 76.6 %    Lymphocytes % 10.2 %    Monocytes % 10.2 %    Eosinophils % 1.8 %    Basophils % 1.2 %    Neutrophils Absolute 8.0 (H) 1.7 - 7.7 K/uL    Lymphocytes Absolute 1.1 1.0 - 5.1 K/uL    Monocytes Absolute 1.1 0.0 - 1.3 K/uL    Eosinophils Absolute 0.2 0.0 - 0.6 K/uL    Basophils Absolute 0.1 0.0 - 0.2 K/uL   Comprehensive Metabolic Panel   Result Value Ref Range    Sodium 134 (L) 136 - 145 mmol/L    Potassium 3.8 3.5 - 5.1 mmol/L    Chloride 96 (L) 99 - 110 mmol/L    CO2 25 21 - 32 mmol/L    Anion Gap 13 3 - 16    Glucose 105 (H) 70 - 99 mg/dL    BUN 11 7 - 20 mg/dL    CREATININE 0.6 0.6 - 1.1 mg/dL    GFR Non-African American >60 >60    GFR African American >60 >60    Calcium 9.8 8.3 - 10.6 mg/dL    Total Protein 7.6 6.4 - 8.2 g/dL    Alb 3.9 3.4 - 5.0 g/dL    Albumin/Globulin Ratio 1.1 1.1 - 2.2    Total Bilirubin 0.7 0.0 - 1.0 mg/dL    Alkaline Phosphatase 184 (H) 40 - 129 U/L    ALT 19 10 - 40 U/L    AST 23 15 - 37 U/L    Globulin 3.7 g/dL   Brain Natriuretic Peptide   Result Value Ref Range    Pro-BNP 1,069 (H) 0 - 124 pg/mL   Troponin   Result Value Ref Range    Troponin <0.01 <0.01 ng/mL   Urinalysis   Result Value Ref Range    Color, UA Straw Straw/Yellow    Clarity, UA Clear Clear    Glucose, Ur Negative Negative mg/dL    Bilirubin Urine Negative Negative    Ketones, Urine Negative Negative mg/dL    Specific Gravity, UA 1.010 1.005 - 1.030    Blood, Urine Negative Negative    pH, UA 7.5 5.0 - 8.0    Protein, UA Negative Negative mg/dL    Urobilinogen, Urine 0.2 <2.0 E.U./dL    Nitrite, Urine Negative Negative    Leukocyte Esterase, Urine Negative Negative    Microscopic Examination Not Indicated     Urine Type NotGiven    Lactic Acid, Plasma   Result Value Ref Range    Lactic Acid 0.9 0.4 - 2.0 mmol/L   Procalcitonin   Result Value Ref Range    Procalcitonin 0.05 0.00 - 0.15 ng/mL   EKG 12 Lead   Result Value Ref Range    Ventricular Rate 84 BPM    Atrial Rate 84 BPM    P-R Interval 164 ms    QRS Duration 90 ms    Q-T Interval 348 ms    QTc Calculation (Bazett) 411 ms    P Axis 49 degrees    R Axis 14 degrees    T Axis -26 degrees    Diagnosis       Normal sinus rhythmPossible Left atrial enlargementPossible Inferior infarct (cited on or before 19-MAY-2020)Abnormal ECGWhen compared with ECG of 01-JUN-2020 07:54,T wave inversion less evident in Inferior leadsNonspecific T wave abnormality now evident in Lateral leads         RADIOLOGY:  All x-ray studies areviewed/reviewed by me. Formal interpretations per the radiologist are as follows:      Xr Chest Portable    Result Date: 7/10/2020  EXAMINATION: ONE XRAY VIEW OF THE CHEST 7/10/2020 10:49 am COMPARISON: 07/02/2020 HISTORY: ORDERING SYSTEM PROVIDED HISTORY: cough TECHNOLOGIST PROVIDED HISTORY: Reason for exam:->cough Reason for Exam: emesis, sob open herat sx 6/2 Acuity: Acute Type of Exam: Initial FINDINGS: Persistent large left pleural effusion not appreciably changed. Persistent airspace disease in the left mid and lower lung. Right lung clear. Mediastinum midline. Persistent large left pleural effusion.   Left-sided airspace disease in part represents associated compressive atelectasis       Ct Chest Pulmonary Embolism W Contrast    Result Date: 7/10/2020  EXAMINATION: CTA OF THE CHEST 7/10/2020 N/A    CRITICAL CARE TIME:     Due to the immediate potential for life-threatening deterioration due to pleural effusion with compressive atelectasis and mild hypotension, I spent 32 minutes providing critical care. This time is excluding time spent performing procedures. CONSULTS:  IP CONSULT TO HOSPITALIST  IP CONSULT TO CARDIOTHORACIC SURGERY      EMERGENCY DEPARTMENT COURSE and DIFFERENTIAL DIAGNOSIS/MDM:   Vitals:    Vitals:    07/10/20 1041 07/10/20 1103 07/10/20 1235 07/10/20 1353   BP: 90/68  84/64 87/62   Pulse: 88  87 95   Resp: 16  16 16   Temp: 97.9 °F (36.6 °C)      SpO2:  98% 99% 97%   Weight: 118 lb (53.5 kg)      Height: 5' 3\" (1.6 m)          Patient was given the following medications:  Medications   0.9 % sodium chloride bolus (500 mLs Intravenous New Bag 7/10/20 1125)   ondansetron (ZOFRAN) injection 4 mg (4 mg Intravenous Given 7/10/20 1125)   iopamidol (ISOVUE-370) 76 % injection 75 mL (75 mLs Intravenous Given 7/10/20 1147)         Patient was evaluated by both myself and Dianna Farmer MD.   Old records were reviewed. Patient arrives to the ED reporting ongoing shortness of breath. She is about 5 weeks status post CABG. She was supposed to have outpatient CT imaging of her chest today but did not feel well enough. She reports developing fevers, nausea and vomiting. Given her history broad cardiac/septic work-up was initiated. Her EKG shows sinus rhythm in the 80s without acute ischemia. CBC reveals white count of 10.5. H&H 12.6 and 37.3. CMP was unremarkable with just mild elevated alk phos at 184. Troponin less than 0.01  proBNP 1069  Lactate 0.9 and procalcitonin 0.05  Urinalysis unremarkable. Portable chest x-ray shows persistent large left pleural effusion. Left-sided airspace disease in part represents associated compressive atelectasis. CT chest is negative for PE.   There is moderate size left effusion with associated underlying left basilar compressive atelectasis. Advanced emphysema. Patient given a 500 mL bolus of normal saline IV in the ED with 4 mg of Zofran IV. Her blood pressures been primarily in the upper 00J/75A systolic. Given the recurrence of her pleural effusion despite thoracentesis about 1 week ago along with her being symptomatic and mildly hypotensive, she warrants hospitalization. I will consult Dr. Tino Jane as well as the hospitalist and request admission. I spoke with Dr. Tino Jane and Dr. Yulisa Arechiga. We thoroughly discussed the history, physical exam, laboratory and imaging studies, as well as, emergency department course. Based upon that discussion, we've decided to admit Betty Camacho for further observation and evaluation of Dasha Coleman's shortness of breath with large left pleural effusion as well as reported fevers, nausea and vomiting. As I have deemed necessary from their history, physical, and studies, I have considered and evaluated Betty Camacho for the following diagnoses:  ACUTE CORONARY SYNDROME, PERICARDIAL TAMPONADE, CHF, SEPSIS, COPD EXACERBATION, PNEUMONIA, PNEUMOTHORAX, PULMONARY EMBOLISM, and THORACIC DISSECTION. FINAL IMPRESSION:      1. Recurrent left pleural effusion    2. S/P CABG (coronary artery bypass graft)    3. Shortness of breath    4. Fever, unspecified fever cause    5. Non-intractable vomiting with nausea, unspecified vomiting type    6.  Hypotension, unspecified hypotension type          DISPOSITION/PLAN:   DISPOSITION Decision To Admit                 (Please note thatportions of this note were completed with a voice recognition program.  Efforts were made to edit the dictations, but occasionally words are mis-transcribed.)    Mike Fonseca PA-C (electronicallysigned)              [place], [place]  07/10/20 1761

## 2020-07-10 NOTE — CONSULTS
**OR** acetaminophen, polyethylene glycol, prochlorperazine   Inpatient consult to Pulmonology  Consult performed by: Josselyn Cason MD  Consult ordered by: Carolina Clinton MD        ALLERGIES:  Patient has No Known Allergies. REVIEW OF SYSTEMS:  Review of Systems   Constitutional: Negative for chills, fever and unexpected weight change. HENT: Negative for mouth sores, sore throat and voice change. Eyes: Negative for pain, discharge and itching. Respiratory: Positive for shortness of breath. Negative for choking, chest tightness and stridor. Cardiovascular: Negative for chest pain, palpitations and leg swelling. Gastrointestinal: Negative for abdominal pain, constipation and diarrhea. Endocrine: Negative for cold intolerance, heat intolerance and polydipsia. Genitourinary: Negative for dysuria, frequency and hematuria. Musculoskeletal: Negative for gait problem, joint swelling and neck stiffness. Neurological: Negative for dizziness, numbness and headaches. Psychiatric/Behavioral: Negative for agitation, confusion and hallucinations. Objective:   PHYSICAL EXAM:  BP 86/61   Pulse 97   Temp 97.8 °F (36.6 °C) (Oral)   Resp 16   Ht 5' 3\" (1.6 m)   Wt 118 lb (53.5 kg)   LMP 11/01/2017 (Approximate)   SpO2 94%   BMI 20.90 kg/m²    Physical Exam  Constitutional:       General: She is not in acute distress. Appearance: She is well-developed. She is not diaphoretic. HENT:      Head: Normocephalic and atraumatic. Mouth/Throat:      Pharynx: No oropharyngeal exudate. Eyes:      Pupils: Pupils are equal, round, and reactive to light. Neck:      Musculoskeletal: Neck supple. Vascular: No JVD. Cardiovascular:      Heart sounds: Normal heart sounds. No murmur. No friction rub. No gallop. Pulmonary:      Effort: Pulmonary effort is normal.      Breath sounds: No wheezing or rales. Abdominal:      General: Bowel sounds are normal. There is no distension. Palpations: Abdomen is soft. Tenderness: There is no abdominal tenderness. Musculoskeletal: Normal range of motion. Lymphadenopathy:      Cervical: No cervical adenopathy. Skin:     General: Skin is warm and dry. Findings: No rash. Neurological:      Mental Status: She is alert. Cranial Nerves: No cranial nerve deficit. Comments: CN 2-12 grossly intact            Data Reviewed:   LABS:  CBC:   Recent Labs     07/10/20  1049   WBC 10.5   HGB 12.6   HCT 37.3   MCV 82.8        BMP:   Recent Labs     07/10/20  1049   *   K 3.8   CL 96*   CO2 25   BUN 11   CREATININE 0.6     LIVER PROFILE:   Recent Labs     07/10/20  1049   AST 23   ALT 19   BILITOT 0.7   ALKPHOS 184*     PT/INR: No results for input(s): PROTIME, INR in the last 72 hours. APTT:No results for input(s): APTT in the last 72 hours. UA:  Recent Labs     07/10/20  1200   COLORU Straw   PHUR 7.5   CLARITYU Clear   SPECGRAV 1.010   LEUKOCYTESUR Negative   UROBILINOGEN 0.2   BILIRUBINUR Negative   BLOODU Negative   GLUCOSEU Negative     No results for input(s): PHART, WBO5QSJ, PO2ART in the last 72 hours. Vent Information  SpO2: 94 %    CT chest personally reviewed, emphysema and a left pleural effusion     PFTs reviewed, FEV1 74% without meeting criteria for obstruction       Assessment:     1. Recurrent left pleural effusion    -postop related from recent CABG  2. Shortness of breath, chest pain from above  3. Centrilobular emphysema without acute exac  4.  CAD with associated CHF    Plan:      -repeat thoracentesis through IR, further workup/management of postop pleural effusion per cardiac surgery   -Has underlying COPD, will start on bronchodilators but this is not the source of her shortness of breath/chest discomfort  -Monitor saturations, no need for supplemental oxygen at present  -Will follow    Nidhi Hayes MD

## 2020-07-10 NOTE — ED NOTES
Ps hosp @ 1213 per HUSSEIN Soto  Re- SOB, N/V, fever, left pleural effusion, 5 weeks s/p CABG   called back and spoke with HUSSEIN Reza at [employer]  07/10/20 8105

## 2020-07-10 NOTE — ED PROVIDER NOTES
Patient seen and evaluated by PA. EKG: Normal sinus rhythm with a rate of 84. Normal axis. Normal intervals and durations. No ST or T wave changes appreciated.   Nonspecific inferior T wave changes have improved from previous on 6/1/2020      Luz Silver DO  07/10/20 1408

## 2020-07-11 ENCOUNTER — APPOINTMENT (OUTPATIENT)
Dept: ULTRASOUND IMAGING | Age: 55
DRG: 206 | End: 2020-07-11
Payer: COMMERCIAL

## 2020-07-11 ENCOUNTER — APPOINTMENT (OUTPATIENT)
Dept: GENERAL RADIOLOGY | Age: 55
DRG: 206 | End: 2020-07-11
Payer: COMMERCIAL

## 2020-07-11 LAB
A/G RATIO: 1.2 (ref 1.1–2.2)
ALBUMIN SERPL-MCNC: 3.3 G/DL (ref 3.4–5)
ALP BLD-CCNC: 146 U/L (ref 40–129)
ALT SERPL-CCNC: 14 U/L (ref 10–40)
ANION GAP SERPL CALCULATED.3IONS-SCNC: 12 MMOL/L (ref 3–16)
APTT: 31.7 SEC (ref 24.2–36.2)
AST SERPL-CCNC: 18 U/L (ref 15–37)
BASOPHILS ABSOLUTE: 0.1 K/UL (ref 0–0.2)
BASOPHILS RELATIVE PERCENT: 1.1 %
BILIRUB SERPL-MCNC: 0.5 MG/DL (ref 0–1)
BUN BLDV-MCNC: 8 MG/DL (ref 7–20)
CALCIUM SERPL-MCNC: 9.2 MG/DL (ref 8.3–10.6)
CHLORIDE BLD-SCNC: 105 MMOL/L (ref 99–110)
CO2: 23 MMOL/L (ref 21–32)
CREAT SERPL-MCNC: <0.5 MG/DL (ref 0.6–1.1)
D DIMER: 1663 NG/ML DDU (ref 0–229)
EOSINOPHILS ABSOLUTE: 0.5 K/UL (ref 0–0.6)
EOSINOPHILS RELATIVE PERCENT: 5.2 %
FIBRINOGEN: 563 MG/DL (ref 200–397)
FLUID TYPE: NORMAL
GFR AFRICAN AMERICAN: >60
GFR NON-AFRICAN AMERICAN: >60
GLOBULIN: 2.7 G/DL
GLUCOSE BLD-MCNC: 98 MG/DL (ref 70–99)
GLUCOSE, FLUID: 69 MG/DL
HCT VFR BLD CALC: 33.2 % (ref 36–48)
HEMOGLOBIN: 11 G/DL (ref 12–16)
INR BLD: 1.37 (ref 0.86–1.14)
L. PNEUMOPHILA SEROGP 1 UR AG: NORMAL
LD, FLUID: 350 U/L
LYMPHOCYTES ABSOLUTE: 1.6 K/UL (ref 1–5.1)
LYMPHOCYTES RELATIVE PERCENT: 17.8 %
MCH RBC QN AUTO: 27.2 PG (ref 26–34)
MCHC RBC AUTO-ENTMCNC: 33.2 G/DL (ref 31–36)
MCV RBC AUTO: 81.8 FL (ref 80–100)
MONOCYTES ABSOLUTE: 1.3 K/UL (ref 0–1.3)
MONOCYTES RELATIVE PERCENT: 14.9 %
NEUTROPHILS ABSOLUTE: 5.5 K/UL (ref 1.7–7.7)
NEUTROPHILS RELATIVE PERCENT: 61 %
PDW BLD-RTO: 15.5 % (ref 12.4–15.4)
PH, BODY FLUID: 7.8
PLATELET # BLD: 241 K/UL (ref 135–450)
PMV BLD AUTO: 10.4 FL (ref 5–10.5)
POTASSIUM REFLEX MAGNESIUM: 3.9 MMOL/L (ref 3.5–5.1)
PROCALCITONIN: 0.06 NG/ML (ref 0–0.15)
PROTEIN FLUID: 4.7 G/DL
PROTHROMBIN TIME: 16 SEC (ref 10–13.2)
RBC # BLD: 4.06 M/UL (ref 4–5.2)
SODIUM BLD-SCNC: 140 MMOL/L (ref 136–145)
STREP PNEUMONIAE ANTIGEN, URINE: NORMAL
TOTAL PROTEIN: 6 G/DL (ref 6.4–8.2)
WBC # BLD: 8.9 K/UL (ref 4–11)

## 2020-07-11 PROCEDURE — 87116 MYCOBACTERIA CULTURE: CPT

## 2020-07-11 PROCEDURE — 6360000002 HC RX W HCPCS: Performed by: INTERNAL MEDICINE

## 2020-07-11 PROCEDURE — 85610 PROTHROMBIN TIME: CPT

## 2020-07-11 PROCEDURE — 85379 FIBRIN DEGRADATION QUANT: CPT

## 2020-07-11 PROCEDURE — 85384 FIBRINOGEN ACTIVITY: CPT

## 2020-07-11 PROCEDURE — 84145 PROCALCITONIN (PCT): CPT

## 2020-07-11 PROCEDURE — 0W9B3ZZ DRAINAGE OF LEFT PLEURAL CAVITY, PERCUTANEOUS APPROACH: ICD-10-PCS | Performed by: RADIOLOGY

## 2020-07-11 PROCEDURE — 2709999900 US THORACENTESIS

## 2020-07-11 PROCEDURE — 88112 CYTOPATH CELL ENHANCE TECH: CPT

## 2020-07-11 PROCEDURE — 2580000003 HC RX 258: Performed by: INTERNAL MEDICINE

## 2020-07-11 PROCEDURE — 80053 COMPREHEN METABOLIC PANEL: CPT

## 2020-07-11 PROCEDURE — 2060000000 HC ICU INTERMEDIATE R&B

## 2020-07-11 PROCEDURE — 87015 SPECIMEN INFECT AGNT CONCNTJ: CPT

## 2020-07-11 PROCEDURE — 87070 CULTURE OTHR SPECIMN AEROBIC: CPT

## 2020-07-11 PROCEDURE — 6370000000 HC RX 637 (ALT 250 FOR IP): Performed by: INTERNAL MEDICINE

## 2020-07-11 PROCEDURE — 87205 SMEAR GRAM STAIN: CPT

## 2020-07-11 PROCEDURE — 87206 SMEAR FLUORESCENT/ACID STAI: CPT

## 2020-07-11 PROCEDURE — 82945 GLUCOSE OTHER FLUID: CPT

## 2020-07-11 PROCEDURE — 99232 SBSQ HOSP IP/OBS MODERATE 35: CPT | Performed by: INTERNAL MEDICINE

## 2020-07-11 PROCEDURE — 85730 THROMBOPLASTIN TIME PARTIAL: CPT

## 2020-07-11 PROCEDURE — 6370000000 HC RX 637 (ALT 250 FOR IP): Performed by: NURSE PRACTITIONER

## 2020-07-11 PROCEDURE — 83615 LACTATE (LD) (LDH) ENZYME: CPT

## 2020-07-11 PROCEDURE — 94640 AIRWAY INHALATION TREATMENT: CPT

## 2020-07-11 PROCEDURE — 84157 ASSAY OF PROTEIN OTHER: CPT

## 2020-07-11 PROCEDURE — 88305 TISSUE EXAM BY PATHOLOGIST: CPT

## 2020-07-11 PROCEDURE — 71045 X-RAY EXAM CHEST 1 VIEW: CPT

## 2020-07-11 PROCEDURE — 87102 FUNGUS ISOLATION CULTURE: CPT

## 2020-07-11 PROCEDURE — 36415 COLL VENOUS BLD VENIPUNCTURE: CPT

## 2020-07-11 PROCEDURE — 85025 COMPLETE CBC W/AUTO DIFF WBC: CPT

## 2020-07-11 PROCEDURE — 83986 ASSAY PH BODY FLUID NOS: CPT

## 2020-07-11 RX ORDER — HYDROCODONE BITARTRATE AND ACETAMINOPHEN 7.5; 325 MG/1; MG/1
1 TABLET ORAL EVERY 6 HOURS PRN
Status: COMPLETED | OUTPATIENT
Start: 2020-07-11 | End: 2020-07-13

## 2020-07-11 RX ADMIN — TICAGRELOR 90 MG: 90 TABLET ORAL at 19:52

## 2020-07-11 RX ADMIN — Medication 2 PUFF: at 18:36

## 2020-07-11 RX ADMIN — Medication 2 PUFF: at 15:18

## 2020-07-11 RX ADMIN — Medication 10 ML: at 08:35

## 2020-07-11 RX ADMIN — HYDROCODONE BITARTRATE AND ACETAMINOPHEN 1 TABLET: 7.5; 325 TABLET ORAL at 20:59

## 2020-07-11 RX ADMIN — Medication 2 PUFF: at 15:23

## 2020-07-11 RX ADMIN — ASPIRIN 81 MG: 81 TABLET ORAL at 08:34

## 2020-07-11 RX ADMIN — POTASSIUM CHLORIDE 20 MEQ: 20 TABLET, EXTENDED RELEASE ORAL at 08:34

## 2020-07-11 RX ADMIN — ATORVASTATIN CALCIUM 80 MG: 80 TABLET, FILM COATED ORAL at 19:52

## 2020-07-11 RX ADMIN — ENOXAPARIN SODIUM 30 MG: 30 INJECTION SUBCUTANEOUS at 19:53

## 2020-07-11 RX ADMIN — Medication 2 PUFF: at 08:20

## 2020-07-11 RX ADMIN — ACETAMINOPHEN 650 MG: 325 TABLET ORAL at 16:04

## 2020-07-11 RX ADMIN — MAGNESIUM GLUCONATE 500 MG ORAL TABLET 400 MG: 500 TABLET ORAL at 19:52

## 2020-07-11 RX ADMIN — Medication 2 PUFF: at 18:35

## 2020-07-11 RX ADMIN — Medication 10 ML: at 19:53

## 2020-07-11 RX ADMIN — FAMOTIDINE 40 MG: 20 TABLET, FILM COATED ORAL at 18:26

## 2020-07-11 RX ADMIN — METOPROLOL TARTRATE 25 MG: 25 TABLET, FILM COATED ORAL at 19:52

## 2020-07-11 RX ADMIN — Medication 2 PUFF: at 08:21

## 2020-07-11 RX ADMIN — METOPROLOL TARTRATE 25 MG: 25 TABLET, FILM COATED ORAL at 08:34

## 2020-07-11 RX ADMIN — POTASSIUM CHLORIDE 20 MEQ: 20 TABLET, EXTENDED RELEASE ORAL at 16:04

## 2020-07-11 RX ADMIN — MAGNESIUM GLUCONATE 500 MG ORAL TABLET 400 MG: 500 TABLET ORAL at 08:34

## 2020-07-11 RX ADMIN — TICAGRELOR 90 MG: 90 TABLET ORAL at 10:55

## 2020-07-11 ASSESSMENT — PAIN DESCRIPTION - LOCATION: LOCATION: BACK

## 2020-07-11 ASSESSMENT — PAIN SCALES - GENERAL
PAINLEVEL_OUTOF10: 7
PAINLEVEL_OUTOF10: 0
PAINLEVEL_OUTOF10: 8
PAINLEVEL_OUTOF10: 8
PAINLEVEL_OUTOF10: 6
PAINLEVEL_OUTOF10: 0

## 2020-07-11 ASSESSMENT — PAIN DESCRIPTION - ORIENTATION: ORIENTATION: POSTERIOR

## 2020-07-11 ASSESSMENT — PAIN DESCRIPTION - PAIN TYPE: TYPE: ACUTE PAIN

## 2020-07-11 ASSESSMENT — PAIN - FUNCTIONAL ASSESSMENT: PAIN_FUNCTIONAL_ASSESSMENT: PREVENTS OR INTERFERES WITH MANY ACTIVE NOT PASSIVE ACTIVITIES

## 2020-07-11 NOTE — BRIEF OP NOTE
Brief Postoperative Note      Patient: Sharla Osborne  YOB: 1965  MRN: [id number]    Date of Procedure:     Pre-Op Diagnosis: Left pleural effusion    Post-Op Diagnosis: Same       US Guided Left Thoracentesis    Mikael Buenrostro DO     Anesthesia: Local anesthesia    Estimated Blood Loss (mL): Minimal    Complications: None    Findings:     US Guided Left Thoracentesis  ~1 L of punch colored pleural fluid obtained, sample sent for analysis     Electronically signed by Mikael Buenrostro MD on 7/11/2020 at 12:18 PM

## 2020-07-11 NOTE — ED PROVIDER NOTES
Emergency Department Provider Note     Location: [facility] C4 PCU  7/10/2020    I independently performed a history and physical on [facility]. All diagnostic, treatment, and disposition decisions were made by myself in conjunction with the mid-level provider. Briefly, this is a 48 y.o. female here for SOB, nausea, vomiting, cough. Patient is s/p CABG on 6/2/20 and developed pleural effusion and had paracentesis on July 2, 2020. Patient said her symptom recurred and she started having shortness of breath again. She also reported a fever of 101.7 yesterday. She has nausea and vomiting associated with this. ED Triage Vitals   BP Temp Temp Source Pulse Resp SpO2 Height Weight   07/10/20 1041 07/10/20 1041 07/10/20 1453 07/10/20 1041 07/10/20 1041 07/10/20 1103 07/10/20 1041 07/10/20 1041   90/68 97.9 °F (36.6 °C) Oral 88 16 98 % 5' 3\" (1.6 m) 118 lb (53.5 kg)        Exam showed WDWN female awake alert, heart RRR, lungs very diminished on the left. No focal neurological deficit. Patient seen and examined in room 20. EKG  The Ekg interpreted by me in the absence of a cardiologist shows. normal sinus rhythm with a rate of 84  Axis is   Normal  QTc is  normal  Intervals and Durations are unremarkable. T waves inverted laterally, new compared to prior EKG dated June 1, 2020  No evidence of STEMI     Radiology  Xr Chest Portable    Result Date: 7/10/2020  EXAMINATION: ONE XRAY VIEW OF THE CHEST 7/10/2020 10:49 am COMPARISON: 07/02/2020 HISTORY: ORDERING SYSTEM PROVIDED HISTORY: cough TECHNOLOGIST PROVIDED HISTORY: Reason for exam:->cough Reason for Exam: emesis, sob open herat sx 6/2 Acuity: Acute Type of Exam: Initial FINDINGS: Persistent large left pleural effusion not appreciably changed. Persistent airspace disease in the left mid and lower lung. Right lung clear. Mediastinum midline. Persistent large left pleural effusion.   Left-sided airspace disease in part represents associated compressive atelectasis     Ct Chest Pulmonary Embolism W Contrast    Result Date: 7/10/2020  EXAMINATION: CTA OF THE CHEST 7/10/2020 11:42 am TECHNIQUE: CTA of the chest was performed after the administration of intravenous contrast.  Multiplanar reformatted images are provided for review. MIP images are provided for review. Dose modulation, iterative reconstruction, and/or weight based adjustment of the mA/kV was utilized to reduce the radiation dose to as low as reasonably achievable. COMPARISON: None. HISTORY: ORDERING SYSTEM PROVIDED HISTORY: SOB, post op CABG on 6/2/2020, abnormal chest xray TECHNOLOGIST PROVIDED HISTORY: Reason for exam:->SOB, post op CABG on 6/2/2020, abnormal chest xray Reason for Exam: CABG last month, had an outpatient appt for a CT chest w/o, felt sick and vomitting, called doctor and he/she told her to come to ER Acuity: Unknown Type of Exam: Subsequent/Follow-up Relevant Medical/Surgical History: hx of Coronary artery bypass graft FINDINGS: Pulmonary Arteries: Pulmonary arteries are adequately opacified for evaluation. No evidence of intraluminal filling defect to suggest pulmonary embolism. Main pulmonary artery is normal in caliber. Mediastinum: No evidence of mediastinal lymphadenopathy. The heart and pericardium demonstrate no acute abnormality. There is no acute abnormality of the thoracic aorta. Lungs/pleura: There is a moderate-sized left pleural effusion with associated underlying left basilar and lingular compressive atelectasis. Moderate emphysematous changes are present throughout the aerated portions of both lungs. Upper Abdomen: Limited images of the upper abdomen are unremarkable. Soft Tissues/Bones: No acute bone or soft tissue abnormality. 1. Negative for pulmonary embolus. 2. Moderate-sized left effusion with associated underlying left basilar compressive atelectasis. 3. Advanced emphysema. Lab reviewed.   Pertinent for BNP 1069, trop < 0.01, lactic acid 0.9.  CMP and CBC unremarkable. Patient's pleural effusion reaccumulated again. She is also getting hypotensive in the ED and needed fluid resuscitation. Needs admission for thoracentesis and further management. For further details of [facility] emergency department encounter, please see HUSSEIN Russo's documentation. Total critical care time is 15 minutes, which excludes separately billable procedures and updating family. Time spent is specifically for management of the presenting complaint and symptoms initially, direct bedside care, reevaluation, review of records, and consultation. There was a high probability of clinically significant life-threatening deterioration in the patient's condition, which required my urgent intervention. This chart was generated in part by using Dragon Dictation system and may contain errors related to that system including errors in grammar, punctuation, and spelling, as well as words and phrases that may be inappropriate. If there are any questions or concerns please feel free to contact the dictating provider for clarification.           Shakeel Yanez MD  07/10/20 9626

## 2020-07-11 NOTE — PROGRESS NOTES
Patient is well-known to the service, recurrent pleural effusion left side, after coronary artery bypass  As discussed with the emergency room prior to admission  Plan  #1 IR for CT-guided chest tube drainage if did not resolve TPA dornase x3

## 2020-07-11 NOTE — PROGRESS NOTES
Hospitalist Progress Note      PCP: Matthew Valenzuela MD    Date of Admission: 7/10/2020    Chief Complaint: shortness of breath, chest pain, cough    Hospital Course: admitted with recurrent postop left pleural effusion after CABG. Subjective: no chest pain, mild shortness of breath, no nausea or vomiting        Medications:  Reviewed    Infusion Medications   Scheduled Medications    aspirin  81 mg Oral Daily    atorvastatin  80 mg Oral Nightly    famotidine  40 mg Oral QPM    magnesium oxide  400 mg Oral BID    metoprolol tartrate  25 mg Oral BID    potassium chloride  20 mEq Oral BID WC    ticagrelor  90 mg Oral BID    sodium chloride flush  10 mL Intravenous 2 times per day    enoxaparin  30 mg Subcutaneous BID    albuterol sulfate HFA  2 puff Inhalation 4x daily    And    ipratropium  2 puff Inhalation 4x daily     PRN Meds: sodium chloride flush, acetaminophen **OR** acetaminophen, polyethylene glycol, prochlorperazine      Intake/Output Summary (Last 24 hours) at 7/11/2020 0916  Last data filed at 7/11/2020 0838  Gross per 24 hour   Intake 504 ml   Output 1250 ml   Net -746 ml       Physical Exam Performed:    /68   Pulse 90   Temp 98 °F (36.7 °C) (Oral)   Resp 14   Ht 5' 3\" (1.6 m)   Wt 119 lb 11.2 oz (54.3 kg)   LMP 11/01/2017 (Approximate)   SpO2 93%   BMI 21.20 kg/m²     General appearance: No apparent distress, appears stated age and cooperative. HEENT: Pupils equal, round, and reactive to light. Conjunctivae/corneas clear. Neck: Supple, with full range of motion. No jugular venous distention. Trachea midline. Respiratory:  Decreased breath sounds left   Cardiovascular: Regular rate and rhythm with normal S1/S2 without murmurs, rubs or gallops. Abdomen: Soft, non-tender, non-distended with normal bowel sounds. Musculoskeletal: No clubbing, cyanosis or edema bilaterally. Full range of motion without deformity.   Skin: Skin color, texture, turgor normal.  No rashes or lesions. Neurologic:  Neurovascularly intact without any focal sensory/motor deficits. Cranial nerves: II-XII intact, grossly non-focal.  Psychiatric: Alert and oriented, thought content appropriate, normal insight  Capillary Refill: Brisk,< 3 seconds   Peripheral Pulses: +2 palpable, equal bilaterally       Labs:   Recent Labs     07/10/20  1049 07/11/20  0455   WBC 10.5 8.9   HGB 12.6 11.0*   HCT 37.3 33.2*    241     Recent Labs     07/10/20  1049 07/11/20  0455   * 140   K 3.8 3.9   CL 96* 105   CO2 25 23   BUN 11 8   CREATININE 0.6 <0.5*   CALCIUM 9.8 9.2     Recent Labs     07/10/20  1049 07/11/20  0455   AST 23 18   ALT 19 14   BILITOT 0.7 0.5   ALKPHOS 184* 146*     Recent Labs     07/11/20  0455   INR 1.37*     Recent Labs     07/10/20  1049   TROPONINI <0.01       Urinalysis:      Lab Results   Component Value Date    NITRU Negative 07/10/2020    WBCUA 10-20 06/01/2020    BACTERIA 3+ 06/01/2020    RBCUA 0-2 06/01/2020    BLOODU Negative 07/10/2020    SPECGRAV 1.010 07/10/2020    GLUCOSEU Negative 07/10/2020       Radiology:  CT CHEST PULMONARY EMBOLISM W CONTRAST   Final Result   1. Negative for pulmonary embolus. 2. Moderate-sized left effusion with associated underlying left basilar   compressive atelectasis. 3. Advanced emphysema. XR CHEST PORTABLE   Final Result   Persistent large left pleural effusion. Left-sided airspace disease in part   represents associated compressive atelectasis         US THORACENTESIS    (Results Pending)           Assessment/Plan:    Active Hospital Problems    Diagnosis    Complication of coronary artery bypass graft surgery [T82. 9XXA]     PLAN:    Large pleural effusion as postoperative complication of coronary artery bypass graft surgery  Cardiothoracic surgery notified. Pulmonology consulted. Thoracentesis under US guidance ordered  Patient is clinically stable     Coronary artery disease  Post CABG. Continue home medications.   No separate symptoms that could be attributed to coronary artery disease at this time. Troponin is negative.     GERD  Continue proton pump inhibitors.     Hypertension  Blood pressure is controlled, but on the low side. Continue beta-blockers with hold parameters.     Hyponatremia  Resolved off Lasix.  Continue to monitor    D/w patient  D/w nursing    DVT Prophylaxis: Lovenox  Diet: DIET GENERAL;  Code Status: Full Code    PT/OT Eval Status: requested    Dispo - inpatient pending thoracentesis    Armando James MD

## 2020-07-12 LAB
A/G RATIO: 1 (ref 1.1–2.2)
ALBUMIN SERPL-MCNC: 3.1 G/DL (ref 3.4–5)
ALP BLD-CCNC: 149 U/L (ref 40–129)
ALT SERPL-CCNC: 14 U/L (ref 10–40)
ANION GAP SERPL CALCULATED.3IONS-SCNC: 10 MMOL/L (ref 3–16)
APTT: 32.7 SEC (ref 24.2–36.2)
AST SERPL-CCNC: 18 U/L (ref 15–37)
BASOPHILS ABSOLUTE: 0.1 K/UL (ref 0–0.2)
BASOPHILS RELATIVE PERCENT: 1.2 %
BILIRUB SERPL-MCNC: 0.4 MG/DL (ref 0–1)
BUN BLDV-MCNC: 8 MG/DL (ref 7–20)
CALCIUM SERPL-MCNC: 9.2 MG/DL (ref 8.3–10.6)
CHLORIDE BLD-SCNC: 106 MMOL/L (ref 99–110)
CO2: 25 MMOL/L (ref 21–32)
CREAT SERPL-MCNC: 0.6 MG/DL (ref 0.6–1.1)
EOSINOPHILS ABSOLUTE: 0.6 K/UL (ref 0–0.6)
EOSINOPHILS RELATIVE PERCENT: 8.9 %
FIBRINOGEN: 546 MG/DL (ref 200–397)
GFR AFRICAN AMERICAN: >60
GFR NON-AFRICAN AMERICAN: >60
GLOBULIN: 3.2 G/DL
GLUCOSE BLD-MCNC: 103 MG/DL (ref 70–99)
HCT VFR BLD CALC: 35.1 % (ref 36–48)
HEMOGLOBIN: 11.5 G/DL (ref 12–16)
INR BLD: 1.21 (ref 0.86–1.14)
LYMPHOCYTES ABSOLUTE: 1.6 K/UL (ref 1–5.1)
LYMPHOCYTES RELATIVE PERCENT: 22.6 %
MCH RBC QN AUTO: 27.2 PG (ref 26–34)
MCHC RBC AUTO-ENTMCNC: 32.8 G/DL (ref 31–36)
MCV RBC AUTO: 83 FL (ref 80–100)
MONOCYTES ABSOLUTE: 1.1 K/UL (ref 0–1.3)
MONOCYTES RELATIVE PERCENT: 15.2 %
NEUTROPHILS ABSOLUTE: 3.6 K/UL (ref 1.7–7.7)
NEUTROPHILS RELATIVE PERCENT: 52.1 %
PDW BLD-RTO: 15.2 % (ref 12.4–15.4)
PLATELET # BLD: 222 K/UL (ref 135–450)
PMV BLD AUTO: 10.5 FL (ref 5–10.5)
POTASSIUM REFLEX MAGNESIUM: 5.1 MMOL/L (ref 3.5–5.1)
PROTHROMBIN TIME: 14.1 SEC (ref 10–13.2)
RBC # BLD: 4.22 M/UL (ref 4–5.2)
SODIUM BLD-SCNC: 141 MMOL/L (ref 136–145)
TOTAL PROTEIN: 6.3 G/DL (ref 6.4–8.2)
WBC # BLD: 6.9 K/UL (ref 4–11)

## 2020-07-12 PROCEDURE — 94640 AIRWAY INHALATION TREATMENT: CPT

## 2020-07-12 PROCEDURE — 85610 PROTHROMBIN TIME: CPT

## 2020-07-12 PROCEDURE — 85730 THROMBOPLASTIN TIME PARTIAL: CPT

## 2020-07-12 PROCEDURE — 85025 COMPLETE CBC W/AUTO DIFF WBC: CPT

## 2020-07-12 PROCEDURE — 85384 FIBRINOGEN ACTIVITY: CPT

## 2020-07-12 PROCEDURE — 6370000000 HC RX 637 (ALT 250 FOR IP): Performed by: INTERNAL MEDICINE

## 2020-07-12 PROCEDURE — 2060000000 HC ICU INTERMEDIATE R&B

## 2020-07-12 PROCEDURE — 80053 COMPREHEN METABOLIC PANEL: CPT

## 2020-07-12 PROCEDURE — 6360000002 HC RX W HCPCS: Performed by: INTERNAL MEDICINE

## 2020-07-12 PROCEDURE — 36415 COLL VENOUS BLD VENIPUNCTURE: CPT

## 2020-07-12 PROCEDURE — 2580000003 HC RX 258: Performed by: INTERNAL MEDICINE

## 2020-07-12 PROCEDURE — 6370000000 HC RX 637 (ALT 250 FOR IP): Performed by: NURSE PRACTITIONER

## 2020-07-12 RX ADMIN — Medication 2 PUFF: at 12:31

## 2020-07-12 RX ADMIN — Medication 2 PUFF: at 19:26

## 2020-07-12 RX ADMIN — TICAGRELOR 90 MG: 90 TABLET ORAL at 09:18

## 2020-07-12 RX ADMIN — Medication 2 PUFF: at 15:47

## 2020-07-12 RX ADMIN — ATORVASTATIN CALCIUM 80 MG: 80 TABLET, FILM COATED ORAL at 21:59

## 2020-07-12 RX ADMIN — MAGNESIUM GLUCONATE 500 MG ORAL TABLET 400 MG: 500 TABLET ORAL at 09:18

## 2020-07-12 RX ADMIN — Medication 2 PUFF: at 08:53

## 2020-07-12 RX ADMIN — ASPIRIN 81 MG: 81 TABLET ORAL at 09:18

## 2020-07-12 RX ADMIN — MAGNESIUM GLUCONATE 500 MG ORAL TABLET 400 MG: 500 TABLET ORAL at 21:59

## 2020-07-12 RX ADMIN — Medication 2 PUFF: at 15:46

## 2020-07-12 RX ADMIN — Medication 2 PUFF: at 08:16

## 2020-07-12 RX ADMIN — METOPROLOL TARTRATE 25 MG: 25 TABLET, FILM COATED ORAL at 13:13

## 2020-07-12 RX ADMIN — Medication 10 ML: at 09:18

## 2020-07-12 RX ADMIN — ENOXAPARIN SODIUM 30 MG: 30 INJECTION SUBCUTANEOUS at 21:59

## 2020-07-12 RX ADMIN — Medication 10 ML: at 22:00

## 2020-07-12 RX ADMIN — FAMOTIDINE 40 MG: 20 TABLET, FILM COATED ORAL at 17:41

## 2020-07-12 RX ADMIN — TICAGRELOR 90 MG: 90 TABLET ORAL at 21:59

## 2020-07-12 RX ADMIN — HYDROCODONE BITARTRATE AND ACETAMINOPHEN 1 TABLET: 7.5; 325 TABLET ORAL at 22:26

## 2020-07-12 ASSESSMENT — PAIN SCALES - GENERAL: PAINLEVEL_OUTOF10: 7

## 2020-07-12 NOTE — PROGRESS NOTES
Hospitalist Progress Note      PCP: Sergio Simmons MD    Date of Admission: 7/10/2020    Chief Complaint: shortness of breath, chest pain, cough    Hospital Course: admitted with recurrent postop left pleural effusion after CABG. Had thoracentesis. Feels slightly better today. Chest x-ray showed improvement. Subjective: no chest pain, improving shortness of breath, no nausea or vomiting        Medications:  Reviewed    Infusion Medications   Scheduled Medications    aspirin  81 mg Oral Daily    atorvastatin  80 mg Oral Nightly    famotidine  40 mg Oral QPM    magnesium oxide  400 mg Oral BID    metoprolol tartrate  25 mg Oral BID    potassium chloride  20 mEq Oral BID WC    ticagrelor  90 mg Oral BID    sodium chloride flush  10 mL Intravenous 2 times per day    enoxaparin  30 mg Subcutaneous BID    albuterol sulfate HFA  2 puff Inhalation 4x daily    And    ipratropium  2 puff Inhalation 4x daily     PRN Meds: HYDROcodone-acetaminophen, sodium chloride flush, acetaminophen **OR** acetaminophen, polyethylene glycol, prochlorperazine      Intake/Output Summary (Last 24 hours) at 7/12/2020 1030  Last data filed at 7/12/2020 0917  Gross per 24 hour   Intake 997 ml   Output 1375 ml   Net -378 ml       Physical Exam Performed:    BP 95/60   Pulse 80   Temp 97 °F (36.1 °C)   Resp 17   Ht 5' 3\" (1.6 m)   Wt 118 lb 14.4 oz (53.9 kg)   LMP 11/01/2017 (Approximate)   SpO2 95%   BMI 21.06 kg/m²     General appearance: No apparent distress, appears stated age and cooperative. HEENT: Pupils equal, round, and reactive to light. Conjunctivae/corneas clear. Neck: Supple, with full range of motion. No jugular venous distention. Trachea midline. Respiratory: Moving air better than yesterday. Left side decreased breath sounds noted, but improved. Cardiovascular: Regular rate and rhythm with normal S1/S2 without murmurs, rubs or gallops.   Abdomen: Soft, non-tender, non-distended with normal bowel sounds. Musculoskeletal: No clubbing, cyanosis or edema bilaterally. Full range of motion without deformity. Skin: Skin color, texture, turgor normal.  No rashes or lesions. Neurologic:  Neurovascularly intact without any focal sensory/motor deficits. Cranial nerves: II-XII intact, grossly non-focal.  Psychiatric: Alert and oriented, thought content appropriate, normal insight  Capillary Refill: Brisk,< 3 seconds   Peripheral Pulses: +2 palpable, equal bilaterally       Labs:   Recent Labs     07/10/20  1049 07/11/20  0455 07/12/20  0418   WBC 10.5 8.9 6.9   HGB 12.6 11.0* 11.5*   HCT 37.3 33.2* 35.1*    241 222     Recent Labs     07/10/20  1049 07/11/20  0455 07/12/20  0418   * 140 141   K 3.8 3.9 5.1   CL 96* 105 106   CO2 25 23 25   BUN 11 8 8   CREATININE 0.6 <0.5* 0.6   CALCIUM 9.8 9.2 9.2     Recent Labs     07/10/20  1049 07/11/20  0455 07/12/20  0418   AST 23 18 18   ALT 19 14 14   BILITOT 0.7 0.5 0.4   ALKPHOS 184* 146* 149*     Recent Labs     07/11/20  0455 07/12/20  0418   INR 1.37* 1.21*     Recent Labs     07/10/20  1049   TROPONINI <0.01       Urinalysis:      Lab Results   Component Value Date    NITRU Negative 07/10/2020    WBCUA 10-20 06/01/2020    BACTERIA 3+ 06/01/2020    RBCUA 0-2 06/01/2020    BLOODU Negative 07/10/2020    SPECGRAV 1.010 07/10/2020    GLUCOSEU Negative 07/10/2020       Radiology:  XR CHEST PORTABLE   Final Result   Moderate left pleural effusion, decreased as compared to prior, with adjacent   left basilar atelectasis or airspace disease. No gross pneumothorax. US THORACENTESIS   Final Result   Successful ultrasound guided left-sided thoracentesis. A sample was sent for analysis at the request of the ordering clinician. CT CHEST PULMONARY EMBOLISM W CONTRAST   Final Result   1. Negative for pulmonary embolus. 2. Moderate-sized left effusion with associated underlying left basilar   compressive atelectasis. 3. Advanced emphysema. XR CHEST PORTABLE   Final Result   Persistent large left pleural effusion. Left-sided airspace disease in part   represents associated compressive atelectasis                 Assessment/Plan:    Active Hospital Problems    Diagnosis    Complication of coronary artery bypass graft surgery [T82. 9XXA]     PLAN:    Large pleural effusion as postoperative complication of coronary artery bypass graft surgery  Cardiothoracic surgery notified. Following. Pulmonology consulted. Thoracentesis under US guidance ordered. Postprocedure chest x-ray shows improvement  Patient is clinically stable  I will order repeat chest x-ray tomorrow for follow-up. Coronary artery disease  Post CABG. Continue home medications. No separate symptoms that could be attributed to coronary artery disease at this time. Troponin is negative.     GERD  Continue proton pump inhibitors.     Hypertension  Blood pressure is controlled, but remains on the low side. Continue beta-blockers with hold parameters.     Hyponatremia  Resolved off Lasix. Continue to monitor daily basic metabolic panel    D/w patient      DVT Prophylaxis: Lovenox  Diet: DIET GENERAL;  Code Status: Full Code    PT/OT Eval Status: requested    Dispo - inpatient probably 1 more day, pending stability of pleural effusion.     Richelle Vasquez MD

## 2020-07-12 NOTE — PROGRESS NOTES
Toya Vega MD   Patient: Madalyn Meza"   7/12/20 11:40 AM   [phone] Hospital or Facility: [facility] From: [facility], [facility] RE: jeet alonso 1965 RM: 18 Dr. Maynor Owens, stated pt needs ct guided chest tube, does not have to be today. please review and order. ty Need Callback: NO CALLBACK REQ C4 PROGRESSIVE CARE  Unread  See orders. Pt is ordered npo at [place], radiology requested aspirin, brilliant, and lovenox be held until after the chest tube placement tomorrow. Mar marked, will update hs rn, to update mondays rn.

## 2020-07-13 ENCOUNTER — APPOINTMENT (OUTPATIENT)
Dept: GENERAL RADIOLOGY | Age: 55
DRG: 206 | End: 2020-07-13
Payer: COMMERCIAL

## 2020-07-13 ENCOUNTER — APPOINTMENT (OUTPATIENT)
Dept: CT IMAGING | Age: 55
DRG: 206 | End: 2020-07-13
Payer: COMMERCIAL

## 2020-07-13 LAB
A/G RATIO: 1.1 (ref 1.1–2.2)
ALBUMIN SERPL-MCNC: 3.1 G/DL (ref 3.4–5)
ALP BLD-CCNC: 145 U/L (ref 40–129)
ALT SERPL-CCNC: 14 U/L (ref 10–40)
ANION GAP SERPL CALCULATED.3IONS-SCNC: 13 MMOL/L (ref 3–16)
APPEARANCE FLUID: NORMAL
APTT: 33.4 SEC (ref 24.2–36.2)
AST SERPL-CCNC: 16 U/L (ref 15–37)
BASOPHILS ABSOLUTE: 0.1 K/UL (ref 0–0.2)
BASOPHILS RELATIVE PERCENT: 0.9 %
BILIRUB SERPL-MCNC: 0.4 MG/DL (ref 0–1)
BUN BLDV-MCNC: 7 MG/DL (ref 7–20)
C-REACTIVE PROTEIN: 49.4 MG/L (ref 0–5.1)
CALCIUM SERPL-MCNC: 9 MG/DL (ref 8.3–10.6)
CELL COUNT FLUID TYPE: NORMAL
CHLORIDE BLD-SCNC: 106 MMOL/L (ref 99–110)
CLOT EVALUATION: NORMAL
CO2: 21 MMOL/L (ref 21–32)
COLOR FLUID: YELLOW
CREAT SERPL-MCNC: <0.5 MG/DL (ref 0.6–1.1)
EOSINOPHILS ABSOLUTE: 0.6 K/UL (ref 0–0.6)
EOSINOPHILS RELATIVE PERCENT: 9.4 %
FERRITIN: 563.5 NG/ML (ref 15–150)
FIBRINOGEN: 536 MG/DL (ref 200–397)
FLUID PATH CONSULT: YES
GFR AFRICAN AMERICAN: >60
GFR NON-AFRICAN AMERICAN: >60
GLOBULIN: 2.9 G/DL
GLUCOSE BLD-MCNC: 91 MG/DL (ref 70–99)
HCT VFR BLD CALC: 33.4 % (ref 36–48)
HEMOGLOBIN: 10.9 G/DL (ref 12–16)
INR BLD: 1.27 (ref 0.86–1.14)
LYMPHOCYTES ABSOLUTE: 1.7 K/UL (ref 1–5.1)
LYMPHOCYTES RELATIVE PERCENT: 25.7 %
LYMPHOCYTES, BODY FLUID: 90 %
MACROPHAGE FLUID: 4 %
MCH RBC QN AUTO: 27.1 PG (ref 26–34)
MCHC RBC AUTO-ENTMCNC: 32.6 G/DL (ref 31–36)
MCV RBC AUTO: 83.2 FL (ref 80–100)
MONOCYTE, FLUID: 2 %
MONOCYTES ABSOLUTE: 0.9 K/UL (ref 0–1.3)
MONOCYTES RELATIVE PERCENT: 14.2 %
MONONUCLEAR UNIDENTIFIED CELLS FLUID: 1 %
NEUTROPHIL, FLUID: 3 %
NEUTROPHILS ABSOLUTE: 3.2 K/UL (ref 1.7–7.7)
NEUTROPHILS RELATIVE PERCENT: 49.8 %
NUCLEATED CELLS FLUID: 5968 /CUMM
NUMBER OF CELLS COUNTED FLUID: 100
PDW BLD-RTO: 15.2 % (ref 12.4–15.4)
PLATELET # BLD: 214 K/UL (ref 135–450)
PMV BLD AUTO: 10.2 FL (ref 5–10.5)
POTASSIUM REFLEX MAGNESIUM: 3.9 MMOL/L (ref 3.5–5.1)
PROTHROMBIN TIME: 14.8 SEC (ref 10–13.2)
RBC # BLD: 4.02 M/UL (ref 4–5.2)
RBC FLUID: NORMAL /CUMM
SODIUM BLD-SCNC: 140 MMOL/L (ref 136–145)
TOTAL PROTEIN: 6 G/DL (ref 6.4–8.2)
WBC # BLD: 6.5 K/UL (ref 4–11)

## 2020-07-13 PROCEDURE — 0W9B30Z DRAINAGE OF LEFT PLEURAL CAVITY WITH DRAINAGE DEVICE, PERCUTANEOUS APPROACH: ICD-10-PCS | Performed by: RADIOLOGY

## 2020-07-13 PROCEDURE — 6370000000 HC RX 637 (ALT 250 FOR IP): Performed by: NURSE PRACTITIONER

## 2020-07-13 PROCEDURE — 85730 THROMBOPLASTIN TIME PARTIAL: CPT

## 2020-07-13 PROCEDURE — 94761 N-INVAS EAR/PLS OXIMETRY MLT: CPT

## 2020-07-13 PROCEDURE — 2709999900 CT GUIDED PLEURAL DRAINAGE W CATH PERC

## 2020-07-13 PROCEDURE — 2580000003 HC RX 258: Performed by: INTERNAL MEDICINE

## 2020-07-13 PROCEDURE — 80053 COMPREHEN METABOLIC PANEL: CPT

## 2020-07-13 PROCEDURE — 6360000002 HC RX W HCPCS: Performed by: RADIOLOGY

## 2020-07-13 PROCEDURE — 85610 PROTHROMBIN TIME: CPT

## 2020-07-13 PROCEDURE — 6370000000 HC RX 637 (ALT 250 FOR IP): Performed by: INTERNAL MEDICINE

## 2020-07-13 PROCEDURE — 85384 FIBRINOGEN ACTIVITY: CPT

## 2020-07-13 PROCEDURE — 71046 X-RAY EXAM CHEST 2 VIEWS: CPT

## 2020-07-13 PROCEDURE — 85025 COMPLETE CBC W/AUTO DIFF WBC: CPT

## 2020-07-13 PROCEDURE — 94640 AIRWAY INHALATION TREATMENT: CPT

## 2020-07-13 PROCEDURE — 2060000000 HC ICU INTERMEDIATE R&B

## 2020-07-13 PROCEDURE — 6360000002 HC RX W HCPCS: Performed by: INTERNAL MEDICINE

## 2020-07-13 RX ORDER — HYDROCODONE BITARTRATE AND ACETAMINOPHEN 7.5; 325 MG/1; MG/1
1 TABLET ORAL EVERY 6 HOURS PRN
Status: DISCONTINUED | OUTPATIENT
Start: 2020-07-13 | End: 2020-07-17 | Stop reason: HOSPADM

## 2020-07-13 RX ORDER — FENTANYL CITRATE 50 UG/ML
INJECTION, SOLUTION INTRAMUSCULAR; INTRAVENOUS
Status: COMPLETED | OUTPATIENT
Start: 2020-07-13 | End: 2020-07-13

## 2020-07-13 RX ORDER — MIDAZOLAM HYDROCHLORIDE 5 MG/ML
INJECTION INTRAMUSCULAR; INTRAVENOUS
Status: COMPLETED | OUTPATIENT
Start: 2020-07-13 | End: 2020-07-13

## 2020-07-13 RX ADMIN — MAGNESIUM GLUCONATE 500 MG ORAL TABLET 400 MG: 500 TABLET ORAL at 20:55

## 2020-07-13 RX ADMIN — FAMOTIDINE 40 MG: 20 TABLET, FILM COATED ORAL at 18:19

## 2020-07-13 RX ADMIN — Medication 2 PUFF: at 11:34

## 2020-07-13 RX ADMIN — ENOXAPARIN SODIUM 30 MG: 30 INJECTION SUBCUTANEOUS at 20:55

## 2020-07-13 RX ADMIN — Medication 2 PUFF: at 15:07

## 2020-07-13 RX ADMIN — Medication 2 PUFF: at 19:22

## 2020-07-13 RX ADMIN — ATORVASTATIN CALCIUM 80 MG: 80 TABLET, FILM COATED ORAL at 20:55

## 2020-07-13 RX ADMIN — FENTANYL CITRATE 25 MCG: 50 INJECTION INTRAMUSCULAR; INTRAVENOUS at 11:04

## 2020-07-13 RX ADMIN — METOPROLOL TARTRATE 25 MG: 25 TABLET, FILM COATED ORAL at 20:55

## 2020-07-13 RX ADMIN — Medication 2 PUFF: at 07:42

## 2020-07-13 RX ADMIN — ACETAMINOPHEN 650 MG: 325 TABLET ORAL at 18:21

## 2020-07-13 RX ADMIN — Medication 2 PUFF: at 19:17

## 2020-07-13 RX ADMIN — Medication 2 PUFF: at 07:43

## 2020-07-13 RX ADMIN — HYDROCODONE BITARTRATE AND ACETAMINOPHEN 1 TABLET: 7.5; 325 TABLET ORAL at 14:08

## 2020-07-13 RX ADMIN — TICAGRELOR 90 MG: 90 TABLET ORAL at 20:55

## 2020-07-13 RX ADMIN — MIDAZOLAM HYDROCHLORIDE 0.5 MG: 5 INJECTION, SOLUTION INTRAMUSCULAR; INTRAVENOUS at 11:04

## 2020-07-13 RX ADMIN — Medication 10 ML: at 20:59

## 2020-07-13 RX ADMIN — HYDROCODONE BITARTRATE AND ACETAMINOPHEN 1 TABLET: 7.5; 325 TABLET ORAL at 20:55

## 2020-07-13 ASSESSMENT — PAIN SCALES - GENERAL
PAINLEVEL_OUTOF10: 7
PAINLEVEL_OUTOF10: 0
PAINLEVEL_OUTOF10: 6
PAINLEVEL_OUTOF10: 8

## 2020-07-13 NOTE — PROGRESS NOTES
CT guided 12 Fr L chest tube placed by Dr. Ismael Snider. 0.5 mg Versed and 25 mcg Fentanyl given for procedure. VSS throughout. DSD applied over site. Chest tube connected to atrium. Pt returned to pre ky score prior to transport. Report called to [facility] AND [facility].

## 2020-07-13 NOTE — PROGRESS NOTES
[facility] Thoracic Progress Note:          CC:  Pt known to service; CABG x1 on 6/02/20 with Dr. Sacha Aragon. Currently here with a left sided pleural effusion     Subj: awake, sitting up on couch, breathing with ease, saturating 95% on RA    Obj:    Blood pressure (!) 88/58, pulse 102, temperature 97.4 °F (36.3 °C), temperature source Oral, resp. rate 16, height 5' 3\" (1.6 m), weight 118 lb 9.6 oz (53.8 kg), last menstrual period 11/01/2017, SpO2 95 %. Lungs diminished on left   Abdomen flat, soft, non-tender   Incision on left posterior chest with dressing, CDI     Diagnostics:   Recent Labs     07/11/20 0455 07/12/20 0418 07/13/20 0427   WBC 8.9 6.9 6.5   HGB 11.0* 11.5* 10.9*   HCT 33.2* 35.1* 33.4*    222 214                                                                  Recent Labs     07/11/20 0455 07/12/20 0418 07/13/20 0427    141 140   K 3.9 5.1 3.9    106 106   CO2 23 25 21   BUN 8 8 7   CREATININE <0.5* 0.6 <0.5*   GLUCOSE 98 103* 91     Recent Labs     07/10/20  1049   TROPONINI <0.01     Recent Labs     07/11/20 0455 07/12/20 0418 07/13/20  0427   INR 1.37* 1.21* 1.27*     CXR: 7/13 unchanged left pleural effusion    Assess/Plan:   Care per primary team and pulmonology    Left-sided pleural effusion:   S/p thoracentesis on 7/11 with 1L of serosanguinous fluid removed  Path pending  Scheduled to get a CT guided chest tube today. Will monitor drainage daily, may end up needing TPA/Dornase    _____________________________________________________________      Palmer Santos CNP  7/13/2020  9:57 AM       __________________________________________________  Note reviewed, events of last 24 hours reviewed along with vital signs and I/Os and patient examined. Images reviewed.   Assessment & Plans  Clinically stable on room air, sitting on couch  Left pleural effusion on CXR  Brilinta has been held  CT guided chest tube today    Juani Lyn MD  Cardiothoracic Surgery

## 2020-07-13 NOTE — PROGRESS NOTES
Hospitalist Progress Note      PCP: Francesca Nails MD    Date of Admission: 7/10/2020    Chief Complaint: shortness of breath, chest pain, cough    Hospital Course: admitted with recurrent postop left pleural effusion after CABG. Had thoracentesis. Chest x-ray shows smaller but persistent left-sided pleural effusion. Remains on room air. Subjective: no chest pain, improved shortness of breath, no nausea or vomiting. Stable since admission      Medications:  Reviewed    Infusion Medications   Scheduled Medications    aspirin  81 mg Oral Daily    atorvastatin  80 mg Oral Nightly    famotidine  40 mg Oral QPM    magnesium oxide  400 mg Oral BID    metoprolol tartrate  25 mg Oral BID    ticagrelor  90 mg Oral BID    sodium chloride flush  10 mL Intravenous 2 times per day    enoxaparin  30 mg Subcutaneous BID    albuterol sulfate HFA  2 puff Inhalation 4x daily    And    ipratropium  2 puff Inhalation 4x daily     PRN Meds: HYDROcodone-acetaminophen, sodium chloride flush, acetaminophen **OR** acetaminophen, polyethylene glycol, prochlorperazine      Intake/Output Summary (Last 24 hours) at 7/13/2020 1113  Last data filed at 7/13/2020 0517  Gross per 24 hour   Intake 960 ml   Output 1300 ml   Net -340 ml       Physical Exam Performed:    BP 90/65   Pulse 93   Temp 97.4 °F (36.3 °C) (Oral)   Resp 25   Ht 5' 3\" (1.6 m)   Wt 118 lb 9.6 oz (53.8 kg)   LMP 11/01/2017 (Approximate)   SpO2 100%   BMI 21.01 kg/m²     General appearance: No apparent distress, appears stated age and cooperative. HEENT: Pupils equal, round, and reactive to light. Conjunctivae/corneas clear. Neck: Supple, with full range of motion. No jugular venous distention. Trachea midline. Respiratory: Moving air better than yesterday. Left side decreased breath sounds noted, but improved. Cardiovascular: Regular rate and rhythm with normal S1/S2 without murmurs, rubs or gallops.   Abdomen: Soft, non-tender, non-distended with normal bowel sounds. Musculoskeletal: No clubbing, cyanosis or edema bilaterally. Full range of motion without deformity. Skin: Skin color, texture, turgor normal.  No rashes or lesions. Neurologic:  Neurovascularly intact without any focal sensory/motor deficits. Cranial nerves: II-XII intact, grossly non-focal.  Psychiatric: Alert and oriented, thought content appropriate, normal insight  Capillary Refill: Brisk,< 3 seconds   Peripheral Pulses: +2 palpable, equal bilaterally       I examined the patient today (07/13/20). Physical exam is similar to yesterday (7/12). Labs:   Recent Labs     07/11/20 0455 07/12/20 0418 07/13/20 0427   WBC 8.9 6.9 6.5   HGB 11.0* 11.5* 10.9*   HCT 33.2* 35.1* 33.4*    222 214     Recent Labs     07/11/20 0455 07/12/20 0418 07/13/20  0427    141 140   K 3.9 5.1 3.9    106 106   CO2 23 25 21   BUN 8 8 7   CREATININE <0.5* 0.6 <0.5*   CALCIUM 9.2 9.2 9.0     Recent Labs     07/11/20 0455 07/12/20 0418 07/13/20  0427   AST 18 18 16   ALT 14 14 14   BILITOT 0.5 0.4 0.4   ALKPHOS 146* 149* 145*     Recent Labs     07/11/20 0455 07/12/20 0418 07/13/20  0427   INR 1.37* 1.21* 1.27*     No results for input(s): Prabhjot Hahn in the last 72 hours. Urinalysis:      Lab Results   Component Value Date    NITRU Negative 07/10/2020    WBCUA 10-20 06/01/2020    BACTERIA 3+ 06/01/2020    RBCUA 0-2 06/01/2020    BLOODU Negative 07/10/2020    SPECGRAV 1.010 07/10/2020    GLUCOSEU Negative 07/10/2020       Radiology:  XR CHEST STANDARD (2 VW)   Final Result   Unchanged small left pleural effusion. XR CHEST PORTABLE   Final Result   Moderate left pleural effusion, decreased as compared to prior, with adjacent   left basilar atelectasis or airspace disease. No gross pneumothorax. US THORACENTESIS   Final Result   Successful ultrasound guided left-sided thoracentesis.       A sample was sent for analysis at the request of the ordering clinician. CT CHEST PULMONARY EMBOLISM W CONTRAST   Final Result   1. Negative for pulmonary embolus. 2. Moderate-sized left effusion with associated underlying left basilar   compressive atelectasis. 3. Advanced emphysema. XR CHEST PORTABLE   Final Result   Persistent large left pleural effusion. Left-sided airspace disease in part   represents associated compressive atelectasis         CT GUIDED PLEURAL DRAINAGE W CATH PERC    (Results Pending)           Assessment/Plan:    Active Hospital Problems    Diagnosis    Complication of coronary artery bypass graft surgery [T82. 9XXA]     PLAN:    Large pleural effusion as postoperative complication of coronary artery bypass graft surgery  Cardiothoracic surgery notified. Following. Pleural effusion stable but not better after removal of 1 L serosanguineous fluid. CT-guided chest tube insertion today  Patient is clinically stable    Coronary artery disease  Post CABG. Continue home medications. No separate symptoms that could be attributed to coronary artery disease at this time. Troponin is negative. Brilinta on hold for chest tube insertion. No chest pain.     GERD  Continue proton pump inhibitors.     Hypertension  Blood pressure is controlled, but remains on the low side, similar to before. Continue beta-blockers with hold parameters.     Hyponatremia  Resolved off Lasix. Continue to monitor daily basic metabolic panel    D/w patient and family  Discussed with nursing      DVT Prophylaxis: Lovenox  Diet: Diet NPO Time Specified  Code Status: Full Code    PT/OT Eval Status: requested    Dispo - inpatient pending chest tube drainage.     Lisandro Perkins MD

## 2020-07-13 NOTE — CARE COORDINATION
CASE MANAGEMENT INITIAL ASSESSMENT      Reviewed chart and completed assessment via telephone with:  Explained Case Management role/services. Triggered  By age and diagnosis    Primary contact information: Spouse - Harish Avalos [phone]    Admit date/status: Inpatient 7/10/2020  Diagnosis: Shortness of breath and fever    Is this a Readmission?:  No    Insurance: [employer] required for SNF - Y        3 night stay required - N    Living arrangements, Adls, care needs, prior to admission: Lives in single story home with spouse    Transportation: TBD    Durable Medical Equipment at home: Walker__Cane__RTS__ BSC__Shower Chair__  02__ HHN__ CPAP__  BiPap__  Hospital Bed__ W/C___ Other__________    Services in the home and/or outpatient, prior to admission: Recently discharged home in June 2020 after a CABG with [facility]    PT/OT recs: Not seen    Hospital Exemption Notification (HEN): N/A    Barriers to discharge: Clinical improvement    Plan/comments: Patient was here at end of May and had a CABG, was discharged home with [facility]. Now back with shortness of breath and has pleural effusion. Getting CT guided chest tube insertion today. Will follow hospital course and monitor for specialty recommendations and assist with all barriers or needs pertaining to discharge. At present plan is if for home with home care however this is pending clinical course and response to treatment.     ECOC on chart for MD signature Quadrant Reporting?: no Integrate Histology Into Note?: Yes Stage 15: Additional Anesthesia Volume In Cc: 0 Bilobed Flap Text: The defect edges were debeveled with a #15 scalpel blade. Given the location of the defect and the proximity to free margins a bilobe flap was deemed most appropriate. Using a sterile surgical marker, an appropriate bilobe flap drawn around the defect. The area thus outlined was incised deep to adipose tissue with a #15 scalpel blade. The skin margins were undermined to an appropriate distance in all directions utilizing iris scissors. Rotation Flap Text: The defect edges were debeveled with a #15 scalpel blade. Given the location of the defect, shape of the defect and the proximity to free margins a rotation flap was deemed most appropriate. Using a sterile surgical marker, an appropriate rotation flap was drawn incorporating the defect and placing the expected incisions within the relaxed skin tension lines where possible. The area thus outlined was incised deep to adipose tissue with a #15 scalpel blade. The skin margins were undermined to an appropriate distance in all directions utilizing iris scissors. Crescentic Advancement Flap Text: The defect edges were debeveled with a #15 scalpel blade. Given the location of the defect and the proximity to free margins a crescentic advancement flap was deemed most appropriate. Using a sterile surgical marker, the appropriate advancement flap was drawn incorporating the defect and placing the expected incisions within the relaxed skin tension lines where possible. The area thus outlined was incised deep to adipose tissue with a #15 scalpel blade. The skin margins were undermined to an appropriate distance in all directions utilizing iris scissors. Surgical Defect Length In Cm (Optional): 0.8 V-Y Flap Text: The defect edges were debeveled with a #15 scalpel blade. Given the location of the defect, shape of the defect and the proximity to free margins a V-Y flap was deemed most appropriate. Using a sterile surgical marker, an appropriate advancement flap was drawn incorporating the defect and placing the expected incisions within the relaxed skin tension lines where possible. The area thus outlined was incised deep to adipose tissue with a #15 scalpel blade. The skin margins were undermined to an appropriate distance in all directions utilizing iris scissors. Muscle Hinge Flap Text: The defect edges were debeveled with a #15 scalpel blade. Given the size, depth and location of the defect and the proximity to free margins a muscle hinge flap was deemed most appropriate. Using a sterile surgical marker, an appropriate hinge flap was drawn incorporating the defect. The area thus outlined was incised with a #15 scalpel blade. The skin margins were undermined to an appropriate distance in all directions utilizing iris scissors. Consent (Marginal Mandibular)/Introductory Paragraph: The rationale for Mohs was explained to the patient and consent was obtained. The risks, benefits and alternatives to therapy were discussed in detail. Specifically, the risks of damage to the marginal mandibular branch of the facial nerve, infection, scarring, bleeding, prolonged wound healing, incomplete removal, allergy to anesthesia, and recurrence were addressed. Prior to the procedure, the treatment site was clearly identified and confirmed by the patient. All components of Universal Protocol/PAUSE Rule completed. Surgical Defect Length In Cm (Optional): 1.9 Interpolation Flap Text: A decision was made to reconstruct the defect utilizing an interpolation axial flap and a staged reconstruction. A telfa template was made of the defect. This telfa template was then used to outline the interpolation flap. The donor area for the pedicle flap was then injected with anesthesia. The flap was excised through the skin and subcutaneous tissue down to the layer of the underlying musculature. The interpolation flap was carefully excised within this deep plane to maintain its blood supply. The edges of the donor site were undermined. The donor site was closed in a primary fashion. The pedicle was then rotated into position and sutured. Once the tube was sutured into place, adequate blood supply was confirmed with blanching and refill. The pedicle was then wrapped with xeroform gauze and dressed appropriately with a telfa and gauze bandage to ensure continued blood supply and protect the attached pedicle. Graft Type: Full Thickness Skin Graft Manual Repair Warning Statement: We plan on removing the manually selected variable below in favor of our much easier automatic structured text blocks found in the previous tab. We decided to do this to help make the flow better and give you the full power of structured data. Manual selection is never going to be ideal in our platform and I would encourage you to avoid using manual selection from this point on, especially since I will be sunsetting this feature. It is important that you do one of two things with the customized text below. First, you can save all of the text in a word file so you can have it for future reference. Second, transfer the text to the appropriate area in the Library tab. Lastly, if there is a flap or graft type which we do not have you need to let us know right away so I can add it in before the variable is hidden. No need to panic, we plan to give you roughly 6 months to make the change. Referred To Mid-Level For Closure Text (Leave Blank If You Do Not Want): After obtaining clear surgical margins the patient was sent to a mid-level provider for surgical repair. The patient understands they will receive post-surgical care and follow-up from the mid-level provider. Transposition Flap Text: The defect edges were debeveled with a #15 scalpel blade. Given the location of the defect and the proximity to free margins a transposition flap was deemed most appropriate. Using a sterile surgical marker, an appropriate transposition flap was drawn incorporating the defect. The area thus outlined was incised deep to adipose tissue with a #15 scalpel blade. The skin margins were undermined to an appropriate distance in all directions utilizing iris scissors. S Plasty Text: Given the location and shape of the defect, and the orientation of relaxed skin tension lines, an S-plasty was deemed most appropriate for repair. Using a sterile surgical marker, the appropriate outline of the S-plasty was drawn, incorporating the defect and placing the expected incisions within the relaxed skin tension lines where possible. The area thus outlined was incised deep to adipose tissue with a #15 scalpel blade. The skin margins were undermined to an appropriate distance in all directions utilizing iris scissors. The skin flaps were advanced over the defect. The opposing margins were then approximated with interrupted buried subcutaneous sutures. Tissue Cultured Epidermal Autograft Text: The defect edges were debeveled with a #15 scalpel blade. Given the location of the defect, shape of the defect and the proximity to free margins a tissue cultured epidermal autograft was deemed most appropriate. The graft was then trimmed to fit the size of the defect. The graft was then placed in the primary defect and oriented appropriately. Dorsal Nasal Flap Text: The defect edges were debeveled with a #15 scalpel blade. Given the location of the defect and the proximity to free margins a dorsal nasal flap was deemed most appropriate. Using a sterile surgical marker, an appropriate dorsal nasal flap was drawn around the defect. The area thus outlined was incised deep to adipose tissue with a #15 scalpel blade. The skin margins were undermined to an appropriate distance in all directions utilizing iris scissors. Purse String (Simple) Text: Given the location of the defect and the characteristics of the surrounding skin a pursestring closure was deemed most appropriate. Undermining was performed circumfirentially around the surgical defect. A purstring suture was then placed and tightened. Mohs Histo Method Verbiage: Each section was then chromacoded and processed in the Mohs lab using the Mohs protocol and submitted for frozen section. Depth Of Tumor Invasion (For Histology): dermis Surgical Defect Width In Cm (Optional): 1.2 Anesthesia Volume In Cc: 6 Consent 1/Introductory Paragraph: Various treatment options for skin cancer treatment; including but not limited to no treatment, cryosurgery or cryotherapy, excision, radiation therapy, electrodessication and curettage, topical therapeutic agents and light therapy were discussed in depth with the patient. The rationale for Mohs was explained to the patient and consent was obtained. The risks, benefits and alternatives to therapy were discussed in detail. Specifically, the risks of infection, scarring, bleeding, prolonged wound healing, incomplete removal, allergy to anesthesia, nerve injury and recurrence were addressed. Prior to the procedure, the treatment site was clearly identified and confirmed by the patient and the patient agreed that Mohs surgery is the best treatment option for their lesion. No guarantees were made or implied; close follow-up was stressed out to the patient. All components of Universal Protocol/PAUSE Rule completed. Number Of Stages: 4 Ear Wedge Repair Text: A wedge excision was completed by carrying down an excision through the full thickness of the ear and cartilage with an inward facing Burow's triangle. The wound was then closed in a layered fashion. Referred To Plastics For Closure Text (Leave Blank If You Do Not Want): After obtaining clear surgical margins the patient was sent to plastics for surgical repair. The patient understands they will receive post-surgical care and follow-up from the referring physician's office. Partial Purse String (Intermediate) Text: Given the location of the defect and the characteristics of the surrounding skin an intermediate purse string closure was deemed most appropriate. Undermining was performed circumfirentially around the surgical defect. A purse string suture was then placed and tightened. Wound tension only allowed a partial closure of the circular defect. Consent (Lip)/Introductory Paragraph: The rationale for Mohs was explained to the patient and consent was obtained. The risks, benefits and alternatives to therapy were discussed in detail. Specifically, the risks of lip deformity, changes in the oral aperture, infection, scarring, bleeding, prolonged wound healing, incomplete removal, allergy to anesthesia, nerve injury and recurrence were addressed. Prior to the procedure, the treatment site was clearly identified and confirmed by the patient. All components of Universal Protocol/PAUSE Rule completed. Consent 3/Introductory Paragraph: I gave the patient a chance to ask questions they had about the procedure. Following this I explained the Mohs procedure and consent was obtained. The risks, benefits and alternatives to therapy were discussed in detail. Specifically, the risks of infection, scarring, bleeding, prolonged wound healing, incomplete removal, allergy to anesthesia, nerve injury and recurrence were addressed. Prior to the procedure, the treatment site was clearly identified and confirmed by the patient. All components of Universal Protocol/PAUSE Rule completed. Rhombic Flap Text: The defect edges were debeveled with a #15 scalpel blade. Given the location of the defect and the proximity to free margins a rhombic flap was deemed most appropriate. Using a sterile surgical marker, an appropriate rhombic flap was drawn incorporating the defect. The area thus outlined was incised deep to adipose tissue with a #15 scalpel blade. The skin margins were undermined to an appropriate distance in all directions utilizing iris scissors. Stage 4: Additional Anesthesia Type: 1% lidocaine with epinephrine Stage 1: Number Of Blocks?: 1 O-T Advancement Flap Text: The defect edges were debeveled with a #15 scalpel blade. Given the location of the defect, shape of the defect and the proximity to free margins an O-T advancement flap was deemed most appropriate. Using a sterile surgical marker, an appropriate advancement flap was drawn incorporating the defect and placing the expected incisions within the relaxed skin tension lines where possible. The area thus outlined was incised deep to adipose tissue with a #15 scalpel blade. The skin margins were undermined to an appropriate distance in all directions utilizing iris scissors. Scc Histology Text: There is hyperkeratosis, acanthosis, and cytologic atypia of keratinocytes with hyperchromatic and pleomorphic nuclei throughout the epidermis. No dermal invasion is seen. Chronic inflammation is present in the dermis. Closure 4 Information: This tab is for additional flaps and grafts above and beyond our usual structured repairs. Please note if you enter information here it will not currently bill and you will need to add the billing information manually. Cheek Interpolation Flap Text: A decision was made to reconstruct the defect utilizing an interpolation axial flap and a staged reconstruction. A telfa template was made of the defect. This telfa template was then used to outline the Cheek Interpolation flap. The donor area for the pedicle flap was then injected with anesthesia. The flap was excised through the skin and subcutaneous tissue down to the layer of the underlying musculature. The interpolation flap was carefully excised within this deep plane to maintain its blood supply. The edges of the donor site were undermined. The donor site was closed in a primary fashion. The pedicle was then rotated into position and sutured. Once the tube was sutured into place, adequate blood supply was confirmed with blanching and refill. The pedicle was then wrapped with xeroform gauze and dressed appropriately with a telfa and gauze bandage to ensure continued blood supply and protect the attached pedicle. Advancement Flap (Single) Text: The defect edges were debeveled with a #15 scalpel blade. Given the location of the defect and the proximity to free margins a single advancement flap was deemed most appropriate. Using a sterile surgical marker, an appropriate advancement flap was drawn incorporating the defect and placing the expected incisions within the relaxed skin tension lines where possible. The area thus outlined was incised deep to adipose tissue with a #15 scalpel blade. The skin margins were undermined to an appropriate distance in all directions utilizing iris scissors. Island Pedicle Flap-Requiring Vessel Identification Text: The defect edges were debeveled with a #15 scalpel blade. Given the location of the defect, shape of the defect and the proximity to free margins an island pedicle advancement flap was deemed most appropriate. Using a sterile surgical marker, an appropriate advancement flap was drawn, based on the axial vessel mentioned above, incorporating the defect, outlining the appropriate donor tissue and placing the expected incisions within the relaxed skin tension lines where possible. The area thus outlined was incised deep to adipose tissue with a #15 scalpel blade. The skin margins were undermined to an appropriate distance in all directions around the primary defect and laterally outward around the island pedicle utilizing iris scissors. There was minimal undermining beneath the pedicle flap. Epidermal Sutures: 5-0 Fast Absorbing Gut Bcc  Morpheaform/Sclerosing Histology Text: irregular nests of pleomorphic, hyperchromatic basaloid cells with peripheral palisading infiltrate the tissue in a diffuse fashion in association with a mucoid stroma and dense dermal sclerosis. The tumor infiltrates in thin strands and single cells among the sclerotic collagen fibers in a pattern of morpheaform variant of basal cell carcinoma. Consent Type: Consent 1 (Standard) Subsequent Stages Histo Method Verbiage: Using a similar technique to that described above, a thin layer of tissue was removed from all areas where tumor was visible on the previous stage. The tissue was again oriented, mapped, dyed, and processed as above. Afx Histology Text: there is a poorly differentiated spindled cell neoplasm composed of fascicles of atypical spindled and epithelioid cells, infiltrating and replacing papillary and reticular dermis. Mitotic activity is brisk, including atypical forms. The lesion is present on a sun-damaged skin. This pattern supports the diagnosis of atypical fibrous xanthoma. Complex Repair And Flap Additional Text (Will Appearing After The Standard Complex Repair Text): The complex repair was not sufficient to completely close the primary defect. The remaining additional defect was repaired with the flap mentioned below. Mastoid Interpolation Flap Text: A decision was made to reconstruct the defect utilizing an interpolation axial flap and a staged reconstruction. A telfa template was made of the defect. This telfa template was then used to outline the mastoid interpolation flap. The donor area for the pedicle flap was then injected with anesthesia. The flap was excised through the skin and subcutaneous tissue down to the layer of the underlying musculature. The pedicle flap was carefully excised within this deep plane to maintain its blood supply. The edges of the donor site were undermined. The donor site was closed in a primary fashion. The pedicle was then rotated into position and sutured. Once the tube was sutured into place, adequate blood supply was confirmed with blanching and refill. The pedicle was then wrapped with xeroform gauze and dressed appropriately with a telfa and gauze bandage to ensure continued blood supply and protect the attached pedicle. Date Of Previous Biopsy (Optional): 3-27-18 Crescentic Intermediate Repair Preamble Text (Leave Blank If You Do Not Want): Undermining was performed with blunt dissection. Epidermal Autograft Text: The defect edges were debeveled with a #15 scalpel blade. Given the location of the defect, shape of the defect and the proximity to free margins an epidermal autograft was deemed most appropriate. Using a sterile surgical marker, the primary defect shape was transferred to the donor site. The epidermal graft was then harvested. The skin graft was then placed in the primary defect and oriented appropriately. Bi-Rhombic Flap Text: The defect edges were debeveled with a #15 scalpel blade. Given the location of the defect and the proximity to free margins a bi-rhombic flap was deemed most appropriate. Using a sterile surgical marker, an appropriate rhombic flap was drawn incorporating the defect. The area thus outlined was incised deep to adipose tissue with a #15 scalpel blade. The skin margins were undermined to an appropriate distance in all directions utilizing iris scissors. Mucosal Advancement Flap Text: Given the location of the defect, shape of the defect and the proximity to free margins a mucosal advancement flap was deemed most appropriate. Incisions were made with a 15 blade scalpel in the appropriate fashion along the cutaneous vermilion border and the mucosal lip. The remaining actinically damaged mucosal tissue was excised. The mucosal advancement flap was then elevated to the gingival sulcus with care taken to preserve the neurovascular structures and advanced into the primary defect. Care was taken to ensure that precise realignment of the vermilion border was achieved. Cheiloplasty (Less Than 50%) Text: A decision was made to reconstruct the defect with a  cheiloplasty. The defect was undermined extensively. Additional obicularis oris muscle was excised with a 15 blade scalpel. The defect was converted into a full thickness wedge, of less than 50% of the vertical height of the lip, to facilite a better cosmetic result. Small vessels were then tied off with 5-0 monocyrl. The obicularis oris, superficial fascia, adipose and dermis were then reapproximated. After the deeper layers were approximated the epidermis was reapproximated with particular care given to realign the vermilion border. Post-Care Instructions: I reviewed with the patient in detail post-care instructions. Patient is not to engage in any heavy lifting, exercise, or swimming for the next 14 days. Should the patient develop any fevers, chills, bleeding, severe pain patient will contact the office immediately. Dermal Autograft Text: The defect edges were debeveled with a #15 scalpel blade. Given the location of the defect, shape of the defect and the proximity to free margins a dermal autograft was deemed most appropriate. Using a sterile surgical marker, the primary defect shape was transferred to the donor site. The area thus outlined was incised deep to adipose tissue with a #15 scalpel blade. The harvested graft was then trimmed of adipose and epidermal tissue until only dermis was left. The skin graft was then placed in the primary defect and oriented appropriately. Bilateral Helical Rim Advancement Flap Text: The defect edges were debeveled with a #15 blade scalpel. Given the location of the defect and the proximity to free margins (helical rim) a bilateral helical rim advancement flap was deemed most appropriate. Using a sterile surgical marker, the appropriate advancement flaps were drawn incorporating the defect and placing the expected incisions between the helical rim and antihelix where possible. The area thus outlined was incised through and through with a #15 scalpel blade. With a skin hook and iris scissors, the flaps were gently and sharply undermined and freed up. Body Location Override (Optional - Billing Will Still Be Based On Selected Body Map Location If Applicable): left superior elsa of antihelix Graft Donor Site Bandage (Optional-Leave Blank If You Don't Want In Note): Steri-strips and a pressure bandage were applied to the donor site. Mixed Nodular And Infiltrative Bcc Histology Text: Irregular nests of pleomorphic, hyperchromatic basaloid cells with peripheral palisading infiltrate the tissue in a diffuse fashion in association with sclerotic stroma. Burow's Advancement Flap Text: The defect edges were debeveled with a #15 scalpel blade. Given the location of the defect and the proximity to free margins a Burow's advancement flap was deemed most appropriate. Using a sterile surgical marker, the appropriate advancement flap was drawn incorporating the defect and placing the expected incisions within the relaxed skin tension lines where possible. The area thus outlined was incised deep to adipose tissue with a #15 scalpel blade. The skin margins were undermined to an appropriate distance in all directions utilizing iris scissors. O-T Plasty Text: The defect edges were debeveled with a #15 scalpel blade. Given the location of the defect, shape of the defect and the proximity to free margins an O-T plasty was deemed most appropriate. Using a sterile surgical marker, an appropriate O-T plasty was drawn incorporating the defect and placing the expected incisions within the relaxed skin tension lines where possible. The area thus outlined was incised deep to adipose tissue with a #15 scalpel blade. The skin margins were undermined to an appropriate distance in all directions utilizing iris scissors. H Plasty Text: Given the location of the defect, shape of the defect and the proximity to free margins a H-plasty was deemed most appropriate for repair. Using a sterile surgical marker, the appropriate advancement arms of the H-plasty were drawn incorporating the defect and placing the expected incisions within the relaxed skin tension lines where possible. The area thus outlined was incised deep to adipose tissue with a #15 scalpel blade. The skin margins were undermined to an appropriate distance in all directions utilizing iris scissors. The opposing advancement arms were then advanced into place in opposite direction and anchored with interrupted buried subcutaneous sutures. Referred To Oculoplastics For Closure Text (Leave Blank If You Do Not Want): After obtaining clear surgical margins the patient was sent to oculoplastics for surgical repair. The patient understands they will receive post-surgical care and follow-up from the referring physician's office. Island Pedicle Flap With Canthal Suspension Text: The defect edges were debeveled with a #15 scalpel blade. Given the location of the defect, shape of the defect and the proximity to free margins an island pedicle advancement flap was deemed most appropriate. Using a sterile surgical marker, an appropriate advancement flap was drawn incorporating the defect, outlining the appropriate donor tissue and placing the expected incisions within the relaxed skin tension lines where possible. The area thus outlined was incised deep to adipose tissue with a #15 scalpel blade. The skin margins were undermined to an appropriate distance in all directions around the primary defect and laterally outward around the island pedicle utilizing iris scissors. There was minimal undermining beneath the pedicle flap. A suspension suture was placed in the canthal tendon to prevent tension and prevent ectropion. Graft Donor Site: preauricular left Bilobed Transposition Flap Text: The defect edges were debeveled with a #15 scalpel blade. Given the location of the defect and the proximity to free margins a bilobed transposition flap was deemed most appropriate. Using a sterile surgical marker, an appropriate bilobe flap drawn around the defect. The area thus outlined was incised deep to adipose tissue with a #15 scalpel blade. The skin margins were undermined to an appropriate distance in all directions utilizing iris scissors. Bcc Infiltrative Histology Text: There were numerous aggregates of basaloid cells demonstrating an infiltrative pattern. Postop Diagnosis: same Split-Thickness Skin Graft Text: The defect edges were debeveled with a #15 scalpel blade. Given the location of the defect, shape of the defect and the proximity to free margins a split thickness skin graft was deemed most appropriate. Using a sterile surgical marker, the primary defect shape was transferred to the donor site. The split thickness graft was then harvested. The skin graft was then placed in the primary defect and oriented appropriately. Posterior Auricular Interpolation Flap Text: A decision was made to reconstruct the defect utilizing an interpolation axial flap and a staged reconstruction. A telfa template was made of the defect. This telfa template was then used to outline the posterior auricular interpolation flap. The donor area for the pedicle flap was then injected with anesthesia. The flap was excised through the skin and subcutaneous tissue down to the layer of the underlying musculature. The pedicle flap was carefully excised within this deep plane to maintain its blood supply. The edges of the donor site were undermined. The donor site was closed in a primary fashion. The pedicle was then rotated into position and sutured. Once the tube was sutured into place, adequate blood supply was confirmed with blanching and refill. The pedicle was then wrapped with xeroform gauze and dressed appropriately with a telfa and gauze bandage to ensure continued blood supply and protect the attached pedicle. Consent (Near Eyelid Margin)/Introductory Paragraph: The rationale for Mohs was explained to the patient and consent was obtained. The risks, benefits and alternatives to therapy were discussed in detail. Specifically, the risks of ectropion or eyelid deformity, infection, scarring, bleeding, prolonged wound healing, incomplete removal, allergy to anesthesia, nerve injury and recurrence were addressed. Prior to the procedure, the treatment site was clearly identified and confirmed by the patient. All components of Universal Protocol/PAUSE Rule completed. Purse String (Intermediate) Text: Given the location of the defect and the characteristics of the surrounding skin a pursestring intermediate closure was deemed most appropriate. Undermining was performed circumfirentially around the surgical defect. A purstring suture was then placed and tightened. Area M Indication Text: Tumors in this location are included in Area M (cheek, forehead, scalp, neck, jawline and pretibial skin). Mohs surgery is indicated for tumors in these anatomic locations. Scc In Situ Histology Text: There is hyperkeratosis, acanthosis, and cytologic atypia of keratinocytes with hyperchromatic and pleomorphic nuclei throughout the full thickness of the epidermis. No dermal invasion is seen. Chronic inflammation is present in the dermis. No Repair - Repaired With Adjacent Surgical Defect Text (Leave Blank If You Do Not Want): After obtaining clear surgical margins the defect was repaired concurrently with another surgical defect which was in close approximation. Epidermal Closure Graft Donor Site (Optional): running Medical Necessity Statement: Based on my medical judgement; after reviewing the pertinent pathology report, physical exam findings and the various treatment options for skin cancer treatment; standard excision and/or destruction technique methods are not clinically sufficient treatment options. To prevent the risk of compromising surgical cure and reconstruction; prompt microscopic examination of the surgical margins is necessary. Based on the given indication(s), maximum conservation of healthy tissue, and high cure rate, Mohs surgery is the most appropriate treatment for this cancer. Same Histology In Subsequent Stages Text: The pattern and morphology of the tumor is as described in the first stage. Z Plasty Text: The lesion was extirpated to the level of the fat with a #15 scalpel blade. Given the location of the defect, shape of the defect and the proximity to free margins a Z-plasty was deemed most appropriate for repair. Using a sterile surgical marker, the appropriate transposition arms of the Z-plasty were drawn incorporating the defect and placing the expected incisions within the relaxed skin tension lines where possible. The area thus outlined was incised deep to adipose tissue with a #15 scalpel blade. The skin margins were undermined to an appropriate distance in all directions utilizing iris scissors. The opposing transposition arms were then transposed into place in opposite direction and anchored with interrupted buried subcutaneous sutures. Stage 4: Additional Anesthesia Volume In Cc: 3 Mohs Method Verbiage: An incision at a 45 degree angle following the standard Mohs approach was done and the specimen was harvested as a microscopic controlled layer. Tarsorrhaphy Text: A tarsorrhaphy was performed using Frost sutures. Complex Repair Preamble Text (Leave Blank If You Do Not Want): Extensive wide undermining was performed. Mauc Instructions: By selecting yes to the question below the [facility] number will be added into the note. This will be calculated automatically based on the diagnosis chosen, the size entered, the body zone selected (H,M,L) and the specific indications you chose. You will also have the option to override the Mohs AUC if you disagree with the automatically calculated number and this option is found in the Case Summary tab. Bcc Superficial Histology Text: Beneath an irregular epidermis, there are small nodular masses of basaloid neoplastic cells with nuclear pleomorphism attached to the basal layer and surrounded by a loose fibrous stroma and mild inflammation in the superficial dermis. The findings are typical for a superficial type of basal cell carcinoma. Consent 2/Introductory Paragraph: Mohs surgery was explained to the patient and consent was obtained. The risks, benefits and alternatives to therapy were discussed in detail. Specifically, the risks of infection, scarring, bleeding, prolonged wound healing, incomplete removal, allergy to anesthesia, nerve injury and recurrence were addressed. Prior to the procedure, the treatment site was clearly identified and confirmed by the patient. All components of Universal Protocol/PAUSE Rule completed. Surgeon: Enoch Brunner MD Referred To Asc For Closure Text (Leave Blank If You Do Not Want): After obtaining clear surgical margins the patient was sent to an [facility] for surgical repair. The patient understands they will receive post-surgical care and follow-up from the [facility] physician. Consent (Nose)/Introductory Paragraph: The rationale for Mohs was explained to the patient and consent was obtained. The risks, benefits and alternatives to therapy were discussed in detail. Specifically, the risks of nasal deformity, changes in the flow of air through the nose, infection, scarring, bleeding, prolonged wound healing, incomplete removal, allergy to anesthesia, nerve injury and recurrence were addressed. Prior to the procedure, the treatment site was clearly identified and confirmed by the patient. All components of Universal Protocol/PAUSE Rule completed. Double Island Pedicle Flap Text: The defect edges were debeveled with a #15 scalpel blade. Given the location of the defect, shape of the defect and the proximity to free margins a double island pedicle advancement flap was deemed most appropriate. Using a sterile surgical marker, an appropriate advancement flap was drawn incorporating the defect, outlining the appropriate donor tissue and placing the expected incisions within the relaxed skin tension lines where possible. The area thus outlined was incised deep to adipose tissue with a #15 scalpel blade. The skin margins were undermined to an appropriate distance in all directions around the primary defect and laterally outward around the island pedicle utilizing iris scissors. There was minimal undermining beneath the pedicle flap. Deep Sutures: 5-0 Vicryl Stage 3: Additional Anesthesia Volume In Cc: 1.5 Mohs Rapid Report Verbiage: The area of clinically evident tumor was marked with skin marking ink and appropriately hatched. The initial incision was made following the Mohs approach through the skin. The specimen was taken to the lab, divided into the necessary number of pieces, chromacoded and processed according to the Mohs protocol. This was repeated in successive stages until a tumor free defect was achieved. Surgical Defect Length In Cm (Optional): 1.4 Cartilage Graft Text: The defect edges were debeveled with a #15 scalpel blade. Given the location of the defect, shape of the defect, the fact the defect involved a full thickness cartilage defect a cartilage graft was deemed most appropriate. An appropriate donor site was identified, cleansed, and anesthetized. The cartilage graft was then harvested and transferred to the recipient site, oriented appropriately and then sutured into place. The secondary defect was then repaired using a primary closure. Wound Care: Bacitracin A-T Advancement Flap Text: The defect edges were debeveled with a #15 scalpel blade. Given the location of the defect, shape of the defect and the proximity to free margins an A-T advancement flap was deemed most appropriate. Using a sterile surgical marker, an appropriate advancement flap was drawn incorporating the defect and placing the expected incisions within the relaxed skin tension lines where possible. The area thus outlined was incised deep to adipose tissue with a #15 scalpel blade. The skin margins were undermined to an appropriate distance in all directions utilizing iris scissors. Estimated Blood Loss (Cc): minimal Detail Level: Detailed Cheek-To-Nose Interpolation Flap Text: A decision was made to reconstruct the defect utilizing an interpolation axial flap and a staged reconstruction. A telfa template was made of the defect. This telfa template was then used to outline the Cheek-To-Nose Interpolation flap. The donor area for the pedicle flap was then injected with anesthesia. The flap was excised through the skin and subcutaneous tissue down to the layer of the underlying musculature. The interpolation flap was carefully excised within this deep plane to maintain its blood supply. The edges of the donor site were undermined. The donor site was closed in a primary fashion. The pedicle was then rotated into position and sutured. Once the tube was sutured into place, adequate blood supply was confirmed with blanching and refill. The pedicle was then wrapped with xeroform gauze and dressed appropriately with a telfa and gauze bandage to ensure continued blood supply and protect the attached pedicle. Wound Care (No Sutures): Petrolatum Advancement-Rotation Flap Text: The defect edges were debeveled with a #15 scalpel blade. Given the location of the defect, shape of the defect and the proximity to free margins an advancement-rotation flap was deemed most appropriate. Using a sterile surgical marker, an appropriate flap was drawn incorporating the defect and placing the expected incisions within the relaxed skin tension lines where possible. The area thus outlined was incised deep to adipose tissue with a #15 scalpel blade. The skin margins were undermined to an appropriate distance in all directions utilizing iris scissors. Hatchet Flap Text: The defect edges were debeveled with a #15 scalpel blade. Given the location of the defect, shape of the defect and the proximity to free margins a hatchet flap was deemed most appropriate. Using a sterile surgical marker, an appropriate hatchet flap was drawn incorporating the defect and placing the expected incisions within the relaxed skin tension lines where possible. The area thus outlined was incised deep to adipose tissue with a #15 scalpel blade. The skin margins were undermined to an appropriate distance in all directions utilizing iris scissors. Melolabial Transposition Flap Text: The defect edges were debeveled with a #15 scalpel blade. Given the location of the defect and the proximity to free margins a melolabial flap was deemed most appropriate. Using a sterile surgical marker, an appropriate melolabial transposition flap was drawn incorporating the defect. The area thus outlined was incised deep to adipose tissue with a #15 scalpel blade. The skin margins were undermined to an appropriate distance in all directions utilizing iris scissors. Donor Site Anesthesia Type: same as repair anesthesia O-L Flap Text: The defect edges were debeveled with a #15 scalpel blade. Given the location of the defect, shape of the defect and the proximity to free margins an O-L flap was deemed most appropriate. Using a sterile surgical marker, an appropriate advancement flap was drawn incorporating the defect and placing the expected incisions within the relaxed skin tension lines where possible. The area thus outlined was incised deep to adipose tissue with a #15 scalpel blade. The skin margins were undermined to an appropriate distance in all directions utilizing iris scissors. Ear Star Wedge Flap Text: The defect edges were debeveled with a #15 blade scalpel. Given the location of the defect and the proximity to free margins (helical rim) an ear star wedge flap was deemed most appropriate. Using a sterile surgical marker, the appropriate flap was drawn incorporating the defect and placing the expected incisions between the helical rim and antihelix where possible. The area thus outlined was incised through and through with a #15 scalpel blade. Repair Performed By Another Provider Text (Leave Blank If You Do Not Want): After obtaining clear surgical margins the defect was repaired by another provider. Melanoma In Situ Histology Text: On a sun-damaged skin, there is a broad and asymmetrical proliferation of enlarged and atypical melanocytes present continuously as single cells and in small irregular coalescing nests along the dermoepidermal junction. The melanocytes extend into the superficial portions of epithelial structures of adnexa. Pagetoid spread of melanocytes is appreciated. Occasional mitotic figures and individually necrotic melanocytes are also noted. In the underlying papillary dermis, there is mild lymphocytic inflammatory infiltrate with prominent dermal fibroplasia and melanophages. Spiral Flap Text: The defect edges were debeveled with a #15 scalpel blade. Given the location of the defect, shape of the defect and the proximity to free margins a spiral flap was deemed most appropriate. Using a sterile surgical marker, an appropriate rotation flap was drawn incorporating the defect and placing the expected incisions within the relaxed skin tension lines where possible. The area thus outlined was incised deep to adipose tissue with a #15 scalpel blade. The skin margins were undermined to an appropriate distance in all directions utilizing iris scissors. Hemostasis: Electrocautery Scc Ka Subtype Histology Text: there is a cup-shaped exoendophytic epithelial neoplasm characterized by proliferations of keratinocytes with abundant eosinophilic glossy cytoplasm and nuclei with open chromatin in the papillary and upper reticular dermis. Multiple inclusions containing elastic material are seen within the cytoplasm. The features are of squamous cell carcinoma, keratoacanthoma type. V-Y Plasty Text: The defect edges were debeveled with a #15 scalpel blade. Given the location of the defect, shape of the defect and the proximity to free margins an V-Y advancement flap was deemed most appropriate. Using a sterile surgical marker, an appropriate advancement flap was drawn incorporating the defect and placing the expected incisions within the relaxed skin tension lines where possible. The area thus outlined was incised deep to adipose tissue with a #15 scalpel blade. The skin margins were undermined to an appropriate distance in all directions utilizing iris scissors. Bcc  Nodular Histology Text: There are uniform nodular masses of basaloid neoplastic cells with scanty cytoplasm and peripheral palisading with a loose fibrous stroma. The appearance is typical for a nodular basal cell carcinoma. Complex Repair And Graft Additional Text (Will Appearing After The Standard Complex Repair Text): The complex repair was not sufficient to completely close the primary defect. The remaining additional defect was repaired with the graft mentioned below. Presence Of Scar Tissue (For Histology): absent Full Thickness Lip Wedge Repair (Flap) Text: Given the location of the defect and the proximity to free margins a full thickness wedge repair was deemed most appropriate. Using a sterile surgical marker, the appropriate repair was drawn incorporating the defect and placing the expected incisions perpendicular to the vermilion border. The vermilion border was also meticulously outlined to ensure appropriate reapproximation during the repair. The area thus outlined was incised through and through with a #15 scalpel blade. The muscularis and dermis were reaproximated with deep sutures following hemostasis. Care was taken to realign the vermilion border before proceeding with the superficial closure. Once the vermilion was realigned the superfical and mucosal closure was finished. Cheiloplasty (Complex) Text: A decision was made to reconstruct the defect with a  cheiloplasty. The defect was undermined extensively. Additional obicularis oris muscle was excised with a 15 blade scalpel. The defect was converted into a full thickness wedge to facilite a better cosmetic result. Small vessels were then tied off with 5-0 monocyrl. The obicularis oris, superficial fascia, adipose and dermis were then reapproximated. After the deeper layers were approximated the epidermis was reapproximated with particular care given to realign the vermilion border. Trilobed Flap Text: The defect edges were debeveled with a #15 scalpel blade. Given the location of the defect and the proximity to free margins a trilobed flap was deemed most appropriate. Using a sterile surgical marker, an appropriate trilobed flap drawn around the defect. The area thus outlined was incised deep to adipose tissue with a #15 scalpel blade. The skin margins were undermined to an appropriate distance in all directions utilizing iris scissors. Previous Accession (Optional): [id number] Consent (Temporal Branch)/Introductory Paragraph: The rationale for Mohs was explained to the patient and consent was obtained. The risks, benefits and alternatives to therapy were discussed in detail. Specifically, the risks of damage to the temporal branch of the facial nerve, infection, scarring, bleeding, prolonged wound healing, incomplete removal, allergy to anesthesia, and recurrence were addressed. Prior to the procedure, the treatment site was clearly identified and confirmed by the patient. All components of Universal Protocol/PAUSE Rule completed. Xenograft Text: The defect edges were debeveled with a #15 scalpel blade. Given the location of the defect, shape of the defect and the proximity to free margins a xenograft was deemed most appropriate. The graft was then trimmed to fit the size of the defect. The graft was then placed in the primary defect and oriented appropriately. Star Wedge Flap Text: The defect edges were debeveled with a #15 scalpel blade. Given the location of the defect, shape of the defect and the proximity to free margins a star wedge flap was deemed most appropriate. Using a sterile surgical marker, an appropriate rotation flap was drawn incorporating the defect and placing the expected incisions within the relaxed skin tension lines where possible. The area thus outlined was incised deep to adipose tissue with a #15 scalpel blade. The skin margins were undermined to an appropriate distance in all directions utilizing iris scissors. Dressing: dry sterile dressing Area H Indication Text: Tumors in this location are included in Area H (eyelids, eyebrows, nose, lips, chin, ear, pre-auricular, post-auricular, temple, genitalia, hands, feet, ankles and areola). Tissue conservation is critical in these anatomic locations. Partial Purse String (Simple) Text: Given the location of the defect and the characteristics of the surrounding skin a simple purse string closure was deemed most appropriate. Undermining was performed circumfirentially around the surgical defect. A purse string suture was then placed and tightened. Wound tension only allowed a partial closure of the circular defect. Localized Dermabrasion Text: The patient was draped in routine manner. Localized dermabrasion using 3 x 17 mm wire brush was performed in routine manner to papillary dermis. This spot dermabrasion is being performed to complete skin cancer reconstruction. It also will eliminate the other sun damaged precancerous cells that are known to be part of the regional effect of a lifetime's worth of sun exposure. This localized dermabrasion is therapeutic and should not be considered cosmetic in any regard. Consent (Scalp)/Introductory Paragraph: The rationale for Mohs was explained to the patient and consent was obtained. The risks, benefits and alternatives to therapy were discussed in detail. Specifically, the risks of changes in hair growth pattern secondary to repair, infection, scarring, bleeding, prolonged wound healing, incomplete removal, allergy to anesthesia, nerve injury and recurrence were addressed. Prior to the procedure, the treatment site was clearly identified and confirmed by the patient. All components of Universal Protocol/PAUSE Rule completed. Alar Island Pedicle Flap Text: The defect edges were debeveled with a #15 scalpel blade. Given the location of the defect, shape of the defect and the proximity to the alar rim an island pedicle advancement flap was deemed most appropriate. Using a sterile surgical marker, an appropriate advancement flap was drawn incorporating the defect, outlining the appropriate donor tissue and placing the expected incisions within the nasal ala running parallel to the alar rim. The area thus outlined was incised with a #15 scalpel blade. The skin margins were undermined minimally to an appropriate distance in all directions around the primary defect and laterally outward around the island pedicle utilizing iris scissors. There was minimal undermining beneath the pedicle flap. Keystone Flap Text: The defect edges were debeveled with a #15 scalpel blade. Given the location of the defect, shape of the defect a keystone flap was deemed most appropriate. Using a sterile surgical marker, an appropriate keystone flap was drawn incorporating the defect, outlining the appropriate donor tissue and placing the expected incisions within the relaxed skin tension lines where possible. The area thus outlined was incised deep to adipose tissue with a #15 scalpel blade. The skin margins were undermined to an appropriate distance in all directions around the primary defect and laterally outward around the flap utilizing iris scissors. Referred To Otolaryngology For Closure Text (Leave Blank If You Do Not Want): After obtaining clear surgical margins the patient was sent to otolaryngology for surgical repair. The patient understands they will receive post-surgical care and follow-up from the referring physician's office. W Plasty Text: The lesion was extirpated to the level of the fat with a #15 scalpel blade. Given the location of the defect, shape of the defect and the proximity to free margins a W-plasty was deemed most appropriate for repair. Using a sterile surgical marker, the appropriate transposition arms of the W-plasty were drawn incorporating the defect and placing the expected incisions within the relaxed skin tension lines where possible. The area thus outlined was incised deep to adipose tissue with a #15 scalpel blade. The skin margins were undermined to an appropriate distance in all directions utilizing iris scissors. The opposing transposition arms were then transposed into place in opposite direction and anchored with interrupted buried subcutaneous sutures. Home Suture Removal Text: Patient was provided instructions on removing sutures and will remove their sutures at home. If they have any questions or difficulties they will call the office. Surgeon Performing Repair (Optional): Caro Mendez MD No Residual Tumor Seen Histology Text: There were no malignant cells seen in the sections examined. Location Indication Override (Is Already Calculated Based On Selected Body Location): Area H Bcc Histology Text: Beneath an irregular epidermis, there are small nodular masses of basaloid neoplastic cells with nuclear pleomorphism attached to the basal layer and surrounded by a loose fibrous stroma and mild inflammation in the dermis. The findings are typical for a basal cell carcinoma. Scc Poorly Differentiated Histology Text: there are strands and nests of pleomorphic cells present in the infiltrative pattern. Mitotic activity is brisk. There is vascular proliferation and acute and chronic inflammation in the dermis. The findings are those of a poorly differentiated squamous cell carcinoma. Mohs Case Number: [id number] Helical Rim Advancement Flap Text: The defect edges were debeveled with a #15 blade scalpel. Given the location of the defect and the proximity to free margins (helical rim) a double helical rim advancement flap was deemed most appropriate. Using a sterile surgical marker, the appropriate advancement flaps were drawn incorporating the defect and placing the expected incisions between the helical rim and antihelix where possible. The area thus outlined was incised through and through with a #15 scalpel blade. With a skin hook and iris scissors, the flaps were gently and sharply undermined and freed up. Advancement Flap (Double) Text: The defect edges were debeveled with a #15 scalpel blade. Given the location of the defect and the proximity to free margins a double advancement flap was deemed most appropriate. Using a sterile surgical marker, the appropriate advancement flaps were drawn incorporating the defect and placing the expected incisions within the relaxed skin tension lines where possible. The area thus outlined was incised deep to adipose tissue with a #15 scalpel blade. The skin margins were undermined to an appropriate distance in all directions utilizing iris scissors. Eye Protection Verbiage: Before proceeding with the stage, a plastic scleral shield was inserted. The globe was anesthetized with a few drops of 1% lidocaine with 1:100,000 epinephrine. Then, an appropriate sized scleral shield was chosen and coated with lacrilube ointment. The shield was gently inserted and left in place for the duration of each stage. After the stage was completed, the shield was gently removed. Composite Graft Text: The defect edges were debeveled with a #15 scalpel blade. Given the location of the defect, shape of the defect, the proximity to free margins and the fact the defect was full thickness a composite graft was deemed most appropriate. The defect was outline and then transferred to the donor site. A full thickness graft was then excised from the donor site. The graft was then placed in the primary defect, oriented appropriately and then sutured into place. The secondary defect was then repaired using a primary closure. Inflammation Suggestive Of Cancer Camouflage Histology Text: There was a dense lymphocytic infiltrate which prevented adequate histologic evaluation of adjacent structures. Ftsg Text: The defect edges were debeveled with a #15 scalpel blade. Given the location of the defect, shape of the defect and the proximity to free margins a full thickness skin graft was deemed most appropriate. Using a sterile surgical marker, the primary defect shape was transferred to the donor site. The area thus outlined was incised deep to adipose tissue with a #15 scalpel blade. The harvested graft was then trimmed of adipose tissue until only dermis and epidermis was left. The skin margins of the secondary defect were undermined to an appropriate distance in all directions utilizing iris scissors. The secondary defect was closed with interrupted buried subcutaneous sutures. The skin edges were then re-apposed with running  sutures. The skin graft was then placed in the primary defect and oriented appropriately. Closure 3 Information: This tab is for additional flaps and grafts above and beyond our usual structured repairs.  Please note if you enter information here it will not currently bill and you will need to add the billing information manually. Alternatives Discussed Intro (Do Not Add Period): I discussed alternative treatments to Mohs surgery and specifically discussed the risks and benefits of Paramedian Forehead Flap Text: A decision was made to reconstruct the defect utilizing an interpolation axial flap and a staged reconstruction. A telfa template was made of the defect. This telfa template was then used to outline the paramedian forehead pedicle flap. The donor area for the pedicle flap was then injected with anesthesia. The flap was excised through the skin and subcutaneous tissue down to the layer of the underlying musculature. The pedicle flap was carefully excised within this deep plane to maintain its blood supply. The edges of the donor site were undermined. The donor site was closed in a primary fashion. The pedicle was then rotated into position and sutured. Once the tube was sutured into place, adequate blood supply was confirmed with blanching and refill. The pedicle was then wrapped with xeroform gauze and dressed appropriately with a telfa and gauze bandage to ensure continued blood supply and protect the attached pedicle. Tumor Debulked?: not debulked Repair Type: Graft Island Pedicle Flap Text: The defect edges were debeveled with a #15 scalpel blade. Given the location of the defect, shape of the defect and the proximity to free margins an island pedicle advancement flap was deemed most appropriate. Using a sterile surgical marker, an appropriate advancement flap was drawn incorporating the defect, outlining the appropriate donor tissue and placing the expected incisions within the relaxed skin tension lines where possible. The area thus outlined was incised deep to adipose tissue with a #15 scalpel blade. The skin margins were undermined to an appropriate distance in all directions around the primary defect and laterally outward around the island pedicle utilizing iris scissors. There was minimal undermining beneath the pedicle flap. X Size Of Lesion In Cm (Optional): 0.5 Surgeon/Pathologist Verbiage (Will Incorporate Name Of Surgeon From Intro If Not Blank): operated in two distinct and integrated capacities as the surgeon and pathologist. O-Z Plasty Text: The defect edges were debeveled with a #15 scalpel blade. Given the location of the defect, shape of the defect and the proximity to free margins an O-Z plasty (double transposition flap) was deemed most appropriate. Using a sterile surgical marker, the appropriate transposition flaps were drawn incorporating the defect and placing the expected incisions within the relaxed skin tension lines where possible. The area thus outlined was incised deep to adipose tissue with a #15 scalpel blade. The skin margins were undermined to an appropriate distance in all directions utilizing iris scissors. Hemostasis was achieved with electrocautery. The flaps were then transposed into place, one clockwise and the other counterclockwise, and anchored with interrupted buried subcutaneous sutures. Consent (Ear)/Introductory Paragraph: The rationale for Mohs was explained to the patient and consent was obtained. The risks, benefits and alternatives to therapy were discussed in detail. Specifically, the risks of ear deformity, infection, scarring, bleeding, prolonged wound healing, incomplete removal, allergy to anesthesia, nerve injury and recurrence were addressed. Prior to the procedure, the treatment site was clearly identified and confirmed by the patient. All components of Universal Protocol/PAUSE Rule completed. Consent (Spinal Accessory)/Introductory Paragraph: The rationale for Mohs was explained to the patient and consent was obtained. The risks, benefits and alternatives to therapy were discussed in detail. Specifically, the risks of damage to the spinal accessory nerve, infection, scarring, bleeding, prolonged wound healing, incomplete removal, allergy to anesthesia, and recurrence were addressed. Prior to the procedure, the treatment site was clearly identified and confirmed by the patient. All components of Universal Protocol/PAUSE Rule completed. Secondary Intention Text (Leave Blank If You Do Not Want): The defect will heal with secondary intention. Modified Advancement Flap Text: The defect edges were debeveled with a #15 scalpel blade. Given the location of the defect, shape of the defect and the proximity to free margins a modified advancement flap was deemed most appropriate. Using a sterile surgical marker, an appropriate advancement flap was drawn incorporating the defect and placing the expected incisions within the relaxed skin tension lines where possible. The area thus outlined was incised deep to adipose tissue with a #15 scalpel blade. The skin margins were undermined to an appropriate distance in all directions utilizing iris scissors. Skin Substitute Text: The defect edges were debeveled with a #15 scalpel blade. Given the location of the defect, shape of the defect and the proximity to free margins a skin substitute graft was deemed most appropriate. The graft material was trimmed to fit the size of the defect. The graft was then placed in the primary defect and oriented appropriately. Closure 2 Information: This tab is for additional flaps and grafts, including complex repair and grafts and complex repair and flaps. You can also specify a different location for the additional defect, if the location is the same you do not need to select a new one. We will insert the automated text for the repair you select below just as we do for solitary flaps and grafts. Please note that at this time if you select a location with a different insurance zone you will need to override the ICD10 and CPT if appropriate. Area L Indication Text: Tumors in this location are included in Area L (trunk and extremities). Mohs surgery is indicated for larger tumors, or tumors with aggressive histologic features, in these anatomic locations. Anesthesia Type: 1% lidocaine with epinephrine and a 1:10 solution of 8.4% sodium bicarbonate Melolabial Interpolation Flap Text: A decision was made to reconstruct the defect utilizing an interpolation axial flap and a staged reconstruction. A telfa template was made of the defect. This telfa template was then used to outline the melolabial interpolation flap. The donor area for the pedicle flap was then injected with anesthesia. The flap was excised through the skin and subcutaneous tissue down to the layer of the underlying musculature. The pedicle flap was carefully excised within this deep plane to maintain its blood supply. The edges of the donor site were undermined. The donor site was closed in a primary fashion. The pedicle was then rotated into position and sutured. Once the tube was sutured into place, adequate blood supply was confirmed with blanching and refill. The pedicle was then wrapped with xeroform gauze and dressed appropriately with a telfa and gauze bandage to ensure continued blood supply and protect the attached pedicle.

## 2020-07-13 NOTE — PROGRESS NOTES
Care assumed from previous RN. A&O, VSS, NSR on tele, assessment complete as documented. Pt aware of plan for possible chest tube placement in AM and need to remain NPO after 0000. Medicated per MAR with Norco 7.5/325 for 7/10 back pain. Denies other needs at this time.

## 2020-07-14 ENCOUNTER — APPOINTMENT (OUTPATIENT)
Dept: GENERAL RADIOLOGY | Age: 55
DRG: 206 | End: 2020-07-14
Payer: COMMERCIAL

## 2020-07-14 LAB
A/G RATIO: 1.1 (ref 1.1–2.2)
ALBUMIN SERPL-MCNC: 3.2 G/DL (ref 3.4–5)
ALP BLD-CCNC: 184 U/L (ref 40–129)
ALT SERPL-CCNC: 17 U/L (ref 10–40)
ANION GAP SERPL CALCULATED.3IONS-SCNC: 12 MMOL/L (ref 3–16)
APTT: 35.2 SEC (ref 24.2–36.2)
AST SERPL-CCNC: 22 U/L (ref 15–37)
BASOPHILS ABSOLUTE: 0.1 K/UL (ref 0–0.2)
BASOPHILS RELATIVE PERCENT: 1.5 %
BILIRUB SERPL-MCNC: 0.5 MG/DL (ref 0–1)
BLOOD CULTURE, ROUTINE: NORMAL
BODY FLUID CULTURE, STERILE: NORMAL
BUN BLDV-MCNC: 5 MG/DL (ref 7–20)
CALCIUM SERPL-MCNC: 9 MG/DL (ref 8.3–10.6)
CHLORIDE BLD-SCNC: 107 MMOL/L (ref 99–110)
CO2: 22 MMOL/L (ref 21–32)
CREAT SERPL-MCNC: <0.5 MG/DL (ref 0.6–1.1)
CULTURE, BLOOD 2: NORMAL
EOSINOPHILS ABSOLUTE: 0.7 K/UL (ref 0–0.6)
EOSINOPHILS RELATIVE PERCENT: 11.9 %
FIBRINOGEN: 523 MG/DL (ref 200–397)
GFR AFRICAN AMERICAN: >60
GFR NON-AFRICAN AMERICAN: >60
GLOBULIN: 2.8 G/DL
GLUCOSE BLD-MCNC: 92 MG/DL (ref 70–99)
GRAM STAIN RESULT: NORMAL
HCT VFR BLD CALC: 35 % (ref 36–48)
HEMOGLOBIN: 11.4 G/DL (ref 12–16)
INR BLD: 1.15 (ref 0.86–1.14)
LYMPHOCYTES ABSOLUTE: 1.4 K/UL (ref 1–5.1)
LYMPHOCYTES RELATIVE PERCENT: 23.2 %
MCH RBC QN AUTO: 27 PG (ref 26–34)
MCHC RBC AUTO-ENTMCNC: 32.7 G/DL (ref 31–36)
MCV RBC AUTO: 82.8 FL (ref 80–100)
MONOCYTES ABSOLUTE: 0.7 K/UL (ref 0–1.3)
MONOCYTES RELATIVE PERCENT: 10.9 %
NEUTROPHILS ABSOLUTE: 3.2 K/UL (ref 1.7–7.7)
NEUTROPHILS RELATIVE PERCENT: 52.5 %
PATH CONSULT FLUID: NORMAL
PDW BLD-RTO: 15.3 % (ref 12.4–15.4)
PLATELET # BLD: 226 K/UL (ref 135–450)
PMV BLD AUTO: 10.2 FL (ref 5–10.5)
POTASSIUM REFLEX MAGNESIUM: 4.1 MMOL/L (ref 3.5–5.1)
PROTHROMBIN TIME: 13.4 SEC (ref 10–13.2)
RBC # BLD: 4.22 M/UL (ref 4–5.2)
SODIUM BLD-SCNC: 141 MMOL/L (ref 136–145)
TOTAL PROTEIN: 6 G/DL (ref 6.4–8.2)
WBC # BLD: 6 K/UL (ref 4–11)

## 2020-07-14 PROCEDURE — 2580000003 HC RX 258: Performed by: INTERNAL MEDICINE

## 2020-07-14 PROCEDURE — 6360000002 HC RX W HCPCS: Performed by: NURSE PRACTITIONER

## 2020-07-14 PROCEDURE — 99024 POSTOP FOLLOW-UP VISIT: CPT | Performed by: THORACIC SURGERY (CARDIOTHORACIC VASCULAR SURGERY)

## 2020-07-14 PROCEDURE — 71045 X-RAY EXAM CHEST 1 VIEW: CPT

## 2020-07-14 PROCEDURE — 6370000000 HC RX 637 (ALT 250 FOR IP): Performed by: INTERNAL MEDICINE

## 2020-07-14 PROCEDURE — 85610 PROTHROMBIN TIME: CPT

## 2020-07-14 PROCEDURE — 85384 FIBRINOGEN ACTIVITY: CPT

## 2020-07-14 PROCEDURE — 94640 AIRWAY INHALATION TREATMENT: CPT

## 2020-07-14 PROCEDURE — 2580000003 HC RX 258: Performed by: NURSE PRACTITIONER

## 2020-07-14 PROCEDURE — 85025 COMPLETE CBC W/AUTO DIFF WBC: CPT

## 2020-07-14 PROCEDURE — 2060000000 HC ICU INTERMEDIATE R&B

## 2020-07-14 PROCEDURE — 80053 COMPREHEN METABOLIC PANEL: CPT

## 2020-07-14 PROCEDURE — 85730 THROMBOPLASTIN TIME PARTIAL: CPT

## 2020-07-14 PROCEDURE — 6360000002 HC RX W HCPCS: Performed by: INTERNAL MEDICINE

## 2020-07-14 PROCEDURE — 6370000000 HC RX 637 (ALT 250 FOR IP): Performed by: THORACIC SURGERY (CARDIOTHORACIC VASCULAR SURGERY)

## 2020-07-14 RX ORDER — ALBUTEROL SULFATE 90 UG/1
2 AEROSOL, METERED RESPIRATORY (INHALATION) EVERY 4 HOURS PRN
Status: DISCONTINUED | OUTPATIENT
Start: 2020-07-14 | End: 2020-07-17 | Stop reason: HOSPADM

## 2020-07-14 RX ADMIN — HYDROCODONE BITARTRATE AND ACETAMINOPHEN 1 TABLET: 7.5; 325 TABLET ORAL at 11:15

## 2020-07-14 RX ADMIN — TICAGRELOR 90 MG: 90 TABLET ORAL at 08:35

## 2020-07-14 RX ADMIN — ACETAMINOPHEN 650 MG: 325 TABLET ORAL at 02:19

## 2020-07-14 RX ADMIN — HYDROCODONE BITARTRATE AND ACETAMINOPHEN 1 TABLET: 7.5; 325 TABLET ORAL at 04:46

## 2020-07-14 RX ADMIN — Medication 2 PUFF: at 08:32

## 2020-07-14 RX ADMIN — ENOXAPARIN SODIUM 30 MG: 30 INJECTION SUBCUTANEOUS at 08:35

## 2020-07-14 RX ADMIN — TICAGRELOR 90 MG: 90 TABLET ORAL at 21:37

## 2020-07-14 RX ADMIN — ALTEPLASE 10 MG: 2.2 INJECTION, POWDER, LYOPHILIZED, FOR SOLUTION INTRAVENOUS at 11:15

## 2020-07-14 RX ADMIN — HYDROCODONE BITARTRATE AND ACETAMINOPHEN 1 TABLET: 7.5; 325 TABLET ORAL at 19:44

## 2020-07-14 RX ADMIN — MAGNESIUM GLUCONATE 500 MG ORAL TABLET 400 MG: 500 TABLET ORAL at 08:36

## 2020-07-14 RX ADMIN — Medication 10 ML: at 08:37

## 2020-07-14 RX ADMIN — ENOXAPARIN SODIUM 30 MG: 30 INJECTION SUBCUTANEOUS at 21:37

## 2020-07-14 RX ADMIN — ATORVASTATIN CALCIUM 80 MG: 80 TABLET, FILM COATED ORAL at 21:37

## 2020-07-14 RX ADMIN — FAMOTIDINE 40 MG: 20 TABLET, FILM COATED ORAL at 21:37

## 2020-07-14 RX ADMIN — METOPROLOL TARTRATE 25 MG: 25 TABLET, FILM COATED ORAL at 08:35

## 2020-07-14 RX ADMIN — ASPIRIN 81 MG: 81 TABLET ORAL at 08:36

## 2020-07-14 RX ADMIN — DORNASE ALFA 5 MG: 1 SOLUTION RESPIRATORY (INHALATION) at 11:16

## 2020-07-14 RX ADMIN — METOPROLOL TARTRATE 25 MG: 25 TABLET, FILM COATED ORAL at 21:37

## 2020-07-14 RX ADMIN — Medication 10 ML: at 21:37

## 2020-07-14 RX ADMIN — MAGNESIUM GLUCONATE 500 MG ORAL TABLET 400 MG: 500 TABLET ORAL at 21:37

## 2020-07-14 ASSESSMENT — PAIN SCALES - GENERAL
PAINLEVEL_OUTOF10: 6
PAINLEVEL_OUTOF10: 5
PAINLEVEL_OUTOF10: 10
PAINLEVEL_OUTOF10: 7
PAINLEVEL_OUTOF10: 7
PAINLEVEL_OUTOF10: 6
PAINLEVEL_OUTOF10: 6

## 2020-07-14 NOTE — PROGRESS NOTES
RESPIRATORY THERAPY ASSESSMENT    Name:  Pieter [place] Record Number:  [id number]  Age: 48 y.o. Gender: female  : 1965  Today's Date:  2020  Room:  [facility]042-    Assessment     Is the patient being admitted for a COPD or Asthma exacerbation? No  (If yes the patient will be seen every 4 hours for the first 24 hours and then reassessed)    Patient Admission Diagnosis      Allergies  No Known Allergies    Minimum Predicted Vital Capacity:     NA          Actual Vital Capacity:      NA              Pulmonary History:No history  Home Oxygen Therapy:  room air  Home Respiratory Therapy:None   Current Respiratory Therapy:  Albuterol QID  Treatment Type: MDI  Medications: Ipratropium Bromide    Respiratory Severity Index(RSI)   Patients with orders for inhalation medications, oxygen, or any therapeutic treatment modality will be placed on Respiratory Protocol. They will be assessed with the first treatment and at least every 72 hours thereafter. The following severity scale will be used to determine frequency of treatment intervention.     Smoking History: Smoking History Less than 1ppd or less than 15 pack year = 1    Social History  Social History     Tobacco Use    Smoking status: Former Smoker     Packs/day: 1.00     Years: 10.00     Pack years: 10.00     Types: Cigarettes    Smokeless tobacco: Never Used   Substance Use Topics    Alcohol use: No    Drug use: No       Recent Surgical History: None = 0  Past Surgical History  Past Surgical History:   Procedure Laterality Date     SECTION      CORONARY ARTERY BYPASS GRAFT N/A 2020    CORONARY ARTERY BYPASS GRAFTING X1, INTERNAL MAMMARY ARTERY, OFF PUMP (LIMA TO LAD), 5 LEVEL BILATERAL INTERCOSTAL NERVE BLOCK, BALLOON PUMP REMOVAL performed by aSnjay Mcmullen MD at [facility]  2020    CT INSERT CATH PLEURA W IMAGE 2020 Jerzy Castro [place] [address] [facility] [place] CT SCAN    SINUS SURGERY         Level of Consciousness: Alert, Oriented, and Cooperative = 0    Level of Activity: Walking with assistance = 1    Respiratory Pattern: Regular Pattern; RR 8-20 = 0    Breath Sounds: Clear = 0    Sputum  Sputum Color: White,  , Sputum How Obtained: Cough on request  Cough: Strong, spontaneous, non-productive = 0    Vital Signs   BP (!) 83/57   Pulse 72   Temp 97.6 °F (36.4 °C) (Oral)   Resp 16   Ht 5' 3\" (1.6 m)   Wt 116 lb 1.6 oz (52.7 kg)   LMP 11/01/2017 (Approximate)   SpO2 96%   BMI 20.57 kg/m²   SPO2 (COPD values may differ): Greater than or equal to 92% on room air = 0    Peak Flow (asthma only): not applicable = 0    RSI: 0-4 = See once and convert to home regimen or discontinue        Plan       Goals: medication delivery, mobilize retained secretions, volume expansion and improve oxygenation    Patient/caregiver was educated on the proper method of use for Respiratory Care Devices:  Yes      Level of patient/caregiver understanding able to:   ? Verbalize understanding   ? Demonstrate understanding       ? Teach back        ? Needs reinforcement       ? No available caregiver               ? Other:     Response to education:  Excellent     Is patient being placed on Home Treatment Regimen? NA     Does the patient have everything they need prior to discharge? NA     Comments: chart reviewed/patient assessed    Plan of Care: decrease to PRN    Electronically signed by Stephanie Metz RCP on 7/14/2020 at 11:49 AM    Respiratory Protocol Guidelines     1. Assessment and treatment by Respiratory Therapy will be initiated for medication and therapeutic interventions upon initiation of aerosolized medication. 2. Physician will be contacted for respiratory rate (RR) greater than 35 breaths per minute. Therapy will be held for heart rate (HR) greater than 140 beats per minute, pending direction from physician.   3. Bronchodilators will be administered via Metered Dose Inhaler (MDI) with spacer when the following criteria are met:  a. Alert and cooperative     b. HR < 140 bpm  c. RR < 30 bpm                d. Can demonstrate a 2-3 second inspiratory hold  4. Bronchodilators will be administered via Hand Held Nebulizer [facility] [place]) to patients when ANY of the following criteria are met  a. Incognizant or uncooperative          b. Patients treated with HHN at Home        c. Unable to demonstrate proper use of MDI with spacer     d. RR > 30 bpm   5. Bronchodilators will be delivered via Metered Dose Inhaler (MDI), HHN, Aerogen to intubated patients on mechanical ventilation. 6. Inhalation medication orders will be delivered and/or substituted as outlined below. Aerosolized Medications Ordering and Administration Guidelines:    1. All Medications will be ordered by a physician, and their frequency and/or modality will be adjusted as defined by the patients Respiratory Severity Index (RSI) score. 2. If the patient does not have documented COPD, consider discontinuing anticholinergics when RSI is less than 9.  3. If the bronchospasm worsens (increased RSI), then the bronchodilator frequency can be increased to a maximum of every 4 hours. If greater than every 4 hours is required, the physician will be contacted. 4. If the bronchospasm improves, the frequency of the bronchodilator can be decreased, based on the patient's RSI, but not less than home treatment regimen frequency. 5. Bronchodilator(s) will be discontinued if patient has a RSI less than 9 and has received no scheduled or as needed treatment for 72  Hrs. Patients Ordered on a Mucolytic Agent:    1. Must always be administered with a bronchodilator. 2. Discontinue if patient experiences worsened bronchospasm, or secretions have lessened to the point that the patient is able to clear them with a cough. Anti-inflammatory and Combination Medications:    1.  If the patient lacks prior history of lung disease, is not using inhaled anti-inflammatory medication at home, and lacks wheezing by examination or by history for at least 24 hours, contact physician for possible discontinuation.

## 2020-07-14 NOTE — PROGRESS NOTES
Bedside report received from [facility]. Patient resting comfortably in bed. No signs of discomfort or distress. Bed is in lowest position, wheels locked, 2/2 side rails up. Bedside table and call light within reach. White board updated. Will continue to monitor patient. Jessica Garcia RN    9:37 PM  PRN norco given for pain.  Jessica Garcia RN

## 2020-07-14 NOTE — PROGRESS NOTES
No edema. I examined the patient today (07/14/20). Labs:   Recent Labs     07/12/20 0418 07/13/20  0427 07/14/20  0433   WBC 6.9 6.5 6.0   HGB 11.5* 10.9* 11.4*   HCT 35.1* 33.4* 35.0*    214 226     Recent Labs     07/12/20 0418 07/13/20  0427 07/14/20  0433    140 141   K 5.1 3.9 4.1    106 107   CO2 25 21 22   BUN 8 7 5*   CREATININE 0.6 <0.5* <0.5*   CALCIUM 9.2 9.0 9.0     Recent Labs     07/12/20 0418 07/13/20  0427 07/14/20  0433   AST 18 16 22   ALT 14 14 17   BILITOT 0.4 0.4 0.5   ALKPHOS 149* 145* 184*     Recent Labs     07/12/20 0418 07/13/20  0427 07/14/20  0433   INR 1.21* 1.27* 1.15*     No results for input(s): Gerard Nilemelia in the last 72 hours. Urinalysis:      Lab Results   Component Value Date    NITRU Negative 07/10/2020    WBCUA 10-20 06/01/2020    BACTERIA 3+ 06/01/2020    RBCUA 0-2 06/01/2020    BLOODU Negative 07/10/2020    SPECGRAV 1.010 07/10/2020    GLUCOSEU Negative 07/10/2020       Radiology:  XR CHEST PORTABLE   Final Result   1. Improved with residual small left hydropneumothorax status post left chest   tube. CT GUIDED PLEURAL DRAINAGE W CATH PERC   Final Result   Successful CT guided placement of a left-sided chest tube         XR CHEST STANDARD (2 VW)   Final Result   Unchanged small left pleural effusion. XR CHEST PORTABLE   Final Result   Moderate left pleural effusion, decreased as compared to prior, with adjacent   left basilar atelectasis or airspace disease. No gross pneumothorax. US THORACENTESIS   Final Result   Successful ultrasound guided left-sided thoracentesis. A sample was sent for analysis at the request of the ordering clinician. CT CHEST PULMONARY EMBOLISM W CONTRAST   Final Result   1. Negative for pulmonary embolus. 2. Moderate-sized left effusion with associated underlying left basilar   compressive atelectasis. 3. Advanced emphysema.          XR CHEST PORTABLE   Final Result

## 2020-07-15 ENCOUNTER — APPOINTMENT (OUTPATIENT)
Dept: GENERAL RADIOLOGY | Age: 55
DRG: 206 | End: 2020-07-15
Payer: COMMERCIAL

## 2020-07-15 LAB
A/G RATIO: 1 (ref 1.1–2.2)
ALBUMIN SERPL-MCNC: 3.2 G/DL (ref 3.4–5)
ALP BLD-CCNC: 209 U/L (ref 40–129)
ALT SERPL-CCNC: 21 U/L (ref 10–40)
ANION GAP SERPL CALCULATED.3IONS-SCNC: 10 MMOL/L (ref 3–16)
APTT: 24.6 SEC (ref 24.2–36.2)
AST SERPL-CCNC: 25 U/L (ref 15–37)
BASOPHILS ABSOLUTE: 0.1 K/UL (ref 0–0.2)
BASOPHILS RELATIVE PERCENT: 1.1 %
BILIRUB SERPL-MCNC: 0.5 MG/DL (ref 0–1)
BUN BLDV-MCNC: 6 MG/DL (ref 7–20)
CALCIUM SERPL-MCNC: 9.4 MG/DL (ref 8.3–10.6)
CHLORIDE BLD-SCNC: 102 MMOL/L (ref 99–110)
CO2: 22 MMOL/L (ref 21–32)
CREAT SERPL-MCNC: <0.5 MG/DL (ref 0.6–1.1)
EOSINOPHILS ABSOLUTE: 0.6 K/UL (ref 0–0.6)
EOSINOPHILS RELATIVE PERCENT: 9.2 %
FIBRINOGEN: 549 MG/DL (ref 200–397)
GFR AFRICAN AMERICAN: >60
GFR NON-AFRICAN AMERICAN: >60
GLOBULIN: 3.3 G/DL
GLUCOSE BLD-MCNC: 109 MG/DL (ref 70–99)
HCT VFR BLD CALC: 39.6 % (ref 36–48)
HEMOGLOBIN: 12.9 G/DL (ref 12–16)
INR BLD: 1.11 (ref 0.86–1.14)
LYMPHOCYTES ABSOLUTE: 1.1 K/UL (ref 1–5.1)
LYMPHOCYTES RELATIVE PERCENT: 15.7 %
MCH RBC QN AUTO: 27.2 PG (ref 26–34)
MCHC RBC AUTO-ENTMCNC: 32.6 G/DL (ref 31–36)
MCV RBC AUTO: 83.4 FL (ref 80–100)
MONOCYTES ABSOLUTE: 0.6 K/UL (ref 0–1.3)
MONOCYTES RELATIVE PERCENT: 8.3 %
NEUTROPHILS ABSOLUTE: 4.6 K/UL (ref 1.7–7.7)
NEUTROPHILS RELATIVE PERCENT: 65.7 %
PDW BLD-RTO: 15.7 % (ref 12.4–15.4)
PLATELET # BLD: 263 K/UL (ref 135–450)
PMV BLD AUTO: 10.3 FL (ref 5–10.5)
POTASSIUM REFLEX MAGNESIUM: 4.1 MMOL/L (ref 3.5–5.1)
PROTHROMBIN TIME: 12.9 SEC (ref 10–13.2)
RBC # BLD: 4.75 M/UL (ref 4–5.2)
SODIUM BLD-SCNC: 134 MMOL/L (ref 136–145)
TOTAL PROTEIN: 6.5 G/DL (ref 6.4–8.2)
WBC # BLD: 7 K/UL (ref 4–11)

## 2020-07-15 PROCEDURE — 71045 X-RAY EXAM CHEST 1 VIEW: CPT

## 2020-07-15 PROCEDURE — 6360000002 HC RX W HCPCS: Performed by: INTERNAL MEDICINE

## 2020-07-15 PROCEDURE — 6370000000 HC RX 637 (ALT 250 FOR IP): Performed by: INTERNAL MEDICINE

## 2020-07-15 PROCEDURE — 85730 THROMBOPLASTIN TIME PARTIAL: CPT

## 2020-07-15 PROCEDURE — 2580000003 HC RX 258: Performed by: NURSE PRACTITIONER

## 2020-07-15 PROCEDURE — 6360000002 HC RX W HCPCS: Performed by: NURSE PRACTITIONER

## 2020-07-15 PROCEDURE — 85025 COMPLETE CBC W/AUTO DIFF WBC: CPT

## 2020-07-15 PROCEDURE — 85384 FIBRINOGEN ACTIVITY: CPT

## 2020-07-15 PROCEDURE — 80053 COMPREHEN METABOLIC PANEL: CPT

## 2020-07-15 PROCEDURE — 85610 PROTHROMBIN TIME: CPT

## 2020-07-15 PROCEDURE — 2580000003 HC RX 258: Performed by: INTERNAL MEDICINE

## 2020-07-15 PROCEDURE — 36415 COLL VENOUS BLD VENIPUNCTURE: CPT

## 2020-07-15 PROCEDURE — 2060000000 HC ICU INTERMEDIATE R&B

## 2020-07-15 RX ADMIN — TICAGRELOR 90 MG: 90 TABLET ORAL at 21:06

## 2020-07-15 RX ADMIN — Medication 10 ML: at 21:07

## 2020-07-15 RX ADMIN — ENOXAPARIN SODIUM 30 MG: 30 INJECTION SUBCUTANEOUS at 09:31

## 2020-07-15 RX ADMIN — ASPIRIN 81 MG: 81 TABLET ORAL at 09:32

## 2020-07-15 RX ADMIN — HYDROCODONE BITARTRATE AND ACETAMINOPHEN 1 TABLET: 7.5; 325 TABLET ORAL at 16:22

## 2020-07-15 RX ADMIN — Medication 10 ML: at 04:33

## 2020-07-15 RX ADMIN — DORNASE ALFA 5 MG: 1 SOLUTION RESPIRATORY (INHALATION) at 10:35

## 2020-07-15 RX ADMIN — ACETAMINOPHEN 650 MG: 325 TABLET ORAL at 21:06

## 2020-07-15 RX ADMIN — FAMOTIDINE 40 MG: 20 TABLET, FILM COATED ORAL at 18:32

## 2020-07-15 RX ADMIN — TICAGRELOR 90 MG: 90 TABLET ORAL at 09:32

## 2020-07-15 RX ADMIN — METOPROLOL TARTRATE 25 MG: 25 TABLET, FILM COATED ORAL at 21:06

## 2020-07-15 RX ADMIN — PROCHLORPERAZINE EDISYLATE 10 MG: 5 INJECTION INTRAMUSCULAR; INTRAVENOUS at 04:33

## 2020-07-15 RX ADMIN — ATORVASTATIN CALCIUM 80 MG: 80 TABLET, FILM COATED ORAL at 21:06

## 2020-07-15 RX ADMIN — MAGNESIUM GLUCONATE 500 MG ORAL TABLET 400 MG: 500 TABLET ORAL at 21:06

## 2020-07-15 RX ADMIN — Medication 10 ML: at 09:32

## 2020-07-15 RX ADMIN — HYDROCODONE BITARTRATE AND ACETAMINOPHEN 1 TABLET: 7.5; 325 TABLET ORAL at 09:31

## 2020-07-15 RX ADMIN — MAGNESIUM GLUCONATE 500 MG ORAL TABLET 400 MG: 500 TABLET ORAL at 09:32

## 2020-07-15 RX ADMIN — METOPROLOL TARTRATE 25 MG: 25 TABLET, FILM COATED ORAL at 09:31

## 2020-07-15 RX ADMIN — ALTEPLASE 10 MG: 2.2 INJECTION, POWDER, LYOPHILIZED, FOR SOLUTION INTRAVENOUS at 10:35

## 2020-07-15 ASSESSMENT — PAIN SCALES - GENERAL
PAINLEVEL_OUTOF10: 5
PAINLEVEL_OUTOF10: 7
PAINLEVEL_OUTOF10: 5
PAINLEVEL_OUTOF10: 5

## 2020-07-15 NOTE — PROGRESS NOTES
CVTS Thoracic Progress Note:          CC:  Pt known to service; CABG x1 on 6/02/20 with Dr. Carloyn Bernheim. Currently here with a left sided pleural effusion     Subj: awake, sitting up in bed, breathing with ease, in NAD    Obj:    Blood pressure (!) 105/58, pulse 93, temperature 97.7 °F (36.5 °C), temperature source Oral, resp. rate 16, height 5' 3\" (1.6 m), weight 115 lb 8 oz (52.4 kg), last menstrual period 11/01/2017, SpO2 98 %. Lungs diminished to left base    Abdomen flat, soft, non-tender   Incision on left posterior chest with dressing, CDI    Chest tube with 660 ml serous drainage in last 24 hrs    Diagnostics:   Recent Labs     07/13/20 0427 07/14/20  0433 07/15/20  0811   WBC 6.5 6.0 7.0   HGB 10.9* 11.4* 12.9   HCT 33.4* 35.0* 39.6    226 263                                                                  Recent Labs     07/13/20 0427 07/14/20  0433 07/15/20  0811    141 134*   K 3.9 4.1 4.1    107 102   CO2 21 22 22   BUN 7 5* 6*   CREATININE <0.5* <0.5* <0.5*   GLUCOSE 91 92 109*     Recent Labs     07/13/20 0427 07/14/20  0433 07/15/20  0810   INR 1.27* 1.15* 1.11     CXR: 7/13 unchanged left pleural effusion  CXR: 7/14   Impression    1. Improved with residual small left hydropneumothorax status post left chest    tube. CXR: 7/15/20   Impression    1. Stable positioning of the left chest tube.  The left pneumothorax has    resolved and the left pleural effusion/left basilar lung consolidation have    improved. Cytology from thoracentesis 7/11/20: negative for malignancy    Assess/Plan:   Care per primary team and pulmonology    Left-sided pleural effusion:   S/p thoracentesis on 7/11 with 1L of serosanguinous fluid removed  CT guided chest tube placed 7/13. TPA/Dornase 7/14 with good result. Will administer again today.    Check CXR tomorrow AM.   _____________________________________________________________      Teresa Britton CNP  7/15/2020  9:32 AM

## 2020-07-15 NOTE — PROGRESS NOTES
Hospitalist Progress Note      PCP: Iam Hairston MD    Date of Admission: 7/10/2020    Chief Complaint: shortness of breath, chest pain, cough    Hospital Course: admitted with recurrent postop left pleural effusion after CABG. Had thoracentesis. Chest x-ray shows smaller but persistent left-sided pleural effusion. Remains on room air. Had CT-guided left-sided chest tube insertion. Required alteplase instillation. Cardiothoracic surgery following. Subjective: no chest pain, improved shortness of breath, no nausea or vomiting. Stable since admission. No new issues. Medications:  Reviewed    Infusion Medications   Scheduled Medications    [START ON 7/16/2020] enoxaparin  40 mg Subcutaneous Daily    aspirin  81 mg Oral Daily    atorvastatin  80 mg Oral Nightly    famotidine  40 mg Oral QPM    magnesium oxide  400 mg Oral BID    metoprolol tartrate  25 mg Oral BID    ticagrelor  90 mg Oral BID    sodium chloride flush  10 mL Intravenous 2 times per day     PRN Meds: albuterol sulfate HFA **AND** ipratropium **AND** MDI Treatment, HYDROcodone-acetaminophen, sodium chloride flush, acetaminophen **OR** acetaminophen, polyethylene glycol, prochlorperazine      Intake/Output Summary (Last 24 hours) at 7/15/2020 1529  Last data filed at 7/15/2020 0512  Gross per 24 hour   Intake 240 ml   Output 2510 ml   Net -2270 ml       Physical Exam Performed:    BP (!) 105/58   Pulse 93   Temp 97.7 °F (36.5 °C) (Oral)   Resp 16   Ht 5' 3\" (1.6 m)   Wt 115 lb 8 oz (52.4 kg)   LMP 11/01/2017 (Approximate)   SpO2 98%   BMI 20.46 kg/m²     General appearance: No apparent distress, appears stated age and cooperative. HEENT: Pupils equal, round, and reactive to light. Conjunctivae/corneas clear. Neck: Supple, with full range of motion. No jugular venous distention. Trachea midline. Respiratory: Moving air well. Left-sided chest tube with serosanguineous drainage.   Decreased left-sided breath sounds. Cardiovascular: Regular rate and rhythm with normal S1/S2 without murmurs, rubs or gallops. Abdomen: Soft, non-tender, non-distended with normal bowel sounds. Musculoskeletal: No clubbing, cyanosis or edema bilaterally. Full range of motion without deformity. Skin: Skin color, texture, turgor normal.  No rashes or lesions. Neurologic:  Neurovascularly intact without any focal sensory/motor deficits. Cranial nerves: II-XII intact, grossly non-focal.  Psychiatric: Alert and oriented, thought content appropriate, normal insight  Capillary Refill: Brisk,< 3 seconds   Peripheral Pulses: +2 palpable, equal bilaterally           Labs:   Recent Labs     07/13/20 0427 07/14/20  0433 07/15/20  0811   WBC 6.5 6.0 7.0   HGB 10.9* 11.4* 12.9   HCT 33.4* 35.0* 39.6    226 263     Recent Labs     07/13/20 0427 07/14/20  0433 07/15/20  0811    141 134*   K 3.9 4.1 4.1    107 102   CO2 21 22 22   BUN 7 5* 6*   CREATININE <0.5* <0.5* <0.5*   CALCIUM 9.0 9.0 9.4     Recent Labs     07/13/20 0427 07/14/20  0433 07/15/20  0811   AST 16 22 25   ALT 14 17 21   BILITOT 0.4 0.5 0.5   ALKPHOS 145* 184* 209*     Recent Labs     07/13/20 0427 07/14/20  0433 07/15/20  0810   INR 1.27* 1.15* 1.11     No results for input(s): CKTOTAL, TROPONINI in the last 72 hours. Urinalysis:      Lab Results   Component Value Date    NITRU Negative 07/10/2020    WBCUA 10-20 06/01/2020    BACTERIA 3+ 06/01/2020    RBCUA 0-2 06/01/2020    BLOODU Negative 07/10/2020    SPECGRAV 1.010 07/10/2020    GLUCOSEU Negative 07/10/2020       Radiology:  XR CHEST PORTABLE   Final Result   1. Stable positioning of the left chest tube. The left pneumothorax has   resolved and the left pleural effusion/left basilar lung consolidation have   improved. XR CHEST PORTABLE   Final Result   1. Improved with residual small left hydropneumothorax status post left chest   tube.          CT GUIDED PLEURAL DRAINAGE W CATH [address]   Final Result   Successful CT guided placement of a left-sided chest tube         XR CHEST STANDARD (2 VW)   Final Result   Unchanged small left pleural effusion. XR CHEST PORTABLE   Final Result   Moderate left pleural effusion, decreased as compared to prior, with adjacent   left basilar atelectasis or airspace disease. No gross pneumothorax. US THORACENTESIS   Final Result   Successful ultrasound guided left-sided thoracentesis. A sample was sent for analysis at the request of the ordering clinician. CT CHEST PULMONARY EMBOLISM W CONTRAST   Final Result   1. Negative for pulmonary embolus. 2. Moderate-sized left effusion with associated underlying left basilar   compressive atelectasis. 3. Advanced emphysema. XR CHEST PORTABLE   Final Result   Persistent large left pleural effusion. Left-sided airspace disease in part   represents associated compressive atelectasis         XR CHEST PORTABLE    (Results Pending)           Assessment/Plan:    Active Hospital Problems    Diagnosis    Complication of coronary artery bypass graft surgery [T82. 9XXA]     PLAN:    Large pleural effusion as postoperative complication of coronary artery bypass graft surgery  Cardiothoracic surgery notified. Following. Had CT-guided chest tube insertion. Alteplase per cardiothoracic surgery. Subjectively better. Coronary artery disease  Post CABG. Continue home medications. No separate symptoms that could be attributed to coronary artery disease at this time. Troponin is negative. Asymptomatic. Doing fine     GERD  Continue proton pump inhibitors.     Hypertension  Blood pressure is controlled, but remains on the low side. Continue beta-blockers with hold parameters.     Hyponatremia  Resolved off Lasix.  Continue to monitor daily basic metabolic panel    D/w patient and family  Discussed with nursing    DVT Prophylaxis: Lovenox  Diet: DIET CARDIAC;  Code Status: Full Code    PT/OT Amilcar

## 2020-07-15 NOTE — PLAN OF CARE
Problem: Falls - Risk of:  Goal: Will remain free from falls  Description: Will remain free from falls  Outcome: Ongoing     Problem: Falls - Risk of:  Goal: Absence of physical injury  Description: Absence of physical injury  Outcome: Ongoing     Problem: Pain:  Goal: Pain level will decrease  Description: Pain level will decrease  Outcome: Ongoing

## 2020-07-15 NOTE — PROGRESS NOTES
tPA / Dornase Administration -     Time of delivery - 10:20 AM    10 mg alteplase diluted in 30 ml NACL and 5mg Dornase diluted in 30 mls sterile water given via chest tube and chest tube clamped at 10:20 AM. Chest tube is to remain clamped until 1:20 PM at which time suction can be reapplied and tube unclamped. To have CXR in am. Patient tolerated procedure well. Pt without complaints of dyspnea. RN informed that if pt were to develop dyspnea/respiratory distress it is OK to unclamp CT. Clamp ~3 hrs.      Florence Obregon, APRN-CNP

## 2020-07-16 ENCOUNTER — APPOINTMENT (OUTPATIENT)
Dept: GENERAL RADIOLOGY | Age: 55
DRG: 206 | End: 2020-07-16
Payer: COMMERCIAL

## 2020-07-16 LAB
A/G RATIO: 1 (ref 1.1–2.2)
ALBUMIN SERPL-MCNC: 3.1 G/DL (ref 3.4–5)
ALP BLD-CCNC: 210 U/L (ref 40–129)
ALT SERPL-CCNC: 25 U/L (ref 10–40)
ANION GAP SERPL CALCULATED.3IONS-SCNC: 11 MMOL/L (ref 3–16)
APTT: 32.7 SEC (ref 24.2–36.2)
AST SERPL-CCNC: 31 U/L (ref 15–37)
BASOPHILS ABSOLUTE: 0.1 K/UL (ref 0–0.2)
BASOPHILS RELATIVE PERCENT: 1.2 %
BILIRUB SERPL-MCNC: 0.5 MG/DL (ref 0–1)
BUN BLDV-MCNC: 8 MG/DL (ref 7–20)
CALCIUM SERPL-MCNC: 9.1 MG/DL (ref 8.3–10.6)
CHLORIDE BLD-SCNC: 106 MMOL/L (ref 99–110)
CO2: 21 MMOL/L (ref 21–32)
CREAT SERPL-MCNC: <0.5 MG/DL (ref 0.6–1.1)
EOSINOPHILS ABSOLUTE: 0.7 K/UL (ref 0–0.6)
EOSINOPHILS RELATIVE PERCENT: 9.1 %
FIBRINOGEN: 536 MG/DL (ref 200–397)
GFR AFRICAN AMERICAN: >60
GFR NON-AFRICAN AMERICAN: >60
GLOBULIN: 3 G/DL
GLUCOSE BLD-MCNC: 98 MG/DL (ref 70–99)
HCT VFR BLD CALC: 37.6 % (ref 36–48)
HEMOGLOBIN: 12.2 G/DL (ref 12–16)
INR BLD: 1.19 (ref 0.86–1.14)
LYMPHOCYTES ABSOLUTE: 1.4 K/UL (ref 1–5.1)
LYMPHOCYTES RELATIVE PERCENT: 18.4 %
MCH RBC QN AUTO: 26.7 PG (ref 26–34)
MCHC RBC AUTO-ENTMCNC: 32.4 G/DL (ref 31–36)
MCV RBC AUTO: 82.5 FL (ref 80–100)
MONOCYTES ABSOLUTE: 0.8 K/UL (ref 0–1.3)
MONOCYTES RELATIVE PERCENT: 10.1 %
NEUTROPHILS ABSOLUTE: 4.7 K/UL (ref 1.7–7.7)
NEUTROPHILS RELATIVE PERCENT: 61.2 %
PDW BLD-RTO: 15.1 % (ref 12.4–15.4)
PLATELET # BLD: 284 K/UL (ref 135–450)
PMV BLD AUTO: 10.4 FL (ref 5–10.5)
POTASSIUM REFLEX MAGNESIUM: 3.8 MMOL/L (ref 3.5–5.1)
PROTHROMBIN TIME: 13.8 SEC (ref 10–13.2)
RBC # BLD: 4.55 M/UL (ref 4–5.2)
SODIUM BLD-SCNC: 138 MMOL/L (ref 136–145)
TOTAL PROTEIN: 6.1 G/DL (ref 6.4–8.2)
WBC # BLD: 7.8 K/UL (ref 4–11)

## 2020-07-16 PROCEDURE — 85025 COMPLETE CBC W/AUTO DIFF WBC: CPT

## 2020-07-16 PROCEDURE — 6370000000 HC RX 637 (ALT 250 FOR IP): Performed by: INTERNAL MEDICINE

## 2020-07-16 PROCEDURE — 6360000002 HC RX W HCPCS: Performed by: INTERNAL MEDICINE

## 2020-07-16 PROCEDURE — 71045 X-RAY EXAM CHEST 1 VIEW: CPT

## 2020-07-16 PROCEDURE — 80053 COMPREHEN METABOLIC PANEL: CPT

## 2020-07-16 PROCEDURE — 2580000003 HC RX 258: Performed by: INTERNAL MEDICINE

## 2020-07-16 PROCEDURE — 85384 FIBRINOGEN ACTIVITY: CPT

## 2020-07-16 PROCEDURE — 85730 THROMBOPLASTIN TIME PARTIAL: CPT

## 2020-07-16 PROCEDURE — 2060000000 HC ICU INTERMEDIATE R&B

## 2020-07-16 PROCEDURE — 85610 PROTHROMBIN TIME: CPT

## 2020-07-16 PROCEDURE — 36415 COLL VENOUS BLD VENIPUNCTURE: CPT

## 2020-07-16 RX ADMIN — HYDROCODONE BITARTRATE AND ACETAMINOPHEN 1 TABLET: 7.5; 325 TABLET ORAL at 09:06

## 2020-07-16 RX ADMIN — METOPROLOL TARTRATE 25 MG: 25 TABLET, FILM COATED ORAL at 21:22

## 2020-07-16 RX ADMIN — TICAGRELOR 90 MG: 90 TABLET ORAL at 21:23

## 2020-07-16 RX ADMIN — ATORVASTATIN CALCIUM 80 MG: 80 TABLET, FILM COATED ORAL at 21:22

## 2020-07-16 RX ADMIN — MAGNESIUM GLUCONATE 500 MG ORAL TABLET 400 MG: 500 TABLET ORAL at 09:06

## 2020-07-16 RX ADMIN — Medication 10 ML: at 21:23

## 2020-07-16 RX ADMIN — Medication 10 ML: at 09:07

## 2020-07-16 RX ADMIN — ASPIRIN 81 MG: 81 TABLET ORAL at 09:07

## 2020-07-16 RX ADMIN — FAMOTIDINE 40 MG: 20 TABLET, FILM COATED ORAL at 17:36

## 2020-07-16 RX ADMIN — TICAGRELOR 90 MG: 90 TABLET ORAL at 09:06

## 2020-07-16 RX ADMIN — PROCHLORPERAZINE EDISYLATE 10 MG: 5 INJECTION INTRAMUSCULAR; INTRAVENOUS at 11:53

## 2020-07-16 RX ADMIN — MAGNESIUM GLUCONATE 500 MG ORAL TABLET 400 MG: 500 TABLET ORAL at 21:22

## 2020-07-16 RX ADMIN — PROCHLORPERAZINE EDISYLATE 10 MG: 5 INJECTION INTRAMUSCULAR; INTRAVENOUS at 21:22

## 2020-07-16 RX ADMIN — PROCHLORPERAZINE EDISYLATE 10 MG: 5 INJECTION INTRAMUSCULAR; INTRAVENOUS at 01:58

## 2020-07-16 RX ADMIN — ENOXAPARIN SODIUM 40 MG: 40 INJECTION SUBCUTANEOUS at 09:06

## 2020-07-16 ASSESSMENT — PAIN SCALES - GENERAL
PAINLEVEL_OUTOF10: 6
PAINLEVEL_OUTOF10: 0
PAINLEVEL_OUTOF10: 5
PAINLEVEL_OUTOF10: 6
PAINLEVEL_OUTOF10: 6

## 2020-07-16 NOTE — PROGRESS NOTES
CVTS Thoracic Progress Note:          CC:  Pt known to service; CABG x1 on 6/02/20 with Dr. Edward Reddy. Currently here with a left sided pleural effusion     Subj: awake, sitting up in bed, breathing with ease, in NAD    Obj:    Blood pressure 96/62, pulse 100, temperature 97.7 °F (36.5 °C), temperature source Oral, resp. rate 18, height 5' 3\" (1.6 m), weight 115 lb 8 oz (52.4 kg), last menstrual period 11/01/2017, SpO2 94 %. Lungs diminished to left base    Abdomen flat, soft, non-tender   Incision on left posterior chest with dressing, CDI    Chest tube with 310 ml serous drainage in last 24 hrs    Diagnostics:   Recent Labs     07/14/20  0433 07/15/20  0811 07/16/20  0439   WBC 6.0 7.0 7.8   HGB 11.4* 12.9 12.2   HCT 35.0* 39.6 37.6    263 284                                                                  Recent Labs     07/14/20  0433 07/15/20  0811 07/16/20  0438    134* 138   K 4.1 4.1 3.8    102 106   CO2 22 22 21   BUN 5* 6* 8   CREATININE <0.5* <0.5* <0.5*   GLUCOSE 92 109* 98     Recent Labs     07/14/20  0433 07/15/20  0810 07/16/20  0439   INR 1.15* 1.11 1.19*     CXR: 7/13 unchanged left pleural effusion  CXR: 7/14   Impression    1. Improved with residual small left hydropneumothorax status post left chest    tube. CXR: 7/15/20   Impression    1. Stable positioning of the left chest tube.  The left pneumothorax has    resolved and the left pleural effusion/left basilar lung consolidation have    improved. Cytology from thoracentesis 7/11/20: negative for malignancy    Assess/Plan:   Care per primary team and pulmonology    Left-sided pleural effusion:   S/p thoracentesis on 7/11 with 1L of serosanguinous fluid removed  CT guided chest tube placed 7/13. TPA/Dornase X2 with good result. Will leave chest tube in for one more day. Anticipate removal tomorrow morning. Check CXR tomorrow after removing chest tube. ___________________________________________________      Yogi Jones CNP  7/16/2020  1:41 PM

## 2020-07-16 NOTE — PROGRESS NOTES
Hospitalist Progress Note      PCP: Georgette Katz MD    Date of Admission: 7/10/2020    Chief Complaint: shortness of breath, chest pain, cough    Hospital Course: admitted with recurrent postop left pleural effusion after CABG. Had thoracentesis. Remains on room air. Had CT-guided left-sided chest tube insertion. Required alteplase instillation. Cardiothoracic surgery following. Chest x-ray done earlier today. Subjective: no chest pain, improved shortness of breath, no nausea or vomiting. Stable since admission. No new issues past couple days      Medications:  Reviewed    Infusion Medications   Scheduled Medications    enoxaparin  40 mg Subcutaneous Daily    aspirin  81 mg Oral Daily    atorvastatin  80 mg Oral Nightly    famotidine  40 mg Oral QPM    magnesium oxide  400 mg Oral BID    metoprolol tartrate  25 mg Oral BID    ticagrelor  90 mg Oral BID    sodium chloride flush  10 mL Intravenous 2 times per day     PRN Meds: albuterol sulfate HFA **AND** ipratropium **AND** MDI Treatment, HYDROcodone-acetaminophen, sodium chloride flush, acetaminophen **OR** acetaminophen, polyethylene glycol, prochlorperazine      Intake/Output Summary (Last 24 hours) at 7/16/2020 1209  Last data filed at 7/16/2020 0433  Gross per 24 hour   Intake 440 ml   Output 2560 ml   Net -2120 ml       Physical Exam Performed:    BP 96/62   Pulse 100   Temp 97.7 °F (36.5 °C) (Oral)   Resp 18   Ht 5' 3\" (1.6 m)   Wt 115 lb 8 oz (52.4 kg)   LMP 11/01/2017 (Approximate)   SpO2 94%   BMI 20.46 kg/m²     General appearance: No apparent distress, appears stated age and cooperative. HEENT: Pupils equal, round, and reactive to light. Conjunctivae/corneas clear. Neck: Supple, with full range of motion. No jugular venous distention. Trachea midline. Respiratory: Moving air well. Left-sided chest tube. Decreased left-sided breath sounds.   Cardiovascular: Regular rate and rhythm with normal S1/S2 without murmurs, has   resolved and the left pleural effusion/left basilar lung consolidation have   improved. XR CHEST PORTABLE   Final Result   1. Improved with residual small left hydropneumothorax status post left chest   tube. CT GUIDED PLEURAL DRAINAGE W CATH PERC   Final Result   Successful CT guided placement of a left-sided chest tube         XR CHEST STANDARD (2 VW)   Final Result   Unchanged small left pleural effusion. XR CHEST PORTABLE   Final Result   Moderate left pleural effusion, decreased as compared to prior, with adjacent   left basilar atelectasis or airspace disease. No gross pneumothorax. US THORACENTESIS   Final Result   Successful ultrasound guided left-sided thoracentesis. A sample was sent for analysis at the request of the ordering clinician. CT CHEST PULMONARY EMBOLISM W CONTRAST   Final Result   1. Negative for pulmonary embolus. 2. Moderate-sized left effusion with associated underlying left basilar   compressive atelectasis. 3. Advanced emphysema. XR CHEST PORTABLE   Final Result   Persistent large left pleural effusion. Left-sided airspace disease in part   represents associated compressive atelectasis                 Assessment/Plan:    Active Hospital Problems    Diagnosis    Complication of coronary artery bypass graft surgery [T82. 9XXA]     PLAN:    Large pleural effusion as postoperative complication of coronary artery bypass graft surgery  Cardiothoracic surgery notified. Following. Had CT-guided chest tube insertion. Alteplase per cardiothoracic surgery. Subjectively better. Chest tube removal decision per cardiothoracic surgery when appropriate    Coronary artery disease  Post CABG. Continue home medications. No separate symptoms that could be attributed to coronary artery disease at this time. Troponin is negative. Asymptomatic. Doing fine.   Nothing new required.     GERD  Continue proton pump inhibitor.     Hypertension  Blood pressure is controlled, but remains on the low side slightly below 028 systolic. Continue beta-blockers with hold parameters.     Hyponatremia  Resolved off Lasix. Continue to monitor daily basic metabolic panel    D/w patient. Questions answered      DVT Prophylaxis: Lovenox  Diet: DIET CARDIAC;  Code Status: Full Code    PT/OT Eval Status: requested    Dispo - inpatient pending pleural effusion stability and removal of chest tube.     Juan Linares MD

## 2020-07-16 NOTE — PLAN OF CARE
Problem: Falls - Risk of:  Goal: Will remain free from falls  Description: Will remain free from falls  7/15/2020 2222 by Kayla Wagner RN  Note: Pt will remain free of falls this shift. Bedside table and call light within reach, bed alarm in place. Pt instructed to call out when in need of assistance, verbalized understanding. Will continue to monitor.

## 2020-07-16 NOTE — DISCHARGE INSTR - COC
Continuity of Care Form    Patient Name: Sharon Ocasio   :  1965  MRN:  [id number]    Admit date:  7/10/2020  Discharge date:  2020    Code Status Order: Full Code   Advance Directives:   [address] [facility] Documentation     Date/Time Healthcare Directive Type of Healthcare Directive Copy in [facility] [address] Agent's Name Healthcare Agent's Phone Number    07/10/20 1909  No, patient does not have an advance directive for healthcare treatment -- -- -- -- --          Admitting Physician:  Sintia Mendez MD  PCP: Ty Milian MD    Discharging Nurse: [facility] Unit/Room#: 3303/5801-48  Discharging Unit Phone Number: ***    Emergency Contact:   Extended Emergency Contact Information  Primary Emergency Contact: Stas Coleman  Address: [address] [place] [address] [place] [facility], [address] Phone: [phone]  Mobile Phone: [phone]  Relation: Spouse    Past Surgical History:  Past Surgical History:   Procedure Laterality Date     SECTION      CORONARY ARTERY BYPASS GRAFT N/A 2020    CORONARY ARTERY BYPASS GRAFTING X1, INTERNAL MAMMARY ARTERY, OFF PUMP (LIMA TO LAD), 5 LEVEL BILATERAL INTERCOSTAL NERVE BLOCK, BALLOON PUMP REMOVAL performed by Jacob Bergeron MD at [address] [facility]  2020    CT INSERT CATH PLEURA W IMAGE 2020 MD Jennifer Servin CT SCAN    SINUS SURGERY         Immunization History:   Immunization History   Administered Date(s) Administered    Influenza 10/26/2013    Influenza Virus Vaccine 2014, 2016    Influenza, Quadv, IM, (6 mo and older Fluzone, Flulaval, Fluarix and 3 yrs and older Afluria) 2017    Influenza, Quadv, Recombinant, IM PF (Flublok 18 yrs and older) 09/15/2019    Pneumococcal Polysaccharide ([id number]) 2016    Tdap (Boostrix, Adacel) 2014    Zoster Recombinant (Shingrix) 09/15/2019, 01/06/2020       Active Problems:  Patient Active Problem List   Diagnosis Code    Anxiety F41.9    Primary insomnia F51.01    Arthritis M19.90    Gastroesophageal reflux disease without esophagitis K21.9    Hypercholesteremia E78.00    Chronic bilateral low back pain without sciatica M54.5, G89.29    Lumbar radiculopathy M54.16    Moderate episode of recurrent major depressive disorder ([facility]) F33.1    STEMI (ST elevation myocardial infarction) ([facility]) I21.3    Coronary artery disease involving native coronary artery of native heart I25.10    Acute combined systolic and diastolic congestive heart failure ([facility]) I05.94    Complication of coronary artery bypass graft surgery T82. 9XXA       Isolation/Infection:   Isolation          No Isolation        Patient Infection Status     Infection Onset Added Last Indicated Last Indicated By Review Planned Expiration Resolved Resolved By    None active    Resolved    COVID-19 Rule Out 07/10/20 07/10/20 07/10/20 COVID-19 (Ordered)   07/10/20 Rule-Out Test Resulted          Nurse Assessment:  Last Vital Signs: BP 96/62   Pulse 100   Temp 97.7 °F (36.5 °C) (Oral)   Resp 18   Ht 5' 3\" (1.6 m)   Wt 115 lb 8 oz (52.4 kg)   LMP 11/01/2017 (Approximate)   SpO2 94%   BMI 20.46 kg/m²     Last documented pain score (0-10 scale): Pain Level: 6  Last Weight:   Wt Readings from Last 1 Encounters:   07/15/20 115 lb 8 oz (52.4 kg)     Mental Status:  {IP PT MENTAL STATUS:85225}    IV Access:  { [facility] IV ACCESS:847094245}    Nursing Mobility/ADLs:  Walking   {CHP DME MATQ:963537450}  Transfer  {CHP DME ZOML:748461826}  Bathing  {CHP DME ARLR:206413972}  Dressing  {CHP DME WAIS:176699573}  Toileting  {CHP DME MQXV:637155427}  Feeding  {CHP DME DTRF:374100710}  Med Admin  {CHP DME KPHN:944955356}  Med Delivery   { RFANCISCO MED Delivery:172221970}    Wound Care Documentation and Therapy:        Elimination:  Continence:   · Bowel: {YES / OR:46199}  · Bladder: {YES / OW:11811}  Urinary Catheter: {Urinary Catheter:103220289}   Colostomy/Ileostomy/Ileal Conduit: {YES / X}       Date of Last BM: ***    Intake/Output Summary (Last 24 hours) at 2020 1501  Last data filed at 2020 1423  Gross per 24 hour   Intake 440 ml   Output 3360 ml   Net -2920 ml     I/O last 3 completed shifts: In: 440 [P.O.:440]  Out: 3360 [Urine:3050;  Chest Tube:310]    Safety Concerns:     [address] [employer] Safety Concerns:070288817}    Impairments/Disabilities:      [address] [employer] Impairments/Disabilities:761275904}    Nutrition Therapy:  Current Nutrition Therapy:   [address] [employer] Diet List:506955114}    Routes of Feeding: {CHP DME Other Feedings:381952358}  Liquids: {Slp liquid thickness:14716}  Daily Fluid Restriction: {CHP DME Yes amt example:365257975}  Last Modified Barium Swallow with Video (Video Swallowing Test): {Done Not Done MOGN:009443653}    Treatments at the Time of Hospital Discharge:   Respiratory Treatments: ***  Oxygen Therapy:  {Therapy; copd oxygen:32698}  Ventilator:    {[facility] Vent UZUS:090328272}    Rehab Therapies: {THERAPEUTIC INTERVENTION:2018419025}  Weight Bearing Status/Restrictions: [address] Borrowed Dress  Weight Bearin}  Other Medical Equipment (for information only, NOT a DME order):  {EQUIPMENT:177383947}  Other Treatments: ***    Patient's personal belongings (please select all that are sent with patient):  {[facility] DME Belongings:193165471}    RN SIGNATURE:  {Esignature:301086234}    CASE MANAGEMENT/SOCIAL WORK SECTION    Inpatient Status Date: ***    Readmission Risk Assessment Score:  Readmission Risk              Risk of Unplanned Readmission:        18           Discharging to Facility/ Agency   · Name: [facility]  · Address:  · Phone: [phone]  · Fax: [phone]    Dialysis Facility (if applicable)   · Name:  · Address:  · Dialysis Schedule:  · Phone:  · Fax:    / signature: Electronically signed by Anayeli Grey RN on 20 at 2:30 PM EDT    PHYSICIAN SECTION    Prognosis: Good    Condition at Discharge: Stable    Rehab Potential (if transferring to Rehab): Good    Recommended Labs or Other Treatments After Discharge: daily dressing changes and CXR on 7/20/20. Physician Certification: I certify the above information and transfer of Sharon Ocasio  is necessary for the continuing treatment of the diagnosis listed and that she requires [address] for less 30 days.      Update Admission H&P: No change in H&P    PHYSICIAN SIGNATURE:  Electronically signed by Anil Hereida MD on 7/17/20 at 9:51 AM EDT

## 2020-07-16 NOTE — CARE COORDINATION
Chart review LOS day # 6- cytology from thoracentesis from 7/11/20 was negative for malignancy. TPA/ Dornase 7/14 and 7/15. Chest tube managed by CT Surgery. Following for specialty recommendations, orders , or any barriers that arise pertaining to discharge requiring case managements assistance.

## 2020-07-17 VITALS
RESPIRATION RATE: 18 BRPM | TEMPERATURE: 98 F | OXYGEN SATURATION: 96 % | HEIGHT: 63 IN | HEART RATE: 86 BPM | DIASTOLIC BLOOD PRESSURE: 62 MMHG | BODY MASS INDEX: 20.13 KG/M2 | WEIGHT: 113.6 LBS | SYSTOLIC BLOOD PRESSURE: 98 MMHG

## 2020-07-17 LAB
A/G RATIO: 1.1 (ref 1.1–2.2)
ALBUMIN SERPL-MCNC: 3.2 G/DL (ref 3.4–5)
ALP BLD-CCNC: 215 U/L (ref 40–129)
ALT SERPL-CCNC: 36 U/L (ref 10–40)
ANION GAP SERPL CALCULATED.3IONS-SCNC: 11 MMOL/L (ref 3–16)
APTT: 32.5 SEC (ref 24.2–36.2)
AST SERPL-CCNC: 44 U/L (ref 15–37)
BASOPHILS ABSOLUTE: 0.1 K/UL (ref 0–0.2)
BASOPHILS RELATIVE PERCENT: 1 %
BILIRUB SERPL-MCNC: 0.4 MG/DL (ref 0–1)
BUN BLDV-MCNC: 5 MG/DL (ref 7–20)
CALCIUM SERPL-MCNC: 9.5 MG/DL (ref 8.3–10.6)
CHLORIDE BLD-SCNC: 105 MMOL/L (ref 99–110)
CO2: 22 MMOL/L (ref 21–32)
CREAT SERPL-MCNC: <0.5 MG/DL (ref 0.6–1.1)
EOSINOPHILS ABSOLUTE: 0.6 K/UL (ref 0–0.6)
EOSINOPHILS RELATIVE PERCENT: 7.4 %
FIBRINOGEN: 549 MG/DL (ref 200–397)
GFR AFRICAN AMERICAN: >60
GFR NON-AFRICAN AMERICAN: >60
GLOBULIN: 3 G/DL
GLUCOSE BLD-MCNC: 95 MG/DL (ref 70–99)
HCT VFR BLD CALC: 37.7 % (ref 36–48)
HEMOGLOBIN: 12.4 G/DL (ref 12–16)
INR BLD: 1.14 (ref 0.86–1.14)
LYMPHOCYTES ABSOLUTE: 1.5 K/UL (ref 1–5.1)
LYMPHOCYTES RELATIVE PERCENT: 18.2 %
MCH RBC QN AUTO: 27.3 PG (ref 26–34)
MCHC RBC AUTO-ENTMCNC: 32.9 G/DL (ref 31–36)
MCV RBC AUTO: 82.9 FL (ref 80–100)
MONOCYTES ABSOLUTE: 0.8 K/UL (ref 0–1.3)
MONOCYTES RELATIVE PERCENT: 9.9 %
NEUTROPHILS ABSOLUTE: 5.3 K/UL (ref 1.7–7.7)
NEUTROPHILS RELATIVE PERCENT: 63.5 %
PDW BLD-RTO: 15.8 % (ref 12.4–15.4)
PLATELET # BLD: 296 K/UL (ref 135–450)
PMV BLD AUTO: 9.8 FL (ref 5–10.5)
POTASSIUM REFLEX MAGNESIUM: 4.1 MMOL/L (ref 3.5–5.1)
PROTHROMBIN TIME: 13.2 SEC (ref 10–13.2)
RBC # BLD: 4.54 M/UL (ref 4–5.2)
SODIUM BLD-SCNC: 138 MMOL/L (ref 136–145)
TOTAL PROTEIN: 6.2 G/DL (ref 6.4–8.2)
WBC # BLD: 8.3 K/UL (ref 4–11)

## 2020-07-17 PROCEDURE — 6370000000 HC RX 637 (ALT 250 FOR IP): Performed by: INTERNAL MEDICINE

## 2020-07-17 PROCEDURE — 6360000002 HC RX W HCPCS: Performed by: NURSE PRACTITIONER

## 2020-07-17 PROCEDURE — 80053 COMPREHEN METABOLIC PANEL: CPT

## 2020-07-17 PROCEDURE — 85730 THROMBOPLASTIN TIME PARTIAL: CPT

## 2020-07-17 PROCEDURE — 2580000003 HC RX 258: Performed by: NURSE PRACTITIONER

## 2020-07-17 PROCEDURE — 85610 PROTHROMBIN TIME: CPT

## 2020-07-17 PROCEDURE — 85384 FIBRINOGEN ACTIVITY: CPT

## 2020-07-17 PROCEDURE — 2580000003 HC RX 258: Performed by: INTERNAL MEDICINE

## 2020-07-17 PROCEDURE — 6360000002 HC RX W HCPCS: Performed by: INTERNAL MEDICINE

## 2020-07-17 PROCEDURE — 36415 COLL VENOUS BLD VENIPUNCTURE: CPT

## 2020-07-17 PROCEDURE — 99024 POSTOP FOLLOW-UP VISIT: CPT | Performed by: THORACIC SURGERY (CARDIOTHORACIC VASCULAR SURGERY)

## 2020-07-17 PROCEDURE — 85025 COMPLETE CBC W/AUTO DIFF WBC: CPT

## 2020-07-17 RX ORDER — FUROSEMIDE 20 MG/1
20 TABLET ORAL DAILY
Qty: 60 TABLET | Refills: 3 | Status: SHIPPED | OUTPATIENT
Start: 2020-07-17 | End: 2020-08-19

## 2020-07-17 RX ORDER — HYDROCODONE BITARTRATE AND ACETAMINOPHEN 7.5; 325 MG/1; MG/1
1 TABLET ORAL EVERY 6 HOURS PRN
Qty: 12 TABLET | Refills: 0 | Status: SHIPPED | OUTPATIENT
Start: 2020-07-17 | End: 2020-07-20

## 2020-07-17 RX ORDER — POTASSIUM CHLORIDE 20 MEQ/1
20 TABLET, EXTENDED RELEASE ORAL DAILY
Qty: 60 TABLET | Refills: 1 | Status: SHIPPED | OUTPATIENT
Start: 2020-07-17 | End: 2020-08-19 | Stop reason: SDUPTHER

## 2020-07-17 RX ADMIN — ALTEPLASE 10 MG: 2.2 INJECTION, POWDER, LYOPHILIZED, FOR SOLUTION INTRAVENOUS at 11:10

## 2020-07-17 RX ADMIN — METOPROLOL TARTRATE 25 MG: 25 TABLET, FILM COATED ORAL at 09:24

## 2020-07-17 RX ADMIN — TICAGRELOR 90 MG: 90 TABLET ORAL at 09:24

## 2020-07-17 RX ADMIN — ASPIRIN 81 MG: 81 TABLET ORAL at 09:24

## 2020-07-17 RX ADMIN — DORNASE ALFA 5 MG: 1 SOLUTION RESPIRATORY (INHALATION) at 11:10

## 2020-07-17 RX ADMIN — Medication 10 ML: at 09:24

## 2020-07-17 RX ADMIN — ENOXAPARIN SODIUM 40 MG: 40 INJECTION SUBCUTANEOUS at 09:24

## 2020-07-17 RX ADMIN — MAGNESIUM GLUCONATE 500 MG ORAL TABLET 400 MG: 500 TABLET ORAL at 09:24

## 2020-07-17 ASSESSMENT — PAIN SCALES - GENERAL: PAINLEVEL_OUTOF10: 0

## 2020-07-17 NOTE — PROGRESS NOTES
tPA / Dornase Administration -     Time of delivery - 11:10 am    10 mg alteplase diluted in 30 ml NACL and 5mg Dornase diluted in 30 mls sterile water given via chest tube and chest tube clamped at 11:10. Chest tube is to remain clamped until 2:10 pm at which time suction can be reapplied and tube unclamped. To have CXR on Monday prior to her follow up visit with Dr. Jihan Michael on 7/21/20. Patient tolerated procedure well. VSS and without complaints of dyspnea. Clamp ~3 hrs.    Ventura Mae, APRN-CNP

## 2020-07-17 NOTE — CARE COORDINATION
CASE MANAGEMENT DISCHARGE SUMMARY      Discharge to: Home with [address] ordered/agency: No    Transportation:    Family/car: here to transport       Confirmed discharge plan with: Patient, Concha Warner in intake [facility] , Jeanine COKER     Patient: yes     Facility/Agency, name:  FR[facility]/AVS faxed: [facility] [phone]     RN, name: Jeanine    Note: Discharging nurse to complete FRANCISCO, reconcile AVS, and place final copy with patient's discharge packet. RN to ensure that written prescriptions for  Level II medications are sent with patient to the facility as per protocol.

## 2020-07-17 NOTE — PROGRESS NOTES
Comprehensive Nutrition Assessment    Type and Reason for Visit:  Initial(LOS)    Nutrition Recommendations/Plan:   1. Continue Cardiac diet  2. Ensure with meals  3. [facility] [place] referral for home Ensure usage  4. Will monitor nutritional adequacy, nutrition-related labs, weights, BMs, and clinical progress     Nutrition Assessment:  Nutritional status at risk for decline due to recent decrease in appetite since CABG in early June. Weight has declined from 160's to 140's likely fluid weight but then lost 20 lbs since post CABG on 6/2/20. Patient stated appetite has been decreased especially due to recent difficulty with taste and smell. Patient did MD wants her to drink 3 Ensures per day at home and agreed to start here. RD stated would start Ensure here at the hospital and refer to [facility] for home Ensure. RD provided cardiac diet education and a cardiac diet menu from hospital at time of visit. Patient was very interesting in learning more about what she would eat. RD discussed how to fit in certain foods patient enjoys in small portions balanced with other lower sodium foods and Ensure to meet nutritional needs. Malnutrition Assessment:  Malnutrition Status:   Moderate malnutrition    Context:  Chronic Illness     Findings of the 6 clinical characteristics of malnutrition:  Energy Intake:  7 - 75% or less estimated energy requirements for 1 month or longer  Weight Loss:  Greater than 5% in one month  Body Fat Loss:  1 - Mild body fat loss(face thinning) Orbital   Muscle Mass Loss:  1 - Mild muscle mass loss Clavicles (pectoralis & deltoids)  Fluid Accumulation:  Unable to assess     Strength:  Not Performed    Estimated Daily Nutrient Needs:  Energy (kcal):  9469-2460; Weight Used for Energy Requirements:  Current(51.5 kg)     Protein (g):  ; Weight Used for Protein Requirements:  Current(1.5-2.0; 51.5 kg)        Fluid (ml/day):  per MD; Weight Used for Fluid Requirements:         Nutrition Related Findings:  recent taste alterations post cardiac surgery, decreased appetite      Wounds:  (post surgical incision and chest tube site)       Current Nutrition Therapies:    DIET CARDIAC; Dietary Nutrition Supplements: Standard High Calorie Oral Supplement    Anthropometric Measures:  · Height: 5' 3\" (160 cm)  · Current Body Weight: 113 lb 9 oz (51.5 kg)   · Admission Body Weight: 119 lb 11 oz (54.3 kg)    · Ideal Body Weight: 115 lbs; 98.8 lbs   · BMI: 20.1  · BMI Categories: Normal Weight (BMI 18.5-24. 9)       Nutrition Diagnosis:   · Increased nutrient needs related to increase demand for energy/nutrients as evidenced by (recent cardiac surgery in early June, extended hospital stay)    Nutrition Interventions:   Food and/or Nutrient Delivery:  Continue Current Diet, Start Oral Nutrition Supplement  Nutrition Education/Counseling:  Education completed   Coordination of Nutrition Care:  Continued Inpatient Monitoring    Goals:  Patient will eat 50% or greater of meals and supplements.        Nutrition Monitoring and Evaluation:   Behavioral-Environmental Outcomes:  Knowledge or Skill   Food/Nutrient Intake Outcomes:  Diet Advancement/Tolerance, Food and Nutrient Intake  Physical Signs/Symptoms Outcomes:  Skin     Discharge Planning:    Continue current diet     Electronically signed by Jericho Barcenas RD, LD on 7/17/20 at 11:35 AM EDT    Contact: Office: [phone]; [address] [place] [address] Road: [phone]

## 2020-07-17 NOTE — PLAN OF CARE
Nutrition Problem #1: Increased nutrient needs  Intervention: Food and/or Nutrient Delivery: Continue Current Diet, Start Oral Nutrition Supplement  Nutritional Goals: Patient will eat 50% or greater of meals and supplements.

## 2020-07-17 NOTE — DISCHARGE SUMMARY
Hospital Medicine Discharge Summary    Patient ID: Leyla Zhu      Patient's PCP: Miguelito Alicea MD    Admit Date: 7/10/2020     Discharge Date:   07/17/20     Admitting Physician: Abibe Dupree MD     Discharge Physician: Garo Looney MD     Discharge Diagnoses: Active Hospital Problems    Diagnosis    Complication of coronary artery bypass graft surgery [T82. 9XXA]    Coronary artery disease involving native coronary artery of native heart [I25.10]    Hypercholesteremia [E78.00]    Gastroesophageal reflux disease without esophagitis [K21.9]     Chronic combined systolic and diastolic congestive heart failure with no acute component on arrival.    The patient was seen and examined on day of discharge and this discharge summary is in conjunction with any daily progress note from day of discharge. Hospital Course:     Patient was admitted with left sided pleural effusion, developed as a complication of coronary artery bypass graft surgery. Cardiothoracic surgery was consulted patient had a chest tube inserted. Alteplase was also injected through the chest tube. On the day of discharge, patient was complaint free. Will be discharged with indwelling chest tube, with dry seal and follow-up with cardiothoracic surgery 3 days from now. Patient's questions answered on the day of discharge. Stable condition. Resuming diuretics at a lower dose at the time of discharge. Potassium dose will also be reduced accordingly. Tonye Cogan Physical Exam Performed:     BP 98/62   Pulse 86   Temp 98 °F (36.7 °C) (Oral)   Resp 18   Ht 5' 3\" (1.6 m)   Wt 113 lb 9.6 oz (51.5 kg)   LMP 11/01/2017 (Approximate)   SpO2 96%   BMI 20.12 kg/m²       General appearance:  No apparent distress, appears stated age and cooperative. HEENT:  Normal cephalic, atraumatic without obvious deformity. Pupils equal, round, and reactive to light. Extra ocular muscles intact. Conjunctivae/corneas clear.   Neck: Supple, with full range of motion. No jugular venous distention. Trachea midline. Respiratory:  Normal respiratory effort. C left-sided chest tube present. Cardiovascular:  Regular rate and rhythm with normal S1/S2 without murmurs, rubs or gallops. Abdomen: Soft, non-tender, non-distended with normal bowel sounds. Musculoskeletal:  No clubbing, cyanosis or edema bilaterally. Full range of motion without deformity. Skin: Skin color, texture, turgor normal.  No rashes or lesions. Neurologic:  Neurovascularly intact without any focal sensory/motor deficits. Cranial nerves: II-XII intact, grossly non-focal.  Psychiatric:  Alert and oriented, thought content appropriate, normal insight  Capillary Refill: Brisk,< 3 seconds   Peripheral Pulses: +2 palpable, equal bilaterally       Labs: For convenience and continuity at follow-up the following most recent labs are provided:      CBC:    Lab Results   Component Value Date    WBC 8.3 07/17/2020    HGB 12.4 07/17/2020    HCT 37.7 07/17/2020     07/17/2020       Renal:    Lab Results   Component Value Date     07/17/2020    K 4.1 07/17/2020     07/17/2020    CO2 22 07/17/2020    BUN 5 07/17/2020    CREATININE <0.5 07/17/2020    CALCIUM 9.5 07/17/2020         Significant Diagnostic Studies    Radiology:   XR CHEST PORTABLE   Final Result   Left pleural catheter pigtail remains in satisfactory position, lower left   costophrenic sulcus with some residual pleuroparenchymal disease. No   pneumothorax or large effusion. XR CHEST PORTABLE   Final Result   1. Stable positioning of the left chest tube. The left pneumothorax has   resolved and the left pleural effusion/left basilar lung consolidation have   improved. XR CHEST PORTABLE   Final Result   1. Improved with residual small left hydropneumothorax status post left chest   tube.          CT GUIDED PLEURAL DRAINAGE [facility]   Final Result   Successful CT guided placement of a left-sided chest tube         XR CHEST STANDARD (2 VW)   Final Result   Unchanged small left pleural effusion. XR CHEST PORTABLE   Final Result   Moderate left pleural effusion, decreased as compared to prior, with adjacent   left basilar atelectasis or airspace disease. No gross pneumothorax. US THORACENTESIS   Final Result   Successful ultrasound guided left-sided thoracentesis. A sample was sent for analysis at the request of the ordering clinician. CT CHEST PULMONARY EMBOLISM W CONTRAST   Final Result   1. Negative for pulmonary embolus. 2. Moderate-sized left effusion with associated underlying left basilar   compressive atelectasis. 3. Advanced emphysema. XR CHEST PORTABLE   Final Result   Persistent large left pleural effusion. Left-sided airspace disease in part   represents associated compressive atelectasis                Consults:     IP CONSULT TO HOSPITALIST  IP CONSULT TO CARDIOTHORACIC SURGERY  IP CONSULT TO PULMONOLOGY  IP CONSULT TO CARDIOTHORACIC SURGERY  IP CONSULT TO HOME CARE NEEDS    Disposition: Home    Condition at Discharge: Stable    Discharge Instructions/Follow-up: Cardiothoracic surgery as outpatient    Code Status:  Full Code     Activity: activity as tolerated    Diet: regular diet      Discharge Medications:     Current Discharge Medication List           Details   HYDROcodone-acetaminophen ([address]) 7.5-325 MG per tablet Take 1 tablet by mouth every 6 hours as needed for Pain for up to 3 days.   Qty: 12 tablet, Refills: 0    Comments: Reduce doses taken as pain becomes manageable  Associated Diagnoses: Recurrent left pleural effusion; Acute postoperative pain              Details   furosemide (LASIX) 20 MG tablet Take 1 tablet by mouth daily  Qty: 60 tablet, Refills: 3      potassium chloride (KLOR-CON M) 20 MEQ extended release tablet Take 1 tablet by mouth daily  Qty: 60 tablet, Refills: 1              Details   aspirin 81 MG EC tablet Take 1 tablet by mouth daily  Qty: 30 tablet, Refills: 3      metoprolol tartrate (LOPRESSOR) 25 MG tablet Take 1 tablet by mouth 2 times daily  Qty: 60 tablet, Refills: 3      magnesium oxide (MAG-OX) 400 (241.3 Mg) MG TABS tablet Take 1 tablet by mouth 2 times daily  Qty: 30 tablet, Refills: 0      atorvastatin (LIPITOR) 80 MG tablet Take 1 tablet by mouth nightly  Qty: 30 tablet, Refills: 11      ticagrelor (BRILINTA) 90 MG TABS tablet Take 1 tablet by mouth 2 times daily  Qty: 60 tablet, Refills: 11      famotidine (PEPCID) 40 MG tablet Take 1 tablet by mouth every evening  Qty: 30 tablet, Refills: 5             Time Spent on discharge is more than 45 minutes in the examination, evaluation, counseling and review of medications and discharge plan. Signed:    Weston Ross MD   7/17/2020      Thank you Lynn Guillen MD for the opportunity to be involved in this patient's care. If you have any questions or concerns please feel free to contact me at [phone].

## 2020-07-17 NOTE — FLOWSHEET NOTE
07/10/20 2127   Vital Signs   Temp 97.7 °F (36.5 °C)   Pulse 100   Resp 16   BP 99/64   BP Location Left upper arm   Patient Position Semi fowlers;Supine   Level of Consciousness 0   MEWS Score 2   Oxygen Therapy   SpO2 95 %   O2 Device None (Room air)   Shift assessment complete; see flow sheet. Scheduled medications administered; see MAR. Call light and bedside table within reach, bed in low, locked position, side rails up, pt encouraged to call for assistance with ambulation or transfer. Pt denies further needs at this time. Nurse and staff will continue to monitor throughout shift.
07/17/20 1313   Encounter Summary   Services provided to: Patient not available   Referral/Consult From: [id number] non-den   Continue Visiting   (7/17 Tel.--pt. being d/c this afternoon)
Drainage Description Sanguinous   Dressing Status Clean;Dry; Intact   Dressing Type Dry dressing;Split gauze   Dressing Change Due 07/16/20   Site Assessment Clean;Dry; Intact   Surrounding Skin Unable to view   Psychosocial   Psychosocial (WDL) WDL
with chlorhexidine;Sterile drsg with biopatch; Correct Position  Inserted by: Kingsley  . .. Chest Tube Airleak No   Drainage Description Sanguinous   Dressing Status Clean;Dry; Intact   Dressing Type Dry dressing;Split gauze   Dressing Change Due 07/16/20   Site Assessment Clean;Dry; Intact   Surrounding Skin Unable to view   Psychosocial   Psychosocial (WDL) WDL   Patient lying in bed. Bed in lowest position and locked. Bed alarm on. 2/4 bed rails up. Patient call light and belongings within reach. Will continue to monitor.

## 2020-07-17 NOTE — PROGRESS NOTES
CVTS Thoracic Progress Note:          CC:  Pt known to service; CABG x1 on 6/02/20 with Dr. Maikel Rudd. Currently here with a left sided pleural effusion     Subj: awake, sitting up in bed, breathing with ease, in NAD    Obj:    Blood pressure 112/76, pulse 102, temperature 98 °F (36.7 °C), temperature source Oral, resp. rate 19, height 5' 3\" (1.6 m), weight 113 lb 9.6 oz (51.5 kg), last menstrual period 11/01/2017, SpO2 96 %. Lungs diminished to left base    Abdomen flat, soft, non-tender   Incision on left posterior chest with dressing, CDI    Chest tube with 100 ml serous drainage in last 24 hrs    Diagnostics:   Recent Labs     07/15/20  0811 07/16/20  0439 07/17/20  0427   WBC 7.0 7.8 8.3   HGB 12.9 12.2 12.4   HCT 39.6 37.6 37.7    284 296                                                                  Recent Labs     07/15/20  0811 07/16/20  0438 07/17/20  0427   * 138 138   K 4.1 3.8 4.1    106 105   CO2 22 21 22   BUN 6* 8 5*   CREATININE <0.5* <0.5* <0.5*   GLUCOSE 109* 98 95     Recent Labs     07/15/20  0810 07/16/20  0439 07/17/20  0427   INR 1.11 1.19* 1.14     CXR: 7/13 unchanged left pleural effusion  CXR: 7/14   Impression    1. Improved with residual small left hydropneumothorax status post left chest    tube. CXR: 7/15/20   Impression    1. Stable positioning of the left chest tube.  The left pneumothorax has    resolved and the left pleural effusion/left basilar lung consolidation have    improved. Cytology from thoracentesis 7/11/20: negative for malignancy    Assess/Plan:   Care per primary team and pulmonology    Left-sided pleural effusion: s/p thoracentesis on 7/11 with 1L of serosanguinous fluid removed  CT guided chest tube placed 7/13. TPA/Dornase X2 with good result. Will give one more dose of TPA/Dornase this morning. After that instills for 3 hours will change her over to a dry seal container that she may discharge home with.    She will need to follow

## 2020-07-17 NOTE — PROGRESS NOTES
Physician Progress Note      Kaden Metz  CSN #:                  [id number]  :                       1965  ADMIT DATE:       7/10/2020 10:22 AM  DISCH DATE:  RESPONDING  PROVIDER #:        Barry Clifton MD          QUERY TEXT:    Pt admitted with post-op pleural effusion. Pt noted to have CHF in Pulmonology   consult and recent Cardiology follow-up on 2020 stating chronic   combined CHF. If possible, please document in progress notes and discharge   summary if you are evaluating and/or treating any of the following:     The medical record reflects the following:  Risk Factors: chronic combined CHF per eMR  Clinical Indicators: pleural effusion, EF 24%, Grade I diastolic dysfunction,   BNP 1069  Treatment: monitor I&O, daily weights    Thank you,  Dina Martinez RN, CDS  [phone]  Options provided:  -- Acute on Chronic Systolic and Diastolic CHF  -- Chronic Systolic and Diastolic CHF  -- Other - I will add my own diagnosis  -- Dismiss - Clinically unable to determine / Unknown  -- Refer to Clinical Documentation Reviewer    PROVIDER RESPONSE TEXT:    This patient has chronic systolic and diastolic CHF, present on arrival.    Query created by: Ericka Greenwood on 2020 1:52 PM      Electronically signed by:  Barry Clifton MD 2020 2:21 PM

## 2020-07-17 NOTE — PLAN OF CARE
Problem: Falls - Risk of:  Goal: Will remain free from falls  Description: Will remain free from falls  7/17/2020 0227 by Navdeep Ray RN  Outcome: Ongoing  Goal: Absence of physical injury  Description: Absence of physical injury  7/17/2020 0227 by Navdeep Ray RN  Outcome: Ongoing     Problem: Pain:  Goal: Pain level will decrease  Description: Pain level will decrease  7/17/2020 0227 by Navdeep Ray RN  Outcome: Ongoing  Goal: Control of acute pain  Description: Control of acute pain  7/17/2020 0227 by Navdeep Ray RN  Outcome: Ongoing  Goal: Control of chronic pain  Description: Control of chronic pain  7/17/2020 0227 by Navdeep Ray RN  Outcome: Ongoing

## 2020-07-18 ENCOUNTER — CARE COORDINATION (OUTPATIENT)
Dept: CASE MANAGEMENT | Age: 55
End: 2020-07-18

## 2020-07-18 NOTE — CARE COORDINATION
Nick 45 Transitions Initial Follow Up Call    Call within 2 business days of discharge: Yes    Patient: Arin Valdez Patient : 1965   MRN: <>  Reason for Admission:   Recurrent Left pleural effusion  Discharge Date: 20 RARS: Readmission Risk Score: 16      Last Discharge [id number] [id number]       Complaint Diagnosis Description Type Department Provider    7/10/20 Nausea; Emesis; Cough Recurrent left pleural effusion . .. ED to Hosp-Admission (Discharged) (ADMITTED) Spencer Wilkins MD; Yamile Bowden.. Spoke with:   patient    Facility:   [facility]    Non-face-to-face services provided:  Education of patient/family/caregiver/guardian to support self-management-1    Care Transitions 24 Hour Call    Do you have a copy of your discharge instructions?:  Yes  Do you have all of your prescriptions and are they filled?:  Yes  Have you been contacted by a [address]?:  No  Have you scheduled your follow up appointment?:  Yes  How are you going to get to your appointment?:  Car - family or friend to transport  Were you discharged with any Home Care or Post Acute Services:  Yes  Post Acute Services:  [facility]  Do you feel like you have everything you need to keep you well at home?:  Yes  Care Transitions Interventions         Follow Up:  COVID-19 Risk Monitoring:    Patient contacted regarding recent discharge and COVID-19 risk. Discussed COVID-19 related testing which was negative. Care Transition Nurse contacted the patient by telephone to perform post discharge assessment. Verified name and  with patient as identifiers. Patient has following risk factors of: CHF. CTN reviewed discharge instructions, medical action plan and red flags related to discharge diagnosis. Reviewed and educated them on any new and changed medications related to discharge diagnosis. Advised obtaining a 90-day supply of all daily and as-needed medications.   Patient confirms that she has all of her medications and is taking them as directed. Patient reports that she has a scale at home; but has not checked her weight. Reviewed daily weight management; instructed patient to report weight gain of 2-3 lbs/ day  5 per week. Reviewed CHF zone management tool and s/s to report to MD.     Patient confirms Provider follow up appt. Next Tuesday. Patient confirms that she has received a visit from [facility]. Reminded patient of the 24/7 availability of a [facility] nurse on call for any change in patient condition or worsening symptoms. Education provided regarding infection prevention, and signs and symptoms of COVID-19 and when to seek medical attention with patient who verbalized understanding. Discussed exposure protocols and quarantine from [address] you at higher risk for severe illness 2019 and given an opportunity for questions and concerns. The patient agrees to contact the COVID-19 hotline [phone] or PCP office for questions related to their healthcare. CTN provided contact information for future reference. From CDC: Are you at higher risk for severe illness?  Wash your hands often.  Avoid close contact (6 feet, which is about two arm lengths) with people who are sick.  Put distance between yourself and other people if COVID-19 is spreading in your community.  Clean and disinfect frequently touched surfaces.  Avoid all cruise travel and non-essential air travel.  Call your healthcare professional if you have concerns about COVID-19 and your underlying condition or if you are sick. For more information on steps you can take to protect yourself, see CDC's How to [address] [place] [address] for follow up in 7-10 days based on severity of symptoms and risk factors.   Future Appointments   Date Time Provider Nancy Haines   7/21/2020 10:45 AM MD SYDNIE Jewell S [facility]   7/28/2020  8:30 AM Karyle Sprinkles, APRN - CNP P CLER CAR [facility]   8/18/2020 2:20 PM Francesca Nails MD OWN FP CHASE Jones RN

## 2020-07-20 ENCOUNTER — TELEPHONE (OUTPATIENT)
Dept: CARDIOTHORACIC SURGERY | Age: 55
End: 2020-07-20

## 2020-07-20 ENCOUNTER — TELEPHONE (OUTPATIENT)
Dept: CARDIOLOGY CLINIC | Age: 55
End: 2020-07-20

## 2020-07-20 ENCOUNTER — HOSPITAL ENCOUNTER (OUTPATIENT)
Age: 55
Discharge: HOME | End: 2020-07-20
Payer: COMMERCIAL

## 2020-07-20 ENCOUNTER — HOSPITAL ENCOUNTER (OUTPATIENT)
Dept: GENERAL RADIOLOGY | Age: 55
Discharge: HOME | End: 2020-07-20
Payer: COMMERCIAL

## 2020-07-20 PROCEDURE — 71046 X-RAY EXAM CHEST 2 VIEWS: CPT

## 2020-07-20 NOTE — TELEPHONE ENCOUNTER
Patient is scheduled for OV with Dr. Dyan Tay tomorrow. She had a CXR today that showed an increase in effusion. Patient did note that she drained 300 ml from her atrium on Saturday but since then hasn't had anything come out from her CT. Patient denies any shortness of breath or change in breathing status. CXR reviewed by Dr. Elbert Mane and he reports that as long as patient is not short of breath or having difficulty breathing she is ok to wait until tomorrow to see Dr. Dyan Tay. We did instruct her to try to change positions to assist in drainage. She is aware and agreeable with plan.

## 2020-07-20 NOTE — TELEPHONE ENCOUNTER
Patient calling to let saranya know she was in the hospital this past weeknd and het klorcon dose is 20 meq's daily. recent lab attached.   Sodium  138  136 - 145 mmol/L  Final  07/17/2020  4:27 AM  [address] [facility]    Potassium reflex Magnesium  4.1  3.5 - 5.1 mmol/L  Final  07/17/2020  4:27 AM  [facility]    Chloride  105  99 - 110 mmol/L  Final  07/17/2020  4:27 AM  [facility]    CO2  22  21 - 32 mmol/L  Final  07/17/2020  4:27 AM  [facility]    Anion Gap  11  3 - 16  Final  07/17/2020  4:27 AM  [facility]    Glucose  95  70 - 99 mg/dL  Final  07/17/2020  4:27 AM  [address] Lab    BUN  5Low    7 - 20 mg/dL  Final  07/17/2020  4:27 AM  [facility]    CREATININE  <0.5Low    0.6 - 1.1 mg/dL  Final  07/17/2020  4:27 AM  [facility]    GFR Non-African American  >60  >60  Final  07/17/2020  4:27 AM  [facility]                  Calcium  9.5  8.3 - 10.6 mg/dL  Final  07/17/2020  4:27 AM  [facility]    Total Protein  6.2Low    6.4 - 8.2 g/dL  Final  07/17/2020  4:27 AM  [facility]    Alb  3.2Low    3.4 - 5.0 g/dL  Final  07/17/2020  4:27 AM  [facility]    Albumin/Globulin Ratio  1.1  1.1 - 2.2  Final  07/17/2020  4:27 AM  [facility]    Total Bilirubin  0.4  0.0 - 1.0 mg/dL  Final  07/17/2020  4:27 AM  [facility]    Alkaline Phosphatase  215High    40 - 129 U/L  Final  07/17/2020  4:27 AM  [facility]    ALT  36  10 - 40 U/L  Final  07/17/2020  4:27 AM  - [facility]    AST  44High    15 - 37 U/L  Final  07/17/2020  4:27 AM  - [facility]    Globulin  3.0  g/dL

## 2020-07-21 ENCOUNTER — OFFICE VISIT (OUTPATIENT)
Dept: CARDIOTHORACIC SURGERY | Age: 55
End: 2020-07-21

## 2020-07-21 VITALS
DIASTOLIC BLOOD PRESSURE: 60 MMHG | SYSTOLIC BLOOD PRESSURE: 82 MMHG | TEMPERATURE: 98.2 F | OXYGEN SATURATION: 96 % | RESPIRATION RATE: 14 BRPM | HEART RATE: 73 BPM

## 2020-07-21 PROCEDURE — 99024 POSTOP FOLLOW-UP VISIT: CPT | Performed by: THORACIC SURGERY (CARDIOTHORACIC VASCULAR SURGERY)

## 2020-07-21 NOTE — PROGRESS NOTES
[facility]  Clinic Note     7/21/2020 11:52 AM  Surgeon:  Jihan Michael     S/P : Coronary artery bypass    Chief complaint : Left pleural effusion with chest tube drainage  Subjective:  Ms. River Diaz   No major complaint or events since seen last  Vital Signs: BP 82/60 (Site: Right Upper Arm, Position: Sitting)   Pulse 73   Temp 98.2 °F (36.8 °C)   Resp 14   LMP 11/01/2017 (Approximate)   SpO2 96%      I/O:  No intake or output data in the 24 hours ending 07/21/20 1152    Exam:   Cardiovascular: S1 plus S2 +0 no additional sound bilaterally clear  Pulmonary: Bilaterally clear with good air entry      Incision: Dry and clean  Chest tube in position clear drainage minimum output  Labs:   CBC: No results for input(s): WBC, HGB, HCT, MCV, PLT in the last 72 hours. BMP: No results for input(s): NA, K, CL, CO2, PHOS, BUN, CREATININE in the last 72 hours. Invalid input(s): CA  PT/INR: No results for input(s): PROTIME, INR in the last 72 hours. APTT: No results for input(s): APTT in the last 72 hours. Scheduled Meds:     Patient Active Problem List   Diagnosis    Anxiety    Primary insomnia    Arthritis    Gastroesophageal reflux disease without esophagitis    Hypercholesteremia    Chronic bilateral low back pain without sciatica    Lumbar radiculopathy    Moderate episode of recurrent major depressive disorder ([address]    STEMI (ST elevation myocardial infarction) ([address]    Coronary artery disease involving native coronary artery of native heart    Acute combined systolic and diastolic congestive heart failure (HCC)    Complication of coronary artery bypass graft surgery       Assessment/Plan:   1. Chest tube removal in the clinic today  2.  No need for follow-up    Gregoria Thacker MD FACS

## 2020-07-27 ENCOUNTER — CARE COORDINATION (OUTPATIENT)
Dept: CASE MANAGEMENT | Age: 55
End: 2020-07-27

## 2020-07-27 NOTE — CARE COORDINATION
You Patient resolved from the Care Transitions episode on 7/27/2020  Discussed COVID-19 related testing which was available at this time. Test results were negative. Patient/family has been provided the following resources and education related to COVID-19:                         Signs, symptoms and red flags related to COVID-19            CDC exposure and quarantine guidelines            [facility] exposure contact - [phone]            Contact for their local Department of Health                 Patient currently reports that the following symptoms have improved:  no new/worsening symptoms     No further outreach scheduled with this CTN/ACM. Episode of Care resolved. Patient has this CTN/ACM contact information if future needs arise. Spoke with patient who reported she is doing fine. Patient denied any SOB, CP, cough, or fever/chills. Patient reported [facility] continues to follow and patient reported she is feeling well. Patient denied any further questions or concerns. CTN advised patient of use of urgent care or physicians 24 hr access line if assistance is needed after hours. Also advised that they can always contact their home care provider to request a nurse visit even if it isn't their regularly scheduled day for their nurse to visit.          Erika KINSEYN, RN, [facility]  Care Transition Nurse   [phone]

## 2020-07-31 ENCOUNTER — TELEPHONE (OUTPATIENT)
Dept: CARDIOTHORACIC SURGERY | Age: 55
End: 2020-07-31

## 2020-07-31 NOTE — TELEPHONE ENCOUNTER
Nurse Isaías Duty with [facility] called in update on patient. She reports muffled breath sounds in left lung. Left lung breath sounds were clear one week ago. Patient denies any new shortness of breath at this time. Appetite and endurance have improved as well. I will review with Dr. Kristyn Talley .

## 2020-08-05 ENCOUNTER — TELEPHONE (OUTPATIENT)
Dept: CARDIOTHORACIC SURGERY | Age: 55
End: 2020-08-05

## 2020-08-05 NOTE — TELEPHONE ENCOUNTER
Dr. Travis Moore reviewed home care nurse note. Does not feel intervention is required at this time but would like for me to check in on patient. I spoke to her today. She denies shortness of breath or any other respiratory problems. She does report that home care nurse was there yesterday and stated that lung sounds were much improved from the week before. No other needs from our office at this time. Advised patient to call if she should have any needs in the future.

## 2020-08-10 LAB
FUNGUS (MYCOLOGY) CULTURE: NORMAL
FUNGUS STAIN: NORMAL

## 2020-08-13 RX ORDER — FLUOXETINE HYDROCHLORIDE 40 MG/1
40 CAPSULE ORAL DAILY
Qty: 90 CAPSULE | Refills: 1 | Status: SHIPPED | OUTPATIENT
Start: 2020-08-13 | End: 2021-03-30

## 2020-08-18 ENCOUNTER — OFFICE VISIT (OUTPATIENT)
Dept: FAMILY MEDICINE CLINIC | Age: 55
End: 2020-08-18
Payer: COMMERCIAL

## 2020-08-18 VITALS
OXYGEN SATURATION: 97 % | WEIGHT: 110.8 LBS | HEART RATE: 67 BPM | TEMPERATURE: 98 F | DIASTOLIC BLOOD PRESSURE: 59 MMHG | SYSTOLIC BLOOD PRESSURE: 92 MMHG | BODY MASS INDEX: 19.63 KG/M2

## 2020-08-18 PROBLEM — Z95.1 HISTORY OF CORONARY ARTERY BYPASS GRAFT X 1: Status: ACTIVE | Noted: 2020-08-18

## 2020-08-18 PROBLEM — I51.89 COMBINED SYSTOLIC AND DIASTOLIC CARDIAC DYSFUNCTION: Status: ACTIVE | Noted: 2020-08-18

## 2020-08-18 PROBLEM — T82.9XXA COMPLICATION OF CORONARY ARTERY BYPASS GRAFT SURGERY: Status: RESOLVED | Noted: 2020-07-10 | Resolved: 2020-08-18

## 2020-08-18 PROBLEM — I50.41 ACUTE COMBINED SYSTOLIC AND DIASTOLIC CONGESTIVE HEART FAILURE (HCC): Status: RESOLVED | Noted: 2020-05-28 | Resolved: 2020-08-18

## 2020-08-18 PROCEDURE — 99214 OFFICE O/P EST MOD 30 MIN: CPT | Performed by: FAMILY MEDICINE

## 2020-08-18 PROCEDURE — G8420 CALC BMI NORM PARAMETERS: HCPCS | Performed by: FAMILY MEDICINE

## 2020-08-18 PROCEDURE — G8427 DOCREV CUR MEDS BY ELIG CLIN: HCPCS | Performed by: FAMILY MEDICINE

## 2020-08-18 PROCEDURE — 3017F COLORECTAL CA SCREEN DOC REV: CPT | Performed by: FAMILY MEDICINE

## 2020-08-18 PROCEDURE — 1036F TOBACCO NON-USER: CPT | Performed by: FAMILY MEDICINE

## 2020-08-18 RX ORDER — FAMOTIDINE 40 MG/1
40 TABLET, FILM COATED ORAL EVERY EVENING
Qty: 90 TABLET | Refills: 1 | Status: SHIPPED | OUTPATIENT
Start: 2020-08-18 | End: 2020-08-21 | Stop reason: SDUPTHER

## 2020-08-18 RX ORDER — MIRTAZAPINE 7.5 MG/1
7.5 TABLET, FILM COATED ORAL NIGHTLY
Qty: 90 TABLET | Refills: 1 | Status: SHIPPED | OUTPATIENT
Start: 2020-08-18 | End: 2021-01-28

## 2020-08-18 NOTE — PROGRESS NOTES
Subjective:  Joe Bond is here to discuss the following issues. She has combined systolic and diastolic heart failure. She continues on Lasix and her other medications. No swelling no orthopnea no chest pain or chest pressure recently. She had coronary artery bypass surgery recently. After which she developed significant pleural effusion requiring an additional 7 days of hospitalization. All related symptoms have resolved. Her chest is well-healed. She does have fatigue exercise intolerance and shortness of breath with exertion. She will see her cardiologist tomorrow. She has elevated cholesterol continues on her medication with no muscle aches or other side effects. She has suffered some weight loss and has noted a return of her anxiety. She would like to restart Remeron. This was previously effective and well-tolerated. No fever sweats or chills. Social History     Tobacco Use   Smoking Status Former Smoker    Packs/day: 1.00    Years: 10.00    Pack years: 10.00    Types: Cigarettes    Last attempt to quit: 2020    Years since quittin.2   Smokeless Tobacco Never Used   Allergies:     Patient has no known allergies. Objective:  BP (!) 92/59   Pulse 67   Temp 98 °F (36.7 °C) (Temporal)   Wt 110 lb 12.8 oz (50.3 kg)   LMP 2017 (Approximate)   SpO2 97%   BMI 19.63 kg/m²    No acute distress, heart regular rate and rhythm without murmur, lungs clear to auscultation easy effort, abdomen soft nondistended, no clubbing or cyanosis mood happy affect reactive    Assessment:  1. Combined systolic and diastolic cardiac dysfunction    2. Hypercholesteremia    3. ST elevation myocardial infarction involving right coronary artery ([address]    4. History of coronary artery bypass graft x 1    5. Weight loss    6.  Anxiety            Plan:  Restart Remeron  Continue other medicines  Keep appointment with cardiology tomorrow  She is expecting to have an echocardiogram in about 1 month  Most recent echocardiogram showed a ejection fraction of 30%  Follow-up in 4 months or as needed  \"Healthy Family Handout\" provided  Avoid exposure to all tobacco products. Read and consider all information provided by the pharmacy regarding prescribed medications before use  Careful medical compliance  Proper diet and weight management   Otherwise continue current treatment plan  Call or return if symptoms are not well controlled  Go to ED if severe/significant symptoms occur    All medical conditions for this patient are stable unless otherwise indicated    Tiara Fraser MD    This note was transcribed using a voice recognition software system. Proper technique and careful oversight were used to increase transcription accuracy but inadvertent errors may be present.

## 2020-08-18 NOTE — PATIENT INSTRUCTIONS
Please read the healthy family handout that you were given and share it with your family. Please compare this printed medication list with your medications at home to be sure they are the same. If you have any medications that are different please contact us immediately at [phone]. Also review your allergies that we have listed, these may also include medications that you have not been able to tolerate, make sure everything listed is correct. If you have any allergies that are different please contact us immediately at [phone].

## 2020-08-19 ENCOUNTER — HOSPITAL ENCOUNTER (OUTPATIENT)
Age: 55
Discharge: HOME | End: 2020-08-19
Payer: COMMERCIAL

## 2020-08-19 ENCOUNTER — OFFICE VISIT (OUTPATIENT)
Dept: CARDIOLOGY CLINIC | Age: 55
End: 2020-08-19
Payer: COMMERCIAL

## 2020-08-19 VITALS
OXYGEN SATURATION: 98 % | SYSTOLIC BLOOD PRESSURE: 92 MMHG | HEART RATE: 65 BPM | TEMPERATURE: 98 F | WEIGHT: 108 LBS | DIASTOLIC BLOOD PRESSURE: 60 MMHG | HEIGHT: 63 IN | BODY MASS INDEX: 19.14 KG/M2

## 2020-08-19 LAB
ANION GAP SERPL CALCULATED.3IONS-SCNC: 15 MMOL/L (ref 3–16)
BUN BLDV-MCNC: 10 MG/DL (ref 7–20)
CALCIUM SERPL-MCNC: 9.8 MG/DL (ref 8.3–10.6)
CHLORIDE BLD-SCNC: 101 MMOL/L (ref 99–110)
CO2: 22 MMOL/L (ref 21–32)
CREAT SERPL-MCNC: <0.5 MG/DL (ref 0.6–1.1)
GFR AFRICAN AMERICAN: >60
GFR NON-AFRICAN AMERICAN: >60
GLUCOSE BLD-MCNC: 103 MG/DL (ref 70–99)
POTASSIUM SERPL-SCNC: 3.2 MMOL/L (ref 3.5–5.1)
SODIUM BLD-SCNC: 138 MMOL/L (ref 136–145)

## 2020-08-19 PROCEDURE — 1036F TOBACCO NON-USER: CPT | Performed by: NURSE PRACTITIONER

## 2020-08-19 PROCEDURE — G8420 CALC BMI NORM PARAMETERS: HCPCS | Performed by: NURSE PRACTITIONER

## 2020-08-19 PROCEDURE — 80048 BASIC METABOLIC PNL TOTAL CA: CPT

## 2020-08-19 PROCEDURE — 3017F COLORECTAL CA SCREEN DOC REV: CPT | Performed by: NURSE PRACTITIONER

## 2020-08-19 PROCEDURE — 99214 OFFICE O/P EST MOD 30 MIN: CPT | Performed by: NURSE PRACTITIONER

## 2020-08-19 PROCEDURE — G8427 DOCREV CUR MEDS BY ELIG CLIN: HCPCS | Performed by: NURSE PRACTITIONER

## 2020-08-19 PROCEDURE — 36415 COLL VENOUS BLD VENIPUNCTURE: CPT

## 2020-08-19 RX ORDER — POTASSIUM CHLORIDE 20 MEQ/1
20 TABLET, EXTENDED RELEASE ORAL 2 TIMES DAILY WITH MEALS
Qty: 60 TABLET | Refills: 1 | Status: SHIPPED | OUTPATIENT
Start: 2020-08-19 | End: 2020-11-25 | Stop reason: SDUPTHER

## 2020-08-19 RX ORDER — METOPROLOL SUCCINATE 25 MG/1
25 TABLET, EXTENDED RELEASE ORAL NIGHTLY
Qty: 30 TABLET | Refills: 0 | Status: SHIPPED | OUTPATIENT
Start: 2020-08-19 | End: 2020-10-26 | Stop reason: SDUPTHER

## 2020-08-19 RX ORDER — FUROSEMIDE 20 MG/1
20 TABLET ORAL 2 TIMES DAILY WITH MEALS
Qty: 60 TABLET | Refills: 3 | Status: SHIPPED | OUTPATIENT
Start: 2020-08-19 | End: 2020-08-19 | Stop reason: SDUPTHER

## 2020-08-19 RX ORDER — METOPROLOL SUCCINATE 25 MG/1
25 TABLET, EXTENDED RELEASE ORAL NIGHTLY
Qty: 90 TABLET | Refills: 3 | Status: SHIPPED | OUTPATIENT
Start: 2020-08-19 | End: 2020-11-16 | Stop reason: SINTOL

## 2020-08-19 RX ORDER — FUROSEMIDE 20 MG/1
20 TABLET ORAL DAILY
Qty: 60 TABLET | Refills: 3 | Status: SHIPPED | OUTPATIENT
Start: 2020-08-19 | End: 2021-03-08

## 2020-08-19 ASSESSMENT — ENCOUNTER SYMPTOMS
GASTROINTESTINAL NEGATIVE: 1
SHORTNESS OF BREATH: 0
COUGH: 0

## 2020-08-19 NOTE — PATIENT INSTRUCTIONS
Plan:   1. Cardiac rehab- please call and get set up  2. Switch metoprolol 25 mg twice a day to Toprol 25 mg nighty, take Lasix 20 mg in the morning.- sent 30 day supply into [employer] [facility] and then refills sent into mail order pharmacy per request  3. Continue Ensure 3 times a day. 4. Recommend EF 90 days post revascularization and/ or optimal medical therapy. An ICD for primary prevention of sudden cardiac death is recommended if EF remains 35% or less  Please call [phone] ([phone]) to scheduled echo mid September  5. Diet and activity as discussed  6 Lab work today to check kidney function and potassium   7. Remains unable to titrate BB or add Lisinopril due to soft blood pressure  8. Follow up with Dr. Anjelica Dougherty at [facility] office in 2 months  9. No change in other medications at this time, continue Lasix, asa, potassium and Brilinta(for at least a year after stent placement)  10. Continue smoking cessation  11.  Continue to monitor blood pressure

## 2020-08-19 NOTE — PROGRESS NOTES
[facility], [place], operated by [facility]   Cardiology Note              Date:  August 19, 2020  Patientname: Arin Valdez  YOB: 1965    Chief Complaint   Patient presents with    1 Month Follow-Up     Cardio follow up for CAD/ No new cardiac complaints      Primary Care physician: Darion Thayer MD    HISTORY OF PRESENT ILLNESS: Arin Valdez is a 48 y.o. female who presented to [address] ER on 5/19/2020 with complaints of chest pain. Second EKG showed interior STEMI. She was transferred to [facility] and underwent emergent coronary angiography which showed 100% dRCA treated with RONDA x2. LM disease noted, referred to CT surgery for possible CABG in the future. EF 35%. Pt was discharged on [address]. Unable to start Lisinopril due to hypotension. Pt was readmitted for chest pain on 5/31 underwent LHC which showed known CAD and she underwent CABG x 1 LIMA to LAD on 6/2/2020. On 7/2 she underwent left sided thoracentesis removed 1450 cc. She was readmitted for recurrent pleural effusion and noted to have a chest tube during this admission. She was discharged with indwelling chest tube which was removed in follow up with CT surgery on 7/21/2020. Today she presents for follow up as mentioned above. She stated over all she is feeling ok. Denies chest pain, shortness of breath, palpitations and dizziness. She stated that her fatigue has improved overall from prior to CABG. She stated she is ready to start cardiac rehab. She stated that she has been monitoring blood pressure at home and it has been 90s/60s. She is unsure what HR was. he forgot her list at home. She stated she has been trying to increase activity and walking at home. She continues not to smoke. She has been trying to use Ensure at home. Taking all medications as prescribed, no refills needed at this time.      Arin Valdez describes symptoms including fatigue (improved since last visit) but denies chest pain,dyspnea, palpitations, orthopnea, PND, exertional chest pressure/discomfort, early saiety, edema, syncope. Past Medical History:   has a past medical history of CAD (coronary artery disease), Depression, GERD (gastroesophageal reflux disease), and On intra-aortic balloon pump assist.    Past Surgical History:   has a past surgical history that includes sinus surgery;  section; Coronary artery bypass graft (N/A, 2020); and CT GUIDED PLEURAL DRAINAGE W CATH PERC (2020). Home Medications:    Prior to Admission medications    Medication Sig Start Date End Date Taking? Authorizing Provider   famotidine (PEPCID) 40 MG tablet Take 1 tablet by mouth every evening 20  Yes Lucille Malave MD   magnesium oxide (MAG-OX) 400 (241.3 Mg) MG TABS tablet Take 1 tablet by mouth 2 times daily 20  Yes Lucille Malave MD   mirtazapine (REMERON) 7.5 MG tablet Take 1 tablet by mouth nightly 20  Yes Lucille Malave MD   [facility]) 40 MG capsule Take 1 capsule by mouth daily 20  Yes Lucille Malave MD   furosemide (LASIX) 20 MG tablet Take 1 tablet by mouth daily 20  Yes Ty Miguel MD   potassium chloride (KLOR-CON M) 20 MEQ extended release tablet Take 1 tablet by mouth daily 20  Yes Ty Miguel MD   aspirin 81 MG EC tablet Take 1 tablet by mouth daily 20  Yes Irlanda Castanon MD   metoprolol tartrate (LOPRESSOR) 25 MG tablet Take 1 tablet by mouth 2 times daily 20  Yes Irlanda Castanon MD   atorvastatin (LIPITOR) 80 MG tablet Take 1 tablet by mouth nightly 20  Yes SUJATA Rojo CNP   ticagrelor (BRILINTA) 90 MG TABS tablet Take 1 tablet by mouth 2 times daily 20  Yes SUJATA Rojo CNP       Allergies:  Patient has no known allergies. Social History:   reports that she quit smoking about 3 months ago. Her smoking use included cigarettes. She has a 10.00 pack-year smoking history.  She has never used smokeless tobacco. She reports that she does not drink alcohol or use drugs. Family History: family history is not on file. Review of Systems   Review of Systems   Constitutional: Positive for appetite change and fatigue (improved). Negative for activity change. Respiratory: Negative for cough and shortness of breath. Cardiovascular: Negative for chest pain, palpitations and leg swelling. Gastrointestinal: Negative. Genitourinary: Negative. Musculoskeletal: Negative for myalgias. Neurological: Negative for dizziness and weakness. Psychiatric/Behavioral: Negative for sleep disturbance. OBJECTIVE:    Vital signs:    BP 92/60   Pulse 65   Temp 98 °F (36.7 °C)   Ht 5' 3\" (1.6 m)   Wt 108 lb (49 kg)   LMP 11/01/2017 (Approximate)   SpO2 98%   BMI 19.13 kg/m²      Physical Exam:  Constitutional:  Comfortable and alert, NAD, appears older than stated age  Eyes: PERRL, sclera nonicteric  Neck:  Supple, no masses, no thyroidmegaly, no JVD  Skin:  Warm and dry; no rash or lesions- mid sternal incision healing well  Heart:  Regular, normal apex, S1 and S2 normal, no M/G/R  Lungs:  Normal respiratory effort; CTA,  no wheezing/rhonchi/rales  Abdomen: soft, non tender, + bowel sounds  Extremities:  No edema or cyanosis; no clubbing  Neuro: alert and oriented, moves legs and arms equally, normal mood and affect      Data Reviewed:    6/2/2020  CORONARY ARTERY BYPASS GRAFTING X1, INTERNAL MAMMARY ARTERY, OFF PUMP (LIMA TO LAD), 5 LEVEL BILATERAL INTERCOSTAL NERVE BLOCK, BALLOON PUMP REMOVAL  Transesophageal echo  Removal of intra-aortic balloon pump  Surgeon(s):  Levon Esparza MD      5/31/2020 Coronary angiogram  Procedure: Emergent LHC, LVG, Insertion of IABP   Complications: None  Medications: Procedural sedation with minimal conscious sedation (Versed/Fentanyl)  An independent trained observer pushed medications at my direction.  We monitored the patient's level of consciousness and vital signs/physiologic status throughout the procedure duration (see start and stop times in log). Estimated Blood Loss: Less than 20 mL  Specimens: were not obtained  Access:  Fluoroscopic and US guided micropuncture access of right CFA   Closure: IABP sheath sutured in  Contrast:  73 cc  Findings:      Left Heart Cath  Dominance:Right      LM: 50% distal stenosis unchanged from prior  LAD: larger vessel with mild plaquing; gives off three diagonals of which the second is with aneursym and 70% proximal stenosis  LCx: smaller caliber with minimal plaque disease   RCA: mild plaquing proximal to mid vessel with widely patent distal stents and no significant disease of RPLB or RPDA      LVEDP: 19 mmHg , no gradient upon pullback   LVEF: 35%     Impression/Recommendations:     Discussed films and distal LM disease with CTS Dr. Dina Mahmood in person postprocedure. Plan is for continuation of IABP with Heparin and Cangrelor gtts as bridge to CABG this week. Last dose of Ticagrelor earlier this morning. Continue aspirin, high intensity statin, low dose beta blocker, and diurese as see fit. Coronary angiogram 5/19/2020:  INDICATION   Acute inferior STEMI  PROCEDURES PERFORMED    Left heart catheterization  LVgram  Coronary angiogam  Coronary cath  Thrombectomy of RCA  IVUS of RCA  PCI of RCA with 2 drug-eluting stents  PROCEDURE DESCRIPTION   Risks/benefits/alternatives/outcomes were discussed with patient and/or family and informed consent was obtained. This was an emergency procedure. patient was prepped draped in the usual sterile fashion. Local anaesthetic was applied over puncture site. Using a back wall technique, a 6 [place] Terumo sheath was inserted into right radial artery. Verapamil, nitroglycerin, nicardipine were administered through the sheath. Heparin was administered. Diagnostic 5 [place] Medtronic pigtail and ultra catheters were used for diagnostic angiograms. Initially focus was on RCA angiography and PCI as noted below.   At the conclusion of the procedure, a TR band was placed over the puncture site and hemostasis was obtained. There were no immediate complications. I supervised sedation with versed 4 mg/fentanyl 100 Mcg during the procedure. 180 cc contrast was utilized. <20cc EBL. I offered to call the patient's friends and family to give them updates, patient declined and says that she would prefer that we not call anybody with regard to updates. FINDINGS      LVEDP  29   GRADIENT ACROSS AORTIC VALVE  no gradient   LV FUNCTION EF 40 %   WALL MOTION  inferior hypokinesis   MITRAL REGURGITATION  mild      LM  Less than 10% proximal-mid stenosis, distally there is an eccentric 50% stenosis         LAD  Large vessel, proximal-mid 30% stenosis. Distally less than 10% stenosis. D1 and D2 have a very high takeoff and D2 in particular has a patulous/aneurysmal segment with 70 to 80% proximal stenosis and less than 10% mid to distal stenosis. D3 has 10 to 20% mylhpmub-sax-mimywi stenosis. LCX  Small vessel, 10% ukghrkqa-dgk-syfyvw stenosis. RI  This vessel could also be described as the high first diagonal as noted above in the LAD section. RCA Large vessel, dominant, proximal less than 10% stenosis, mid 30 to 40% stenosis, distal 90 to 100% stenosis. PERCUTANEOUS INTERVENTION DESCRIPTION    Patient was preloaded with Brilinta, patient was given a single dose of Integrilin during the procedure and was treated otherwise with heparin for anticoagulation. A 6 [place] guiding catheter was taken upfront for PCI and a choice floppy wire was used to cross the lesion. Thrombectomy was then performed with the penumbra device and flow was restored. Lesion in the distal RCA was then dilated with a 3 mm balloon and then stented with Abbott Xience Becky 3.5 x 15 mm drug-eluting stent as well as a 3.0 x 18 mm Love Xience [address] drug-eluting stent. IVUS was performed and showed MLD was 3.5 mm.   Stents were postdilated both a 3 and 3.5 mm noncompliant balloon. There was 0% residual stenosis. There was GAVIN 0 flow before and GAVIN-3 after PCI. During procedure, patient had hypotension which responded to vasopressors and these were able to be weaned partially at the end of the case and patient's EKG and symptoms had markedly improved at end of case. Remaining CAD/ASHD was felt to be treated medically followed by possible CABG surgery for left main disease. CONCLUSIONS:    Successful PCI of RCA with 2 drug-eluting stents  Will refer for consideration of CABG for left main disease  Addend, case d/w dr Reyna Moreira, plan for outpt cabg, order for life vest in interim, ef 35% on current review     Echo 5/19/2020: The left ventricular systolic function is moderately reduced with an   ejection fraction of 35 %. There is hypokinesis of the inferior, basal inferior and inferiolateral   walls. Grade I diastolic dysfunction with normal filling pressure. Mild mitral and aortic regurgitation. Systolic pulmonic artery pressure (SPAP) is normal estimated at 38 mmHg   (Right atrial pressure of 8 mmHg).        Cardiology Labs Reviewed:     Lab Data:  Most recent lab results below reviewed in office    CBC:   Lab Results   Component Value Date    WBC 8.3 07/17/2020    WBC 7.8 07/16/2020    WBC 7.0 07/15/2020    RBC 4.54 07/17/2020    RBC 4.55 07/16/2020    RBC 4.75 07/15/2020    HGB 12.4 07/17/2020    HGB 12.2 07/16/2020    HGB 12.9 07/15/2020    HCT 37.7 07/17/2020    HCT 37.6 07/16/2020    HCT 39.6 07/15/2020    MCV 82.9 07/17/2020    MCV 82.5 07/16/2020    MCV 83.4 07/15/2020    RDW 15.8 07/17/2020    RDW 15.1 07/16/2020    RDW 15.7 07/15/2020     07/17/2020     07/16/2020     07/15/2020     BMP:  Lab Results   Component Value Date     07/17/2020     07/16/2020     07/15/2020    K 4.1 07/17/2020    K 3.8 07/16/2020    K 4.1 07/15/2020     07/17/2020     07/16/2020     07/15/2020    CO2 22 07/17/2020    CO2 21 07/16/2020    CO2 22 07/15/2020    BUN 5 07/17/2020    BUN 8 07/16/2020    BUN 6 07/15/2020    CREATININE <0.5 07/17/2020    CREATININE <0.5 07/16/2020    CREATININE <0.5 07/15/2020     BNP:   Lab Results   Component Value Date    PROBNP 1,069 07/10/2020    PROBNP 1,567 06/01/2020    PROBNP 2,240 05/31/2020     FASTING LIPID PANEL:  Lab Results   Component Value Date    HDL 48 06/01/2020    HDL 45 05/28/2011    LDLCALC 71 06/01/2020    TRIG 102 06/01/2020     LIVER PROFILE:No results for input(s): AST, ALT, ALB in the last 72 hours. Reviewed all labs and imaging today    Assessment:   1. CAD: stable    -s/p CABG x1 LIMA to LAD 6/2/2020   -s/p RONDA x2 to RCA 5/19/2020 in setting of STEMI  2. Ischemic cardiomyopathy: EF 35% on echo 5/19/2020   -Recommend EF 90 days post revascularization and/ or optimal medical therapy. An ICD for primary prevention of sudden cardiac death is  recommended if EF remains 35% or less   3. Chronic combined CHF: appears compensated   4. Hypotension: ongoing- unable to add ACE/ARB/ARNI, pt asymptomatic   5. HLD: almost at goal, LDL 71, on statin   6. Tobacco abuse: continues not to smoke- cessation counseled   7. S/p thoracentesis for pleural effusion on 7/2/2020- 1450 cc nadeem colored- CT surgery following - s/p chest tube recent hospital admission  for recurrent pleural effusion- CT surgery removed CT on 7/21 and pt to call with any concerns    Plan:   1. Cardiac rehab- please call and get set up  2. Switch metoprolol 25 mg twice a day to Toprol 25 mg nighty, take Lasix 20 mg in the morning.- sent 30 day supply into [employer] and then refills sent into mail order pharmacy per request  3. Continue Ensure 3 times a day. 4. Recommend EF 90 days post revascularization and/ or optimal medical therapy. An ICD for primary prevention of sudden cardiac death is recommended if EF remains 35% or less  Please call [phone] ([phone]) to scheduled echo mid September  5.  Diet and activity as discussed  6 Lab work today to check kidney function  7. Remains unable to titrate BB or add Lisinopril due to soft blood pressure  8. Follow up with Dr. David Diego at [facility] office in 2 months  9. No change in other medications at this time, continue Lasix, asa, potassium and Brilinta(for at least a year after stent placement)    SUJATA Lopez-CNP  [facility]  [phone]      QUALITY MEASURES  1. Tobacco Cessation Counseling: Yes  2. Retake of BP if >140/90:   NA  3. Documentation to PCP/referring for new patient:  Sent to PCP at close of office visit  4. CAD patient on anti-platelet: Yes  5. CAD patient on STATIN therapy:  Yes  6.  Patient with CHF and aFib on anticoagulation:  NA

## 2020-08-21 RX ORDER — FAMOTIDINE 20 MG/1
40 TABLET, FILM COATED ORAL EVERY EVENING
Qty: 180 TABLET | Refills: 0 | Status: SHIPPED | OUTPATIENT
Start: 2020-08-21 | End: 2020-12-04

## 2020-08-21 NOTE — TELEPHONE ENCOUNTER
Per pharmacy, pepcid 40 mg is on backorder, they do have 20 mg in stock.   New script sent for the 20 mg, patient made aware also

## 2020-08-24 ENCOUNTER — HOSPITAL ENCOUNTER (OUTPATIENT)
Dept: CARDIAC REHAB | Age: 55
Setting detail: THERAPIES SERIES
Discharge: HOME | End: 2020-08-24
Payer: COMMERCIAL

## 2020-08-25 LAB
AFB CULTURE (MYCOBACTERIA): NORMAL
AFB SMEAR: NORMAL

## 2020-08-26 ENCOUNTER — APPOINTMENT (OUTPATIENT)
Dept: CARDIAC REHAB | Age: 55
End: 2020-08-26
Payer: COMMERCIAL

## 2020-08-28 ENCOUNTER — APPOINTMENT (OUTPATIENT)
Dept: CARDIAC REHAB | Age: 55
End: 2020-08-28
Payer: COMMERCIAL

## 2020-08-31 ENCOUNTER — HOSPITAL ENCOUNTER (OUTPATIENT)
Dept: CARDIAC REHAB | Age: 55
Setting detail: THERAPIES SERIES
Discharge: HOME | End: 2020-08-31
Payer: COMMERCIAL

## 2020-08-31 PROCEDURE — 93798 PHYS/QHP OP CAR RHAB W/ECG: CPT

## 2020-09-02 ENCOUNTER — HOSPITAL ENCOUNTER (OUTPATIENT)
Dept: CARDIAC REHAB | Age: 55
Setting detail: THERAPIES SERIES
Discharge: HOME | End: 2020-09-02
Payer: COMMERCIAL

## 2020-09-02 ENCOUNTER — HOSPITAL ENCOUNTER (OUTPATIENT)
Age: 55
Discharge: HOME | End: 2020-09-02
Payer: COMMERCIAL

## 2020-09-02 LAB
ANION GAP SERPL CALCULATED.3IONS-SCNC: 16 MMOL/L (ref 3–16)
BUN BLDV-MCNC: 9 MG/DL (ref 7–20)
CALCIUM SERPL-MCNC: 10.2 MG/DL (ref 8.3–10.6)
CHLORIDE BLD-SCNC: 104 MMOL/L (ref 99–110)
CO2: 20 MMOL/L (ref 21–32)
CREAT SERPL-MCNC: 0.6 MG/DL (ref 0.6–1.1)
GFR AFRICAN AMERICAN: >60
GFR NON-AFRICAN AMERICAN: >60
GLUCOSE BLD-MCNC: 101 MG/DL (ref 70–99)
POTASSIUM SERPL-SCNC: 3.8 MMOL/L (ref 3.5–5.1)
SODIUM BLD-SCNC: 140 MMOL/L (ref 136–145)

## 2020-09-02 PROCEDURE — 93798 PHYS/QHP OP CAR RHAB W/ECG: CPT

## 2020-09-02 PROCEDURE — 80048 BASIC METABOLIC PNL TOTAL CA: CPT

## 2020-09-02 PROCEDURE — 36415 COLL VENOUS BLD VENIPUNCTURE: CPT

## 2020-09-04 ENCOUNTER — HOSPITAL ENCOUNTER (OUTPATIENT)
Dept: CARDIAC REHAB | Age: 55
Setting detail: THERAPIES SERIES
Discharge: HOME | End: 2020-09-04
Payer: COMMERCIAL

## 2020-09-04 PROCEDURE — 93798 PHYS/QHP OP CAR RHAB W/ECG: CPT

## 2020-09-04 RX ORDER — ASPIRIN 81 MG/1
TABLET, COATED ORAL
Qty: 90 TABLET | Refills: 3 | Status: SHIPPED | OUTPATIENT
Start: 2020-09-04 | End: 2021-08-24 | Stop reason: SDUPTHER

## 2020-09-04 NOTE — TELEPHONE ENCOUNTER
Received RF request for ASA 81 mg taken daily S/P CABG x1 done 6/2/2020 by Dr. Elysia Gleason. Saw you in office 8/19/2020, pended for your approval since no longer under surgeon's care.

## 2020-09-09 ENCOUNTER — HOSPITAL ENCOUNTER (OUTPATIENT)
Dept: CARDIAC REHAB | Age: 55
Setting detail: THERAPIES SERIES
Discharge: HOME | End: 2020-09-09
Payer: COMMERCIAL

## 2020-09-09 ENCOUNTER — NURSE TRIAGE (OUTPATIENT)
Dept: OTHER | Facility: CLINIC | Age: 55
End: 2020-09-09

## 2020-09-09 PROCEDURE — 93798 PHYS/QHP OP CAR RHAB W/ECG: CPT

## 2020-09-09 RX ORDER — DOXYCYCLINE HYCLATE 100 MG/1
100 CAPSULE ORAL 2 TIMES DAILY
Qty: 20 CAPSULE | Refills: 0 | Status: SHIPPED | OUTPATIENT
Start: 2020-09-09 | End: 2020-09-19

## 2020-09-09 NOTE — TELEPHONE ENCOUNTER
Lesion on left side of nose that will not heal.  It has been there 2-3 months. It is scabbed. No drainage. Denies redness. Reason for Disposition   Wound hasn't healed within 10 days after the injury    Answer Assessment - Initial Assessment Questions  1. LOCATION: \"Where is the wound located? \"       See above    2. WOUND APPEARANCE: \"What does the wound look like? \"       See above    3. SIZE: If redness is present, ask: \"What is the size of the red area? \" (Inches, centimeters, or compare to size of a coin)       About a dime    4. SPREAD: \"What's changed in the last day? \"  \"Do you see any red streaks coming from the wound? \"      Denies    5. ONSET: \"When did it start to look infected? \"       Denies    6. MECHANISM: \"How did the wound start, what was the cause? \"      Unsure    7. PAIN: \"Is there any pain? \" If so, ask: \"How bad is the pain? \"   (Scale 1-10; or mild, moderate, severe)      Denies    8. FEVER: \"Do you have a fever? \" If so, ask: \"What is your temperature, how was it measured, and when did it start? \"      Denies    9. OTHER SYMPTOMS: \"Do you have any other symptoms? \" (e.g., shaking chills, weakness, rash elsewhere on body)      Denies    10. PREGNANCY: \"Is there any chance you are pregnant? \" \"When was your last menstrual period? \"        NA    Protocols used: WOUND INFECTION-ADULT-[place]    Patient called pre-service center [facility]) to schedule appointment, with red flag complaint, transferred to RN access for triage. See above questions and answers. Caller talking full sentences without any distress on phone. Discussed disposition and patient agreeable. Discussed potential consequences for not following disposition recommendation. Aware to call back with any concerns or persistent, worsening, or new symptoms develop. Warm transfer to [facility] scheduling for appointment. Please do not respond to the triage nurse through this encounter.   Any subsequent communication should be directly

## 2020-09-11 ENCOUNTER — HOSPITAL ENCOUNTER (OUTPATIENT)
Dept: CARDIAC REHAB | Age: 55
Setting detail: THERAPIES SERIES
Discharge: HOME | End: 2020-09-11
Payer: COMMERCIAL

## 2020-09-11 PROCEDURE — 93798 PHYS/QHP OP CAR RHAB W/ECG: CPT

## 2020-09-14 ENCOUNTER — HOSPITAL ENCOUNTER (OUTPATIENT)
Dept: CARDIAC REHAB | Age: 55
Setting detail: THERAPIES SERIES
Discharge: HOME | End: 2020-09-14
Payer: COMMERCIAL

## 2020-09-14 PROCEDURE — 93798 PHYS/QHP OP CAR RHAB W/ECG: CPT

## 2020-09-15 ENCOUNTER — TELEPHONE (OUTPATIENT)
Dept: MAMMOGRAPHY | Age: 55
End: 2020-09-15

## 2020-09-15 ENCOUNTER — HOSPITAL ENCOUNTER (OUTPATIENT)
Dept: MAMMOGRAPHY | Age: 55
Discharge: HOME | End: 2020-09-15
Payer: COMMERCIAL

## 2020-09-15 PROCEDURE — 77063 BREAST TOMOSYNTHESIS BI: CPT

## 2020-09-16 ENCOUNTER — HOSPITAL ENCOUNTER (OUTPATIENT)
Dept: NON INVASIVE DIAGNOSTICS | Age: 55
Discharge: HOME | End: 2020-09-16
Payer: COMMERCIAL

## 2020-09-16 PROCEDURE — 93308 TTE F-UP OR LMTD: CPT

## 2020-09-18 ENCOUNTER — HOSPITAL ENCOUNTER (OUTPATIENT)
Dept: CARDIAC REHAB | Age: 55
Setting detail: THERAPIES SERIES
Discharge: HOME | End: 2020-09-18
Payer: COMMERCIAL

## 2020-09-18 PROCEDURE — 93798 PHYS/QHP OP CAR RHAB W/ECG: CPT

## 2020-09-21 ENCOUNTER — HOSPITAL ENCOUNTER (OUTPATIENT)
Dept: CARDIAC REHAB | Age: 55
Setting detail: THERAPIES SERIES
Discharge: HOME | End: 2020-09-21
Payer: COMMERCIAL

## 2020-09-21 ENCOUNTER — COMMUNITY OUTREACH (OUTPATIENT)
Dept: OTHER | Age: 55
End: 2020-09-21

## 2020-09-21 PROCEDURE — 93798 PHYS/QHP OP CAR RHAB W/ECG: CPT

## 2020-09-21 NOTE — PROGRESS NOTES
[facility]- will mail letter on this date to patient notifying of termination of [employer] effective today due to now having Medicaid.

## 2020-09-23 ENCOUNTER — HOSPITAL ENCOUNTER (OUTPATIENT)
Dept: CARDIAC REHAB | Age: 55
Setting detail: THERAPIES SERIES
Discharge: HOME | End: 2020-09-23
Payer: COMMERCIAL

## 2020-09-23 PROCEDURE — 93798 PHYS/QHP OP CAR RHAB W/ECG: CPT

## 2020-09-25 ENCOUNTER — HOSPITAL ENCOUNTER (OUTPATIENT)
Dept: CARDIAC REHAB | Age: 55
Setting detail: THERAPIES SERIES
Discharge: HOME | End: 2020-09-25
Payer: COMMERCIAL

## 2020-09-25 PROCEDURE — 93798 PHYS/QHP OP CAR RHAB W/ECG: CPT

## 2020-09-25 NOTE — PROGRESS NOTES
[facility]   Electrophysiology Consult Note              Date:  2020  Patient name: Sia Culver  YOB: 1965    Reason for Consult:  New Patient; Cardiomyopathy; and Shortness of Breath    Requesting Physician: Georgette Katz MD    HISTORY OF PRESENT ILLNESS: Sia Culver is a 48 y.o. female who presents for evaluation for cardiomyopathy and shortness of breath. She has a PMH significant for CAD, interior STEMI 2020, GERD, and intra-aortic balloon assist.  On 2020, she underwent emergent coronary angiography which showed 100% dRCA treated with RONDA x2.  EF 35%. She was discharged wearing a life vest.  She had CABG x1 LIMA to LAD on 2020. Today she reports that on 2020 she woke up at 2:30 and went to ED. She is was referred to discuss her low EF. She gets short of breath if she walks too far. She gets SOB sometimes when she lies in bed. Patient denies chest pain, palpitations, dizziness or syncope. She denies illicit drugs or smoking. EKG today shows SR 96 bpm.  She is taking her medications as prescribed. She does not present today with life vest on. She does cardiac rehab 3 times a week. Past Medical History:   has a past medical history of CAD (coronary artery disease), Depression, GERD (gastroesophageal reflux disease), and On intra-aortic balloon pump assist.    Past Surgical History:   has a past surgical history that includes sinus surgery;  section; Coronary artery bypass graft (N/A, 2020); CT GUIDED PLEURAL DRAINAGE W CATH PERC (2020); thoracentesis (Left, 2020); transesophageal echocardiogram (2020); and Cardiac catheterization (2020). Allergies:  Patient has no known allergies. Social History:   reports that she quit smoking about 4 months ago. Her smoking use included cigarettes. She has a 10.00 pack-year smoking history.  She has never used smokeless tobacco. She reports that she does not drink alcohol or use drugs. Family History: family history is not on file. Home Medications:    Prior to Admission medications    Medication Sig Start Date End Date Taking? Authorizing Provider   ASPIRIN LOW DOSE 81 MG EC tablet TAKE 1 TABLET BY MOUTH DAILY 9/4/20  Yes De Paz Nancy, SUJATA Rodriguez CNP   famotidine (PEPCID) 20 MG tablet Take 2 tablets by mouth every evening 8/21/20  Yes Michelle Levy MD   metoprolol succinate (TOPROL XL) 25 MG extended release tablet Take 1 tablet by mouth nightly 8/19/20  Yes De Paz SUJATA Cruz CNP   furosemide (LASIX) 20 MG tablet Take 1 tablet by mouth daily 8/19/20  Yes De Paz Days, SUJATA Rodriguez CNP   potassium chloride (KLOR-CON M) 20 MEQ extended release tablet Take 1 tablet by mouth 2 times daily (with meals) 8/19/20  Yes De Paz SUJATA Cruz CNP   magnesium oxide (MAG-OX) 400 (241.3 Mg) MG TABS tablet Take 1 tablet by mouth 2 times daily 8/18/20  Yes Michelle Levy MD   mirtazapine (REMERON) 7.5 MG tablet Take 1 tablet by mouth nightly 8/18/20  Yes Michelle Levy MD   FLUoxetine [facility]) 40 MG capsule Take 1 capsule by mouth daily 8/13/20  Yes Michelle Levy MD   atorvastatin (LIPITOR) 80 MG tablet Take 1 tablet by mouth nightly 5/21/20  Yes SUJATA Simons CNP   ticagrelor (BRILINTA) 90 MG TABS tablet Take 1 tablet by mouth 2 times daily 5/21/20  Yes SUJATA Simons CNP   metoprolol succinate (TOPROL XL) 25 MG extended release tablet Take 1 tablet by mouth nightly 8/19/20   SUJATA Miranda CNP       REVIEW OF SYSTEMS:    All 14-point review of systems are completed and  pertinent positives are mentioned in the history of present illness. Other  systems are reviewed and are negative. Physical Examination:    Ht 5' 3\" (1.6 m)   Wt 114 lb (51.7 kg)   LMP 11/01/2017 (Approximate)   BMI 20.19 kg/m²      Constitutional and General Appearance:    alert, cooperative, no distress and appears stated age  [de-identified]:    PERRLA, no cervical lymphadenopathy.  No masses palpable. Normal oral  mucosa  Respiratory:  · Normal excursion and expansion without use of accessory muscles  · Resp Auscultation: Normal breath sounds without dullness or wheezing  Cardiovascular:  · The apical impulse is not displaced  · Heart tones are crisp and normal. regular S1 and S2. Abdomen:  · No masses or tenderness  · Bowel sounds present  Extremities:  ·  No Cyanosis or Clubbing  ·  Lower extremity edema: Yes  · Skin: Warm and dry  Neurological:  · Alert and oriented. · Moves all extremities well  · No abnormalities of mood, affect, memory, mentation, or behavior are noted    DATA:    EC2020  Sinus  Rhythm   -  Nonspecific T-abnormality 96 bpm    Limited ECHO: 2020   This is a limited study for CAD and ischemic cardiomyopathy follow-up. Left ventricular systolic function is reduced with ejection fraction   estimated at 35-40%. There is mild global hypokinesis with regional variation. The basal   inferoseptum, basal to mid inferior wall, and the entire inferolateral wall   appear more hypokinetic than the remaining segments. Direct comparison with the prior study dated 2020 is somewhat limited   as no contrast enhancement is used for the present study. Overall, however,   LV function and regional abnormalities appear similar to the prior study. ECHO:    The left ventricular systolic function is moderately reduced with an   ejection fraction of 35 %. There is hypokinesis of the inferior, basal inferior and inferiolateral   walls. Grade I diastolic dysfunction with normal filling pressure. Mild mitral and aortic regurgitation. Systolic pulmonic artery pressure (SPAP) is normal estimated at 38 mmHg   (Right atrial pressure of 8 mmHg). IMPRESSION:    1. Ischemic cardiomyopathy with moderately depressed left ventricular ejection fraction.   Patient is a pleasant 73-year-old female with a medical history significant for hemic cardiomyopathy status post CABG (2020) with moderately depressed left ventricular ejection fraction, hypertension, hyperlipidemia, anxiety, and GERD who presents from home for evaluation for ICD for primary prevention. Patient is been doing well. No syncopal events or events consistent with arrhythmias. Her left ventricular fraction is 35 to 40%. From an [facility] heart association standpoint her heart failure score is 2. I see no evidence of ventricular tachycardia or nonsustained ventricular tachycardia. Patient is a little anxious and would like to move forward with heart monitoring to see if he does have the potential for ventricular tachycardia. If he does have nonsustained ventricular tachycardia we will move forward with an EP study. If her EP study is positive we will move forward with a dual-chamber ICD. Pending her results from her monitor we will finalize plans as far as EP study and ICD are concerned. - 30 day monitor.  - GDMT as tolerated per cardiology. - If non-sustained ventricular tachycardia then EPS +/- DC ICD. - We will follow up post 30 day monitor if abnormal.    RECOMMENDATIONS:  1. Recommend cardiac event monitor first.  Then if you positive proceed with EP study then if criteria is met then proceed with defibrillator. 2.  30 Day cardiac event monitor  3. Follow up pending results. If arrhythmias noted will discuss ICD. If normal will follow up with JULISSA Cervantes CNP. QUALITY MEASURES  1. Tobacco Cessation Counseling: NA  2. Retake of BP if >140/90:   NA  3. Documentation to PCP/referring for new patient:  Sent to PCP at close of office visit  4. CAD patient on anti-platelet: Yes  5. CAD patient on STATIN therapy:  Yes  6. Patient with CHF and aFib on anticoagulation:  NA     This note was scribed in the presence of Keri Kocher MD by Lyle Land RN. All questions and concerns were addressed to the patient/family. Alternatives to my treatment were discussed.     Dr. Keri Kocher

## 2020-09-29 ENCOUNTER — OFFICE VISIT (OUTPATIENT)
Dept: CARDIOLOGY CLINIC | Age: 55
End: 2020-09-29
Payer: COMMERCIAL

## 2020-09-29 VITALS
SYSTOLIC BLOOD PRESSURE: 104 MMHG | HEIGHT: 63 IN | WEIGHT: 114 LBS | OXYGEN SATURATION: 98 % | HEART RATE: 111 BPM | DIASTOLIC BLOOD PRESSURE: 50 MMHG | BODY MASS INDEX: 20.2 KG/M2

## 2020-09-29 PROCEDURE — 99244 OFF/OP CNSLTJ NEW/EST MOD 40: CPT | Performed by: INTERNAL MEDICINE

## 2020-09-29 PROCEDURE — 93000 ELECTROCARDIOGRAM COMPLETE: CPT | Performed by: INTERNAL MEDICINE

## 2020-09-29 PROCEDURE — G8427 DOCREV CUR MEDS BY ELIG CLIN: HCPCS | Performed by: INTERNAL MEDICINE

## 2020-09-29 PROCEDURE — G8420 CALC BMI NORM PARAMETERS: HCPCS | Performed by: INTERNAL MEDICINE

## 2020-09-29 NOTE — PATIENT INSTRUCTIONS
1.  Recommend cardiac event monitor first.  Then if you positive proceed with EP study then if criteria is met then proceed with defibrillator. 2.  30 Day cardiac event monitor  3. Follow up pending results. If arrhythmias noted will discuss ICD. If normal will follow up with JULISSA Cervantes CNP.

## 2020-09-29 NOTE — LETTER
[address]   Electrophysiology Consult Note              Date:  2020  Patient name: Tori Callahan  YOB: 1965    Reason for Consult:  New Patient; Cardiomyopathy; and Shortness of Breath    Requesting Physician: Matthew Valenzuela MD    HISTORY OF PRESENT ILLNESS: Tori Callahan is a 48 y.o. female who presents for evaluation for cardiomyopathy and shortness of breath. She has a PMH significant for CAD, interior STEMI 2020, GERD, and intra-aortic balloon assist.  On 2020, she underwent emergent coronary angiography which showed 100% dRCA treated with RONDA x2.  EF 35%. She was discharged wearing a life vest.  She had CABG x1 LIMA to LAD on 2020. Today she reports that on 2020 she woke up at 2:30 and went to ED. She is was referred to discuss her low EF. She gets short of breath if she walks too far. She gets SOB sometimes when she lies in bed. Patient denies chest pain, palpitations, dizziness or syncope. She denies illicit drugs or smoking. EKG today shows SR 96 bpm.  She is taking her medications as prescribed. She does not present today with life vest on. She does cardiac rehab 3 times a week. Past Medical History:   has a past medical history of CAD (coronary artery disease), Depression, GERD (gastroesophageal reflux disease), and On intra-aortic balloon pump assist.    Past Surgical History:   has a past surgical history that includes sinus surgery;  section; Coronary artery bypass graft (N/A, 2020); CT GUIDED PLEURAL DRAINAGE W CATH PERC (2020); thoracentesis (Left, 2020); transesophageal echocardiogram (2020); and Cardiac catheterization (2020). Allergies:  Patient has no known allergies. Social History:   reports that she quit smoking about 4 months ago. Her smoking use included cigarettes.  She has a 10.00 pack-year smoking history. She has never used smokeless tobacco. She reports that she does not drink alcohol or use drugs. Family History: family history is not on file. Home Medications:    Prior to Admission medications    Medication Sig Start Date End Date Taking? Authorizing Provider   ASPIRIN LOW DOSE 81 MG EC tablet TAKE 1 TABLET BY MOUTH DAILY 9/4/20  Yes SUJATA Saucedo CNP   famotidine (PEPCID) 20 MG tablet Take 2 tablets by mouth every evening 8/21/20  Yes Jackie Ross MD   metoprolol succinate (TOPROL XL) 25 MG extended release tablet Take 1 tablet by mouth nightly 8/19/20  Yes SUJATA Saucedo CNP   furosemide (LASIX) 20 MG tablet Take 1 tablet by mouth daily 8/19/20  Yes SUJATA Saucedo CNP   potassium chloride (KLOR-CON M) 20 MEQ extended release tablet Take 1 tablet by mouth 2 times daily (with meals) 8/19/20  Yes SUJATA Saucedo CNP   magnesium oxide (MAG-OX) 400 (241.3 Mg) MG TABS tablet Take 1 tablet by mouth 2 times daily 8/18/20  Yes Jackie Ross MD   mirtazapine (REMERON) 7.5 MG tablet Take 1 tablet by mouth nightly 8/18/20  Yes Jackie Ross MD   FLUoxetine [facility]) 40 MG capsule Take 1 capsule by mouth daily 8/13/20  Yes Jackie Ross MD   atorvastatin (LIPITOR) 80 MG tablet Take 1 tablet by mouth nightly 5/21/20  Yes SUJATA Mayen CNP   ticagrelor (BRILINTA) 90 MG TABS tablet Take 1 tablet by mouth 2 times daily 5/21/20  Yes SUJATA Mayen CNP   metoprolol succinate (TOPROL XL) 25 MG extended release tablet Take 1 tablet by mouth nightly 8/19/20   SUJATA Saucedo CNP       REVIEW OF SYSTEMS:    All 14-point review of systems are completed and  pertinent positives are mentioned in the history of present illness. Other  systems are reviewed and are negative.     Physical Examination:    Ht 5' 3\" (1.6 m)   Wt 114 lb (51.7 kg)   LMP 11/01/2017 (Approximate)   BMI 20.19 kg/m²      Constitutional and General Appearance: alert, cooperative, no distress and appears stated age  [de-identified]:    PERRLA, no cervical lymphadenopathy. No masses palpable. Normal oral  mucosa  Respiratory:  · Normal excursion and expansion without use of accessory muscles  · Resp Auscultation: Normal breath sounds without dullness or wheezing  Cardiovascular:  · The apical impulse is not displaced  · Heart tones are crisp and normal. regular S1 and S2. Abdomen:  · No masses or tenderness  · Bowel sounds present  Extremities:  ·  No Cyanosis or Clubbing  ·  Lower extremity edema: Yes  · Skin: Warm and dry  Neurological:  · Alert and oriented. · Moves all extremities well  · No abnormalities of mood, affect, memory, mentation, or behavior are noted    DATA:    EC2020  Sinus  Rhythm   -  Nonspecific T-abnormality 96 bpm    Limited ECHO: 2020   This is a limited study for CAD and ischemic cardiomyopathy follow-up. Left ventricular systolic function is reduced with ejection fraction   estimated at 35-40%. There is mild global hypokinesis with regional variation. The basal   inferoseptum, basal to mid inferior wall, and the entire inferolateral wall   appear more hypokinetic than the remaining segments. Direct comparison with the prior study dated 2020 is somewhat limited   as no contrast enhancement is used for the present study. Overall, however,   LV function and regional abnormalities appear similar to the prior study. ECHO:    The left ventricular systolic function is moderately reduced with an   ejection fraction of 35 %. There is hypokinesis of the inferior, basal inferior and inferiolateral   walls. Grade I diastolic dysfunction with normal filling pressure. Mild mitral and aortic regurgitation. Systolic pulmonic artery pressure (SPAP) is normal estimated at 38 mmHg   (Right atrial pressure of 8 mmHg). IMPRESSION:    1. Ischemic cardiomyopathy with moderately depressed left ventricular ejection fraction. Patient is a pleasant 27-year-old female with a medical history significant for hemic cardiomyopathy status post CABG (2020) with moderately depressed left ventricular ejection fraction, hypertension, hyperlipidemia, anxiety, and GERD who presents from home for evaluation for ICD for primary prevention. Patient is been doing well. No syncopal events or events consistent with arrhythmias. Her left ventricular fraction is 35 to 40%. From an [facility] heart association standpoint her heart failure score is 2. I see no evidence of ventricular tachycardia or nonsustained ventricular tachycardia. Patient is a little anxious and would like to move forward with heart monitoring to see if he does have the potential for ventricular tachycardia. If he does have nonsustained ventricular tachycardia we will move forward with an EP study. If her EP study is positive we will move forward with a dual-chamber ICD. Pending her results from her monitor we will finalize plans as far as EP study and ICD are concerned. - 30 day monitor.  - GDMT as tolerated per cardiology. - If non-sustained ventricular tachycardia then EPS +/- DC ICD. - We will follow up post 30 day monitor if abnormal.    RECOMMENDATIONS:  1. Recommend cardiac event monitor first.  Then if you positive proceed with EP study then if criteria is met then proceed with defibrillator. 2.  30 Day cardiac event monitor  3. Follow up pending results. If arrhythmias noted will discuss ICD. If normal will follow up with JULISSA Cervantes CNP. QUALITY MEASURES  1. Tobacco Cessation Counseling: NA  2. Retake of BP if >140/90:   NA  3. Documentation to PCP/referring for new patient:  Sent to PCP at close of office visit  4. CAD patient on anti-platelet: Yes  5. CAD patient on STATIN therapy:  Yes  6. Patient with CHF and aFib on anticoagulation:  NA     This note was scribed in the presence of Ade Donald MD by Dickson Clemens RN. All questions and concerns were addressed to the patient/family. Alternatives to my treatment were discussed. Dr. Sandra Macias MD  Electrophysiology  [facility] [address]. [place]  Phone: [phone]  Fax: [phone]     NOTE: This report was transcribed using voice recognition software. Every effort was made to ensure accuracy, however, inadvertent computerized transcription errors may be present. The scribe's documentation has been prepared under my direction and personally reviewed by me in its entirety. I confirm that the note above accurately reflects all work, physical examination, the discussion of treatments and procedures, and medical decision making performed by me. Juanetta Felty, MD personally performed the services described in this documentation as scribed by nurse in my presence, and is both accurate and complete.     Electronically signed by Stephanie Del Castillo MD on 9/29/2020 at 8:46 AM

## 2020-09-30 ENCOUNTER — HOSPITAL ENCOUNTER (OUTPATIENT)
Dept: CARDIAC REHAB | Age: 55
Setting detail: THERAPIES SERIES
Discharge: HOME | End: 2020-09-30
Payer: COMMERCIAL

## 2020-09-30 PROCEDURE — 93798 PHYS/QHP OP CAR RHAB W/ECG: CPT

## 2020-10-02 ENCOUNTER — HOSPITAL ENCOUNTER (OUTPATIENT)
Dept: CARDIAC REHAB | Age: 55
Setting detail: THERAPIES SERIES
Discharge: HOME | End: 2020-10-02
Payer: COMMERCIAL

## 2020-10-02 PROCEDURE — 93798 PHYS/QHP OP CAR RHAB W/ECG: CPT

## 2020-10-05 ENCOUNTER — HOSPITAL ENCOUNTER (OUTPATIENT)
Dept: CARDIAC REHAB | Age: 55
Setting detail: THERAPIES SERIES
Discharge: HOME | End: 2020-10-05
Payer: COMMERCIAL

## 2020-10-05 ENCOUNTER — OFFICE VISIT (OUTPATIENT)
Dept: FAMILY MEDICINE CLINIC | Age: 55
End: 2020-10-05
Payer: COMMERCIAL

## 2020-10-05 VITALS
DIASTOLIC BLOOD PRESSURE: 52 MMHG | WEIGHT: 114 LBS | TEMPERATURE: 97.9 F | SYSTOLIC BLOOD PRESSURE: 88 MMHG | OXYGEN SATURATION: 95 % | BODY MASS INDEX: 20.19 KG/M2 | HEART RATE: 65 BPM

## 2020-10-05 PROCEDURE — 3017F COLORECTAL CA SCREEN DOC REV: CPT | Performed by: FAMILY MEDICINE

## 2020-10-05 PROCEDURE — G8427 DOCREV CUR MEDS BY ELIG CLIN: HCPCS | Performed by: FAMILY MEDICINE

## 2020-10-05 PROCEDURE — G8484 FLU IMMUNIZE NO ADMIN: HCPCS | Performed by: FAMILY MEDICINE

## 2020-10-05 PROCEDURE — 99214 OFFICE O/P EST MOD 30 MIN: CPT | Performed by: FAMILY MEDICINE

## 2020-10-05 PROCEDURE — G8420 CALC BMI NORM PARAMETERS: HCPCS | Performed by: FAMILY MEDICINE

## 2020-10-05 PROCEDURE — 1036F TOBACCO NON-USER: CPT | Performed by: FAMILY MEDICINE

## 2020-10-05 PROCEDURE — 93798 PHYS/QHP OP CAR RHAB W/ECG: CPT

## 2020-10-05 NOTE — PROGRESS NOTES
Subjective:  Triny Mathews is here to discuss the following issues. She has a lesion on the left side of her nose present for several weeks. It is not healing. No trauma. No drainage. No fever sweats or chills. On review of systems she snores loudly at night and often awakens with a headache. She has coronary artery disease and had a prior MI and single-vessel bypass. She has systolic and diastolic heart failure. She continues on her medications. No edema orthopnea no chest pain or chest pressure. She is wearing a cardiac monitor. She may require an implantable pacemaker defibrillator. She has significant fatigue with hypotension. Often her blood pressures will be in the low 80s over 40s. Social History     Tobacco Use   Smoking Status Former Smoker    Packs/day: 1.00    Years: 10.00    Pack years: 10.00    Types: Cigarettes    Last attempt to quit: 2020    Years since quittin.4   Smokeless Tobacco Never Used   Allergies:     Patient has no known allergies. Objective:  BP (!) 88/52   Pulse 65   Temp 97.9 °F (36.6 °C) (Oral)   Wt 114 lb (51.7 kg)   LMP 2017 (Approximate)   SpO2 95%   BMI 20.19 kg/m²    No acute distress, heart regular rate and rhythm without murmur, lungs clear to auscultation easy effort, abdomen soft nondistended, no clubbing or cyanosis    Assessment:  1. Suspicious nevus    2. Sleep apnea, unspecified type    3. Coronary artery disease involving native coronary artery of native heart without angina pectoris    4. ST elevation myocardial infarction (STEMI), unspecified artery ([address]    5. Combined systolic and diastolic cardiac dysfunction    6.  Hypotension due to drugs            Plan:  Decrease Lasix and potassium  Continue other medicines  Home sleep study when cardiac monitor is concluded  Dermatology referral ASAP  Keep upcoming follow-up with cardiology  Follow-up with me in 2 months or as needed  \"Healthy Family Handout\" provided  Avoid exposure to all tobacco products. Read and consider all information provided by the pharmacy regarding prescribed medications before use  Careful medical compliance  Proper diet and weight management   Otherwise continue current treatment plan  Call or return if symptoms are not well controlled  Go to ED if severe/significant symptoms occur    All medical conditions for this patient are stable unless otherwise indicated    Seferino Hough MD    This note was transcribed using a voice recognition software system. Proper technique and careful oversight were used to increase transcription accuracy but inadvertent errors may be present.

## 2020-10-07 ENCOUNTER — HOSPITAL ENCOUNTER (OUTPATIENT)
Dept: CARDIAC REHAB | Age: 55
Setting detail: THERAPIES SERIES
Discharge: HOME | End: 2020-10-07
Payer: COMMERCIAL

## 2020-10-07 PROCEDURE — 93798 PHYS/QHP OP CAR RHAB W/ECG: CPT

## 2020-10-09 ENCOUNTER — HOSPITAL ENCOUNTER (OUTPATIENT)
Dept: CARDIAC REHAB | Age: 55
Setting detail: THERAPIES SERIES
Discharge: HOME | End: 2020-10-09
Payer: COMMERCIAL

## 2020-10-09 PROCEDURE — 93798 PHYS/QHP OP CAR RHAB W/ECG: CPT

## 2020-10-12 ENCOUNTER — HOSPITAL ENCOUNTER (OUTPATIENT)
Dept: CARDIAC REHAB | Age: 55
Setting detail: THERAPIES SERIES
Discharge: HOME | End: 2020-10-12
Payer: COMMERCIAL

## 2020-10-12 PROCEDURE — 93798 PHYS/QHP OP CAR RHAB W/ECG: CPT

## 2020-10-14 ENCOUNTER — HOSPITAL ENCOUNTER (OUTPATIENT)
Dept: CARDIAC REHAB | Age: 55
Setting detail: THERAPIES SERIES
Discharge: HOME | End: 2020-10-14
Payer: COMMERCIAL

## 2020-10-14 PROCEDURE — 93798 PHYS/QHP OP CAR RHAB W/ECG: CPT

## 2020-10-15 ENCOUNTER — TELEPHONE (OUTPATIENT)
Dept: CARDIOLOGY CLINIC | Age: 55
End: 2020-10-15

## 2020-10-15 NOTE — TELEPHONE ENCOUNTER
Pt calling. Pt got a heart monitor on 9/29. Pt said she thought they said they were going to cancel the appt with SRJ for 10/16. Does pt still need to come in? Also, pt said she has been going to cardiac rehab and her blood pressure was low. Pt said that since her heart attack it has been running low. This morning it was 95/60 something. Cardiac Rehab told her to let her doctor know that it has been running low.

## 2020-10-15 NOTE — TELEPHONE ENCOUNTER
Spoke to pt. Changed her appt for 11/06 due to still wearing monitor. She reports lower BP at 95-60 range. She denies symptoms such as fatigue, SOB, or dizziness. Cardiac rehab wanted her to inform you.

## 2020-10-15 NOTE — TELEPHONE ENCOUNTER
I would let her know that I would continue to monitor her blood pressure, no new orders or changes at this time.   Thank

## 2020-10-16 ENCOUNTER — HOSPITAL ENCOUNTER (OUTPATIENT)
Dept: CARDIAC REHAB | Age: 55
Setting detail: THERAPIES SERIES
Discharge: HOME | End: 2020-10-16
Payer: COMMERCIAL

## 2020-10-16 PROCEDURE — 93798 PHYS/QHP OP CAR RHAB W/ECG: CPT

## 2020-10-19 ENCOUNTER — HOSPITAL ENCOUNTER (OUTPATIENT)
Dept: CARDIAC REHAB | Age: 55
Setting detail: THERAPIES SERIES
Discharge: HOME | End: 2020-10-19
Payer: COMMERCIAL

## 2020-10-19 PROCEDURE — 93798 PHYS/QHP OP CAR RHAB W/ECG: CPT

## 2020-10-21 ENCOUNTER — HOSPITAL ENCOUNTER (OUTPATIENT)
Dept: CARDIAC REHAB | Age: 55
Setting detail: THERAPIES SERIES
Discharge: HOME | End: 2020-10-21
Payer: COMMERCIAL

## 2020-10-21 PROCEDURE — 93798 PHYS/QHP OP CAR RHAB W/ECG: CPT

## 2020-10-23 ENCOUNTER — HOSPITAL ENCOUNTER (OUTPATIENT)
Dept: CARDIAC REHAB | Age: 55
Setting detail: THERAPIES SERIES
Discharge: HOME | End: 2020-10-23
Payer: COMMERCIAL

## 2020-10-23 PROCEDURE — 93798 PHYS/QHP OP CAR RHAB W/ECG: CPT

## 2020-10-26 ENCOUNTER — TELEPHONE (OUTPATIENT)
Dept: CARDIOLOGY CLINIC | Age: 55
End: 2020-10-26

## 2020-10-26 ENCOUNTER — HOSPITAL ENCOUNTER (OUTPATIENT)
Dept: CARDIAC REHAB | Age: 55
Setting detail: THERAPIES SERIES
Discharge: HOME | End: 2020-10-26
Payer: COMMERCIAL

## 2020-10-26 PROCEDURE — 93798 PHYS/QHP OP CAR RHAB W/ECG: CPT

## 2020-10-26 NOTE — TELEPHONE ENCOUNTER
Spoke to pt. She is having symptoms of SOB- on exertion, headaches, and fatigue. Pt is wearing a 30 day [employer]. Pt is in cardiac rehab. Pt's BP at the beginning of Cardiac rehab at 8 am 80/60, BP at 9 am 80/46, BP last night at 10 pm 104/63. Pt does not know HR. Pt is currently taking Metoprolol 25 mg daily. SRJ please advise. Pt can be reached at [phone].       TY

## 2020-10-26 NOTE — TELEPHONE ENCOUNTER
Blood Pressure Problems:      1) When was the last time you took your blood pressure? 10/26/2020        2) What was your blood pressure readin/46    3) Do you have your blood pressure readings written down within the last week? yes    4) What symptoms are you experiencing?  (headache? Dizziness? SOB? CP?) headaches and tired    5) How long have had these symptoms? Since heart attack in May    6) What medications are you taking for your BP? metoprolol succinate (TOPROL XL) 25 MG extended release tablet             7) Have you had any recent dietary or medication changes?  No

## 2020-10-28 ENCOUNTER — HOSPITAL ENCOUNTER (OUTPATIENT)
Dept: CARDIAC REHAB | Age: 55
Setting detail: THERAPIES SERIES
Discharge: HOME | End: 2020-10-28
Payer: COMMERCIAL

## 2020-10-28 PROCEDURE — 93798 PHYS/QHP OP CAR RHAB W/ECG: CPT

## 2020-10-30 ENCOUNTER — HOSPITAL ENCOUNTER (OUTPATIENT)
Dept: CARDIAC REHAB | Age: 55
Setting detail: THERAPIES SERIES
Discharge: HOME | End: 2020-10-30
Payer: COMMERCIAL

## 2020-10-30 PROCEDURE — 93798 PHYS/QHP OP CAR RHAB W/ECG: CPT

## 2020-11-02 ENCOUNTER — HOSPITAL ENCOUNTER (OUTPATIENT)
Dept: CARDIAC REHAB | Age: 55
Setting detail: THERAPIES SERIES
Discharge: HOME | End: 2020-11-02
Payer: COMMERCIAL

## 2020-11-02 PROCEDURE — 93798 PHYS/QHP OP CAR RHAB W/ECG: CPT

## 2020-11-04 ENCOUNTER — HOSPITAL ENCOUNTER (OUTPATIENT)
Dept: CARDIAC REHAB | Age: 55
Setting detail: THERAPIES SERIES
Discharge: HOME | End: 2020-11-04
Payer: COMMERCIAL

## 2020-11-04 PROBLEM — G25.81 RLS (RESTLESS LEGS SYNDROME): Status: ACTIVE | Noted: 2020-11-04

## 2020-11-04 PROCEDURE — 93798 PHYS/QHP OP CAR RHAB W/ECG: CPT

## 2020-11-04 PROCEDURE — 93272 ECG/REVIEW INTERPRET ONLY: CPT | Performed by: INTERNAL MEDICINE

## 2020-11-05 RX ORDER — PRAMIPEXOLE DIHYDROCHLORIDE 0.25 MG/1
.25-.5 TABLET ORAL NIGHTLY PRN
Qty: 60 TABLET | Refills: 5 | Status: SHIPPED | OUTPATIENT
Start: 2020-11-05 | End: 2021-11-30

## 2020-11-06 ENCOUNTER — OFFICE VISIT (OUTPATIENT)
Dept: CARDIOLOGY CLINIC | Age: 55
End: 2020-11-06
Payer: COMMERCIAL

## 2020-11-06 ENCOUNTER — HOSPITAL ENCOUNTER (OUTPATIENT)
Dept: CARDIAC REHAB | Age: 55
Setting detail: THERAPIES SERIES
Discharge: HOME | End: 2020-11-06
Payer: COMMERCIAL

## 2020-11-06 VITALS
WEIGHT: 116 LBS | BODY MASS INDEX: 20.55 KG/M2 | SYSTOLIC BLOOD PRESSURE: 90 MMHG | HEART RATE: 97 BPM | HEIGHT: 63 IN | OXYGEN SATURATION: 98 % | DIASTOLIC BLOOD PRESSURE: 50 MMHG

## 2020-11-06 PROCEDURE — 1036F TOBACCO NON-USER: CPT | Performed by: INTERNAL MEDICINE

## 2020-11-06 PROCEDURE — 99214 OFFICE O/P EST MOD 30 MIN: CPT | Performed by: INTERNAL MEDICINE

## 2020-11-06 PROCEDURE — 93798 PHYS/QHP OP CAR RHAB W/ECG: CPT

## 2020-11-06 PROCEDURE — G8420 CALC BMI NORM PARAMETERS: HCPCS | Performed by: INTERNAL MEDICINE

## 2020-11-06 PROCEDURE — G8427 DOCREV CUR MEDS BY ELIG CLIN: HCPCS | Performed by: INTERNAL MEDICINE

## 2020-11-06 PROCEDURE — 3017F COLORECTAL CA SCREEN DOC REV: CPT | Performed by: INTERNAL MEDICINE

## 2020-11-06 PROCEDURE — G8484 FLU IMMUNIZE NO ADMIN: HCPCS | Performed by: INTERNAL MEDICINE

## 2020-11-06 RX ORDER — SACUBITRIL AND VALSARTAN 24; 26 MG/1; MG/1
1 TABLET, FILM COATED ORAL 2 TIMES DAILY
Qty: 180 TABLET | Refills: 3 | Status: SHIPPED
Start: 2020-11-06 | End: 2020-11-16 | Stop reason: SINTOL

## 2020-11-06 NOTE — LETTER
[address]   CARDIAC EVALUATION NOTE  [phone]      PCP:  Merlinda Gosling, MD    Reason for Consultation/Chief Complaint:   Chief Complaint   Patient presents with    [place] [facility] [place]    Coronary Artery Disease    Other     STEMI        Subjective   History of Present Illness:  Carolina Kilgore is a 48 y.o. patient who presents for hospital follow up concerning CAD/STEMI. She has a PMH significant for CAD, inferior STEMI 2020, GERD, and intra-aortic balloon assist. On 2020 she presented to the ED with CP. She underwent emergent coronary angiography which showed 100% dRCA treated with RONDA x2. EF 35%. She was discharged wearing a life vest. She was re-admitted for CP on 20 and underwent repeat left heart cath which demonstrated known CAD. She underwent CABG x1 LIMA to LAD on 2020. Repeat limited echo on 20 demonstrated EF of 35-40% with mild global hypokinesis. She wore a cardiac event monitor from -10/29/20 that demonstrated avg HR 75 () with no significant arrhythmias (brief svg). Today 20 she reports she is fatigued most of the time. She reports she is not sure if the cardiac rehab is of benefit to her, as she has not felt improvement in how she feels since starting. She reports she has some SOB with ambulation. She reports she used to smoke but has since quit. She reports she smoked for years. She reports she has not noticed much difference in her fatigue since decreasing her metoprolol and taking it at night. She reports she is taking her medications as prescribed and tolerates them well. Patient denies current edema, chest pain, palpitations, dizziness or syncope. Past Medical History:   has a past medical history of CAD (coronary artery disease), Depression, GERD (gastroesophageal reflux disease), and On intra-aortic balloon pump assist.    Surgical History:   has a past surgical history that includes sinus surgery;   section; Coronary artery bypass graft (N/A, 6/2/2020); CT GUIDED PLEURAL DRAINAGE W CATH PERC (7/13/2020); thoracentesis (Left, 07/11/2020); transesophageal echocardiogram (06/02/2020); and Cardiac catheterization (05/31/2020). Social History:   reports that she quit smoking about 6 months ago. Her smoking use included cigarettes. She has a 10.00 pack-year smoking history. She has never used smokeless tobacco. She reports that she does not drink alcohol or use drugs. Family History:  family history is not on file. Home Medications:  Were reviewed and are listed in nursing record and/or below  Prior to Admission medications    Medication Sig Start Date End Date Taking?  Authorizing Provider   pramipexole (MIRAPEX) 0.25 MG tablet Take 1-2 tablets by mouth nightly as needed (RESTLESS LEG) 11/5/20  Yes Kendra Calle MD   ASPIRIN LOW DOSE 81 MG EC tablet TAKE 1 TABLET BY MOUTH DAILY 9/4/20  Yes SUJATA Scott CNP   famotidine (PEPCID) 20 MG tablet Take 2 tablets by mouth every evening 8/21/20  Yes Kendra Calle MD   metoprolol succinate (TOPROL XL) 25 MG extended release tablet Take 1 tablet by mouth nightly  Patient taking differently: Take 12.5 mg by mouth nightly  8/19/20  Yes SUJATA Scott CNP   furosemide (LASIX) 20 MG tablet Take 1 tablet by mouth daily  Patient taking differently: Take 20 mg by mouth daily 1/2 tab po daily 8/19/20  Yes SUJATA Scott CNP   potassium chloride (KLOR-CON M) 20 MEQ extended release tablet Take 1 tablet by mouth 2 times daily (with meals)  Patient taking differently: Take 20 mEq by mouth daily  8/19/20  Yes SUJATA Scott CNP   magnesium oxide (MAG-OX) 400 (241.3 Mg) MG TABS tablet Take 1 tablet by mouth 2 times daily  Patient taking differently: Take 400 mg by mouth daily  8/18/20  Yes Kendra Calle MD   mirtazapine (REMERON) 7.5 MG tablet Take 1 tablet by mouth nightly 8/18/20  Yes Kendra Calle MD FLUoxetine (PROZAC) 40 MG capsule Take 1 capsule by mouth daily 8/13/20  Yes Kala Pickering MD   atorvastatin (LIPITOR) 80 MG tablet Take 1 tablet by mouth nightly 5/21/20  Yes Melba Romberg, APRN - CNP   [facility]) 90 MG TABS tablet Take 1 tablet by mouth 2 times daily 5/21/20  Yes Melba Romberg, APRN - CNP          Allergies:  Patient has no known allergies. Review of Systems:   A 14 point review of symptoms completed. Pertinent positives identified in the HPI, all other review of symptoms negative as below.       Objective   PHYSICAL EXAM:    Vitals:    11/06/20 1031   BP: (!) 90/50   Pulse:    SpO2:     Weight: 116 lb (52.6 kg)         General Appearance:  Alert, cooperative, no distress, appears stated age   Head:  Normocephalic, without obvious abnormality, atraumatic   Eyes:  PERRL, conjunctiva/corneas clear   Nose: Nares normal, no drainage or sinus tenderness   Throat: Lips, mucosa, and tongue normal   Neck: Supple, symmetrical, trachea midline, no adenopathy, thyroid: not enlarged, symmetric, no tenderness/mass/nodules, no carotid bruit or JVD   Lungs:   Clear to auscultation bilaterally, respirations unlabored   Chest Wall:  No deformity or tenderness   Heart:  Regular rate and rhythm, S1, S2 normal, no murmur, rub or gallop   Abdomen:   Soft, non-tender, bowel sounds active all four quadrants,  no masses, no organomegaly   Extremities: Extremities normal, atraumatic, no cyanosis or edema   Pulses: 2+ and symmetric   Skin: Skin color, texture, turgor normal, no rashes or lesions   Pysch: Normal mood and affect   Neurologic: Normal gross motor and sensory exam.         Labs   CBC:   Lab Results   Component Value Date    WBC 8.3 07/17/2020    RBC 4.54 07/17/2020    HGB 12.4 07/17/2020    HCT 37.7 07/17/2020    MCV 82.9 07/17/2020    RDW 15.8 07/17/2020     07/17/2020     CMP:  Lab Results   Component Value Date     09/02/2020    K 3.8 09/02/2020    K 4.1 07/17/2020  2020    CO2 20 2020    BUN 9 2020    CREATININE 0.6 2020    GFRAA >60 2020    GFRAA >60 2011    AGRATIO 1.1 2020    LABGLOM >60 2020    GLUCOSE 101 2020    PROT 6.2 2020    CALCIUM 10.2 2020    BILITOT 0.4 2020    ALKPHOS 215 2020    AST 44 2020    ALT 36 2020     PT/INR:  No results found for: PTINR  HgBA1c:  Lab Results   Component Value Date    LABA1C 5.5 2020     Lab Results   Component Value Date    TROPONINI <0.01 07/10/2020         Cardiac Data     Last EK20: SR with HR 96 BPM    Echo (limited) 20: Summary   This is a limited study for CAD and ischemic cardiomyopathy follow-up. Left ventricular systolic function is reduced with ejection fraction   estimated at 35-40%. There is mild global hypokinesis with regional variation. The basal   inferoseptum, basal to mid inferior wall, and the entire inferolateral wall   appear more hypokinetic than the remaining segments. Direct comparison with the prior study dated 2020 is somewhat limited   as no contrast enhancement is used for the present study. Overall, however,   LV function and regional abnormalities appear similar to the prior study. Echo: 20: Summary   The left ventricular systolic function is moderately reduced with an   ejection fraction of 35 %. There is hypokinesis of the inferior, basal inferior and inferiolateral   walls. Grade I diastolic dysfunction with normal filling pressure. Mild mitral and aortic regurgitation. Systolic pulmonic artery pressure (SPAP) is normal estimated at 38 mmHg   (Right atrial pressure of 8 mmHg).       Stress Test: n/a    Cath: 20:   FINDINGS         LVGRAM     LVEDP  29   GRADIENT ACROSS AORTIC VALVE  no gradient   LV FUNCTION EF 40 %   WALL MOTION  inferior hypokinesis   MITRAL REGURGITATION  mild         CORONARY ARTERIES LM  Less than 10% proximalmid stenosis, distally there is an eccentric 50% stenosis         LAD  Large vessel, proximalmid 30% stenosis. Distally less than 10% stenosis. D1 and D2 have a very high takeoff and D2 in particular has a patulous/aneurysmal segment with 70 to 80% proximal stenosis and less than 10% mid to distal stenosis. D3 has 10 to 20% proximalmiddistal stenosis. LCX  Small vessel, 10% proximalmiddistal stenosis. RI  This vessel could also be described as the high first diagonal as noted above in the LAD section. RCA Large vessel, dominant, proximal less than 10% stenosis, mid 30 to 40% stenosis, distal 90 to 100% stenosis. PERCUTANEOUS INTERVENTION DESCRIPTION      Patient was preloaded with Brilinta, patient was given a single dose of Integrilin during the procedure and was treated otherwise with heparin for anticoagulation. A 6 [place] guiding catheter was taken upfront for PCI and a choice floppy wire was used to cross the lesion. Thrombectomy was then performed with the penumbra device and flow was restored. Lesion in the distal RCA was then dilated with a 3 mm balloon and then stented with Abbott Xience Becky 3.5 x 15 mm drug-eluting stent as well as a 3.0 x 18 mm Love Xience [address] drug-eluting stent. IVUS was performed and showed MLD was 3.5 mm. Stents were postdilated both a 3 and 3.5 mm noncompliant balloon. There was 0% residual stenosis. There was GAVIN 0 flow before and GAVIN-3 after PCI. During procedure, patient had hypotension which responded to vasopressors and these were able to be weaned partially at the end of the case and patient's EKG and symptoms had markedly improved at end of case. Remaining CAD/ASHD was felt to be treated medically followed by possible CABG surgery for left main disease.               CONCLUSIONS:      Successful PCI of RCA with 2 drug-eluting stents Will refer for consideration of CABG for left main disease     Addend, case d/w dr Navdeep Spicer, plan for outpt cabg, order for life vest in interim, ef 35% on current review    Studies: n/a      I have reviewed labs and imaging/xray/diagnostic testing in this note. Assessment      1. ST elevation myocardial infarction (STEMI), unspecified artery ([address]    2. Hypercholesteremia    3. Coronary artery disease involving native coronary artery of native heart without angina pectoris                 Plan   1. Start taking Entresto 24/26 twice daily for cardiomyopathy , d/w pt to monitor hr/bp on this med as hypotension and sx related to that may occur   2. Continue other medications as prescribed   3. Echocardiogram in 2/2021 for EF   4. LABS - BMP, LIPID, LFT in 12/2020  5. Follow up with NP in 3 months   6. Follow up with EP MD in 4 months, d/w pt may need ICD    Thank you for allowing us to participate in the care of [facility]. Please call me with any questions [phone]. This note was scribed in the presence of Jamilah Grijalva MD by Asif Garcia. Julianna Fuentes RN. Jamilah Grijalva MD, [facility]   Interventional Cardiologist  [place] [id number]  [phone] [facility]  [phone] [address] [place] [address]  11/6/2020 10:33 AM    I will address the patient's cardiac risk factors and adjusted pharmacologic treatment as needed. In addition, I have reinforced the need for patient directed risk factor modification. Tobacco use was discussed with the patient and educated on the negative effects and was asked not to use. All questions and concerns were addressed to the patient/family. Alternatives to my treatment were discussed. I, Dr Jamilah Grijalva, personally performed the services described in this documentation, as scribed by the above signed scribe in my presence. It is both accurate and complete to my knowledge.   I agree with the details independently gathered by the clinical support staff and the scribed note accurately describes my personal service to the patient.

## 2020-11-06 NOTE — PROGRESS NOTES
[facility]   CARDIAC EVALUATION NOTE  [phone]      PCP:  Jc Hsieh MD    Reason for Consultation/Chief Complaint:   Chief Complaint   Patient presents with    [place] [facility] [place]    Coronary Artery Disease    Other     STEMI        Subjective   History of Present Illness:  Kelly Hamilton is a 48 y.o. patient who presents for hospital follow up concerning CAD/STEMI. She has a PMH significant for CAD, inferior STEMI 2020, GERD, and intra-aortic balloon assist. On 2020 she presented to the ED with CP. She underwent emergent coronary angiography which showed 100% dRCA treated with RONDA x2. EF 35%. She was discharged wearing a life vest. She was re-admitted for CP on 20 and underwent repeat left heart cath which demonstrated known CAD. She underwent CABG x1 LIMA to LAD on 2020. Repeat limited echo on 20 demonstrated EF of 35-40% with mild global hypokinesis. She wore a cardiac event monitor from -10/29/20 that demonstrated avg HR 75 () with no significant arrhythmias (brief svg). Today 20 she reports she is fatigued most of the time. She reports she is not sure if the cardiac rehab is of benefit to her, as she has not felt improvement in how she feels since starting. She reports she has some SOB with ambulation. She reports she used to smoke but has since quit. She reports she smoked for years. She reports she has not noticed much difference in her fatigue since decreasing her metoprolol and taking it at night. She reports she is taking her medications as prescribed and tolerates them well. Patient denies current edema, chest pain, palpitations, dizziness or syncope. Past Medical History:   has a past medical history of CAD (coronary artery disease), Depression, GERD (gastroesophageal reflux disease), and On intra-aortic balloon pump assist.    Surgical History:   has a past surgical history that includes sinus surgery;   capsule by mouth daily 8/13/20  Yes Terrence Julien MD   atorvastatin (LIPITOR) 80 MG tablet Take 1 tablet by mouth nightly 5/21/20  Yes SUJATA Elliott CNP   [facility]) 90 MG TABS tablet Take 1 tablet by mouth 2 times daily 5/21/20  Yes SUJATA Elliott CNP          Allergies:  Patient has no known allergies. Review of Systems:   A 14 point review of symptoms completed. Pertinent positives identified in the HPI, all other review of symptoms negative as below.       Objective   PHYSICAL EXAM:    Vitals:    11/06/20 1031   BP: (!) 90/50   Pulse:    SpO2:     Weight: 116 lb (52.6 kg)         General Appearance:  Alert, cooperative, no distress, appears stated age   Head:  Normocephalic, without obvious abnormality, atraumatic   Eyes:  PERRL, conjunctiva/corneas clear   Nose: Nares normal, no drainage or sinus tenderness   Throat: Lips, mucosa, and tongue normal   Neck: Supple, symmetrical, trachea midline, no adenopathy, thyroid: not enlarged, symmetric, no tenderness/mass/nodules, no carotid bruit or JVD   Lungs:   Clear to auscultation bilaterally, respirations unlabored   Chest Wall:  No deformity or tenderness   Heart:  Regular rate and rhythm, S1, S2 normal, no murmur, rub or gallop   Abdomen:   Soft, non-tender, bowel sounds active all four quadrants,  no masses, no organomegaly   Extremities: Extremities normal, atraumatic, no cyanosis or edema   Pulses: 2+ and symmetric   Skin: Skin color, texture, turgor normal, no rashes or lesions   Pysch: Normal mood and affect   Neurologic: Normal gross motor and sensory exam.         Labs   CBC:   Lab Results   Component Value Date    WBC 8.3 07/17/2020    RBC 4.54 07/17/2020    HGB 12.4 07/17/2020    HCT 37.7 07/17/2020    MCV 82.9 07/17/2020    RDW 15.8 07/17/2020     07/17/2020     CMP:  Lab Results   Component Value Date     09/02/2020    K 3.8 09/02/2020    K 4.1 07/17/2020     09/02/2020    CO2 20 09/02/2020    BUN 9 Large vessel, proximal-mid 30% stenosis. Distally less than 10% stenosis. D1 and D2 have a very high takeoff and D2 in particular has a patulous/aneurysmal segment with 70 to 80% proximal stenosis and less than 10% mid to distal stenosis. D3 has 10 to 20% iqfynnie-hnk-xrohzt stenosis. LCX  Small vessel, 10% mbwezycb-pwq-oryqhq stenosis. RI  This vessel could also be described as the high first diagonal as noted above in the LAD section. RCA Large vessel, dominant, proximal less than 10% stenosis, mid 30 to 40% stenosis, distal 90 to 100% stenosis. PERCUTANEOUS INTERVENTION DESCRIPTION      Patient was preloaded with Brilinta, patient was given a single dose of Integrilin during the procedure and was treated otherwise with heparin for anticoagulation. A 6 [place] guiding catheter was taken upfront for PCI and a choice floppy wire was used to cross the lesion. Thrombectomy was then performed with the penumbra device and flow was restored. Lesion in the distal RCA was then dilated with a 3 mm balloon and then stented with Abbott Xience Becky 3.5 x 15 mm drug-eluting stent as well as a 3.0 x 18 mm Love Xience [address] drug-eluting stent. IVUS was performed and showed MLD was 3.5 mm. Stents were postdilated both a 3 and 3.5 mm noncompliant balloon. There was 0% residual stenosis. There was GAVIN 0 flow before and GAVIN-3 after PCI. During procedure, patient had hypotension which responded to vasopressors and these were able to be weaned partially at the end of the case and patient's EKG and symptoms had markedly improved at end of case. Remaining CAD/ASHD was felt to be treated medically followed by possible CABG surgery for left main disease.               CONCLUSIONS:      Successful PCI of RCA with 2 drug-eluting stents  Will refer for consideration of CABG for left main disease     Addend, case d/w dr Maisha Gutierrez, plan for outpt cabg, order for life vest in interim, ef 35% on current review    Studies: n/a      I have reviewed labs and imaging/xray/diagnostic testing in this note. Assessment      1. ST elevation myocardial infarction (STEMI), unspecified artery ([address]    2. Hypercholesteremia    3. Coronary artery disease involving native coronary artery of native heart without angina pectoris                 Plan   1. Start taking Entresto 24/26 twice daily for cardiomyopathy , d/w pt to monitor hr/bp on this med as hypotension and sx related to that may occur   2. Continue other medications as prescribed   3. Echocardiogram in 2/2021 for EF   4. LABS - BMP, LIPID, LFT in 12/2020  5. Follow up with NP in 3 months   6. Follow up with EP MD in 4 months, d/w pt may need ICD    Thank you for allowing us to participate in the care of [facility]. Please call me with any questions [phone]. This note was scribed in the presence of Lizy Elizabeth MD by Lior Barrow. Vannesa Adams RN. Lizy Elizabeth MD, [facility]   Interventional Cardiologist  [facility]  [phone] [facility]  [phone] [address] [place] [address]  11/6/2020 10:33 AM    I will address the patient's cardiac risk factors and adjusted pharmacologic treatment as needed. In addition, I have reinforced the need for patient directed risk factor modification. Tobacco use was discussed with the patient and educated on the negative effects and was asked not to use. All questions and concerns were addressed to the patient/family. Alternatives to my treatment were discussed. I, Dr Lizy Elizabeth, personally performed the services described in this documentation, as scribed by the above signed scribe in my presence. It is both accurate and complete to my knowledge. I agree with the details independently gathered by the clinical support staff and the scribed note accurately describes my personal service to the patient.

## 2020-11-06 NOTE — PATIENT INSTRUCTIONS
1. Start taking Entresto 24/26 twice daily for cardiomyopathy   2. Continue other medications as prescribed   3. Echocardiogram in 2/2021 for EF   4. LABS - BMP, LIPID, LFT in 12/2020  5. Follow up with NP in 3 months   6. Follow up with EP MD in 4 months    Your provider has ordered testing for further evaluation. An order/prescription has been included in your paper work.  To schedule outpatient testing, contact Central Scheduling by calling [employer] ([phone]).

## 2020-11-09 ENCOUNTER — HOSPITAL ENCOUNTER (OUTPATIENT)
Dept: CARDIAC REHAB | Age: 55
Setting detail: THERAPIES SERIES
Discharge: HOME | End: 2020-11-09
Payer: COMMERCIAL

## 2020-11-09 PROCEDURE — 93798 PHYS/QHP OP CAR RHAB W/ECG: CPT

## 2020-11-11 ENCOUNTER — HOSPITAL ENCOUNTER (OUTPATIENT)
Dept: CARDIAC REHAB | Age: 55
Setting detail: THERAPIES SERIES
Discharge: HOME | End: 2020-11-11
Payer: COMMERCIAL

## 2020-11-11 PROCEDURE — 93798 PHYS/QHP OP CAR RHAB W/ECG: CPT

## 2020-11-13 ENCOUNTER — HOSPITAL ENCOUNTER (OUTPATIENT)
Dept: CARDIAC REHAB | Age: 55
Setting detail: THERAPIES SERIES
Discharge: HOME | End: 2020-11-13
Payer: COMMERCIAL

## 2020-11-13 ENCOUNTER — TELEPHONE (OUTPATIENT)
Dept: CARDIOLOGY CLINIC | Age: 55
End: 2020-11-13

## 2020-11-13 PROCEDURE — 93798 PHYS/QHP OP CAR RHAB W/ECG: CPT

## 2020-11-13 NOTE — TELEPHONE ENCOUNTER
Meryl from [employer] called inquiring about pt's PA for the [facility].  Please advise and call [employer] back at [phone] reference #: [id number]

## 2020-11-16 ENCOUNTER — HOSPITAL ENCOUNTER (OUTPATIENT)
Dept: CARDIAC REHAB | Age: 55
Setting detail: THERAPIES SERIES
Discharge: HOME | End: 2020-11-16
Payer: COMMERCIAL

## 2020-11-16 PROCEDURE — 93798 PHYS/QHP OP CAR RHAB W/ECG: CPT

## 2020-11-16 RX ORDER — DIGOXIN 125 MCG
125 TABLET ORAL DAILY
Qty: 30 TABLET | Refills: 5 | Status: SHIPPED | OUTPATIENT
Start: 2020-11-16 | End: 2021-05-19

## 2020-11-16 RX ORDER — DIGOXIN 125 MCG
125 TABLET ORAL DAILY
Qty: 30 TABLET | Refills: 3 | Status: CANCELLED | OUTPATIENT
Start: 2020-11-16

## 2020-11-16 NOTE — TELEPHONE ENCOUNTER
Pt was at [address] rehab this AM. Pt has low B/P. 72/48 then after exercise it is 74/54. Pt sts she is fatigued,lightheaded and low energy. LOC 11/06/2020  Pt did not start Entresto due to her B/P being low.

## 2020-11-16 NOTE — TELEPHONE ENCOUNTER
Lets have her hold off on entresto, dc metoprolol and start digoxin 0.125mg po daily and get bmp and dig level next week. Thank you.

## 2020-11-18 ENCOUNTER — HOSPITAL ENCOUNTER (OUTPATIENT)
Dept: CARDIAC REHAB | Age: 55
Setting detail: THERAPIES SERIES
Discharge: HOME | End: 2020-11-18
Payer: COMMERCIAL

## 2020-11-18 PROCEDURE — 93798 PHYS/QHP OP CAR RHAB W/ECG: CPT

## 2020-11-20 ENCOUNTER — HOSPITAL ENCOUNTER (OUTPATIENT)
Dept: CARDIAC REHAB | Age: 55
Setting detail: THERAPIES SERIES
Discharge: HOME | End: 2020-11-20
Payer: COMMERCIAL

## 2020-11-20 PROCEDURE — 93798 PHYS/QHP OP CAR RHAB W/ECG: CPT

## 2020-11-23 ENCOUNTER — HOSPITAL ENCOUNTER (OUTPATIENT)
Dept: CARDIAC REHAB | Age: 55
Setting detail: THERAPIES SERIES
Discharge: HOME | End: 2020-11-23
Payer: COMMERCIAL

## 2020-11-23 PROCEDURE — 93798 PHYS/QHP OP CAR RHAB W/ECG: CPT

## 2020-11-25 ENCOUNTER — HOSPITAL ENCOUNTER (OUTPATIENT)
Age: 55
Discharge: HOME | End: 2020-11-25
Payer: COMMERCIAL

## 2020-11-25 ENCOUNTER — HOSPITAL ENCOUNTER (OUTPATIENT)
Dept: CARDIAC REHAB | Age: 55
Setting detail: THERAPIES SERIES
Discharge: HOME | End: 2020-11-25
Payer: COMMERCIAL

## 2020-11-25 LAB
ALBUMIN SERPL-MCNC: 4.5 G/DL (ref 3.4–5)
ALP BLD-CCNC: 129 U/L (ref 40–129)
ALT SERPL-CCNC: 29 U/L (ref 10–40)
ANION GAP SERPL CALCULATED.3IONS-SCNC: 13 MMOL/L (ref 3–16)
AST SERPL-CCNC: 32 U/L (ref 15–37)
BILIRUB SERPL-MCNC: 0.3 MG/DL (ref 0–1)
BILIRUBIN DIRECT: <0.2 MG/DL (ref 0–0.3)
BILIRUBIN, INDIRECT: NORMAL MG/DL (ref 0–1)
BUN BLDV-MCNC: 11 MG/DL (ref 7–20)
CALCIUM SERPL-MCNC: 9.7 MG/DL (ref 8.3–10.6)
CHLORIDE BLD-SCNC: 106 MMOL/L (ref 99–110)
CO2: 22 MMOL/L (ref 21–32)
CREAT SERPL-MCNC: 0.6 MG/DL (ref 0.6–1.1)
DIGOXIN LEVEL: 1 NG/ML (ref 0.8–2)
GFR AFRICAN AMERICAN: >60
GFR NON-AFRICAN AMERICAN: >60
GLUCOSE BLD-MCNC: 92 MG/DL (ref 70–99)
POTASSIUM SERPL-SCNC: 4.1 MMOL/L (ref 3.5–5.1)
SODIUM BLD-SCNC: 141 MMOL/L (ref 136–145)
TOTAL PROTEIN: 7.5 G/DL (ref 6.4–8.2)

## 2020-11-25 PROCEDURE — 36415 COLL VENOUS BLD VENIPUNCTURE: CPT

## 2020-11-25 PROCEDURE — 80048 BASIC METABOLIC PNL TOTAL CA: CPT

## 2020-11-25 PROCEDURE — 80162 ASSAY OF DIGOXIN TOTAL: CPT

## 2020-11-25 PROCEDURE — 93798 PHYS/QHP OP CAR RHAB W/ECG: CPT

## 2020-11-25 PROCEDURE — 80076 HEPATIC FUNCTION PANEL: CPT

## 2020-11-25 PROCEDURE — 80061 LIPID PANEL: CPT

## 2020-11-25 RX ORDER — POTASSIUM CHLORIDE 20 MEQ/1
20 TABLET, EXTENDED RELEASE ORAL 2 TIMES DAILY WITH MEALS
Qty: 180 TABLET | Refills: 1 | Status: SHIPPED | OUTPATIENT
Start: 2020-11-25 | End: 2021-03-08

## 2020-11-26 LAB
CHOLESTEROL, FASTING: 143 MG/DL (ref 0–199)
HDLC SERPL-MCNC: 51 MG/DL (ref 40–60)
LDL CHOLESTEROL CALCULATED: 72 MG/DL
TRIGLYCERIDE, FASTING: 100 MG/DL (ref 0–150)
VLDLC SERPL CALC-MCNC: 20 MG/DL

## 2020-11-27 ENCOUNTER — APPOINTMENT (OUTPATIENT)
Dept: CARDIAC REHAB | Age: 55
End: 2020-11-27
Payer: COMMERCIAL

## 2020-11-30 ENCOUNTER — TELEPHONE (OUTPATIENT)
Dept: CARDIOLOGY CLINIC | Age: 55
End: 2020-11-30

## 2020-11-30 ENCOUNTER — HOSPITAL ENCOUNTER (OUTPATIENT)
Dept: CARDIAC REHAB | Age: 55
Setting detail: THERAPIES SERIES
Discharge: HOME | End: 2020-11-30
Payer: COMMERCIAL

## 2020-11-30 PROCEDURE — 93798 PHYS/QHP OP CAR RHAB W/ECG: CPT

## 2020-11-30 NOTE — TELEPHONE ENCOUNTER
----- Message from Jay Stockton MD sent at 11/25/2020  5:08 PM EST -----  SR J patient. Digoxin level is okay but at the high end of what we normally prefer. Suggest we repeat that lab in about 2 weeks as goal digoxin should be less than 1 according to CHF studies. Her kidney and liver function appears normal which is improved since previous.   Fasting lipids still pending no changes at this time

## 2020-11-30 NOTE — TELEPHONE ENCOUNTER
Created telephone encounter. Spoke with Eloise relayed message per [facility] regarding labs. Pt verbalized understanding.

## 2020-11-30 NOTE — TELEPHONE ENCOUNTER
----- Message from Kulwinder Blake MD sent at 11/29/2020  3:08 PM EST -----  Let patient know their lipid test is normal, continue current meds, no new orders or changes at this time. Thanks.

## 2020-12-02 ENCOUNTER — HOSPITAL ENCOUNTER (OUTPATIENT)
Dept: CARDIAC REHAB | Age: 55
Setting detail: THERAPIES SERIES
Discharge: HOME | End: 2020-12-02
Payer: COMMERCIAL

## 2020-12-02 PROCEDURE — 93798 PHYS/QHP OP CAR RHAB W/ECG: CPT

## 2020-12-04 ENCOUNTER — APPOINTMENT (OUTPATIENT)
Dept: CARDIAC REHAB | Age: 55
End: 2020-12-04
Payer: COMMERCIAL

## 2020-12-04 RX ORDER — FAMOTIDINE 20 MG/1
40 TABLET, FILM COATED ORAL EVERY EVENING
Qty: 60 TABLET | Refills: 5 | Status: SHIPPED | OUTPATIENT
Start: 2020-12-04 | End: 2021-05-25

## 2020-12-07 ENCOUNTER — APPOINTMENT (OUTPATIENT)
Dept: CARDIAC REHAB | Age: 55
End: 2020-12-07
Payer: COMMERCIAL

## 2020-12-09 ENCOUNTER — APPOINTMENT (OUTPATIENT)
Dept: CARDIAC REHAB | Age: 55
End: 2020-12-09
Payer: COMMERCIAL

## 2020-12-11 ENCOUNTER — APPOINTMENT (OUTPATIENT)
Dept: CARDIAC REHAB | Age: 55
End: 2020-12-11
Payer: COMMERCIAL

## 2020-12-14 ENCOUNTER — HOSPITAL ENCOUNTER (OUTPATIENT)
Age: 55
Discharge: HOME | End: 2020-12-14
Payer: COMMERCIAL

## 2020-12-14 ENCOUNTER — APPOINTMENT (OUTPATIENT)
Dept: CARDIAC REHAB | Age: 55
End: 2020-12-14
Payer: COMMERCIAL

## 2020-12-14 LAB
ALBUMIN SERPL-MCNC: 4.5 G/DL (ref 3.4–5)
ALP BLD-CCNC: 127 U/L (ref 40–129)
ALT SERPL-CCNC: 19 U/L (ref 10–40)
ANION GAP SERPL CALCULATED.3IONS-SCNC: 10 MMOL/L (ref 3–16)
AST SERPL-CCNC: 24 U/L (ref 15–37)
BILIRUB SERPL-MCNC: 0.3 MG/DL (ref 0–1)
BILIRUBIN DIRECT: <0.2 MG/DL (ref 0–0.3)
BILIRUBIN, INDIRECT: NORMAL MG/DL (ref 0–1)
BUN BLDV-MCNC: 13 MG/DL (ref 7–20)
CALCIUM SERPL-MCNC: 9.9 MG/DL (ref 8.3–10.6)
CHLORIDE BLD-SCNC: 104 MMOL/L (ref 99–110)
CO2: 27 MMOL/L (ref 21–32)
CREAT SERPL-MCNC: 0.8 MG/DL (ref 0.6–1.1)
DIGOXIN LEVEL: 0.7 NG/ML (ref 0.8–2)
GFR AFRICAN AMERICAN: >60
GFR NON-AFRICAN AMERICAN: >60
GLUCOSE BLD-MCNC: 85 MG/DL (ref 70–99)
POTASSIUM SERPL-SCNC: 4.2 MMOL/L (ref 3.5–5.1)
SODIUM BLD-SCNC: 141 MMOL/L (ref 136–145)
TOTAL PROTEIN: 7.4 G/DL (ref 6.4–8.2)

## 2020-12-14 PROCEDURE — 80162 ASSAY OF DIGOXIN TOTAL: CPT

## 2020-12-14 PROCEDURE — 80076 HEPATIC FUNCTION PANEL: CPT

## 2020-12-14 PROCEDURE — 80048 BASIC METABOLIC PNL TOTAL CA: CPT

## 2020-12-14 PROCEDURE — 36415 COLL VENOUS BLD VENIPUNCTURE: CPT

## 2020-12-15 ENCOUNTER — TELEPHONE (OUTPATIENT)
Dept: CARDIOLOGY CLINIC | Age: 55
End: 2020-12-15

## 2020-12-15 NOTE — TELEPHONE ENCOUNTER
----- Message from Amparo Hardin MD sent at 12/14/2020  4:04 PM EST -----  Let patient know their lab tests are normal, continue current meds, no new orders or changes at this time. Thanks.

## 2020-12-16 ENCOUNTER — APPOINTMENT (OUTPATIENT)
Dept: CARDIAC REHAB | Age: 55
End: 2020-12-16
Payer: COMMERCIAL

## 2020-12-18 ENCOUNTER — APPOINTMENT (OUTPATIENT)
Dept: CARDIAC REHAB | Age: 55
End: 2020-12-18
Payer: COMMERCIAL

## 2020-12-21 ENCOUNTER — APPOINTMENT (OUTPATIENT)
Dept: CARDIAC REHAB | Age: 55
End: 2020-12-21
Payer: COMMERCIAL

## 2020-12-22 ENCOUNTER — OFFICE VISIT (OUTPATIENT)
Dept: FAMILY MEDICINE CLINIC | Age: 55
End: 2020-12-22
Payer: COMMERCIAL

## 2020-12-22 VITALS
DIASTOLIC BLOOD PRESSURE: 61 MMHG | TEMPERATURE: 97.8 F | WEIGHT: 114 LBS | SYSTOLIC BLOOD PRESSURE: 99 MMHG | HEART RATE: 73 BPM | BODY MASS INDEX: 20.19 KG/M2 | OXYGEN SATURATION: 96 %

## 2020-12-22 PROCEDURE — 99214 OFFICE O/P EST MOD 30 MIN: CPT | Performed by: FAMILY MEDICINE

## 2020-12-22 PROCEDURE — G8427 DOCREV CUR MEDS BY ELIG CLIN: HCPCS | Performed by: FAMILY MEDICINE

## 2020-12-22 PROCEDURE — 3017F COLORECTAL CA SCREEN DOC REV: CPT | Performed by: FAMILY MEDICINE

## 2020-12-22 PROCEDURE — G8420 CALC BMI NORM PARAMETERS: HCPCS | Performed by: FAMILY MEDICINE

## 2020-12-22 PROCEDURE — G8484 FLU IMMUNIZE NO ADMIN: HCPCS | Performed by: FAMILY MEDICINE

## 2020-12-22 PROCEDURE — 1036F TOBACCO NON-USER: CPT | Performed by: FAMILY MEDICINE

## 2020-12-22 NOTE — PROGRESS NOTES
Subjective:  Tristian Wetzel is here to discuss the following issues. She has coronary artery disease with prior MI and bypass graft. She recently saw her cardiologist.  She was given new medication but it made her blood pressure too low and she had to stop it. She continues on 1/2 tablet of Lasix daily. She has combined systolic and diastolic heart failure. She states her most recent ejection fraction was 35%. She completed cardiac rehab. She wore a monitor with normal results apparently. She may require defibrillator. She has elevated cholesterol continues on her medication with no muscle aches or other side effects. She follows an appropriate diet. She has lost about 40 pounds since her heart attack. She is now on Remeron at night again and her appetite is improving. She has restless leg syndrome seen on sleep study with no apnea. She declines the need for treatment. No fever sweats or chills  Social History     Tobacco Use   Smoking Status Former Smoker    Packs/day: 1.00    Years: 10.00    Pack years: 10.00    Types: Cigarettes    Quit date: 2020    Years since quittin.6   Smokeless Tobacco Never Used   Allergies:     Patient has no known allergies. Objective:  BP 99/61   Pulse 73   Temp 97.8 °F (36.6 °C) (Oral)   Wt 114 lb (51.7 kg)   LMP 2017 (Approximate)   SpO2 96%   BMI 20.19 kg/m²    No acute distress, heart regular rate and rhythm without murmur, lungs clear to auscultation easy effort, abdomen soft nondistended, no clubbing or cyanosis no edema    Assessment:  1. Coronary artery disease involving native coronary artery of native heart without angina pectoris    2. ST elevation myocardial infarction (STEMI), unspecified artery ([address]    3. History of coronary artery bypass graft x 1    4. Combined systolic and diastolic cardiac dysfunction    5. Hypercholesteremia    6. Weight loss    7.  RLS (restless legs syndrome)            Plan:  Continue with plans to have repeat echo in February. She may require defibrillator dependent on EF  Discussed recent favorable results of cardiac monitor  Increase fluid intake  She declines the need for treatment for restless leg syndrome  Remain physically active as tolerated  Continue current medicines  Fit test  Follow-up in 4 months or prn  \"Healthy Family Handout\" provided  Avoid exposure to all tobacco products. Read and consider all information provided by the pharmacy regarding prescribed medications before use  Careful medical compliance  Proper diet and weight management   Otherwise continue current treatment plan  Call or return if symptoms are not well controlled      All medical conditions for this patient are stable unless otherwise indicated    Kaitlynn Elliott MD    This note was transcribed using a voice recognition software system. Proper technique and careful oversight were used to increase transcription accuracy but inadvertent errors may be present.

## 2020-12-23 ENCOUNTER — APPOINTMENT (OUTPATIENT)
Dept: CARDIAC REHAB | Age: 55
End: 2020-12-23
Payer: COMMERCIAL

## 2020-12-25 ENCOUNTER — APPOINTMENT (OUTPATIENT)
Dept: CARDIAC REHAB | Age: 55
End: 2020-12-25
Payer: COMMERCIAL

## 2020-12-28 ENCOUNTER — APPOINTMENT (OUTPATIENT)
Dept: CARDIAC REHAB | Age: 55
End: 2020-12-28
Payer: COMMERCIAL

## 2021-01-26 ENCOUNTER — TELEPHONE (OUTPATIENT)
Dept: CARDIOLOGY CLINIC | Age: 56
End: 2021-01-26

## 2021-01-26 NOTE — LETTER
[address] Cardiology - [address]  Phone: [phone]  Fax: [phone]    Barbra Chavez MD        February 2, 2021     [facility]  [place] [address] [address]      To Whom it May Concern:    Patient is acceptable, intermediate cardiovascular risk for planned procedure. If you have any questions or concerns, please don't hesitate to call.     Sincerely,        Barbra Chavez MD

## 2021-01-26 NOTE — TELEPHONE ENCOUNTER
CARDIAC CLEARANCE     What type of procedure are you having?  excicion of skin cancer on nose. Under MAC  Which physician is performing your procedure? Jose L Crow  When is your procedure scheduled for?  02-04-21  Where are you having this procedure? [facility]  Are you taking Blood Thinners? If so what? (Name/dose/frequesncy)  Brilinta and Aspirin   Does the surgeon want you to stop your blood thinner? If so for how long?   5 days prior  Phone Number and Contact Name for Physicians office:  [phone]  Fax number to send information:    [phone]

## 2021-01-26 NOTE — LETTER
[address] Cardiology - [address] [place] [address] [facility]  Phone: [phone]  Fax: [phone]    Jolie Pyle MD        January 27, 2021      Isma Bolivar 1965 is at acceptable (intermediate) cv risk for planned procedure/surgery. Ok to hold brilinta, ideally stay on aspirin if ok with surgeon. Irrespective, go back on these meds in 1-2 days after procedure. If meds are held, hold them for 5 days before procedure.        Sincerely,        Jolie Pyle MD

## 2021-01-26 NOTE — TELEPHONE ENCOUNTER
SRJ please advise. Pt's last OV 11/6/20. Can pt have cardiac clearance to remove skin cancer on nose on 2/4/21? Pt is currently taking Brilinta and Asprin. How long would you like pt to stop blood thinners prior to the procedure? Clearance Letter can be faxed [phone].       TY

## 2021-01-27 ENCOUNTER — HOSPITAL ENCOUNTER (OUTPATIENT)
Age: 56
Setting detail: OBSERVATION
Discharge: HOME | End: 2021-01-28
Attending: STUDENT IN AN ORGANIZED HEALTH CARE EDUCATION/TRAINING PROGRAM | Admitting: INTERNAL MEDICINE
Payer: COMMERCIAL

## 2021-01-27 ENCOUNTER — APPOINTMENT (OUTPATIENT)
Dept: GENERAL RADIOLOGY | Age: 56
End: 2021-01-27
Payer: COMMERCIAL

## 2021-01-27 DIAGNOSIS — R07.9 CHEST PAIN, UNSPECIFIED TYPE: Primary | ICD-10-CM

## 2021-01-27 LAB
A/G RATIO: 1.5 (ref 1.1–2.2)
ALBUMIN SERPL-MCNC: 4.5 G/DL (ref 3.4–5)
ALP BLD-CCNC: 130 U/L (ref 40–129)
ALT SERPL-CCNC: 18 U/L (ref 10–40)
ANION GAP SERPL CALCULATED.3IONS-SCNC: 11 MMOL/L (ref 3–16)
AST SERPL-CCNC: 23 U/L (ref 15–37)
BASOPHILS ABSOLUTE: 0.1 K/UL (ref 0–0.2)
BASOPHILS RELATIVE PERCENT: 0.9 %
BILIRUB SERPL-MCNC: 0.4 MG/DL (ref 0–1)
BUN BLDV-MCNC: 12 MG/DL (ref 7–20)
CALCIUM SERPL-MCNC: 9.6 MG/DL (ref 8.3–10.6)
CHLORIDE BLD-SCNC: 103 MMOL/L (ref 99–110)
CO2: 23 MMOL/L (ref 21–32)
CREAT SERPL-MCNC: 0.8 MG/DL (ref 0.6–1.1)
EKG ATRIAL RATE: 62 BPM
EKG DIAGNOSIS: NORMAL
EKG P AXIS: 71 DEGREES
EKG P-R INTERVAL: 166 MS
EKG Q-T INTERVAL: 380 MS
EKG QRS DURATION: 102 MS
EKG QTC CALCULATION (BAZETT): 385 MS
EKG R AXIS: 64 DEGREES
EKG T AXIS: -32 DEGREES
EKG VENTRICULAR RATE: 62 BPM
EOSINOPHILS ABSOLUTE: 0.4 K/UL (ref 0–0.6)
EOSINOPHILS RELATIVE PERCENT: 3.4 %
GFR AFRICAN AMERICAN: >60
GFR NON-AFRICAN AMERICAN: >60
GLOBULIN: 3 G/DL
GLUCOSE BLD-MCNC: 101 MG/DL (ref 70–99)
HCG QUALITATIVE: NEGATIVE
HCT VFR BLD CALC: 41.7 % (ref 36–48)
HEMOGLOBIN: 14 G/DL (ref 12–16)
LYMPHOCYTES ABSOLUTE: 1.4 K/UL (ref 1–5.1)
LYMPHOCYTES RELATIVE PERCENT: 13.3 %
MCH RBC QN AUTO: 30 PG (ref 26–34)
MCHC RBC AUTO-ENTMCNC: 33.5 G/DL (ref 31–36)
MCV RBC AUTO: 89.3 FL (ref 80–100)
MONOCYTES ABSOLUTE: 0.8 K/UL (ref 0–1.3)
MONOCYTES RELATIVE PERCENT: 7.6 %
NEUTROPHILS ABSOLUTE: 8 K/UL (ref 1.7–7.7)
NEUTROPHILS RELATIVE PERCENT: 74.8 %
PDW BLD-RTO: 14 % (ref 12.4–15.4)
PLATELET # BLD: 190 K/UL (ref 135–450)
PMV BLD AUTO: 10.8 FL (ref 5–10.5)
POTASSIUM REFLEX MAGNESIUM: 3.9 MMOL/L (ref 3.5–5.1)
PRO-BNP: 697 PG/ML (ref 0–124)
RBC # BLD: 4.66 M/UL (ref 4–5.2)
SARS-COV-2, NAAT: NOT DETECTED
SODIUM BLD-SCNC: 137 MMOL/L (ref 136–145)
TOTAL PROTEIN: 7.5 G/DL (ref 6.4–8.2)
TROPONIN: <0.01 NG/ML
WBC # BLD: 10.7 K/UL (ref 4–11)

## 2021-01-27 PROCEDURE — 6370000000 HC RX 637 (ALT 250 FOR IP): Performed by: PHYSICIAN ASSISTANT

## 2021-01-27 PROCEDURE — 84484 ASSAY OF TROPONIN QUANT: CPT

## 2021-01-27 PROCEDURE — 36415 COLL VENOUS BLD VENIPUNCTURE: CPT

## 2021-01-27 PROCEDURE — 80053 COMPREHEN METABOLIC PANEL: CPT

## 2021-01-27 PROCEDURE — G0378 HOSPITAL OBSERVATION PER HR: HCPCS

## 2021-01-27 PROCEDURE — U0002 COVID-19 LAB TEST NON-CDC: HCPCS

## 2021-01-27 PROCEDURE — 71045 X-RAY EXAM CHEST 1 VIEW: CPT

## 2021-01-27 PROCEDURE — 84703 CHORIONIC GONADOTROPIN ASSAY: CPT

## 2021-01-27 PROCEDURE — 6370000000 HC RX 637 (ALT 250 FOR IP): Performed by: STUDENT IN AN ORGANIZED HEALTH CARE EDUCATION/TRAINING PROGRAM

## 2021-01-27 PROCEDURE — 93010 ELECTROCARDIOGRAM REPORT: CPT | Performed by: INTERNAL MEDICINE

## 2021-01-27 PROCEDURE — 2580000003 HC RX 258: Performed by: PHYSICIAN ASSISTANT

## 2021-01-27 PROCEDURE — 6360000002 HC RX W HCPCS: Performed by: PHYSICIAN ASSISTANT

## 2021-01-27 PROCEDURE — 85025 COMPLETE CBC W/AUTO DIFF WBC: CPT

## 2021-01-27 PROCEDURE — 96372 THER/PROPH/DIAG INJ SC/IM: CPT

## 2021-01-27 PROCEDURE — 93005 ELECTROCARDIOGRAM TRACING: CPT | Performed by: STUDENT IN AN ORGANIZED HEALTH CARE EDUCATION/TRAINING PROGRAM

## 2021-01-27 PROCEDURE — 83880 ASSAY OF NATRIURETIC PEPTIDE: CPT

## 2021-01-27 PROCEDURE — 2060000000 HC ICU INTERMEDIATE R&B

## 2021-01-27 PROCEDURE — 99219 PR INITIAL OBSERVATION CARE/DAY 50 MINUTES: CPT | Performed by: PHYSICIAN ASSISTANT

## 2021-01-27 PROCEDURE — 99284 EMERGENCY DEPT VISIT MOD MDM: CPT

## 2021-01-27 RX ORDER — ASPIRIN 81 MG/1
243 TABLET, CHEWABLE ORAL ONCE
Status: COMPLETED | OUTPATIENT
Start: 2021-01-27 | End: 2021-01-27

## 2021-01-27 RX ORDER — POTASSIUM CHLORIDE 20 MEQ/1
20 TABLET, EXTENDED RELEASE ORAL DAILY
Status: DISCONTINUED | OUTPATIENT
Start: 2021-01-27 | End: 2021-01-28 | Stop reason: HOSPADM

## 2021-01-27 RX ORDER — ONDANSETRON 2 MG/ML
4 INJECTION INTRAMUSCULAR; INTRAVENOUS EVERY 6 HOURS PRN
Status: DISCONTINUED | OUTPATIENT
Start: 2021-01-27 | End: 2021-01-28 | Stop reason: HOSPADM

## 2021-01-27 RX ORDER — POTASSIUM CHLORIDE 20 MEQ/1
40 TABLET, EXTENDED RELEASE ORAL PRN
Status: DISCONTINUED | OUTPATIENT
Start: 2021-01-27 | End: 2021-01-28 | Stop reason: HOSPADM

## 2021-01-27 RX ORDER — POTASSIUM CHLORIDE 7.45 MG/ML
10 INJECTION INTRAVENOUS PRN
Status: DISCONTINUED | OUTPATIENT
Start: 2021-01-27 | End: 2021-01-28 | Stop reason: HOSPADM

## 2021-01-27 RX ORDER — NITROGLYCERIN 0.4 MG/1
0.4 TABLET SUBLINGUAL EVERY 5 MIN PRN
Status: DISCONTINUED | OUTPATIENT
Start: 2021-01-27 | End: 2021-01-28 | Stop reason: HOSPADM

## 2021-01-27 RX ORDER — MAGNESIUM SULFATE 1 G/100ML
1000 INJECTION INTRAVENOUS PRN
Status: DISCONTINUED | OUTPATIENT
Start: 2021-01-27 | End: 2021-01-28 | Stop reason: HOSPADM

## 2021-01-27 RX ORDER — FLUOXETINE HYDROCHLORIDE 20 MG/1
40 CAPSULE ORAL DAILY
Status: DISCONTINUED | OUTPATIENT
Start: 2021-01-28 | End: 2021-01-28 | Stop reason: HOSPADM

## 2021-01-27 RX ORDER — MORPHINE SULFATE 2 MG/ML
2 INJECTION, SOLUTION INTRAMUSCULAR; INTRAVENOUS EVERY 4 HOURS PRN
Status: DISCONTINUED | OUTPATIENT
Start: 2021-01-27 | End: 2021-01-28 | Stop reason: HOSPADM

## 2021-01-27 RX ORDER — PROMETHAZINE HYDROCHLORIDE 25 MG/1
12.5 TABLET ORAL EVERY 6 HOURS PRN
Status: DISCONTINUED | OUTPATIENT
Start: 2021-01-27 | End: 2021-01-28 | Stop reason: HOSPADM

## 2021-01-27 RX ORDER — ATORVASTATIN CALCIUM 40 MG/1
80 TABLET, FILM COATED ORAL NIGHTLY
Status: DISCONTINUED | OUTPATIENT
Start: 2021-01-27 | End: 2021-01-28 | Stop reason: HOSPADM

## 2021-01-27 RX ORDER — SODIUM CHLORIDE 0.9 % (FLUSH) 0.9 %
10 SYRINGE (ML) INJECTION PRN
Status: DISCONTINUED | OUTPATIENT
Start: 2021-01-27 | End: 2021-01-28 | Stop reason: HOSPADM

## 2021-01-27 RX ORDER — FAMOTIDINE 20 MG/1
40 TABLET, FILM COATED ORAL EVERY EVENING
Status: DISCONTINUED | OUTPATIENT
Start: 2021-01-27 | End: 2021-01-28 | Stop reason: HOSPADM

## 2021-01-27 RX ORDER — PRAMIPEXOLE DIHYDROCHLORIDE 0.25 MG/1
0.5 TABLET ORAL NIGHTLY PRN
Status: DISCONTINUED | OUTPATIENT
Start: 2021-01-27 | End: 2021-01-28 | Stop reason: HOSPADM

## 2021-01-27 RX ORDER — MIRTAZAPINE 15 MG/1
7.5 TABLET, FILM COATED ORAL NIGHTLY
Status: DISCONTINUED | OUTPATIENT
Start: 2021-01-27 | End: 2021-01-28 | Stop reason: HOSPADM

## 2021-01-27 RX ORDER — SODIUM CHLORIDE 0.9 % (FLUSH) 0.9 %
10 SYRINGE (ML) INJECTION EVERY 12 HOURS SCHEDULED
Status: DISCONTINUED | OUTPATIENT
Start: 2021-01-27 | End: 2021-01-28 | Stop reason: HOSPADM

## 2021-01-27 RX ORDER — NITROGLYCERIN 0.4 MG/1
0.4 TABLET SUBLINGUAL ONCE
Status: DISCONTINUED | OUTPATIENT
Start: 2021-01-27 | End: 2021-01-27

## 2021-01-27 RX ORDER — POLYETHYLENE GLYCOL 3350 17 G/17G
17 POWDER, FOR SOLUTION ORAL DAILY PRN
Status: DISCONTINUED | OUTPATIENT
Start: 2021-01-27 | End: 2021-01-28 | Stop reason: HOSPADM

## 2021-01-27 RX ORDER — ACETAMINOPHEN 650 MG/1
650 SUPPOSITORY RECTAL EVERY 6 HOURS PRN
Status: DISCONTINUED | OUTPATIENT
Start: 2021-01-27 | End: 2021-01-28 | Stop reason: HOSPADM

## 2021-01-27 RX ORDER — FUROSEMIDE 20 MG/1
20 TABLET ORAL DAILY
Status: DISCONTINUED | OUTPATIENT
Start: 2021-01-28 | End: 2021-01-28 | Stop reason: HOSPADM

## 2021-01-27 RX ORDER — DIGOXIN 125 MCG
125 TABLET ORAL DAILY
Status: DISCONTINUED | OUTPATIENT
Start: 2021-01-28 | End: 2021-01-28 | Stop reason: HOSPADM

## 2021-01-27 RX ORDER — TIZANIDINE 4 MG/1
TABLET ORAL
COMMUNITY
Start: 2020-12-09

## 2021-01-27 RX ORDER — ACETAMINOPHEN 325 MG/1
650 TABLET ORAL EVERY 6 HOURS PRN
Status: DISCONTINUED | OUTPATIENT
Start: 2021-01-27 | End: 2021-01-28 | Stop reason: HOSPADM

## 2021-01-27 RX ORDER — ASPIRIN 81 MG/1
81 TABLET, CHEWABLE ORAL DAILY
Status: DISCONTINUED | OUTPATIENT
Start: 2021-01-28 | End: 2021-01-28 | Stop reason: HOSPADM

## 2021-01-27 RX ADMIN — Medication 10 ML: at 21:07

## 2021-01-27 RX ADMIN — FAMOTIDINE 40 MG: 20 TABLET, FILM COATED ORAL at 18:59

## 2021-01-27 RX ADMIN — MIRTAZAPINE 7.5 MG: 15 TABLET, FILM COATED ORAL at 21:06

## 2021-01-27 RX ADMIN — ENOXAPARIN SODIUM 40 MG: 40 INJECTION SUBCUTANEOUS at 18:59

## 2021-01-27 RX ADMIN — POTASSIUM CHLORIDE 20 MEQ: 1500 TABLET, EXTENDED RELEASE ORAL at 18:59

## 2021-01-27 RX ADMIN — ATORVASTATIN CALCIUM 80 MG: 40 TABLET, FILM COATED ORAL at 21:06

## 2021-01-27 RX ADMIN — TICAGRELOR 90 MG: 90 TABLET ORAL at 21:06

## 2021-01-27 RX ADMIN — ASPIRIN 243 MG: 81 TABLET, CHEWABLE ORAL at 12:28

## 2021-01-27 ASSESSMENT — PAIN DESCRIPTION - ORIENTATION: ORIENTATION: MID

## 2021-01-27 ASSESSMENT — PAIN DESCRIPTION - LOCATION
LOCATION: CHEST
LOCATION: CHEST

## 2021-01-27 ASSESSMENT — PAIN SCALES - GENERAL: PAINLEVEL_OUTOF10: 4

## 2021-01-27 ASSESSMENT — PAIN DESCRIPTION - PAIN TYPE
TYPE: ACUTE PAIN
TYPE: ACUTE PAIN

## 2021-01-27 ASSESSMENT — PAIN DESCRIPTION - FREQUENCY: FREQUENCY: CONTINUOUS

## 2021-01-27 NOTE — H&P
Hospital Medicine History & Physical      PCP: Kaela Walls MD    Date of Admission: 1/27/2021    Date of Service: Pt seen/examined on 1/27/2021      Chief Complaint:    Chief Complaint   Patient presents with    Chest Pain     cp in mid chest states started as waiting in car chest started hurting got hot and sweaty, vomited x 1, then it quit and started back around 10 am. hx of 2 prior MI and CABG     History Of Present Illness: The patient is a 50 y.o. female with coronary artery disease status post stent in May 2020 followed by CABG in June 2020, ischemic cardiomyopathy who presented to [facility] ED with complaint of chest pain. Her pain started this morning around 7 AM while she was driving her granddaughter to school. She describes a heavy pressure across her chest and her abdomen. She states she felt short of breath and diaphoretic with the pain. She was also nauseated and subsequently vomited. She states after vomiting she felt better. She states around 10 AM the pain returned and she therefore came to the emergency department for evaluation. She was given 3 baby aspirin in the ER but no other medications to help her pain. Her pain subsequently subsided. She denies any recent illness, fever, chills, cough. She denies any abdominal pain otherwise, difficulty eating, diarrhea. She denies any pain or swelling in her legs, history of DVT or PE.  ER work-up revealed negative troponin, EKG with inferior T wave abnormality which is unchanged from prior. Her case was discussed with cardiology due to her recent cardiac interventions in 2020, cardiology recommended admission to [facility]. She will be admitted for further care and cardiology evaluation.     Past Medical History:        Diagnosis Date    CAD (coronary artery disease)     Depression     GERD (gastroesophageal reflux disease)     On intra-aortic balloon pump assist 05/31/2020    Removed during [facility] on 6/2/20       Past Surgical History:        Procedure Laterality Date    CARDIAC CATHETERIZATION  05/31/2020    Dr. Heather Glynn - IABP placed   [address] [place] [address] Drive GRAFT N/A 6/2/2020     - off pump x1 (LIMA-LAD), removal of IABP    CT INSERT CATH PLEURA W IMAGE  7/13/2020    Dr. Sandoval Talley - CT guided chest tube insertion    SINUS SURGERY      THORACENTESIS Left 07/11/2020    Dr. Aramis Bro - 1 L drained    TRANSESOPHAGEAL ECHOCARDIOGRAM  06/02/2020    during CABG       Medications Prior to Admission:    Prior to Admission medications    Medication Sig Start Date End Date Taking?  Authorizing Provider   famotidine (PEPCID) 20 MG tablet TAKE 2 TABLETS BY MOUTH EVERY EVENING 12/4/20  Yes Artis Simmonds, MD   potassium chloride (KLOR-CON M) 20 MEQ extended release tablet Take 1 tablet by mouth 2 times daily (with meals)  Patient taking differently: Take 20 mEq by mouth daily  11/25/20  Yes Taylor Morrell MD   digoxin [facility] TRANSPLANT [facility]) 125 MCG tablet Take 1 tablet by mouth daily 11/16/20  Yes Charity Stafford MD   pramipexole (MIRAPEX) 0.25 MG tablet Take 1-2 tablets by mouth nightly as needed (RESTLESS LEG) 11/5/20  Yes Artis Simmonds, MD   ASPIRIN LOW DOSE 81 MG EC tablet TAKE 1 TABLET BY MOUTH DAILY 9/4/20  Yes SUJATA Hdz CNP   furosemide (LASIX) 20 MG tablet Take 1 tablet by mouth daily  Patient taking differently: Take 20 mg by mouth daily 1/2 tab po daily 8/19/20  Yes SUJATA Hdz CNP   magnesium oxide (MAG-OX) 400 (241.3 Mg) MG TABS tablet Take 1 tablet by mouth 2 times daily  Patient taking differently: Take 400 mg by mouth daily  8/18/20  Yes Artis Simmonds, MD   mirtazapine (REMERON) 7.5 MG tablet Take 1 tablet by mouth nightly 8/18/20  Yes Artis Simmonds, MD   FLUoxetine (PROZAC) 40 MG capsule Take 1 capsule by mouth daily 8/13/20  Yes Artis Simmonds, MD   atorvastatin (LIPITOR) 80 MG tablet Take 1 tablet by mouth nightly 5/21/20  Yes Sherrilyn Poot, APRN - [facility] No cyanosis. No joint deformity. No clubbing. Neuro: Awake. Grossly nonfocal    Psych: Oriented x 3. No anxiety or agitation. CBC:   Recent Labs     01/27/21  1111   WBC 10.7   HGB 14.0   HCT 41.7   MCV 89.3        BMP:   Recent Labs     01/27/21  1111      K 3.9      CO2 23   BUN 12   CREATININE 0.8     LIVER PROFILE:   Recent Labs     01/27/21  1111   AST 23   ALT 18   BILITOT 0.4   ALKPHOS 130*       CARDIAC ENZYMES  Recent Labs     01/27/21  1111   TROPONINI <0.01       U/A:    Lab Results   Component Value Date    NITRITE neg 07/17/2015    COLORU Straw 07/10/2020    WBCUA 10-20 06/01/2020    RBCUA 0-2 06/01/2020    BACTERIA 3+ 06/01/2020    CLARITYU Clear 07/10/2020    SPECGRAV 1.010 07/10/2020    LEUKOCYTESUR Negative 07/10/2020    BLOODU Negative 07/10/2020    GLUCOSEU Negative 07/10/2020       ABG    Lab Results   Component Value Date    ZNX4SQS 21.5 06/02/2020    BEART -3 06/02/2020    K1VKOEZO 98 06/02/2020    PHART 7.410 06/02/2020    YUG4NMK 33.9 06/02/2020    PO2ART 94.2 06/02/2020    FSK7DMX 23 06/02/2020       CULTURES  None     EKG:  I have reviewed the EKG with the following interpretation:   Normal sinus rhythm with ST and T wave abnormalities in inferior leads, not new when compared to prior EKGs    RADIOLOGY  XR CHEST PORTABLE   Final Result   No evidence of acute cardiopulmonary disease.              Pertinent previous results reviewed   [facility] 5/19/20  Successful PCI of RCA with 2 drug-eluting stents  Will refer for consideration of CABG for left main disease  Addend, case d/w dr Jackie Jimenez, plan for outpt cabg, order for life vest in interim, ef 35% on current review    Left Heart Cath 5/31/2020  Dominance:Right   LM: 50% distal stenosis unchanged from prior  LAD: larger vessel with mild plaquing; gives off three diagonals of which the second is with aneursym and 70% proximal stenosis  LCx: smaller caliber with minimal plaque disease   RCA: mild plaquing proximal to mid vessel with widely patent distal stents and no significant disease of RPLB or RPDA   LVEDP: 19 mmHg , no gradient upon pullback   LVEF: 35%    6/2/2020:  Coronary artery bypass grafting x1, LIMA to LAD, off pump. Limited Echo 9/16/2020   This is a limited study for CAD and ischemic cardiomyopathy follow-up. Left ventricular systolic function is reduced with ejection fraction   estimated at 35-40%. There is mild global hypokinesis with regional variation. The basal   inferoseptum, basal to mid inferior wall, and the entire inferolateral wall   appear more hypokinetic than the remaining segments. Direct comparison with the prior study dated 5/19/2020 is somewhat limited   as no contrast enhancement is used for the present study. Overall, however,   LV function and regional abnormalities appear similar to the prior study. ASSESSMENT/PLAN:    #Chest pain  #CAD sp stent (RCA 5/2020), and CABG x1 (6/2020)    -Patient is pain-free at time of my examination. Etiology of pain unclear, concern for cardiac  -Troponin trend x2 is negative. Continue to monitor.  -Her EKG has inferior changes concerning for ischemia, but these are not new when compared to prior EKGs  -Continue aspirin, brilinta, statin  -Cardiology consult pending. I will order stress test for the morning    #Ischemic cardiomyopathy   -Ejection fraction 35 to 40%.   - she appears compensated   -Continue digoxin and Lasix  - she tried entresto but it dropped her BP- she is not on ACEi/ARB or BB 2/2 hypotension    #Depression  - cont prozac    DVT Prophylaxis Lovenox   Diet: Diet NPO, After Midnight  DIET CARDIAC; No Caffeine   Code Status: Full Code     Lio Jackson PA-C  1/27/2021 7:01 PM

## 2021-01-27 NOTE — ED PROVIDER NOTES
[facility] PCU TELEMETRY      CHIEF COMPLAINT  Chest Pain (cp in mid chest states started as waiting in car chest started hurting got hot and sweaty, vomited x 1, then it quit and started back around 10 am. hx of 2 prior MI and CABG)       HISTORY OF PRESENT ILLNESS  Deandra Zhang is a 50 y.o. female with a past medical history of anxiety, GERD, hyperlipidemia, STEMI, coronary artery disease status post CABG, who presents to the ED complaining of chest pain. Patient reports midsternal chest pain and pressure this morning. Onset at rest.  She reported associated diaphoresis, one episode of nonbloody nonbilious emesis, and shortness of breath. She had 2 episodes of similar pain, she currently rates her pain at 2/10. She did take aspirin 81 mg this morning but has not taken any other aspirin or nitroglycerin. She denies any leg swelling, cough, fever. Patient denies previous blood clot or active malignancy, leg swelling, hemoptysis, recent travel or surgery/prolonged immobilization, or OCP or other hormone use. Echo 9/16/20  Summary   This is a limited study for CAD and ischemic cardiomyopathy follow-up. Left ventricular systolic function is reduced with ejection fraction   estimated at 35-40%. There is mild global hypokinesis with regional variation. The basal   inferoseptum, basal to mid inferior wall, and the entire inferolateral wall   appear more hypokinetic than the remaining segments. Direct comparison with the prior study dated 5/19/2020 is somewhat limited   as no contrast enhancement is used for the present study. Overall, however,   LV function and regional abnormalities appear similar to the prior study. Cardiac Cath 5/19/20  Successful PCI of RCA with 2 drug-eluting stents  Will refer for consideration of CABG for left main disease    Patient does not smoke. No other complaints, modifying factors or associated symptoms.      I have reviewed the following from the nursing documentation. Past Medical History:   Diagnosis Date    Arthritis     CAD (coronary artery disease)     Cancer ([facility] [address]     CHF (congestive heart failure) (HCC)     Depression     GERD (gastroesophageal reflux disease)     Hyperlipidemia     On intra-aortic balloon pump assist 2020    Removed during [facility] on 20     Past Surgical History:   Procedure Laterality Date    CARDIAC CATHETERIZATION  2020    Dr. Roxann Deleon - IABP placed   [address] [place] [address] Drive GRAFT N/A 2020     - off pump x1 (LIMA-LAD), removal of IABP    CT INSERT CATH PLEURA W IMAGE  2020    Dr. Cristobal Cruz - CT guided chest tube insertion    SINUS SURGERY      THORACENTESIS Left 2020    Dr. Emmanuel Martinez - 1 L drained    TRANSESOPHAGEAL ECHOCARDIOGRAM  2020    during CABG     History reviewed. No pertinent family history.   Social History     Socioeconomic History    Marital status:      Spouse name: Not on file    Number of children: Not on file    Years of education: Not on file    Highest education level: Not on file   Occupational History    Not on file   Social Needs    Financial resource strain: Not on file    Food insecurity     Worry: Not on file     Inability: Not on file    Transportation needs     Medical: Not on file     Non-medical: Not on file   Tobacco Use    Smoking status: Former Smoker     Packs/day: 1.00     Years: 10.00     Pack years: 10.00     Types: Cigarettes     Quit date: 2020     Years since quittin.7    Smokeless tobacco: Never Used   Substance and Sexual Activity    Alcohol use: No    Drug use: No    Sexual activity: Not on file   Lifestyle    Physical activity     Days per week: Not on file     Minutes per session: Not on file    Stress: Not on file   Relationships    Social connections     Talks on phone: Not on file     Gets together: Not on file     Attends Jainism service: Not on file     Active member of club or organization: Not on file     Attends meetings of clubs or organizations: Not on file     Relationship status: Not on file    Intimate partner violence     Fear of current or ex partner: Not on file     Emotionally abused: Not on file     Physically abused: Not on file     Forced sexual activity: Not on file   Other Topics Concern    Not on file   Social History Narrative    Not on file     No current facility-administered medications for this encounter.       Current Outpatient Medications   Medication Sig Dispense Refill    famotidine (PEPCID) 20 MG tablet TAKE 2 TABLETS BY MOUTH EVERY EVENING 60 tablet 5    potassium chloride (KLOR-CON M) 20 MEQ extended release tablet Take 1 tablet by mouth 2 times daily (with meals) (Patient taking differently: Take 20 mEq by mouth daily ) 180 tablet 1    digoxin (LANOXIN) 125 MCG tablet Take 1 tablet by mouth daily 30 tablet 5    pramipexole (MIRAPEX) 0.25 MG tablet Take 1-2 tablets by mouth nightly as needed (RESTLESS LEG) 60 tablet 5    ASPIRIN LOW DOSE 81 MG EC tablet TAKE 1 TABLET BY MOUTH DAILY 90 tablet 3    furosemide (LASIX) 20 MG tablet Take 1 tablet by mouth daily (Patient taking differently: Take 20 mg by mouth daily 1/2 tab po daily) 60 tablet 3    magnesium oxide (MAG-OX) 400 (241.3 Mg) MG TABS tablet Take 1 tablet by mouth 2 times daily (Patient taking differently: Take 400 mg by mouth daily ) 180 tablet 1    mirtazapine (REMERON) 7.5 MG tablet Take 1 tablet by mouth nightly 90 tablet 1    FLUoxetine (PROZAC) 40 MG capsule Take 1 capsule by mouth daily 90 capsule 1    atorvastatin (LIPITOR) 80 MG tablet Take 1 tablet by mouth nightly 30 tablet 11    ticagrelor (BRILINTA) 90 MG TABS tablet Take 1 tablet by mouth 2 times daily 60 tablet 11    diclofenac (VOLTAREN) 50 MG EC tablet       tiZANidine (ZANAFLEX) 4 MG tablet        No Known Allergies    REVIEW OF SYSTEMS  10 systems reviewed, pertinent positives per HPI otherwise noted to be negative. PHYSICAL EXAM  /68   Pulse 66   Resp 20   Ht 5' 3\" (1.6 m)   Wt 115 lb (52.2 kg)   LMP 11/01/2017 (Approximate)   SpO2 99%   BMI 20.37 kg/m²    GENERAL APPEARANCE: Awake and alert. Cooperative. no distress. HENT: Normocephalic. Atraumatic. Mucous membranes are moist  NECK: Supple. Full range of motion of the neck without stiffness or pain. EYES: PERRL. EOM's grossly intact. HEART/CHEST: RRR. No murmurs. Chest wall is not tender to palpation. LUNGS: Respirations unlabored. CTAB. Good air exchange. Speaking comfortably in full sentences. ABDOMEN: No tenderness. Soft. Non-distended. No masses. No organomegaly. No guarding or rebound. MUSCULOSKELETAL: No extremity edema. Compartments soft. No deformity. No tenderness in the extremities. All extremities neurovascularly intact. SKIN: Warm and dry. No acute rashes. NEUROLOGICAL: Alert and oriented. No gross facial drooping. Strength 5/5, sensation intact. PSYCHIATRIC: Normal mood and affect. LABS  I have reviewed all labs for this visit.    Results for orders placed or performed during the hospital encounter of 01/27/21   CBC Auto Differential   Result Value Ref Range    WBC 10.7 4.0 - 11.0 K/uL    RBC 4.66 4.00 - 5.20 M/uL    Hemoglobin 14.0 12.0 - 16.0 g/dL    Hematocrit 41.7 36.0 - 48.0 %    MCV 89.3 80.0 - 100.0 fL    MCH 30.0 26.0 - 34.0 pg    MCHC 33.5 31.0 - 36.0 g/dL    RDW 14.0 12.4 - 15.4 %    Platelets 595 257 - 820 K/uL    MPV 10.8 (H) 5.0 - 10.5 fL    Neutrophils % 74.8 %    Lymphocytes % 13.3 %    Monocytes % 7.6 %    Eosinophils % 3.4 %    Basophils % 0.9 %    Neutrophils Absolute 8.0 (H) 1.7 - 7.7 K/uL    Lymphocytes Absolute 1.4 1.0 - 5.1 K/uL    Monocytes Absolute 0.8 0.0 - 1.3 K/uL    Eosinophils Absolute 0.4 0.0 - 0.6 K/uL    Basophils Absolute 0.1 0.0 - 0.2 K/uL   Comprehensive Metabolic Panel w/ Reflex to MG   Result Value Ref Range    Sodium 137 136 - 145 mmol/L    Potassium reflex Magnesium 3.9 3.5 - 5.1 mmol/L    Chloride 103 99 - 110 mmol/L    CO2 23 21 - 32 mmol/L    Anion Gap 11 3 - 16    Glucose 101 (H) 70 - 99 mg/dL    BUN 12 7 - 20 mg/dL    CREATININE 0.8 0.6 - 1.1 mg/dL    GFR Non-African American >60 >60    GFR African American >60 >60    Calcium 9.6 8.3 - 10.6 mg/dL    Total Protein 7.5 6.4 - 8.2 g/dL    Albumin 4.5 3.4 - 5.0 g/dL    Albumin/Globulin Ratio 1.5 1.1 - 2.2    Total Bilirubin 0.4 0.0 - 1.0 mg/dL    Alkaline Phosphatase 130 (H) 40 - 129 U/L    ALT 18 10 - 40 U/L    AST 23 15 - 37 U/L    Globulin 3.0 g/dL   Troponin   Result Value Ref Range    Troponin <0.01 <0.01 ng/mL   Brain Natriuretic Peptide   Result Value Ref Range    Pro- (H) 0 - 124 pg/mL   Troponin   Result Value Ref Range    Troponin <0.01 <0.01 ng/mL   Troponin   Result Value Ref Range    Troponin <0.01 <0.01 ng/mL   Basic Metabolic Panel w/ Reflex to MG   Result Value Ref Range    Sodium 140 136 - 145 mmol/L    Potassium reflex Magnesium 4.3 3.5 - 5.1 mmol/L    Chloride 109 99 - 110 mmol/L    CO2 22 21 - 32 mmol/L    Anion Gap 9 3 - 16    Glucose 85 70 - 99 mg/dL    BUN 15 7 - 20 mg/dL    CREATININE 0.6 0.6 - 1.1 mg/dL    GFR Non-African American >60 >60    GFR African American >60 >60    Calcium 9.4 8.3 - 10.6 mg/dL   CBC   Result Value Ref Range    WBC 7.3 4.0 - 11.0 K/uL    RBC 4.59 4.00 - 5.20 M/uL    Hemoglobin 13.8 12.0 - 16.0 g/dL    Hematocrit 41.1 36.0 - 48.0 %    MCV 89.5 80.0 - 100.0 fL    MCH 30.1 26.0 - 34.0 pg    MCHC 33.6 31.0 - 36.0 g/dL    RDW 13.7 12.4 - 15.4 %    Platelets 551 299 - 422 K/uL    MPV 11.5 (H) 5.0 - 10.5 fL   Lipid panel - fasting   Result Value Ref Range    Cholesterol, Total 130 0 - 199 mg/dL    Triglycerides 119 0 - 150 mg/dL    HDL 37 (L) 40 - 60 mg/dL    LDL Calculated 69 <100 mg/dL    VLDL Cholesterol Calculated 24 Not Established mg/dL   COVID-19   Result Value Ref Range    SARS-CoV-2, NAAT Not Detected Not Detected   HCG Qualitative, Serum   Result Value Ref Range    hCG Qual Negative Detects HCG level >10 MIU/mL   Hepatic function panel   Result Value Ref Range    Total Protein 6.9 6.4 - 8.2 g/dL    Albumin 3.9 3.4 - 5.0 g/dL    Alkaline Phosphatase 122 40 - 129 U/L    ALT 15 10 - 40 U/L    AST 20 15 - 37 U/L    Total Bilirubin 0.4 0.0 - 1.0 mg/dL    Bilirubin, Direct <0.2 0.0 - 0.3 mg/dL    Bilirubin, Indirect see below 0.0 - 1.0 mg/dL   EKG 12 Lead   Result Value Ref Range    Ventricular Rate 62 BPM    Atrial Rate 62 BPM    P-R Interval 166 ms    QRS Duration 102 ms    Q-T Interval 380 ms    QTc Calculation (Bazett) 385 ms    P Axis 71 degrees    R Axis 64 degrees    T Axis -32 degrees    Diagnosis       Normal sinus rhythmST & T wave abnormality, consider inferior ischemiaNon-specific intra-ventricular conduction delayNo previous ECGs availableConfirmed by COSME Peace MD ([id number]) on 1/27/2021 5:49:31 PM   EKG 12 lead   Result Value Ref Range    Ventricular Rate 59 BPM    Atrial Rate 59 BPM    P-R Interval 188 ms    QRS Duration 100 ms    Q-T Interval 416 ms    QTc Calculation (Bazett) 411 ms    P Axis 71 degrees    R Axis 62 degrees    T Axis -40 degrees    Diagnosis       Sinus bradycardiaT wave abnormality, consider inferior ischemiaAbnormal ECGWhen compared with ECG of 27-JAN-2021 11:05,No significant change was foundConfirmed by COSME Peace MD [id number]) on 1/28/2021 7:36:53 AM       ECG  The Ekg interpreted by me shows  normal sinus rhythm with a rate of 62  Axis is   Normal  QTc is  normal  Intervals and Durations are unremarkable. ST Segments: no acute change  No significant change from prior EKG dated 7/10/20    RADIOLOGY    NM Cardiac Stress Test Nuclear Imaging   Final Result      XR CHEST PORTABLE   Final Result   No evidence of acute cardiopulmonary disease. ED COURSE / MDM  Patient seen and evaluated. Old records reviewed. Labs and imaging reviewed and results discussed with patient. Patient presenting for chest pain.   Patient has had a CABG within the last year. Physical exam unremarkable. Differential diagnosis includes but is not limited to: Pneumonia, pneumothorax, pleural effusion, ACS, CHF exacerbation, COPD exacerbation, asthma, pulmonary embolism, arrhythmia, anemia, Covid    EKG, laboratory studies, and chest x-ray obtained. Work-up showed:    ED Course as of Jan 29 0059   Wed Jan 27, 2021   1204 CXR: FINDINGS:  Normal cardiac size. No evidence of pneumonia, edema or other acute  pulmonary process. No evidence of acute process of the cardiac or mediastinal  structures. No evidence of pneumothorax or pleural effusion.     IMPRESSION:  No evidence of acute cardiopulmonary disease.    [ER]   1204 Troponin within normal limits, EKG is abnormal but similar to previous. No evidence of acute ischemia. [ER]   8113 BNP is mildly elevated, no evidence of fluid overload on exam.  Low suspicion for CHF exacerbation.    [ER]   1205 No electrolyte abnormalities or evidence of kidney dysfunction.    [ER]   1205 No leukocytosis, anemia, thrombocytopenia. [ER]   1205 Liver function testing overall unremarkable. [ER]   1535 Delta troponin within normal limits. [ER]   1609 On reassessment, patient states that chest pain is resolved. [ER]      ED Course User Index  [ER] Carolyne Camilo MD        During the patient's ED course, the patient was given:  Medications   aspirin chewable tablet 243 mg (243 mg Oral Given 1/27/21 1228)      EKG showed no evidence of acute ischemia. Troponin was within normal limits x2. Given patient's significant cardiac history and CABG within the past year, do feel that patient requires cardiac evaluation. BNP was mildly elevated, no evidence of fluid overload on exam. Low Suspicion for CHF exacerbation. Chest x-ray showed no evidence of pneumonia, pleural effusion, pulmonary edema, or pneumothorax. At this time I have low concern for pulmonary embolism. Patient has not had a previous blood clot.   Patient denies other risk factors for pulmonary embolism. Patient does not have any evidence of DVT on exam.  Patient is low risk on PERC and Wells criteria. At this time, considering that risks associated with further work-up for pulmonary embolism outweigh the likelihood of this diagnosis. Low suspicion for aortic pathology. Patient is not hypertensive. Patient has strong pulses in the bilateral radial and femoral arteries. Pain was not maximal at onset. There is no evidence of mediastinal widening on chest x-ray. Patient does not have any neurologic deficits. The evidence indicates that the patient is very low risk for Aortic Dissection and this is consistent with my clinical intuition. The risk of further workup or hospitalization for aortic dissection is likely higher than the risk of the patient having an aortic dissection. Low suspicion for esophageal perforation. There is no crepitus on exam.  No subcutaneous air or pneumomediastinum on chest x-ray. Based on results of work-up, I am concerned for high risk chest pain. Did talk to cardiology to confirm they did not feel that she needed to go somewhere with Cath Lab capabilities, they stated that she was fine to be transferred to [facility] for further evaluation. At this time, do feel the patient requires admission for further work-up and management. Discussed the patient with hospital team.      CLINICAL IMPRESSION  1. Chest pain, unspecified type        Blood pressure 97/65, pulse 78, temperature 97 °F (36.1 °C), temperature source Oral, resp. rate 16, height 5' 3\" (1.6 m), weight 110 lb 1 oz (49.9 kg), last menstrual period 11/01/2017, SpO2 97 %. Stef Ledbetter was admitted in stable condition. DISCLAIMER: This chart was created using Dragon dictation software.   Efforts were made by me to ensure accuracy, however some errors may be present due to limitations of this technology and occasionally words are not transcribed

## 2021-01-27 NOTE — ED NOTES
Matthew Rabago from the transfer center is paging cardiology at [facility].      Ramila Solis  01/27/21 1221

## 2021-01-27 NOTE — ED NOTES
Dr Ryan Tate is speaking with Dr. Terrence Welsh.       Lavinia Rollins  01/27/21 Timothy  01/27/21 [id number]

## 2021-01-28 ENCOUNTER — APPOINTMENT (OUTPATIENT)
Dept: NUCLEAR MEDICINE | Age: 56
End: 2021-01-28
Payer: COMMERCIAL

## 2021-01-28 VITALS
HEIGHT: 63 IN | OXYGEN SATURATION: 97 % | WEIGHT: 110.06 LBS | SYSTOLIC BLOOD PRESSURE: 97 MMHG | RESPIRATION RATE: 16 BRPM | TEMPERATURE: 97 F | DIASTOLIC BLOOD PRESSURE: 65 MMHG | BODY MASS INDEX: 19.5 KG/M2 | HEART RATE: 78 BPM

## 2021-01-28 LAB
ALBUMIN SERPL-MCNC: 3.9 G/DL (ref 3.4–5)
ALP BLD-CCNC: 122 U/L (ref 40–129)
ALT SERPL-CCNC: 15 U/L (ref 10–40)
ANION GAP SERPL CALCULATED.3IONS-SCNC: 9 MMOL/L (ref 3–16)
AST SERPL-CCNC: 20 U/L (ref 15–37)
BILIRUB SERPL-MCNC: 0.4 MG/DL (ref 0–1)
BILIRUBIN DIRECT: <0.2 MG/DL (ref 0–0.3)
BILIRUBIN, INDIRECT: NORMAL MG/DL (ref 0–1)
BUN BLDV-MCNC: 15 MG/DL (ref 7–20)
CALCIUM SERPL-MCNC: 9.4 MG/DL (ref 8.3–10.6)
CHLORIDE BLD-SCNC: 109 MMOL/L (ref 99–110)
CHOLESTEROL, TOTAL: 130 MG/DL (ref 0–199)
CO2: 22 MMOL/L (ref 21–32)
CREAT SERPL-MCNC: 0.6 MG/DL (ref 0.6–1.1)
EKG ATRIAL RATE: 59 BPM
EKG DIAGNOSIS: NORMAL
EKG P AXIS: 71 DEGREES
EKG P-R INTERVAL: 188 MS
EKG Q-T INTERVAL: 416 MS
EKG QRS DURATION: 100 MS
EKG QTC CALCULATION (BAZETT): 411 MS
EKG R AXIS: 62 DEGREES
EKG T AXIS: -40 DEGREES
EKG VENTRICULAR RATE: 59 BPM
GFR AFRICAN AMERICAN: >60
GFR NON-AFRICAN AMERICAN: >60
GLUCOSE BLD-MCNC: 85 MG/DL (ref 70–99)
HCT VFR BLD CALC: 41.1 % (ref 36–48)
HDLC SERPL-MCNC: 37 MG/DL (ref 40–60)
HEMOGLOBIN: 13.8 G/DL (ref 12–16)
LDL CHOLESTEROL CALCULATED: 69 MG/DL
LV EF: 56 %
LVEF MODALITY: NORMAL
MCH RBC QN AUTO: 30.1 PG (ref 26–34)
MCHC RBC AUTO-ENTMCNC: 33.6 G/DL (ref 31–36)
MCV RBC AUTO: 89.5 FL (ref 80–100)
PDW BLD-RTO: 13.7 % (ref 12.4–15.4)
PLATELET # BLD: 177 K/UL (ref 135–450)
PMV BLD AUTO: 11.5 FL (ref 5–10.5)
POTASSIUM REFLEX MAGNESIUM: 4.3 MMOL/L (ref 3.5–5.1)
RBC # BLD: 4.59 M/UL (ref 4–5.2)
SODIUM BLD-SCNC: 140 MMOL/L (ref 136–145)
TOTAL PROTEIN: 6.9 G/DL (ref 6.4–8.2)
TRIGL SERPL-MCNC: 119 MG/DL (ref 0–150)
VLDLC SERPL CALC-MCNC: 24 MG/DL
WBC # BLD: 7.3 K/UL (ref 4–11)

## 2021-01-28 PROCEDURE — 93017 CV STRESS TEST TRACING ONLY: CPT

## 2021-01-28 PROCEDURE — 85027 COMPLETE CBC AUTOMATED: CPT

## 2021-01-28 PROCEDURE — 93005 ELECTROCARDIOGRAM TRACING: CPT | Performed by: PHYSICIAN ASSISTANT

## 2021-01-28 PROCEDURE — 80061 LIPID PANEL: CPT

## 2021-01-28 PROCEDURE — 99214 OFFICE O/P EST MOD 30 MIN: CPT | Performed by: INTERNAL MEDICINE

## 2021-01-28 PROCEDURE — 6370000000 HC RX 637 (ALT 250 FOR IP): Performed by: PHYSICIAN ASSISTANT

## 2021-01-28 PROCEDURE — 78452 HT MUSCLE IMAGE SPECT MULT: CPT

## 2021-01-28 PROCEDURE — G0378 HOSPITAL OBSERVATION PER HR: HCPCS

## 2021-01-28 PROCEDURE — 80048 BASIC METABOLIC PNL TOTAL CA: CPT

## 2021-01-28 PROCEDURE — 80076 HEPATIC FUNCTION PANEL: CPT

## 2021-01-28 PROCEDURE — 99217 PR OBSERVATION CARE DISCHARGE MANAGEMENT: CPT | Performed by: INTERNAL MEDICINE

## 2021-01-28 PROCEDURE — 3430000000 HC RX DIAGNOSTIC RADIOPHARMACEUTICAL: Performed by: INTERNAL MEDICINE

## 2021-01-28 PROCEDURE — 36415 COLL VENOUS BLD VENIPUNCTURE: CPT

## 2021-01-28 PROCEDURE — 2580000003 HC RX 258: Performed by: PHYSICIAN ASSISTANT

## 2021-01-28 PROCEDURE — A9502 TC99M TETROFOSMIN: HCPCS | Performed by: INTERNAL MEDICINE

## 2021-01-28 PROCEDURE — 93010 ELECTROCARDIOGRAM REPORT: CPT | Performed by: INTERNAL MEDICINE

## 2021-01-28 RX ADMIN — FUROSEMIDE 20 MG: 20 TABLET ORAL at 12:40

## 2021-01-28 RX ADMIN — ACETAMINOPHEN 650 MG: 325 TABLET ORAL at 12:39

## 2021-01-28 RX ADMIN — FLUOXETINE 40 MG: 20 CAPSULE ORAL at 12:40

## 2021-01-28 RX ADMIN — Medication 10 ML: at 12:41

## 2021-01-28 RX ADMIN — DIGOXIN 125 MCG: 125 TABLET ORAL at 12:39

## 2021-01-28 RX ADMIN — MAGNESIUM GLUCONATE 500 MG ORAL TABLET 400 MG: 500 TABLET ORAL at 12:39

## 2021-01-28 RX ADMIN — TETROFOSMIN 32.9 MILLICURIE: 1.38 INJECTION, POWDER, LYOPHILIZED, FOR SOLUTION INTRAVENOUS at 11:00

## 2021-01-28 RX ADMIN — POTASSIUM CHLORIDE 20 MEQ: 1500 TABLET, EXTENDED RELEASE ORAL at 12:40

## 2021-01-28 RX ADMIN — TETROFOSMIN 11 MILLICURIE: 1.38 INJECTION, POWDER, LYOPHILIZED, FOR SOLUTION INTRAVENOUS at 09:00

## 2021-01-28 RX ADMIN — ASPIRIN 81 MG: 81 TABLET, CHEWABLE ORAL at 12:39

## 2021-01-28 RX ADMIN — TICAGRELOR 90 MG: 90 TABLET ORAL at 12:39

## 2021-01-28 ASSESSMENT — PAIN DESCRIPTION - PROGRESSION
CLINICAL_PROGRESSION: GRADUALLY WORSENING
CLINICAL_PROGRESSION: GRADUALLY WORSENING

## 2021-01-28 ASSESSMENT — PAIN DESCRIPTION - FREQUENCY: FREQUENCY: CONTINUOUS

## 2021-01-28 NOTE — CONSULTS
[address] [facility]  [phone]        Reason for Consultation/Chief Complaint: \"I have been having chest pain. \"  Consulted for chest pain    History of Present Illness:  Isabelle Lewis is a 50 y.o. patient who presented to [facility] 1/27/21 for c/o CP. Normally follows with Dr. Steve Del Cid for her cardiology care LOV 11/6/20. She has PMH s/p inferior STEMI, CAD s/p 2 RONDA RCA 5/19/20 and s/p 1V CABG LIMA-LAD on 6/2/20, ischemic CM EF=35-40%, hx tobacco abuse (quit 5/20), and HLD. On 5/19/2020 she presented to the ED with CP diagnosed with acute STEMI. Underwent emergent LHC 5/19/20 which showed 100% RCA treated with RONDA x2. Note ECHO 5/19/20 EF 35% hypokinesis of the inferior, basal inferior and inferolateral walls. Grade I DD; Mild MR, AR. She was d/c'd wearing a life vest. She was re-admitted for CP on 5/31/20 and underwent most recent LHC which demonstrated known CAD and concern for LM disease. She underwent CABG x1 LIMA to LAD on 6/2/2020. Repeat limited ECHO  9/16/20 EF of 35-40% with mild global HK. Most recent cardiac event monitor  9/29-10/29/20 demonstrated avg HR 75 () with no significant arrhythmias (brief svt). Now presents with c/o CP starting while sitting in car after dropping granddaughter at school. Reports mid and left-sided pressure CP without radiation associated with diaphoresis. Pain lasted 5-10 mins and had N/V x 1. Pain resolved on its own and went home but while doing laundry pain came back. Admit EKG revealed NSR 62bpm; ST abnormality inferior leads c/w possible ischemia; Non-specific IVCD (no change from 9/20 EKG); CXR No evidence of acute cardiopulmonary disease. Troponin neg x 3; . Patient with no c/o SOB, palpitations, dizziness, edema, or orthopnea/PND. I have been asked to provide consultation regarding further management and testing.       Past Medical History:   has a past medical history of Arthritis, CAD (coronary artery disease), Cancer ([facility] [address] CHF (congestive heart failure) ([facility] [address] Depression, GERD (gastroesophageal reflux disease), Hyperlipidemia, and On intra-aortic balloon pump assist.    Surgical History:   has a past surgical history that includes sinus surgery;  section; Coronary artery bypass graft (N/A, 2020); CT GUIDED PLEURAL DRAINAGE W CATH PERC (2020); thoracentesis (Left, 2020); transesophageal echocardiogram (2020); and Cardiac catheterization (2020). Social History: She is , 2 grown children lives with spouse in [place] [id number]   reports that she quit smoking about 8 months ago. Her smoking use included cigarettes. She has a 10.00 pack-year smoking history. She has never used smokeless tobacco. She reports that she does not drink alcohol or use drugs. Family History:   Mom alive good health age 68 Dad MI age 63's stents  age 68 cancer Brother  age 52 massive MI brother MI age 50 stents    Home Medications:  Were reviewed and are listed in nursing record. and/or listed below  Prior to Admission medications    Medication Sig Start Date End Date Taking?  Authorizing Provider   famotidine (PEPCID) 20 MG tablet TAKE 2 TABLETS BY MOUTH EVERY EVENING 20  Yes Robinson Dawn MD   potassium chloride (KLOR-CON M) 20 MEQ extended release tablet Take 1 tablet by mouth 2 times daily (with meals)  Patient taking differently: Take 20 mEq by mouth daily  20  Yes Radames Paget, MD   digoxin [facility] TRANSPLANT [facility]) 125 MCG tablet Take 1 tablet by mouth daily 20  Yes Fran Newsome MD   pramipexole (MIRAPEX) 0.25 MG tablet Take 1-2 tablets by mouth nightly as needed (RESTLESS LEG) 20  Yes Robinson Dawn MD   ASPIRIN LOW DOSE 81 MG EC tablet TAKE 1 TABLET BY MOUTH DAILY 20  Yes SUJATA Paula CNP   furosemide (LASIX) 20 MG tablet Take 1 tablet by mouth daily  Patient taking differently: Take 20 mg by mouth daily 1/2 tab po daily 20  Yes SUJATA Paula CNP   magnesium oxide (MAG-OX) 400 (241.3 Mg) MG TABS tablet Take 1 tablet by mouth 2 times daily  Patient taking differently: Take 400 mg by mouth daily  8/18/20  Yes Elier Roque MD   mirtazapine (REMERON) 7.5 MG tablet Take 1 tablet by mouth nightly 8/18/20  Yes Elier Roque MD   FLUoxetine [facility]) 40 MG capsule Take 1 capsule by mouth daily 8/13/20  Yes Elier Roque MD   atorvastatin (LIPITOR) 80 MG tablet Take 1 tablet by mouth nightly 5/21/20  Yes SUJATA Samson CNP   ticagrelor (BRILINTA) 90 MG TABS tablet Take 1 tablet by mouth 2 times daily 5/21/20  Yes SUJATA Samson CNP   diclofenac (VOLTAREN) 50 MG EC tablet  12/9/20   Historical Provider, MD   tiZANidine (ZANAFLEX) 4 MG tablet  12/9/20   Historical Provider, MD        Allergies:  Patient has no known allergies.      Review of Systems:   12 point ROS negative in all areas as listed below except as in [place]  Constitutional, EENT, Cardiovascular, pulmonary, GI, , Musculoskeletal, skin, neurological, hematological, endocrine, Psychiatric    Physical Examination:    Vitals:    01/28/21 0235   BP: 89/60   Pulse: 58   Resp: 16   Temp: 97.1 °F (36.2 °C)   SpO2: 94%    Weight: 110 lb 1 oz (49.9 kg)         General Appearance:  Alert, cooperative, no distress, appears stated age   Head:  Normocephalic, without obvious abnormality, atraumatic   Eyes:  PERRL, conjunctiva/corneas clear       Nose: Nares normal, no drainage or sinus tenderness   Throat: Lips, mucosa, and tongue normal   Neck: Supple, symmetrical, trachea midline, no adenopathy, thyroid: not enlarged, symmetric, no tenderness/mass/nodules, no carotid bruit or JVD       Lungs:   Clear to auscultation bilaterally, respirations unlabored   Chest Wall:  No tenderness or deformity   Heart:  Regular rate and rhythm, S1, S2 normal, no murmur, rub or gallop   Abdomen:   Soft, non-tender, bowel sounds active all four quadrants,  no masses, no organomegaly           Extremities: Extremities normal, atraumatic, no cyanosis or edema   Pulses: 2+ and symmetric   Skin: Skin color, texture, turgor normal, no rashes or lesions   Pysch: Normal mood and affect   Neurologic: Normal gross motor and sensory exam.         Labs  CBC:   Lab Results   Component Value Date    WBC 7.3 2021    RBC 4.59 2021    HGB 13.8 2021    HCT 41.1 2021    MCV 89.5 2021    RDW 13.7 2021     2021     CMP:    Lab Results   Component Value Date     2021    K 4.3 2021     2021    CO2 22 2021    BUN 15 2021    CREATININE 0.6 2021    GFRAA >60 2021    GFRAA >60 2011    AGRATIO 1.5 2021    LABGLOM >60 2021    GLUCOSE 85 2021    PROT 6.9 2021    CALCIUM 9.4 2021    BILITOT 0.4 2021    ALKPHOS 122 2021    AST 20 2021    ALT 15 2021     PT/INR:  No results found for: PTINR  Lab Results   Component Value Date    TROPONINI <0.01 2021   EKG 21  Sinus bradycardiaT wave abnormality, consider inferior ischemiaAbnormal ECGWhen compared with ECG of 2021 11:05,No significant change was foundConfirmed by COSME Zhu MD ([id number]) on 2021 7:36:53      EK21  I have reviewed EKG with the following interpretation:  Impression:  See HPI  Normal sinus rhythmST & T wave abnormality, consider inferior ischemiaNon-specific intra-ventricular conduction delayNo previous ECGs availableConfirmed by COSME TORRES MD [id number]) on 2021 5:49:31 PM    CXR 21  No evidence of acute cardiopulmonary disease. FINDINGS: Normal cardiac size. No evidence of pneumonia, edema or other acute pulmonary process. No evidence of acute process of the cardiac or mediastinal structures. No evidence of pneumothorax or pleural effusion.      EK20: SR with HR 96 BPM     Echo (limited) 20: Summary   This is a limited study for CAD and ischemic cardiomyopathy follow-up.   Left ventricular systolic function is reduced with ejection fraction   estimated at 35-40%.   There is mild global hypokinesis with regional variation. The basal   inferoseptum, basal to mid inferior wall, and the entire inferolateral wall   appear more hypokinetic than the remaining segments.   Direct comparison with the prior study dated 5/19/2020 is somewhat limited   as no contrast enhancement is used for the present study. Overall, however,   LV function and regional abnormalities appear similar to the prior study.     Echo: 5/19/20: Summary   The left ventricular systolic function is moderately reduced with EF of 35 %. hypokinesis of the inferior, basal inferior and inferiolateral walls. Grade I diastolic dysfunction with normal filling pressure.   Mild mitral and aortic regurgitation.   (SPAP) is normal estimated at 38 mmHg(Right atrial pressure of 8 mmHg). Cath: 5/19/20:   FINDINGS         LVGRAM     LVEDP  29   GRADIENT ACROSS AORTIC VALVE  no gradient   LV FUNCTION EF 40 %   WALL MOTION  inferior hypokinesis   MITRAL REGURGITATION  mild         CORONARY ARTERIES     LM  Less than 10% proximal-mid stenosis, distally there is an eccentric 50% stenosis         LAD  Large vessel, proximal-mid 30% stenosis.  Distally less than 10% stenosis.     D1 and D2 have a very high takeoff and D2 in particular has a patulous/aneurysmal segment with 70 to 80% proximal stenosis and less than 10% mid to distal stenosis. D3 has 10 to 20% snnloatg-dum-qvohtf stenosis.       LCX  Small vessel, 10% juvqgclt-tac-kcftbd stenosis.          RI  This vessel could also be described as the high first diagonal as noted above in the LAD section.         RCA Large vessel, dominant, proximal less than 10% stenosis, mid 30 to 40% stenosis, distal 90 to 100% stenosis.    Successful PCI of RCA with 2 drug-eluting stents  Will refer for consideration of CABG for left main disease    CATH 5/31/20  Left Heart Cath  Dominance:Right      LM: 50% distal stenosis unchanged from prior  LAD: larger vessel with mild plaquing; gives off three diagonals of which the second is with aneursym and 70% proximal stenosis  LCx: smaller caliber with minimal plaque disease   RCA: mild plaquing proximal to mid vessel with widely patent distal stents and no significant disease of RPLB or RPDA    LVEF: 35%    Brief OP report 6/2/20  PROCEDURE:  1. Coronary artery bypass grafting x1, LIMA to LAD, off pump. 2.  Transesophageal echo. 3.  Doppler assessment of graft. 4.  Intercostal nerve block. 5.  Removal of intra-aortic balloon pump. Assessment:  Rolanda Wise is a 50 y.o. patient who presented to [facility] 1/27/21 for c/o CP. Normally follows with Dr. Piero Milner for her cardiology care LOV 11/6/20. She has PMH s/p inferior STEMI, CAD s/p 2 RONDA RCA 5/19/20 and s/p 1V CABG LIMA-LAD on 6/2/20, ischemic CM EF=35-40%, hx tobacco abuse (quit 5/20), and HLD. On 5/19/2020 she presented to the ED with CP diagnosed with acute STEMI. Underwent emergent LHC 5/19/20 which showed 100% RCA treated with RONDA x2. Note ECHO 5/19/20 EF 35% hypokinesis of the inferior, basal inferior and inferolateral walls. Grade I DD; Mild MR, AR. She was d/c'd wearing a life vest. She was re-admitted for CP on 5/31/20 and underwent most recent LHC which demonstrated known CAD and concern for LM disease. She underwent CABG x1 LIMA to LAD on 6/2/2020. Repeat limited ECHO  9/16/20 EF of 35-40% with mild global HK. Most recent cardiac event monitor  9/29-10/29/20 demonstrated avg HR 75 () with no significant arrhythmias (brief svt). Now presents with c/o CP starting while sitting in car after dropping granddaughter at school. Reports mid and left-sided pressure CP without radiation associated with diaphoresis. Pain lasted 5-10 mins and had N/V x 1. Pain resolved on its own and went home but while doing laundry pain came back.  Admit EKG revealed NSR 62bpm; ST abnormality inferior leads c/w possible ischemia; Non-specific IVCD (no change from 9/20 EKG); CXR No evidence of acute cardiopulmonary disease. Troponin neg x 3; . Diagnosis of unspecified chest pain in middle-aged female with hx inferior STEMI s/p 2 RONDA RCA, s/p 1V CABG LIMA-LAD, and ischemic CM EF=35-40%. Ruled out for MI and baseline inferior ST changes on EKG c/w possible ischemia. Recs:  1. I agree with GXT nuclear stress test to assess myocardial perfusion and LV function. 2. Cont. baby asa qd, lipitor 80mg qd, digoxin 0.125mg qd, brilintal 90mg BID, lasix 20mg qd, KCL 20mEq.qd  3. She is NOT on ACE-I or BB and not certain why? Reported fatigue with BB. Note BP and HR are on low side and would not want to start now. Will d/w Dr. Piero Milner and he can address as outpatient. If nuc stress testing without concerning findings she can be d/c'd later today from cardiology with f/u OV Dr. Piero Milner. Thanks. Patient Active Problem List   Diagnosis    Anxiety    Primary insomnia    Arthritis    Gastroesophageal reflux disease without esophagitis    Hypercholesteremia    Chronic bilateral low back pain without sciatica    Lumbar radiculopathy    Moderate episode of recurrent major depressive disorder ([address]    STEMI (ST elevation myocardial infarction) ([address]    Coronary artery disease involving native heart without angina pectoris    Combined systolic and diastolic cardiac dysfunction    S/P CABG (coronary artery bypass graft)    RLS (restless legs syndrome)    Chest pain    Ischemic cardiomyopathy       Thank you for allowing to us to participate in the [facility] or [facility]. Further evaluation will be based upon the patient's clinical course and testing results.

## 2021-01-28 NOTE — FLOWSHEET NOTE
01/28/21 1153   Vitals   Temp 97 °F (36.1 °C)   Temp Source Oral   Pulse 78   Heart Rate Source Monitor   Resp 16   BP 97/65   BP Location Left upper arm   BP Upper/Lower Upper   BP Method Automatic   Patient Position Semi fowlers   Level of Consciousness Alert (0)   MEWS Score 2   Patient Currently in Pain Denies   Oxygen Therapy   SpO2 97 %   O2 Device None (Room air)   Patient resting quietly in bed. No s/s of distress noted. Shift assessment complete, see flow sheet. Denies needs at this time. Call light within reach. Will continue to monitor.

## 2021-01-28 NOTE — DISCHARGE SUMMARY
Name:  Aimee Cortes  Room:  /2050-41  MRN:    [id number]    Discharge Summary      This discharge summary is in conjunction with a complete physical exam done on the day of discharge. Discharging Physician: Dr. Devin Anaya: 1/27/2021  Discharge: 1/28/2021      HPI taken from admission H&P:    The patient is a 50 y.o. female with coronary artery disease status post stent in May 2020 followed by CABG in June 2020, ischemic cardiomyopathy who presented to [place] ED with complaint of chest pain. Her pain started this morning around 7 AM while she was driving her granddaughter to school. She describes a heavy pressure across her chest and her abdomen. She states she felt short of breath and diaphoretic with the pain. She was also nauseated and subsequently vomited. She states after vomiting she felt better. She states around 10 AM the pain returned and she therefore came to the emergency department for evaluation. She was given 3 baby aspirin in the ER but no other medications to help her pain. Her pain subsequently subsided. She denies any recent illness, fever, chills, cough. She denies any abdominal pain otherwise, difficulty eating, diarrhea. She denies any pain or swelling in her legs, history of DVT or PE.  ER work-up revealed negative troponin, EKG with inferior T wave abnormality which is unchanged from prior. Her case was discussed with cardiology due to her recent cardiac interventions in 2020, cardiology recommended admission to [facility]. She will be admitted for further care and cardiology evaluation.     Diagnoses this Admission and Hospital Course   Chest pain  CAD sp stent (RCA 5/2020), and CABG x1 (6/2020)    -Troponin negative x 3   - EKG with inferior changes but not new compared to prior   -Continue aspirin, brilinta, statin  -Cardiology consult: NM Stress with fixed defected, cleared by cardiology for d/c, can follow up with her primary cardiologist, no recommendations to start ACE/ARB or BB with low normal BP     Ischemic cardiomyopathy   -Ejection fraction 35 to 40%. - she appears compensated   -Continue digoxin and Lasix  - she tried entresto but it dropped her BP- she is not on ACEi/ARB or BB 2/2 hypotension     Depression  - cont prozac       Procedures (Please Review Full Report for Details)  NM Stress     Consults    Cardiology consult     Physical Exam at Discharge:    BP 97/65   Pulse 78   Temp 97 °F (36.1 °C) (Oral)   Resp 16   Ht 5' 3\" (1.6 m)   Wt 110 lb 1 oz (49.9 kg)   LMP 11/01/2017 (Approximate)   SpO2 97%   BMI 19.50 kg/m²     Gen: No distress. Alert. Eyes: PERRL. No sclera icterus. No conjunctival injection. ENT: No discharge. Pharynx clear. Neck: No JVD. No Carotid Bruit. Trachea midline. Resp: No accessory muscle use. No crackles. No wheezes. No rhonchi. CV: Regular rate. Regular rhythm. No murmur. No rub. No edema. + sternotomy scar  Capillary Refill: Brisk,< 3 seconds   Peripheral Pulses: +2 palpable, equal bilaterally   GI: Non-tender. Non-distended. No masses. No organomegaly. Normal bowel sounds. No hernia. Skin: Warm and dry. No nodule on exposed extremities. No rash on exposed extremities. M/S: No cyanosis. No joint deformity. No clubbing. Neuro: Awake. Grossly nonfocal    Psych: Oriented x 3. No anxiety or agitation.     CBC:   Recent Labs     01/27/21  1111 01/28/21 0457   WBC 10.7 7.3   HGB 14.0 13.8   HCT 41.7 41.1   MCV 89.3 89.5    177     BMP:   Recent Labs     01/27/21  1111 01/28/21 0457    140   K 3.9 4.3    109   CO2 23 22   BUN 12 15   CREATININE 0.8 0.6     LIVER PROFILE:   Recent Labs     01/27/21  1111 01/28/21 0457   AST 23 20   ALT 18 15   BILIDIR  --  <0.2   BILITOT 0.4 0.4   ALKPHOS 130* 122     CULTURES  SARS-CoV-2, NAAT Not Detected      RADIOLOGY  NM Cardiac Stress Test Nuclear Imaging   Final Result      XR CHEST PORTABLE   Final Result   No evidence of acute cardiopulmonary disease. NM Stress   Conclusions        Summary   Roxanna Baez is no evidence of stress induced ischemia. Small fixed defect    posterobasal segment c/w infarct. LV function is normal with uniform wall    motion and ejection fraction of 56%. Low risk abnormal study. Discharge Medications     Medication List      CHANGE how you take these medications    furosemide 20 MG tablet  Commonly known as: LASIX  Take 1 tablet by mouth daily  What changed: additional instructions     magnesium oxide 400 (241.3 Mg) MG Tabs tablet  Commonly known as: MAG-OX  Take 1 tablet by mouth 2 times daily  What changed: when to take this     potassium chloride 20 MEQ extended release tablet  Commonly known as: KLOR-CON M  Take 1 tablet by mouth 2 times daily (with meals)  What changed: when to take this        CONTINUE taking these medications    Aspirin Low Dose 81 MG EC tablet  Generic drug: aspirin  TAKE 1 TABLET BY MOUTH DAILY     atorvastatin 80 MG tablet  Commonly known as: LIPITOR  Take 1 tablet by mouth nightly     diclofenac 50 MG EC tablet  Commonly known as: VOLTAREN     digoxin 125 MCG tablet  Commonly known as: LANOXIN  Take 1 tablet by mouth daily     famotidine 20 MG tablet  Commonly known as: PEPCID  TAKE 2 TABLETS BY MOUTH EVERY EVENING     FLUoxetine 40 MG capsule  Commonly known as: PROZAC  Take 1 capsule by mouth daily     mirtazapine 7.5 MG tablet  Commonly known as: REMERON  Take 1 tablet by mouth nightly     pramipexole 0.25 MG tablet  Commonly known as: Mirapex  Take 1-2 tablets by mouth nightly as needed (RESTLESS LEG)     ticagrelor 90 MG Tabs tablet  Commonly known as: BRILINTA  Take 1 tablet by mouth 2 times daily     tiZANidine 4 MG tablet  Commonly known as: Bhargav Pelaez          Discharged in stable condition to home    Follow Up:   Follow up with PCP, cardiology OP      Roxanne Felipe 1/29/2021 11:56 AM

## 2021-01-28 NOTE — PROGRESS NOTES
Pt had Myoview stress test, pt betsey well, pt had no chest pain, pt waiting to be scanned, resp easy/even.

## 2021-01-28 NOTE — PROGRESS NOTES
Pt in bed, awake, A/O X4. Shift assessment complete, night meds given. No C/o pain at this time. Night time snack given. . No other needs expressed. Call light in reach. Will monitor.   Stan Mijares

## 2021-01-28 NOTE — PROGRESS NOTES
Patient educated on discharge instructions as well as new medications use, dosage, administration and possible side effects. Patient verified knowledge. IV removed without difficulty and dry dressing in place. Telemetry monitor removed and returned to [facility] [address] [place]. Pt left facility in stable condition to Home with all of their personal belongings. Pt currently waiting for  to arrive.

## 2021-01-29 ENCOUNTER — OFFICE VISIT (OUTPATIENT)
Dept: FAMILY MEDICINE CLINIC | Age: 56
End: 2021-01-29
Payer: COMMERCIAL

## 2021-01-29 VITALS
DIASTOLIC BLOOD PRESSURE: 62 MMHG | HEART RATE: 73 BPM | SYSTOLIC BLOOD PRESSURE: 96 MMHG | WEIGHT: 116 LBS | OXYGEN SATURATION: 96 % | BODY MASS INDEX: 20.55 KG/M2 | TEMPERATURE: 98.1 F

## 2021-01-29 DIAGNOSIS — I42.9 CARDIOMYOPATHY, UNSPECIFIED TYPE (HCC): ICD-10-CM

## 2021-01-29 DIAGNOSIS — F33.1 MODERATE EPISODE OF RECURRENT MAJOR DEPRESSIVE DISORDER (HCC): ICD-10-CM

## 2021-01-29 DIAGNOSIS — I25.10 CORONARY ARTERY DISEASE INVOLVING NATIVE CORONARY ARTERY OF NATIVE HEART WITHOUT ANGINA PECTORIS: ICD-10-CM

## 2021-01-29 DIAGNOSIS — Z01.818 PREOP EXAMINATION: Primary | ICD-10-CM

## 2021-01-29 DIAGNOSIS — I51.89 COMBINED SYSTOLIC AND DIASTOLIC CARDIAC DYSFUNCTION: ICD-10-CM

## 2021-01-29 DIAGNOSIS — J34.89 NASAL LESION: ICD-10-CM

## 2021-01-29 PROBLEM — R07.9 CHEST PAIN: Status: RESOLVED | Noted: 2021-01-27 | Resolved: 2021-01-29

## 2021-01-29 PROCEDURE — G8484 FLU IMMUNIZE NO ADMIN: HCPCS | Performed by: FAMILY MEDICINE

## 2021-01-29 PROCEDURE — 99214 OFFICE O/P EST MOD 30 MIN: CPT | Performed by: FAMILY MEDICINE

## 2021-01-29 PROCEDURE — 3017F COLORECTAL CA SCREEN DOC REV: CPT | Performed by: FAMILY MEDICINE

## 2021-01-29 PROCEDURE — 1036F TOBACCO NON-USER: CPT | Performed by: FAMILY MEDICINE

## 2021-01-29 PROCEDURE — G8420 CALC BMI NORM PARAMETERS: HCPCS | Performed by: FAMILY MEDICINE

## 2021-01-29 PROCEDURE — G8427 DOCREV CUR MEDS BY ELIG CLIN: HCPCS | Performed by: FAMILY MEDICINE

## 2021-01-29 RX ORDER — MAGNESIUM OXIDE 400 MG/1
TABLET ORAL
Qty: 180 TABLET | Refills: 0 | Status: SHIPPED | OUTPATIENT
Start: 2021-01-29 | End: 2021-04-28

## 2021-01-29 RX ORDER — MIRTAZAPINE 7.5 MG/1
7.5 TABLET, FILM COATED ORAL NIGHTLY
Qty: 90 TABLET | Refills: 0 | Status: SHIPPED | OUTPATIENT
Start: 2021-01-29 | End: 2021-04-28

## 2021-01-29 NOTE — PROGRESS NOTES
atraumatic  Bilateral external ears normal  Nose normal  Oropharynx is clear and moist. No oral pharyngeal exudate  Conjunctivae and extraocular muscles are normal. Pupils are equal round and reactive. No ocular discharge. No scleral icterus  Neck normal range of motion. Neck supple. No tracheal deviation. No thyromegaly. Heart regular rate and rhythm with normal heart sounds. No gallop. No murmur   Respiratory effort normal and breath sounds normal. No stridor. No respiratory distress. No wheezing. No rales. Abdomen soft with normal bowel sounds. No distention and no mass. No tenderness rebound or guarding. Musculoskeletal shows normal range of motion. No edema. No cervical lymphadenopathy  No cranial nerve defects. Normal muscle tone. Coordination normal.  Skin is warm and dry. No diaphoresis. No erythema or pallor. Normal mood and affect. Behavior is normal. Judgment and thought content normal for age. Encounter Diagnoses   Name Primary?  Preop examination Yes    Nasal lesion     Cardiomyopathy, unspecified type ([address]     Coronary artery disease involving native coronary artery of native heart without angina pectoris     Combined systolic and diastolic cardiac dysfunction     Moderate episode of recurrent major depressive disorder ([address]        PLAN:  Recent hospitalization reviewed. Blood work was favorable. Cardiac stress test was favorable. My opinion is that the patient is currently medically stable. She was advised to follow the advice of her surgeon regarding taking medication that can thin the blood prior to the planned procedure. A copy of this note will be sent to her surgeon. She should follow up in 4 months or as needed        Constantine Nayak M.D. [facility]      Addendum:  3-3-21  I have again reviewed this patients history and recent testing. She states she feels well with no new complaints.  She is scheduled for a skin flap surgery on 3-11-21 and I feel she is medically stable with normal surgical risk and may proceed with surgery plans.     Alan Rodriguez MD

## 2021-02-02 NOTE — TELEPHONE ENCOUNTER
SRJ is pt okay to have cardiac clearance 2/4/21 to remove skin cell carcinoma from the nose/ pt was just hospitalized for CP. SRJ please advise. Pt can be reached at [phone].     TY

## 2021-02-08 ENCOUNTER — TELEPHONE (OUTPATIENT)
Dept: FAMILY MEDICINE CLINIC | Age: 56
End: 2021-02-08

## 2021-02-08 NOTE — TELEPHONE ENCOUNTER
Patient is having 2nd surgery on 2/11 for a forehead flap, her original preop was for a different surgery  And [facility] is needing her original pre op addended and needs this surgery mentioned in the pre op so she can have this surgery. They will be able to see it in the computer no need to fax.     Carmen [id number]

## 2021-02-10 ENCOUNTER — HOSPITAL ENCOUNTER (OUTPATIENT)
Dept: NON INVASIVE DIAGNOSTICS | Age: 56
Discharge: HOME | End: 2021-02-10
Payer: COMMERCIAL

## 2021-02-10 ENCOUNTER — TELEPHONE (OUTPATIENT)
Dept: CARDIOLOGY CLINIC | Age: 56
End: 2021-02-10

## 2021-02-10 DIAGNOSIS — E78.00 HYPERCHOLESTEREMIA: ICD-10-CM

## 2021-02-10 DIAGNOSIS — I42.9 CARDIOMYOPATHY, UNSPECIFIED TYPE (HCC): ICD-10-CM

## 2021-02-10 DIAGNOSIS — I25.10 CORONARY ARTERY DISEASE INVOLVING NATIVE CORONARY ARTERY OF NATIVE HEART WITHOUT ANGINA PECTORIS: ICD-10-CM

## 2021-02-10 DIAGNOSIS — I21.3 ST ELEVATION MYOCARDIAL INFARCTION (STEMI), UNSPECIFIED ARTERY (HCC): ICD-10-CM

## 2021-02-10 LAB
LV EF: 43 %
LVEF MODALITY: NORMAL

## 2021-02-10 PROCEDURE — 93306 TTE W/DOPPLER COMPLETE: CPT

## 2021-02-10 NOTE — TELEPHONE ENCOUNTER
Created telephone encounter. Spoke with Pearl Pereira relayed message per [facility] regarding echo. Pt verbalized understanding.

## 2021-02-18 ENCOUNTER — OFFICE VISIT (OUTPATIENT)
Dept: CARDIOLOGY CLINIC | Age: 56
End: 2021-02-18
Payer: COMMERCIAL

## 2021-02-18 VITALS
SYSTOLIC BLOOD PRESSURE: 90 MMHG | HEART RATE: 60 BPM | HEIGHT: 63 IN | DIASTOLIC BLOOD PRESSURE: 60 MMHG | BODY MASS INDEX: 19.99 KG/M2 | TEMPERATURE: 97.1 F | OXYGEN SATURATION: 99 % | WEIGHT: 112.8 LBS

## 2021-02-18 DIAGNOSIS — E78.5 DYSLIPIDEMIA: ICD-10-CM

## 2021-02-18 DIAGNOSIS — I25.5 ISCHEMIC CARDIOMYOPATHY: ICD-10-CM

## 2021-02-18 DIAGNOSIS — I50.22 CHRONIC SYSTOLIC HEART FAILURE (HCC): ICD-10-CM

## 2021-02-18 DIAGNOSIS — I25.10 CORONARY ARTERY DISEASE INVOLVING NATIVE CORONARY ARTERY OF NATIVE HEART WITHOUT ANGINA PECTORIS: Primary | ICD-10-CM

## 2021-02-18 PROCEDURE — 3017F COLORECTAL CA SCREEN DOC REV: CPT | Performed by: NURSE PRACTITIONER

## 2021-02-18 PROCEDURE — 1036F TOBACCO NON-USER: CPT | Performed by: NURSE PRACTITIONER

## 2021-02-18 PROCEDURE — G8420 CALC BMI NORM PARAMETERS: HCPCS | Performed by: NURSE PRACTITIONER

## 2021-02-18 PROCEDURE — G8427 DOCREV CUR MEDS BY ELIG CLIN: HCPCS | Performed by: NURSE PRACTITIONER

## 2021-02-18 PROCEDURE — 99214 OFFICE O/P EST MOD 30 MIN: CPT | Performed by: NURSE PRACTITIONER

## 2021-02-18 PROCEDURE — G8484 FLU IMMUNIZE NO ADMIN: HCPCS | Performed by: NURSE PRACTITIONER

## 2021-02-18 ASSESSMENT — ENCOUNTER SYMPTOMS
GASTROINTESTINAL NEGATIVE: 1
RESPIRATORY NEGATIVE: 1

## 2021-02-18 NOTE — PATIENT INSTRUCTIONS
Everything looks great today, good job!   Continue current medications  Stay active along with a healthy diet  Follow up in 6 months

## 2021-02-18 NOTE — PROGRESS NOTES
[facility]   Cardiology Note              Date:  2021  Patientname: Olivia Miller  YOB: 1965    Primary Care physician: Elle Leavitt MD    HISTORY OF PRESENT ILLNESS: Olivia Miller is a 50 y.o. female with a history of CAD, CHF, cardiomyopathy, HLD. On 2020 she was admitted for chest pain. EKG showed inferior STEMI. LHC showed 100% RCA treated with RONDA x2, plan for possible outpatient CABG for LM disease. Echo showed EF 35%. She was re admitted 2020 for chest pain and LHC showed significant LM disease, IABP placed, referred to CT surgery. On 2020 she had CABG x1. Echo 2020 showed EF 35-40%. Cardiac monitor 10/2020 negative for significant arrhythmia. She was admitted 2021 for chest pain. Stress test negative for ischemia, +infarct. Echo 2021 showed EF 40-45%. Today she presents for follow up for CAD, cardiomyopathy, HLD. She denies chest pain, shortness of breath, palpitations and dizziness. Home BP 90s/60s. Past Medical History:   has a past medical history of Arthritis, CAD (coronary artery disease), Cancer ([address] CHF (congestive heart failure) ([facility] [address] Depression, GERD (gastroesophageal reflux disease), Hyperlipidemia, and On intra-aortic balloon pump assist.    Past Surgical History:   has a past surgical history that includes sinus surgery;  section; Coronary artery bypass graft (N/A, 2020); CT GUIDED PLEURAL DRAINAGE W CATH PERC (2020); thoracentesis (Left, 2020); transesophageal echocardiogram (2020); and Cardiac catheterization (2020). Home Medications:    Prior to Admission medications    Medication Sig Start Date End Date Taking?  Authorizing Provider   mirtazapine (REMERON) 7.5 MG tablet TAKE 1 TABLET BY MOUTH NIGHTLY 21   Elle Leavitt MD   magnesium oxide (MAG-OX) 400 MG tablet TAKE ONE (1) TABLET BY MOUTH TWICE DAILY 21   Elle Leavitt MD   diclofenac (VOLTAREN) 50 MG EC tablet  12/9/20   Historical Provider, MD   tiZANidine (ZANAFLEX) 4 MG tablet  12/9/20   Historical Provider, MD   famotidine (PEPCID) 20 MG tablet TAKE 2 TABLETS BY MOUTH EVERY EVENING 12/4/20   Cristina Navarro MD   potassium chloride (KLOR-CON M) 20 MEQ extended release tablet Take 1 tablet by mouth 2 times daily (with meals)  Patient taking differently: Take 20 mEq by mouth daily  11/25/20   Daniel Rosario MD   digoxin [facility]) 125 MCG tablet Take 1 tablet by mouth daily 11/16/20   Eula Camacho MD   pramipexole (MIRAPEX) 0.25 MG tablet Take 1-2 tablets by mouth nightly as needed (RESTLESS LEG) 11/5/20   Cristina Navarro MD   ASPIRIN LOW DOSE 81 MG EC tablet TAKE 1 TABLET BY MOUTH DAILY 9/4/20   SUJATA Serrano CNP   furosemide (LASIX) 20 MG tablet Take 1 tablet by mouth daily  Patient taking differently: Take 20 mg by mouth daily 1/2 tab po daily 8/19/20   SUJATA Serrano CNP   FLUoxetine (PROZAC) 40 MG capsule Take 1 capsule by mouth daily 8/13/20   Cristina Navarro MD   atorvastatin (LIPITOR) 80 MG tablet Take 1 tablet by mouth nightly 5/21/20   SUJATA Tomlin CNP   ticagrelor (BRILINTA) 90 MG TABS tablet Take 1 tablet by mouth 2 times daily 5/21/20   SUJATA Tomlin CNP     Allergies:  Patient has no known allergies. Social History:   reports that she quit smoking about 9 months ago. Her smoking use included cigarettes. She has a 10.00 pack-year smoking history. She has never used smokeless tobacco. She reports that she does not drink alcohol or use drugs. Family History: family history is not on file. Review of Systems   Review of Systems   Constitutional: Negative. Respiratory: Negative. Cardiovascular: Negative. Gastrointestinal: Negative. Neurological: Negative.       OBJECTIVE:    Vital signs:    BP 90/60   Pulse 60   Temp 97.1 °F (36.2 °C)   Ht 5' 3\" (1.6 m)   Wt 112 lb 12.8 oz (51.2 kg)   LMP 11/01/2017 (Approximate)   SpO2 99%   BMI 19.98 kg/m² Physical Exam:  Constitutional:  Comfortable and alert, NAD, appears older than stated age  Eyes: PERRL, sclera nonicteric  Neck:  Supple, no masses, no thyroidmegaly, no JVD  Skin:  Warm and dry; no rash or lesions  Heart: Regular, normal apex, S1 and S2 normal, no M/G/R  Lungs:  Normal respiratory effort; clear; no wheezing/rhonchi/rales  Abdomen: soft, non tender, + bowel sounds  Extremities:  No edema or cyanosis; no clubbing  Neuro: alert and oriented, moves legs and arms equally, normal mood and affect    Data Reviewed:      Echo 2/10/2021:  The left ventricular systolic function is mildly reduced with an ejection   fraction of 40-45 %. There is hypokinesis of the basal septal, basal inferior, and basal   inferolateral walls. Normal left ventricular diastolic filling pressure. The left atrium is mildly dilated. Moderate aortic regurgitation. Mild mitral regurgitation. Systolic pulmonary artery pressure (SPAP) is normal estimated at 28 mmHg   (Right atrial pressure of 3 mmHg). Compared to exam done 9/16/2020, Left ventricular systolic function appears   improved. Stress test 1/28/2021:  There is no evidence of stress induced ischemia. Small fixed defect    posterobasal segment c/w infarct. LV function is normal with uniform wall    motion and ejection fraction of 56%. Low risk abnormal study. CABG 6/2/2020:  CORONARY ARTERY BYPASS GRAFTING X1, INTERNAL MAMMARY ARTERY, OFF PUMP (LIMA TO LAD), 5 LEVEL BILATERAL INTERCOSTAL NERVE BLOCK, BALLOON PUMP REMOVAL    Echo 5/2020: The left ventricular systolic function is moderately reduced with an   ejection fraction of 35 %. There is hypokinesis of the inferior, basal inferior and inferiolateral   walls. Grade I diastolic dysfunction with normal filling pressure. Mild mitral and aortic regurgitation. Systolic pulmonic artery pressure (SPAP) is normal estimated at 38 mmHg   (Right atrial pressure of 8 mmHg).     Coronary angiogram 5/31/2020:  Dominance:Right   LM: 50% distal stenosis unchanged from prior  LAD: larger vessel with mild plaquing; gives off three diagonals of which the second is with aneursym and 70% proximal stenosis  LCx: smaller caliber with minimal plaque disease   RCA: mild plaquing proximal to mid vessel with widely patent distal stents and no significant disease of RPLB or RPDA   LVEDP: 19 mmHg , no gradient upon pullback   LVEF: 35%  Discussed films and distal LM disease with CTS Dr. Jeannie Yarbrough in person postprocedure. Plan is for continuation of IABP with Heparin and Cangrelor gtts as bridge to CABG this week. Last dose of Ticagrelor earlier this morning. Continue aspirin, high intensity statin, low dose beta blocker, and diurese as see fit. Coronary angiogram 5/19/2020:  Acute inferior STEMI  PROCEDURES PERFORMED    Left heart catheterization  LVgram  Coronary angiogam  Coronary cath  Thrombectomy of RCA  IVUS of RCA  PCI of RCA with 2 drug-eluting stents  PROCEDURE DESCRIPTION   Risks/benefits/alternatives/outcomes were discussed with patient and/or family and informed consent was obtained. This was an emergency procedure. patient was prepped draped in the usual sterile fashion. Local anaesthetic was applied over puncture site. Using a back wall technique, a 6 [place] sheath was inserted into right radial artery. Verapamil, nitroglycerin, nicardipine were administered through the sheath. Heparin was administered. Diagnostic 5 [place] Medtronic pigtail and ultra catheters were used for diagnostic angiograms. Initially focus was on RCA angiography and PCI as noted below. At the conclusion of the procedure, a TR band was placed over the puncture site and hemostasis was obtained. There were no immediate complications. I supervised sedation with versed 4 mg/fentanyl 100 Mcg during the procedure. 180 cc contrast was utilized. <20cc EBL.   I offered to call the patient's friends and family to give them updates, patient declined and says that she would prefer that we not call anybody with regard to updates. FINDINGS     LVEDP  29   GRADIENT ACROSS AORTIC VALVE  no gradient   LV FUNCTION EF 40 %   WALL MOTION  inferior hypokinesis   MITRAL REGURGITATION  mild     LM  Less than 10% proximal-mid stenosis, distally there is an eccentric 50% stenosis         LAD  Large vessel, proximal-mid 30% stenosis. Distally less than 10% stenosis.     D1 and D2 have a very high takeoff and D2 in particular has a patulous/aneurysmal segment with 70 to 80% proximal stenosis and less than 10% mid to distal stenosis. D3 has 10 to 20% dhwktlov-cdb-gytjgw stenosis.       LCX  Small vessel, 10% pccjxufw-ftl-idieqk stenosis.       RI  This vessel could also be described as the high first diagonal as noted above in the LAD section.         RCA Large vessel, dominant, proximal less than 10% stenosis, mid 30 to 40% stenosis, distal 90 to 100% stenosis. PERCUTANEOUS INTERVENTION DESCRIPTION    Patient was preloaded with Brilinta, patient was given a single dose of Integrilin during the procedure and was treated otherwise with heparin for anticoagulation. A 6 [place] guiding catheter was taken upfront for PCI and a choice floppy wire was used to cross the lesion. Thrombectomy was then performed with the penumbra device and flow was restored. Lesion in the distal RCA was then dilated with a 3 mm balloon and then stented with Abbott Xience Becky 3.5 x 15 mm drug-eluting stent as well as a 3.0 x 18 mm Love Xience [place] drug-eluting stent. IVUS was performed and showed MLD was 3.5 mm. Stents were postdilated both a 3 and 3.5 mm noncompliant balloon. There was 0% residual stenosis. There was GAVIN 0 flow before and GAVIN-3 after PCI.     During procedure, patient had hypotension which responded to vasopressors and these were able to be weaned partially at the end of the case and patient's EKG and symptoms had markedly improved at end of case. Remaining CAD/ASHD was felt to be treated medically followed by possible CABG surgery for left main disease. CONCLUSIONS:    Successful PCI of RCA with 2 drug-eluting stents  Will refer for consideration of CABG for left main disease  Addend, case d/w dr Maria Teresa Goodwin, plan for outpt cabg, order for life vest in interim, ef 35% on current review    Cardiology Labs Reviewed:   CBC: No results for input(s): WBC, HGB, HCT, PLT in the last 72 hours. BMP:No results for input(s): NA, K, CO2, BUN, CREATININE, LABGLOM, GLUCOSE in the last 72 hours. PT/INR: No results for input(s): PROTIME, INR in the last 72 hours. APTT:No results for input(s): APTT in the last 72 hours. FASTING LIPID PANEL:  Lab Results   Component Value Date    HDL 37 01/28/2021    HDL 45 05/28/2011    LDLCALC 69 01/28/2021    TRIG 119 01/28/2021     LIVER PROFILE:No results for input(s): AST, ALT, ALB in the last 72 hours. BNP:   Lab Results   Component Value Date    PROBNP 697 01/27/2021    PROBNP 1,069 07/10/2020    PROBNP 1,567 06/01/2020    PROBNP 2,240 05/31/2020    PROBNP 671 05/19/2020     Reviewed all labs and imaging today    Assessment:   CAD: stable, no angina; +infarct, no ischemia on stress test 1/2021; s/p LIMA-LAD 6/2/2020; s/p RONDA x2 RCA in the setting of STEMI 9/0304  Chronic systolic CHF: compensated  Ischemic cardiomyopathy: improved, EF 40-45% on echo 2/2021 from 35% in 5/2020  Hypotension: ongoing, asymptomatic  HLD: controlled, LDL 69, continue statin    Plan:   1. Continue aspirin, statin, brilinta, digoxin, lasix; unable to add beta blocker or ACE/ARB due to hypotension, unable to tolerate either in the past  2. Check BP at home and call the office if consistently out of goal range  3. Stay active along with a healthy diet  4.  Follow up in 6 months with Dr. Aly Ulloa, APRN-CNP  [address]  [phone]

## 2021-03-03 ENCOUNTER — TELEPHONE (OUTPATIENT)
Dept: FAMILY MEDICINE CLINIC | Age: 56
End: 2021-03-03

## 2021-03-03 NOTE — TELEPHONE ENCOUNTER
[facility] called and patient is having another surgery on 3/11, she is going back in for intermediate stage 4 head flap. They need her preop from 1/29 to have another addendum stating this surgery and that is cleared for this. They can see the note in Epic so we don't need to fax it anywhere.

## 2021-03-08 ENCOUNTER — OFFICE VISIT (OUTPATIENT)
Dept: FAMILY MEDICINE CLINIC | Age: 56
End: 2021-03-08
Payer: COMMERCIAL

## 2021-03-08 VITALS
WEIGHT: 113 LBS | DIASTOLIC BLOOD PRESSURE: 51 MMHG | SYSTOLIC BLOOD PRESSURE: 81 MMHG | BODY MASS INDEX: 20.02 KG/M2 | OXYGEN SATURATION: 96 % | TEMPERATURE: 98 F | HEART RATE: 51 BPM

## 2021-03-08 DIAGNOSIS — I25.10 CORONARY ARTERY DISEASE INVOLVING NATIVE CORONARY ARTERY OF NATIVE HEART WITHOUT ANGINA PECTORIS: ICD-10-CM

## 2021-03-08 DIAGNOSIS — C44.311 BASAL CELL CARCINOMA (BCC) OF SKIN OF NOSE: ICD-10-CM

## 2021-03-08 DIAGNOSIS — Z95.1 S/P CABG (CORONARY ARTERY BYPASS GRAFT): ICD-10-CM

## 2021-03-08 DIAGNOSIS — I51.89 COMBINED SYSTOLIC AND DIASTOLIC CARDIAC DYSFUNCTION: ICD-10-CM

## 2021-03-08 DIAGNOSIS — M19.90 ARTHRITIS: ICD-10-CM

## 2021-03-08 DIAGNOSIS — Z01.818 PREOP EXAMINATION: Primary | ICD-10-CM

## 2021-03-08 LAB
BASOPHILS ABSOLUTE: 0.1 K/UL (ref 0–0.2)
BASOPHILS RELATIVE PERCENT: 1.3 %
EOSINOPHILS ABSOLUTE: 0.5 K/UL (ref 0–0.6)
EOSINOPHILS RELATIVE PERCENT: 6 %
HCT VFR BLD CALC: 37.7 % (ref 36–48)
HEMOGLOBIN: 12.9 G/DL (ref 12–16)
LYMPHOCYTES ABSOLUTE: 2.4 K/UL (ref 1–5.1)
LYMPHOCYTES RELATIVE PERCENT: 28.1 %
MCH RBC QN AUTO: 30.2 PG (ref 26–34)
MCHC RBC AUTO-ENTMCNC: 34.1 G/DL (ref 31–36)
MCV RBC AUTO: 88.7 FL (ref 80–100)
MONOCYTES ABSOLUTE: 0.9 K/UL (ref 0–1.3)
MONOCYTES RELATIVE PERCENT: 10.1 %
NEUTROPHILS ABSOLUTE: 4.7 K/UL (ref 1.7–7.7)
NEUTROPHILS RELATIVE PERCENT: 54.5 %
PDW BLD-RTO: 13.2 % (ref 12.4–15.4)
PLATELET # BLD: 223 K/UL (ref 135–450)
PMV BLD AUTO: 11.5 FL (ref 5–10.5)
RBC # BLD: 4.26 M/UL (ref 4–5.2)
WBC # BLD: 8.7 K/UL (ref 4–11)

## 2021-03-08 PROCEDURE — 3017F COLORECTAL CA SCREEN DOC REV: CPT | Performed by: FAMILY MEDICINE

## 2021-03-08 PROCEDURE — 99214 OFFICE O/P EST MOD 30 MIN: CPT | Performed by: FAMILY MEDICINE

## 2021-03-08 PROCEDURE — 1036F TOBACCO NON-USER: CPT | Performed by: FAMILY MEDICINE

## 2021-03-08 PROCEDURE — G8420 CALC BMI NORM PARAMETERS: HCPCS | Performed by: FAMILY MEDICINE

## 2021-03-08 PROCEDURE — 36415 COLL VENOUS BLD VENIPUNCTURE: CPT | Performed by: FAMILY MEDICINE

## 2021-03-08 PROCEDURE — G8427 DOCREV CUR MEDS BY ELIG CLIN: HCPCS | Performed by: FAMILY MEDICINE

## 2021-03-08 PROCEDURE — G8484 FLU IMMUNIZE NO ADMIN: HCPCS | Performed by: FAMILY MEDICINE

## 2021-03-08 NOTE — PROGRESS NOTES
SUBJECTIVE:   This patient presents requesting preoperative evaluation regarding the stability of her chronic medical problems. She is scheduled for surgery for basal cell cancer on her face on 3-11-21 and 21. She had a basal cell cancer removed from her face and will require some skin grafts. She has a history of coronary artery disease with prior MI and prior coronary artery bypass surgery. She has systolic and diastolic heart failure but her EF has improved from 30% to 45% over the last 10 months. She continues on Lasix and potassium. She has no swelling. No chest pain or chest pressure. Her blood pressures are very low and she often feels weak and lightheaded. She has a long history of arthritis and has had no joint redness or swelling. She  reports that she quit smoking about 10 months ago. Her smoking use included cigarettes. She has a 10.00 pack-year smoking history. She has never used smokeless tobacco.     Patient Active Problem List   Diagnosis    Anxiety    Primary insomnia    Arthritis    Gastroesophageal reflux disease without esophagitis    Hypercholesteremia    Chronic bilateral low back pain without sciatica    Lumbar radiculopathy    Moderate episode of recurrent major depressive disorder ([address]    STEMI (ST elevation myocardial infarction) ([address]    Coronary artery disease involving native heart without angina pectoris    Combined systolic and diastolic cardiac dysfunction    S/P CABG (coronary artery bypass graft)    RLS (restless legs syndrome)    Cardiomyopathy ([address]       Surgical History:  has a past surgical history that includes sinus surgery;  section; Coronary artery bypass graft (N/A, 2020); CT GUIDED PLEURAL DRAINAGE W CATH PERC (2020); thoracentesis (Left, 2020); transesophageal echocardiogram (2020); and Cardiac catheterization (2020). Family History: No pertinent family history.     Allergies: She has No Known Allergies. Medications:   Current Outpatient Medications   Medication Sig Dispense Refill    mirtazapine (REMERON) 7.5 MG tablet TAKE 1 TABLET BY MOUTH NIGHTLY 90 tablet 0    magnesium oxide (MAG-OX) 400 MG tablet TAKE ONE (1) TABLET BY MOUTH TWICE DAILY 180 tablet 0    diclofenac (VOLTAREN) 50 MG EC tablet       tiZANidine (ZANAFLEX) 4 MG tablet       famotidine (PEPCID) 20 MG tablet TAKE 2 TABLETS BY MOUTH EVERY EVENING 60 tablet 5    potassium chloride (KLOR-CON M) 20 MEQ extended release tablet Take 1 tablet by mouth 2 times daily (with meals) (Patient taking differently: Take 20 mEq by mouth daily ) 180 tablet 1    digoxin (LANOXIN) 125 MCG tablet Take 1 tablet by mouth daily 30 tablet 5    pramipexole (MIRAPEX) 0.25 MG tablet Take 1-2 tablets by mouth nightly as needed (RESTLESS LEG) 60 tablet 5    ASPIRIN LOW DOSE 81 MG EC tablet TAKE 1 TABLET BY MOUTH DAILY 90 tablet 3    furosemide (LASIX) 20 MG tablet Take 1 tablet by mouth daily (Patient taking differently: Take 20 mg by mouth daily 1/2 tab po daily) 60 tablet 3    FLUoxetine (PROZAC) 40 MG capsule Take 1 capsule by mouth daily 90 capsule 1    atorvastatin (LIPITOR) 80 MG tablet Take 1 tablet by mouth nightly 30 tablet 11    ticagrelor (BRILINTA) 90 MG TABS tablet Take 1 tablet by mouth 2 times daily 60 tablet 11     No current facility-administered medications for this visit. Blood pressure (!) 81/51, pulse 51, temperature 98 °F (36.7 °C), temperature source Oral, weight 113 lb (51.3 kg), last menstrual period 11/01/2017, SpO2 96 %. Patient reports no activity change, appetite change, neck pain, neck stiffness, eye discharge, eye pain, choking, stridor, chest pain, palpitations, diarrhea, vomiting, polydipsia, polyphagia, enuresis, hematuria, pallor, rash, seizure, syncope     OBJECTIVE:  PHYSICAL EXAM:  Oriented to person place and time. Appears well-developed and well-nourished. No distress.   Head normocephalic and atraumatic  Bilateral external ears normal  Nose normal  Oropharynx is clear and moist. No oral pharyngeal exudate  Conjunctivae and extraocular muscles are normal. Pupils are equal round and reactive. No ocular discharge. No scleral icterus  Neck normal range of motion. Neck supple. No tracheal deviation. No thyromegaly. Heart regular rate and rhythm with normal heart sounds. No gallop. No murmur   Respiratory effort normal and breath sounds normal. No stridor. No respiratory distress. No wheezing. No rales. Abdomen soft with normal bowel sounds. No distention and no mass. No tenderness rebound or guarding. Musculoskeletal shows normal range of motion. No edema. No cervical lymphadenopathy  No cranial nerve defects. Normal muscle tone. Coordination normal.  Skin is warm and dry. No diaphoresis. No erythema or pallor. Normal mood and affect. Behavior is normal. Judgment and thought content normal for age. Encounter Diagnoses   Name Primary?  Preop examination Yes    Basal cell carcinoma (BCC) of skin of nose     Coronary artery disease involving native coronary artery of native heart without angina pectoris     S/P CABG (coronary artery bypass graft)     Combined systolic and diastolic cardiac dysfunction     Arthritis        PLAN:  Orders Placed This Encounter   Procedures    CBC Auto Differential    Basic Metabolic Panel       My opinion is that the patient is currently medically stable. She will stop her low-dose Lasix and potassium supplement. She was advised to hold Brilinta but continue with aspirin. A copy of this note will be sent to her surgeon. She should follow up in 4 months or as needed        Wiley Jackson M.D.   [employer]

## 2021-03-08 NOTE — PATIENT INSTRUCTIONS
Please read the healthy family handout that you were given and share it with your family. Please compare this printed medication list with your medications at home to be sure they are the same. If you have any medications that are different please contact us immediately at [phone]. Also review your allergies that we have listed, these may also include medications that you have not been able to tolerate, make sure everything listed is correct. If you have any allergies that are different please contact us immediately at [phone].

## 2021-03-09 DIAGNOSIS — E87.5 SERUM POTASSIUM ELEVATED: Primary | ICD-10-CM

## 2021-03-09 LAB
ANION GAP SERPL CALCULATED.3IONS-SCNC: 13 MMOL/L (ref 3–16)
BUN BLDV-MCNC: 9 MG/DL (ref 7–20)
CALCIUM SERPL-MCNC: 10.2 MG/DL (ref 8.3–10.6)
CHLORIDE BLD-SCNC: 104 MMOL/L (ref 99–110)
CO2: 25 MMOL/L (ref 21–32)
CREAT SERPL-MCNC: 0.9 MG/DL (ref 0.6–1.1)
GFR AFRICAN AMERICAN: >60
GFR NON-AFRICAN AMERICAN: >60
GLUCOSE BLD-MCNC: 97 MG/DL (ref 70–99)
POTASSIUM SERPL-SCNC: 5.3 MMOL/L (ref 3.5–5.1)
SODIUM BLD-SCNC: 142 MMOL/L (ref 136–145)

## 2021-03-10 ENCOUNTER — HOSPITAL ENCOUNTER (OUTPATIENT)
Age: 56
Discharge: HOME | End: 2021-03-10
Payer: COMMERCIAL

## 2021-03-10 DIAGNOSIS — E87.5 SERUM POTASSIUM ELEVATED: ICD-10-CM

## 2021-03-10 LAB — POTASSIUM SERPL-SCNC: 4.1 MMOL/L (ref 3.5–5.1)

## 2021-03-10 PROCEDURE — 84132 ASSAY OF SERUM POTASSIUM: CPT

## 2021-03-10 PROCEDURE — 36415 COLL VENOUS BLD VENIPUNCTURE: CPT

## 2021-03-23 ENCOUNTER — TELEPHONE (OUTPATIENT)
Dept: CARDIOLOGY CLINIC | Age: 56
End: 2021-03-23

## 2021-03-23 NOTE — TELEPHONE ENCOUNTER
Claudia Roth, 1965    Cardiac Risk Assessment    What type of procedure are you having?:  Last stage of basal ca ca of nose-Forehead flap takedown    When is your procedure scheduled for?:  4.2.21    What physician is performing your procedure?:  Dr. Shelby Bean    Phone Number:  [phone]-iekm with [address]    Fax number to send the letter:  [phone]

## 2021-03-23 NOTE — TELEPHONE ENCOUNTER
BRIGETTE Naidu Staff is pt okay to have cardiac clearance for nose forehead flap take down for last stage basal cell carcinoma scheduled for 4/2/21? Pt is currently taking Brilinta 90 mg Bid and Asa 81 mg daily. How long long should pt hold blood thinners prior to procedure? Pt's last OV 2/18/21NPBLANCHE. CARRINGTONJP please advise. Cardiac clearance can be faxed to [phone].       TY

## 2021-03-24 NOTE — TELEPHONE ENCOUNTER
Sounds like staging his previous evaluate this patient prior to the surgery and was stated to be moderate risk. He is now having the revision of that surgery so I would assume that his risk status remains moderate for surgery.   Okay to hold Brilinta 1 week prior to procedure if needed would likely continue aspirin 81 mg daily throughout surgery unless absolute required to stop by surgeon

## 2021-03-25 ENCOUNTER — TELEPHONE (OUTPATIENT)
Dept: CARDIOLOGY CLINIC | Age: 56
End: 2021-03-25

## 2021-03-26 ENCOUNTER — TELEPHONE (OUTPATIENT)
Dept: FAMILY MEDICINE CLINIC | Age: 56
End: 2021-03-26

## 2021-03-26 RX ORDER — LACTOSE-REDUCED FOOD
1 LIQUID (ML) ORAL DAILY
Qty: 30 CAN | Refills: 5 | Status: CANCELLED | OUTPATIENT
Start: 2021-03-26

## 2021-03-30 RX ORDER — FLUOXETINE HYDROCHLORIDE 40 MG/1
CAPSULE ORAL
Qty: 30 CAPSULE | Refills: 5 | Status: SHIPPED | OUTPATIENT
Start: 2021-03-30 | End: 2021-04-23 | Stop reason: SDUPTHER

## 2021-04-07 ENCOUNTER — TELEPHONE (OUTPATIENT)
Dept: FAMILY MEDICINE CLINIC | Age: 56
End: 2021-04-07

## 2021-04-23 ENCOUNTER — OFFICE VISIT (OUTPATIENT)
Dept: FAMILY MEDICINE CLINIC | Age: 56
End: 2021-04-23
Payer: COMMERCIAL

## 2021-04-23 VITALS
BODY MASS INDEX: 20.19 KG/M2 | DIASTOLIC BLOOD PRESSURE: 59 MMHG | SYSTOLIC BLOOD PRESSURE: 102 MMHG | TEMPERATURE: 96 F | WEIGHT: 114 LBS | OXYGEN SATURATION: 97 % | HEART RATE: 67 BPM

## 2021-04-23 DIAGNOSIS — I51.89 COMBINED SYSTOLIC AND DIASTOLIC CARDIAC DYSFUNCTION: ICD-10-CM

## 2021-04-23 DIAGNOSIS — F41.9 ANXIETY: ICD-10-CM

## 2021-04-23 DIAGNOSIS — I25.10 CORONARY ARTERY DISEASE INVOLVING NATIVE CORONARY ARTERY OF NATIVE HEART WITHOUT ANGINA PECTORIS: Primary | ICD-10-CM

## 2021-04-23 DIAGNOSIS — N95.2 ATROPHIC VAGINITIS: ICD-10-CM

## 2021-04-23 PROCEDURE — 3017F COLORECTAL CA SCREEN DOC REV: CPT | Performed by: FAMILY MEDICINE

## 2021-04-23 PROCEDURE — 99214 OFFICE O/P EST MOD 30 MIN: CPT | Performed by: FAMILY MEDICINE

## 2021-04-23 PROCEDURE — G8427 DOCREV CUR MEDS BY ELIG CLIN: HCPCS | Performed by: FAMILY MEDICINE

## 2021-04-23 PROCEDURE — G8420 CALC BMI NORM PARAMETERS: HCPCS | Performed by: FAMILY MEDICINE

## 2021-04-23 PROCEDURE — 1036F TOBACCO NON-USER: CPT | Performed by: FAMILY MEDICINE

## 2021-04-23 RX ORDER — CONJUGATED ESTROGENS 0.62 MG/G
CREAM VAGINAL
Qty: 1 TUBE | Refills: 3 | Status: SHIPPED | OUTPATIENT
Start: 2021-04-23 | End: 2021-11-30

## 2021-04-23 RX ORDER — FLUOXETINE HYDROCHLORIDE 20 MG/1
CAPSULE ORAL
Qty: 90 CAPSULE | Refills: 5 | Status: SHIPPED | OUTPATIENT
Start: 2021-04-23 | End: 2021-10-28

## 2021-04-23 NOTE — PATIENT INSTRUCTIONS
Please read the healthy family handout that you were given and share it with your family. Please compare this printed medication list with your medications at home to be sure they are the same. If you have any medications that are different please contact us immediately at [phone]. Also review your allergies that we have listed, these may also include medications that you have not been able to tolerate, make sure everything listed is correct. If you have any allergies that are different please contact us immediately at [phone].

## 2021-04-23 NOTE — PROGRESS NOTES
Subjective:  Sarina Roldan is here to discuss the following issues she has coronary artery disease and combined heart failure. She denies any increased edema orthopnea. She had one episode of chest pain which was brief and she feels is related to increased anxiety. She has multiple stresses including her recent health problems and she has 2 sisters with cancer. She states that she has felt more anxious and nervous and has more trouble sleeping. No suicidal or homicidal thoughts. She is compliant with all her medicine. She also reports some vaginal dryness and discomfort with intercourse. This began after menopause about 4 years ago. No dysuria. No discharge. Social History     Tobacco Use   Smoking Status Former Smoker    Packs/day: 1.00    Years: 10.00    Pack years: 10.00    Types: Cigarettes    Quit date: 2020    Years since quittin.9   Smokeless Tobacco Never Used   Allergies:     Patient has no known allergies. Objective:  BP (!) 102/59   Pulse 67   Temp 96 °F (35.6 °C)   Wt 114 lb (51.7 kg)   LMP 2017 (Approximate)   SpO2 97%   BMI 20.19 kg/m²    No acute distress, heart regular rate and rhythm without murmur, lungs clear to auscultation easy effort, abdomen soft nondistended, no clubbing or cyanosis mood happy affect reactive    Assessment:  1. Coronary artery disease involving native coronary artery of native heart without angina pectoris    2. Combined systolic and diastolic cardiac dysfunction    3. Anxiety    4. Atrophic vaginitis            Plan:  Increase Prozac  Estrogen vaginal cream  Continue other medicines  She has a sister in [place] with leukemia and a sister that lives locally with lymphoma  Follow-up in 3 months or as needed  \"Healthy Family Handout\" provided  Avoid exposure to all tobacco products.   Read and consider all information provided by the pharmacy regarding prescribed medications before use  Proper diet and weight management   Otherwise continue current treatment plan  Call or return if symptoms are not well controlled      All medical conditions for this patient are stable unless otherwise indicated    Michel Dunham MD    This note was transcribed using a voice recognition software system. Proper technique and careful oversight were used to increase transcription accuracy but inadvertent errors may be present.

## 2021-04-28 RX ORDER — MAGNESIUM OXIDE 400 MG/1
TABLET ORAL
Qty: 60 TABLET | Refills: 0 | Status: SHIPPED | OUTPATIENT
Start: 2021-04-28 | End: 2021-05-26

## 2021-04-28 RX ORDER — MIRTAZAPINE 7.5 MG/1
TABLET, FILM COATED ORAL
Qty: 30 TABLET | Refills: 0 | Status: SHIPPED | OUTPATIENT
Start: 2021-04-28 | End: 2021-05-26

## 2021-04-28 NOTE — TELEPHONE ENCOUNTER
Future appt scheduled 07/26/2021            Last appt 04/23/2021      Last Written     mirtazapine (REMERON) 7.5 MG tablet  01/29/2021  #90  0 RF     magnesium oxide (MAG-OX) 400 MG tablet  01/29/2021  #180  0 RF       Refilled medication per verbal order from provider.

## 2021-04-29 RX ORDER — TICAGRELOR 90 MG/1
TABLET ORAL
Qty: 180 TABLET | Refills: 2 | Status: SHIPPED | OUTPATIENT
Start: 2021-04-29 | End: 2022-01-25

## 2021-04-29 RX ORDER — ATORVASTATIN CALCIUM 80 MG/1
TABLET, FILM COATED ORAL
Qty: 90 TABLET | Refills: 2 | Status: SHIPPED | OUTPATIENT
Start: 2021-04-29 | End: 2021-08-27

## 2021-05-10 ENCOUNTER — TELEPHONE (OUTPATIENT)
Dept: FAMILY MEDICINE CLINIC | Age: 56
End: 2021-05-10

## 2021-05-10 ENCOUNTER — VIRTUAL VISIT (OUTPATIENT)
Dept: FAMILY MEDICINE CLINIC | Age: 56
End: 2021-05-10
Payer: COMMERCIAL

## 2021-05-10 DIAGNOSIS — J01.90 ACUTE BACTERIAL SINUSITIS: Primary | ICD-10-CM

## 2021-05-10 DIAGNOSIS — R09.89 RESPIRATORY SYMPTOMS: ICD-10-CM

## 2021-05-10 DIAGNOSIS — B96.89 ACUTE BACTERIAL SINUSITIS: Primary | ICD-10-CM

## 2021-05-10 LAB
INTERNAL QC: NORMAL
SARS-COV-2, NAA: NEGATIVE

## 2021-05-10 PROCEDURE — 3017F COLORECTAL CA SCREEN DOC REV: CPT | Performed by: NURSE PRACTITIONER

## 2021-05-10 PROCEDURE — G8427 DOCREV CUR MEDS BY ELIG CLIN: HCPCS | Performed by: NURSE PRACTITIONER

## 2021-05-10 PROCEDURE — 99213 OFFICE O/P EST LOW 20 MIN: CPT | Performed by: NURSE PRACTITIONER

## 2021-05-10 RX ORDER — DOXYCYCLINE HYCLATE 100 MG
100 TABLET ORAL 2 TIMES DAILY
Qty: 20 TABLET | Refills: 0 | Status: SHIPPED | OUTPATIENT
Start: 2021-05-10 | End: 2021-05-20

## 2021-05-10 ASSESSMENT — ENCOUNTER SYMPTOMS
SINUS PAIN: 1
EYES NEGATIVE: 1
SINUS PRESSURE: 1
SHORTNESS OF BREATH: 0
WHEEZING: 0
COUGH: 1

## 2021-05-10 NOTE — TELEPHONE ENCOUNTER
Pharmacy calling about script for Premarin, they need to know many grams she is supposed to use at a time

## 2021-05-10 NOTE — PATIENT INSTRUCTIONS
Please read the healthy family handout that you were given and share it with your family. Please compare this printed medication list with your medications at home to be sure they are the same. If you have any medications that are different please contact us immediately at [phone]. Also review your allergies that we have listed, these may also include medications that you have not been able to tolerate, make sure everything listed is correct. If you have any allergies that are different please contact us immediately at [phone].

## 2021-05-10 NOTE — PROGRESS NOTES
5/10/2021    TELEHEALTH EVALUATION -- Audio/Visual (During WKEES-17 public health emergency)    HPI:    Jessica Johnston (:  1965) has requested an audio/video evaluation for the following concern(s):    Chief Complaint   Patient presents with    Sinusitis    Cough    Congestion     Sinus Pain  Patient complains of congestion, headaches, nasal congestion, post nasal drip and sinus pressure. Onset of symptoms was 2 weeks ago. Symptoms have been gradually worsening since that time. She is drinking plenty of fluids. Past history is significant for nothing. Patient is occasionally. Review of Systems   Constitutional: Negative for appetite change, chills and fever. HENT: Positive for congestion, sinus pressure and sinus pain. Eyes: Negative. Respiratory: Positive for cough. Negative for shortness of breath and wheezing. Cardiovascular: Negative. Endocrine: Negative. Musculoskeletal: Negative. Neurological: Positive for headaches. Psychiatric/Behavioral: Negative. Prior to Visit Medications    Medication Sig Taking?  Authorizing Provider   BRILINTA 90 MG TABS tablet TAKE 1 TABLET BY MOUTH TWICE DAILY Yes SUJATA Cordero CNP   atorvastatin (LIPITOR) 80 MG tablet TAKE 1 TABLET BY MOUTH AT BEDTIME Yes SUJATA Cordero CNP   magnesium oxide (MAG-OX) 400 MG tablet TAKE 1 TABLET BY MOUTH TWICE DAILY Yes Luz Foley MD   mirtazapine (REMERON) 7.5 MG tablet TAKE 1 TABLET BY MOUTH DAILY IN THE EVENING Yes Luz Foley MD   conjugated estrogens (PREMARIN) 0.625 MG/GM vaginal cream 3 days a week Yes Luz Foley MD   FLUoxetine (PROZAC) 20 MG capsule 3 CAPSULES BY MOUTH DAILY Yes Luz Foley MD   diclofenac (VOLTAREN) 50 MG EC tablet  Yes Historical Provider, MD   tiZANidine (ZANAFLEX) 4 MG tablet  Yes Historical Provider, MD   famotidine (PEPCID) 20 MG tablet TAKE 2 TABLETS BY MOUTH EVERY EVENING Yes Luz Foley MD   digoxin (LANOXIN) 125 MCG tablet Take 1 tablet by mouth daily Yes Jonas Sanz MD   pramipexole (MIRAPEX) 0.25 MG tablet Take 1-2 tablets by mouth nightly as needed (RESTLESS LEG) Yes Luz Foley MD   ASPIRIN LOW DOSE 81 MG EC tablet TAKE 1 TABLET BY MOUTH DAILY Yes SUJATA Montelongo - CNP       Social History     Tobacco Use    Smoking status: Former Smoker     Packs/day: 1.00     Years: 10.00     Pack years: 10.00     Types: Cigarettes     Quit date: 2020     Years since quittin.0    Smokeless tobacco: Never Used   Substance Use Topics    Alcohol use: No    Drug use: No        No Known Allergies,   Past Medical History:   Diagnosis Date    Arthritis     CAD (coronary artery disease)     Cancer ([facility] [address]     [facility]congest[facility] failure) [facility])     Depression     GERD (gastroesophageal reflux disease)     Hyperlipidemia     On intra-aortic balloon pump assist 2020    Removed during [facility] on 20   ,   Past Surgical History:   Procedure Laterality Date    CARDIAC CATHETERIZATION  2020    Dr. Karoline Smith - IABP placed     SECTION      CORONARY ARTERY BYPASS GRAFT N/A 2020     - off pump x1 (LIMA-LAD), removal of IABP    CT INSERT CATH PLEURA W IMAGE  2020    Dr. Maryann Shah - CT guided chest tube insertion    SINUS SURGERY      THORACENTESIS Left 2020    Dr. Kiara Tripp - 1 L drained    TRANSESOPHAGEAL ECHOCARDIOGRAM  2020    during CABG   ,   Social History     Tobacco Use    Smoking status: Former Smoker     Packs/day: 1.00     Years: 10.00     Pack years: 10.00     Types: Cigarettes     Quit date: 2020     Years since quittin.0    Smokeless tobacco: Never Used   Substance Use Topics    Alcohol use: No    Drug use: No   , History reviewed. No pertinent family history.     PHYSICAL EXAMINATION:  [ INSTRUCTIONS:  \"[x]\" Indicates a positive item  \"[]\" Indicates a negative item  -- DELETE ALL ITEMS NOT EXAMINED]  Vital Signs: (As obtained by patient/caregiver or practitioner 0      2. Respiratory symptoms  Patient with symptoms as noted above and her spouse has developed body aches, chest tightness and productive cough over the past 3 days. Recommend patient also be tested for COVID-19. Patient agreeable. -     COVID-19 Ambulatory; Future      Return if symptoms worsen or fail to improve. Dayton Romero, was evaluated through a synchronous (real-time) audio-video encounter. The patient (or guardian if applicable) is aware that this is a billable service. Verbal consent to proceed has been obtained within the past 12 months. The visit was conducted pursuant to the emergency declaration under the [address] [place] [address], [address] [place] [address] authority and the [employer] and [place] Act. Patient identification was verified, and a caregiver was present when appropriate. The patient was located in a state where the provider was credentialed to provide care. Total time spent on this encounter: Not billed by time    --SUJATA Del Castillo CNP on 5/10/2021 at 9:33 AM    An electronic signature was used to authenticate this note.

## 2021-05-19 RX ORDER — DIGOXIN 125 MCG
TABLET ORAL
Qty: 90 TABLET | Refills: 1 | Status: SHIPPED | OUTPATIENT
Start: 2021-05-19 | End: 2021-05-24

## 2021-05-24 RX ORDER — DIGOXIN 125 MCG
TABLET ORAL
Qty: 90 TABLET | Refills: 1 | Status: SHIPPED | OUTPATIENT
Start: 2021-05-24 | End: 2021-08-24 | Stop reason: SDUPTHER

## 2021-05-25 RX ORDER — FAMOTIDINE 20 MG/1
40 TABLET, FILM COATED ORAL EVERY EVENING
Qty: 60 TABLET | Refills: 5 | Status: SHIPPED | OUTPATIENT
Start: 2021-05-25 | End: 2021-11-26

## 2021-05-26 RX ORDER — MIRTAZAPINE 7.5 MG/1
TABLET, FILM COATED ORAL
Qty: 30 TABLET | Refills: 0 | Status: SHIPPED | OUTPATIENT
Start: 2021-05-26 | End: 2021-06-25

## 2021-05-26 RX ORDER — MAGNESIUM OXIDE 400 MG/1
TABLET ORAL
Qty: 60 TABLET | Refills: 0 | Status: SHIPPED | OUTPATIENT
Start: 2021-05-26 | End: 2021-06-25

## 2021-05-26 NOTE — TELEPHONE ENCOUNTER
Future appt scheduled 07/26/2021             Last appt 05/10/2021      Last Written 04/28/2021    magnesium oxide (MAG-OX) 400 MG tablet  #60  0 RF      mirtazapine (REMERON) 7.5 MG tablet  04/28/2021  #30  0 RF         Refilled medication per verbal order from provider.

## 2021-06-25 RX ORDER — POTASSIUM CHLORIDE 20 MEQ/1
TABLET, EXTENDED RELEASE ORAL
Qty: 60 TABLET | Refills: 0 | OUTPATIENT
Start: 2021-06-25

## 2021-06-25 RX ORDER — MAGNESIUM OXIDE 400 MG/1
TABLET ORAL
Qty: 60 TABLET | Refills: 5 | Status: SHIPPED | OUTPATIENT
Start: 2021-06-25 | End: 2021-12-23

## 2021-06-25 RX ORDER — MIRTAZAPINE 7.5 MG/1
TABLET, FILM COATED ORAL
Qty: 30 TABLET | Refills: 5 | Status: SHIPPED | OUTPATIENT
Start: 2021-06-25 | End: 2021-07-26 | Stop reason: SDUPTHER

## 2021-06-25 NOTE — TELEPHONE ENCOUNTER
Refilled medication per verbal order from provider.   Future appt scheduled 07/26/2021  Last appt 04/23/2021

## 2021-07-26 ENCOUNTER — OFFICE VISIT (OUTPATIENT)
Dept: FAMILY MEDICINE CLINIC | Age: 56
End: 2021-07-26
Payer: COMMERCIAL

## 2021-07-26 VITALS
BODY MASS INDEX: 19.49 KG/M2 | DIASTOLIC BLOOD PRESSURE: 47 MMHG | HEART RATE: 57 BPM | SYSTOLIC BLOOD PRESSURE: 86 MMHG | WEIGHT: 110 LBS | OXYGEN SATURATION: 95 % | TEMPERATURE: 97.5 F

## 2021-07-26 DIAGNOSIS — R63.4 WEIGHT LOSS: ICD-10-CM

## 2021-07-26 DIAGNOSIS — F32.4 MAJOR DEPRESSIVE DISORDER WITH SINGLE EPISODE, IN PARTIAL REMISSION (HCC): ICD-10-CM

## 2021-07-26 DIAGNOSIS — I95.9 HYPOTENSION, UNSPECIFIED HYPOTENSION TYPE: ICD-10-CM

## 2021-07-26 DIAGNOSIS — I25.10 CORONARY ARTERY DISEASE INVOLVING NATIVE CORONARY ARTERY OF NATIVE HEART WITHOUT ANGINA PECTORIS: Primary | ICD-10-CM

## 2021-07-26 DIAGNOSIS — I51.89 COMBINED SYSTOLIC AND DIASTOLIC CARDIAC DYSFUNCTION: ICD-10-CM

## 2021-07-26 PROCEDURE — G8420 CALC BMI NORM PARAMETERS: HCPCS | Performed by: FAMILY MEDICINE

## 2021-07-26 PROCEDURE — 99214 OFFICE O/P EST MOD 30 MIN: CPT | Performed by: FAMILY MEDICINE

## 2021-07-26 PROCEDURE — 1036F TOBACCO NON-USER: CPT | Performed by: FAMILY MEDICINE

## 2021-07-26 PROCEDURE — 3017F COLORECTAL CA SCREEN DOC REV: CPT | Performed by: FAMILY MEDICINE

## 2021-07-26 PROCEDURE — G8427 DOCREV CUR MEDS BY ELIG CLIN: HCPCS | Performed by: FAMILY MEDICINE

## 2021-07-26 RX ORDER — MIRTAZAPINE 15 MG/1
15 TABLET, FILM COATED ORAL NIGHTLY
Qty: 30 TABLET | Refills: 5 | Status: SHIPPED | OUTPATIENT
Start: 2021-07-26 | End: 2022-01-31

## 2021-07-26 NOTE — PROGRESS NOTES
Subjective:  Vonnie Galvan is here to discuss the following issues. She has coronary artery disease and congestive heart failure and continues on a combination of medicines and denies any swelling chest pain pressure increase shortness of breath or other related symptoms. She has follow-up next month with her cardiologist.  She has low blood pressures and feels tired. No syncope. She drinks very little water. She has history of depression is slightly improved on increased Prozac. At times she feels down and sad. Her sister in [place] recently . Another sister has been ill. No suicidal or homicidal thoughts. She has been losing weight consistent with past episodes of depression  Social History     Tobacco Use   Smoking Status Former Smoker    Packs/day: 1.00    Years: 10.00    Pack years: 10.00    Types: Cigarettes    Quit date: 2020    Years since quittin.2   Smokeless Tobacco Never Used   Allergies:     Patient has no known allergies. Objective:  BP (!) 86/47   Pulse 57   Temp 97.5 °F (36.4 °C) (Oral)   Wt 110 lb (49.9 kg)   LMP 2017 (Approximate)   SpO2 95%   BMI 19.49 kg/m²    No acute distress, heart regular rate and rhythm without murmur, lungs clear to auscultation easy effort, abdomen soft nondistended, no clubbing or cyanosis mood happy affect reactive    Assessment:  1. Coronary artery disease involving native coronary artery of native heart without angina pectoris    2. Combined systolic and diastolic cardiac dysfunction    3. Hypotension, unspecified hypotension type    4. Major depressive disorder with single episode, in partial remission ([facility] [address]    5.  Weight loss            Plan:  Increase Remeron  Increase fluid and salt intake  Continue other medicines  Keep appointment next month with cardiology  Her sister in [place] recently  from leukemia  Follow-up in 2 months or as needed  The diagnoses listed in the assessment above are stable unless otherwise indicated. Age-specific preventative health recommendations were reviewed and the [facility]" was provided. Avoid exposure to tobacco products. Follow CDC guidelines for covid-19 prevention. Read and consider all information provided by the pharmacy regarding prescribed medications before use    Call or return for any problems that arise before the next scheduled appointment. Hemanth Olson MD    This note was transcribed using a voice recognition software system. Proper technique and careful oversight were used to increase transcription accuracy but inadvertent errors may be present.

## 2021-07-26 NOTE — PATIENT INSTRUCTIONS
Please read the healthy family handout that you were given and share it with your family. Please compare this printed medication list with your medications at home to be sure they are the same. If you have any medications that are different please contact us immediately at [phone]. Also review your allergies that we have listed, these may also include medications that you have not been able to tolerate, make sure everything listed is correct. If you have any allergies that are different please contact us immediately at [phone].

## 2021-07-30 RX ORDER — POTASSIUM CHLORIDE 20 MEQ/1
TABLET, EXTENDED RELEASE ORAL
Qty: 60 TABLET | Refills: 0 | Status: SHIPPED | OUTPATIENT
Start: 2021-07-30 | End: 2021-09-03

## 2021-08-23 NOTE — PROGRESS NOTES
[facility]   CARDIAC EVALUATION NOTE  [phone]      PCP:  Kecia Heaton MD    Reason for Consultation/Chief Complaint:   Chief Complaint   Patient presents with    6 Month Follow-Up    Shortness of Breath    Medication Refill     digoxin and aspirin        Subjective   History of Present Illness:  Shirlean Duverney is a 50 y.o. patient who presents for hospital follow up concerning CAD/STEMI. She has a PMH significant for CAD, inferior STEMI 2020, GERD, and intra-aortic balloon assist. On 2020 she presented to the ED with CP. She underwent emergent coronary angiography which showed 100% dRCA treated with RONDA x2. EF 35%. She was discharged wearing a life vest. She was re-admitted for CP on 20 and underwent repeat left heart cath which demonstrated known CAD. She underwent CABG x1 LIMA to LAD on 2020. Repeat limited echo on 20 demonstrated EF of 35-40% with mild global hypokinesis. She wore a cardiac event monitor from -10/29/20 that demonstrated avg HR 75 () with no significant arrhythmias (brief svg). She underwent CABG x1 LIMA to LAD on 2020. Repeat limited echo 20 EF of 35-40% with mild global HK. Cardia event monitor -10/269/20 demonstrated avg HR 75 with no significant arrhythmias (brief svt). Today she reports doing well. She states that she has been sob. She is still smoking. She notes that she is finished with cardiac rehab. Patient denies chest pain, palpitations, dizziness or syncope. Past Medical History:   has a past medical history of Arthritis, CAD (coronary artery disease), Cancer ([address] CHF (congestive heart failure) ([facility] [address] Depression, GERD (gastroesophageal reflux disease), Hyperlipidemia, and On intra-aortic balloon pump assist.    Surgical History:   has a past surgical history that includes sinus surgery;   section; Coronary artery bypass graft (N/A, 2020); CT GUIDED PLEURAL DRAINAGE W CATH PERC (7/13/2020); thoracentesis (Left, 07/11/2020); transesophageal echocardiogram (06/02/2020); and Cardiac catheterization (05/31/2020). Social History:   reports that she has been smoking cigarettes. She has a 10.00 pack-year smoking history. She has never used smokeless tobacco. She reports that she does not drink alcohol and does not use drugs. Family History:  family history is not on file. Home Medications:  Were reviewed and are listed in nursing record and/or below  Prior to Admission medications    Medication Sig Start Date End Date Taking?  Authorizing Provider   mirtazapine (REMERON) 15 MG tablet Take 1 tablet by mouth nightly 7/26/21  Yes Kin Jackson MD   magnesium oxide (MAG-OX) 400 MG tablet TAKE ONE (1) TABLET BY MOUTH TWICE DAILY 6/25/21  Yes Kin Jackson MD   famotidine (PEPCID) 20 MG tablet TAKE 2 TABLETS BY MOUTH EVERY EVENING 5/25/21  Yes Kin Jackson MD   digoxin [facility]) 125 MCG tablet TAKE ONE TABLET BY MOUTH DAILY 5/24/21  Yes Luis Woodward MD   BRILINTA 90 MG TABS tablet TAKE 1 TABLET BY MOUTH TWICE DAILY 4/29/21  Yes SUJATA Castillo CNP   atorvastatin (LIPITOR) 80 MG tablet TAKE 1 TABLET BY MOUTH AT BEDTIME 4/29/21  Yes SUJATA Castillo CNP   FLUoxetine (PROZAC) 20 MG capsule 3 CAPSULES BY MOUTH DAILY  Patient taking differently: 20 mg 3 CAPSULES BY MOUTH DAILY 4/23/21  Yes Kin Jackson MD   ASPIRIN LOW DOSE 81 MG EC tablet TAKE 1 TABLET BY MOUTH DAILY 9/4/20  Yes SUJATA Lenz CNP   potassium chloride (KLOR-CON M) 20 MEQ extended release tablet TAKE 1 TABLET BY MOUTH TWICE DAILY WITH MEALS *EMERGENCY REFILL*  Patient not taking: Reported on 8/24/2021 7/30/21   Jung Oconnell MD   conjugated estrogens (PREMARIN) 0.625 MG/GM vaginal cream 3 days a week  Patient not taking: Reported on 8/24/2021 4/23/21   Kin Jackson MD   diclofenac (VOLTAREN) 50 MG EC tablet  12/9/20   Historical Provider, MD   tiZANidine (ZANAFLEX) 4 MG tablet  12/9/20   Historical Provider, MD   pramipexole (MIRAPEX) 0.25 MG tablet Take 1-2 tablets by mouth nightly as needed (RESTLESS LEG)  Patient not taking: Reported on 8/24/2021 11/5/20   Suellen Mcdonald MD          Allergies:  Patient has no known allergies. Review of Systems:   A 14 point review of symptoms completed. Pertinent positives identified in the HPI, all other review of symptoms negative as below.       Objective   PHYSICAL EXAM:    Vitals:    08/24/21 1001   BP: 92/70   Pulse: 88   SpO2: 97%    Weight: 117 lb (53.1 kg)         General Appearance:  Alert, cooperative, no distress, appears stated age   Head:  Normocephalic, without obvious abnormality, atraumatic   Eyes:  PERRL, conjunctiva/corneas clear   Nose: Nares normal, no drainage or sinus tenderness   Throat: Lips, mucosa, and tongue normal   Neck: Supple, symmetrical, trachea midline, no adenopathy, thyroid: not enlarged, symmetric, no tenderness/mass/nodules, no carotid bruit or JVD   Lungs:   bilat zipper like crackles , respirations unlabored   Chest Wall:  No deformity or tenderness   Heart:  Regular rate and rhythm, S1, S2 normal, no murmur, rub or gallop   Abdomen:   Soft, non-tender, bowel sounds active all four quadrants,  no masses, no organomegaly   Extremities: Extremities normal, atraumatic, no cyanosis or edema   Pulses: 2+ and symmetric   Skin: Skin color, texture, turgor normal, no rashes or lesions   Pysch: Normal mood and affect   Neurologic: Normal gross motor and sensory exam.         Labs   CBC:   Lab Results   Component Value Date    WBC 8.7 03/08/2021    RBC 4.26 03/08/2021    HGB 12.9 03/08/2021    HCT 37.7 03/08/2021    MCV 88.7 03/08/2021    RDW 13.2 03/08/2021     03/08/2021     CMP:  Lab Results   Component Value Date     03/08/2021    K 4.1 03/10/2021    K 4.3 01/28/2021     03/08/2021    CO2 25 03/08/2021    BUN 9 03/08/2021    CREATININE 0.9 03/08/2021    GFRAA >60 03/08/2021    GFRAA >60 05/28/2011    AGRATIO 1.5 appear more hypokinetic than the remaining segments. Direct comparison with the prior study dated 5/19/2020 is somewhat limited   as no contrast enhancement is used for the present study. Overall, however,   LV function and regional abnormalities appear similar to the prior study. Echo: 5/19/20: Summary   The left ventricular systolic function is moderately reduced with an   ejection fraction of 35 %. There is hypokinesis of the inferior, basal inferior and inferiolateral   walls. Grade I diastolic dysfunction with normal filling pressure. Mild mitral and aortic regurgitation. Systolic pulmonic artery pressure (SPAP) is normal estimated at 38 mmHg   (Right atrial pressure of 8 mmHg). Stress Test: 1/27/2021   Summary  There is no evidence of stress induced ischemia. Small fixed defect  posterobasal segment c/w infarct. LV function is normal with uniform wall  motion and ejection fraction of 56%. Low risk abnormal study. BYPASS: 6/2/2021 Dr. Ruperto You  Procedure(s):  CORONARY ARTERY BYPASS GRAFTING X1, INTERNAL MAMMARY ARTERY, OFF PUMP (LIMA TO LAD), 5 LEVEL BILATERAL INTERCOSTAL NERVE BLOCK, BALLOON PUMP REMOVAL  Transesophageal echo  Removal of intra-aortic balloon pump  Surgeon(s):  Joie Lopez MD      Cath: 5/19/20:   FINDINGS         LVGRAM     LVEDP  29   GRADIENT ACROSS AORTIC VALVE  no gradient   LV FUNCTION EF 40 %   WALL MOTION  inferior hypokinesis   MITRAL REGURGITATION  mild         CORONARY ARTERIES     LM  Less than 10% proximal-mid stenosis, distally there is an eccentric 50% stenosis         LAD  Large vessel, proximal-mid 30% stenosis. Distally less than 10% stenosis. D1 and D2 have a very high takeoff and D2 in particular has a patulous/aneurysmal segment with 70 to 80% proximal stenosis and less than 10% mid to distal stenosis. D3 has 10 to 20% touohixg-ile-ibgiol stenosis. LCX  Small vessel, 10% olofapay-ivb-vgplnf stenosis.          RI  This vessel could also be described as the high first diagonal as noted above in the LAD section. RCA Large vessel, dominant, proximal less than 10% stenosis, mid 30 to 40% stenosis, distal 90 to 100% stenosis. PERCUTANEOUS INTERVENTION DESCRIPTION      Patient was preloaded with Brilinta, patient was given a single dose of Integrilin during the procedure and was treated otherwise with heparin for anticoagulation. A 6 [place] guiding catheter was taken upfront for PCI and a choice floppy wire was used to cross the lesion. Thrombectomy was then performed with the penumbra device and flow was restored. Lesion in the distal RCA was then dilated with a 3 mm balloon and then stented with Abbott Xience Becky 3.5 x 15 mm drug-eluting stent as well as a 3.0 x 18 mm Love Xience Danelle Pacer drug-eluting stent. IVUS was performed and showed MLD was 3.5 mm. Stents were postdilated both a 3 and 3.5 mm noncompliant balloon. There was 0% residual stenosis. There was GAVIN 0 flow before and GAVIN-3 after PCI. During procedure, patient had hypotension which responded to vasopressors and these were able to be weaned partially at the end of the case and patient's EKG and symptoms had markedly improved at end of case. Remaining CAD/ASHD was felt to be treated medically followed by possible CABG surgery for left main disease. CONCLUSIONS:      Successful PCI of RCA with 2 drug-eluting stents  Will refer for consideration of CABG for left main disease     Addend, case d/w dr Miller Pi, plan for outpt cabg, order for life vest in interim, ef 35% on current review    Studies: n/a      I have reviewed labs and imaging/xray/diagnostic testing in this note. Assessment      1. Cardiomyopathy, unspecified type ([address]    2. Coronary artery disease involving native coronary artery of native heart without angina pectoris    3. Hypercholesteremia    4.  ST elevation myocardial infarction (STEMI), unspecified artery ([address] Plan   1. Referral to pulmonary given  2. LABS: FLP, CBC, CMP, BNP, and LFT, dig level   3. Call [phone] to schedule echocardiogram if ischemic cardiomyopathy   4. Follow in 3 months with NP       Thank you for allowing us to participate in the care of [facility]. Please call me with any questions [phone]. This note was scribed in the presence of Josr Chanel MD by Jeanine Benito RN. Josr Chanel MD, [facility]   Interventional Cardiologist  [place] [address]  [phone] [facility]  [phone] [address] [place] [address]  8/24/2021 10:13 AM    I will address the patient's cardiac risk factors and adjusted pharmacologic treatment as needed. In addition, I have reinforced the need for patient directed risk factor modification. Tobacco use was discussed with the patient and educated on the negative effects and was asked not to use. All questions and concerns were addressed to the patient/family. Alternatives to my treatment were discussed. I, Dr Josr Chanel, personally performed the services described in this documentation, as scribed by the above signed scribe in my presence. It is both accurate and complete to my knowledge. I agree with the details independently gathered by the clinical support staff and the scribed note accurately describes my personal service to the patient.

## 2021-08-24 ENCOUNTER — OFFICE VISIT (OUTPATIENT)
Dept: CARDIOLOGY CLINIC | Age: 56
End: 2021-08-24
Payer: COMMERCIAL

## 2021-08-24 VITALS
SYSTOLIC BLOOD PRESSURE: 92 MMHG | WEIGHT: 117 LBS | BODY MASS INDEX: 20.73 KG/M2 | HEART RATE: 88 BPM | DIASTOLIC BLOOD PRESSURE: 70 MMHG | HEIGHT: 63 IN | OXYGEN SATURATION: 97 %

## 2021-08-24 DIAGNOSIS — I42.9 CARDIOMYOPATHY, UNSPECIFIED TYPE (HCC): Primary | ICD-10-CM

## 2021-08-24 DIAGNOSIS — Z95.1 HISTORY OF CORONARY ARTERY BYPASS GRAFT X 1: ICD-10-CM

## 2021-08-24 DIAGNOSIS — I21.3 ST ELEVATION MYOCARDIAL INFARCTION (STEMI), UNSPECIFIED ARTERY (HCC): ICD-10-CM

## 2021-08-24 DIAGNOSIS — R06.02 SHORTNESS OF BREATH: ICD-10-CM

## 2021-08-24 DIAGNOSIS — I25.10 CORONARY ARTERY DISEASE INVOLVING NATIVE CORONARY ARTERY OF NATIVE HEART WITHOUT ANGINA PECTORIS: ICD-10-CM

## 2021-08-24 DIAGNOSIS — E78.00 HYPERCHOLESTEREMIA: ICD-10-CM

## 2021-08-24 PROCEDURE — G8427 DOCREV CUR MEDS BY ELIG CLIN: HCPCS | Performed by: INTERNAL MEDICINE

## 2021-08-24 PROCEDURE — G8420 CALC BMI NORM PARAMETERS: HCPCS | Performed by: INTERNAL MEDICINE

## 2021-08-24 PROCEDURE — 4004F PT TOBACCO SCREEN RCVD TLK: CPT | Performed by: INTERNAL MEDICINE

## 2021-08-24 PROCEDURE — 3017F COLORECTAL CA SCREEN DOC REV: CPT | Performed by: INTERNAL MEDICINE

## 2021-08-24 PROCEDURE — 99214 OFFICE O/P EST MOD 30 MIN: CPT | Performed by: INTERNAL MEDICINE

## 2021-08-24 RX ORDER — NICOTINE 21 MG/24HR
1 PATCH, TRANSDERMAL 24 HOURS TRANSDERMAL DAILY
Qty: 42 PATCH | Refills: 0 | Status: SHIPPED | OUTPATIENT
Start: 2021-08-24 | End: 2021-11-24

## 2021-08-24 RX ORDER — ASPIRIN 81 MG/1
TABLET ORAL
Qty: 90 TABLET | Refills: 3 | Status: SHIPPED | OUTPATIENT
Start: 2021-08-24 | End: 2022-08-05 | Stop reason: SDUPTHER

## 2021-08-24 RX ORDER — DIGOXIN 125 MCG
TABLET ORAL
Qty: 90 TABLET | Refills: 3 | Status: SHIPPED | OUTPATIENT
Start: 2021-08-24 | End: 2022-08-05 | Stop reason: SDUPTHER

## 2021-08-24 NOTE — LETTER
Harsh Ibarra  1965        [facility], [place]   CARDIAC EVALUATION NOTE  [phone]      PCP:  Tommy Pa MD    Reason for Consultation/Chief Complaint:   Chief Complaint   Patient presents with    6 Month Follow-Up    Shortness of Breath    Medication Refill     digoxin and aspirin        Subjective   History of Present Illness:  Harsh Ibarra is a 50 y.o. patient who presents for hospital follow up concerning CAD/STEMI. She has a PMH significant for CAD, inferior STEMI 2020, GERD, and intra-aortic balloon assist. On 2020 she presented to the ED with CP. She underwent emergent coronary angiography which showed 100% dRCA treated with RONDA x2. EF 35%. She was discharged wearing a life vest. She was re-admitted for CP on 20 and underwent repeat left heart cath which demonstrated known CAD. She underwent CABG x1 LIMA to LAD on 2020. Repeat limited echo on 20 demonstrated EF of 35-40% with mild global hypokinesis. She wore a cardiac event monitor from -10/29/20 that demonstrated avg HR 75 () with no significant arrhythmias (brief svg). She underwent CABG x1 LIMA to LAD on 2020. Repeat limited echo 20 EF of 35-40% with mild global HK. Cardia event monitor -10/269/20 demonstrated avg HR 75 with no significant arrhythmias (brief svt). Today she reports doing well. She states that she has been sob. She is still smoking. She notes that she is finished with cardiac rehab. Patient denies chest pain, palpitations, dizziness or syncope. Past Medical History:   has a past medical history of Arthritis, CAD (coronary artery disease), Cancer ([address] CHF (congestive heart failure) ([facility] [address] Depression, GERD (gastroesophageal reflux disease), Hyperlipidemia, and On intra-aortic balloon pump assist.    Surgical History:   has a past surgical history that includes sinus surgery;   section; Coronary artery bypass graft (N/A, 2020); CT GUIDED PLEURAL DRAINAGE W CATH PERC (7/13/2020); thoracentesis (Left, 07/11/2020); transesophageal echocardiogram (06/02/2020); and Cardiac catheterization (05/31/2020). Social History:   reports that she has been smoking cigarettes. She has a 10.00 pack-year smoking history. She has never used smokeless tobacco. She reports that she does not drink alcohol and does not use drugs. Family History:  family history is not on file. Home Medications:  Were reviewed and are listed in nursing record and/or below  Prior to Admission medications    Medication Sig Start Date End Date Taking?  Authorizing Provider   mirtazapine (REMERON) 15 MG tablet Take 1 tablet by mouth nightly 7/26/21  Yes Bruno Huang MD   magnesium oxide (MAG-OX) 400 MG tablet TAKE ONE (1) TABLET BY MOUTH TWICE DAILY 6/25/21  Yes Bruno Huang MD   famotidine (PEPCID) 20 MG tablet TAKE 2 TABLETS BY MOUTH EVERY EVENING 5/25/21  Yes Bruno Huang MD   digoxin [facility]) 125 MCG tablet TAKE ONE TABLET BY MOUTH DAILY 5/24/21  Yes Carmen Gloria MD   BRILINTA 90 MG TABS tablet TAKE 1 TABLET BY MOUTH TWICE DAILY 4/29/21  Yes SUJATA Alicia CNP   atorvastatin (LIPITOR) 80 MG tablet TAKE 1 TABLET BY MOUTH AT BEDTIME 4/29/21  Yes SUJATA Alicia CNP   FLUoxetine (PROZAC) 20 MG capsule 3 CAPSULES BY MOUTH DAILY  Patient taking differently: 20 mg 3 CAPSULES BY MOUTH DAILY 4/23/21  Yes Bruno Huang MD   ASPIRIN LOW DOSE 81 MG EC tablet TAKE 1 TABLET BY MOUTH DAILY 9/4/20  Yes SUJATA Torres CNP   potassium chloride (KLOR-CON M) 20 MEQ extended release tablet TAKE 1 TABLET BY MOUTH TWICE DAILY WITH MEALS *EMERGENCY REFILL*  Patient not taking: Reported on 8/24/2021 7/30/21   Hector Whelan MD   conjugated estrogens (PREMARIN) 0.625 MG/GM vaginal cream 3 days a week  Patient not taking: Reported on 8/24/2021 4/23/21   Bruno Huang MD   diclofenac (VOLTAREN) 50 MG EC tablet  12/9/20   Historical Provider, MD   tiZANidine (ZANAFLEX) 4 MG tablet  12/9/20   Historical Provider, MD   pramipexole (MIRAPEX) 0.25 MG tablet Take 1-2 tablets by mouth nightly as needed (RESTLESS LEG)  Patient not taking: Reported on 8/24/2021 11/5/20   Howie Sommer MD          Allergies:  Patient has no known allergies. Review of Systems:   A 14 point review of symptoms completed. Pertinent positives identified in the HPI, all other review of symptoms negative as below.       Objective   PHYSICAL EXAM:    Vitals:    08/24/21 1001   BP: 92/70   Pulse: 88   SpO2: 97%    Weight: 117 lb (53.1 kg)         General Appearance:  Alert, cooperative, no distress, appears stated age   Head:  Normocephalic, without obvious abnormality, atraumatic   Eyes:  PERRL, conjunctiva/corneas clear   Nose: Nares normal, no drainage or sinus tenderness   Throat: Lips, mucosa, and tongue normal   Neck: Supple, symmetrical, trachea midline, no adenopathy, thyroid: not enlarged, symmetric, no tenderness/mass/nodules, no carotid bruit or JVD   Lungs:   bilat zipper like crackles , respirations unlabored   Chest Wall:  No deformity or tenderness   Heart:  Regular rate and rhythm, S1, S2 normal, no murmur, rub or gallop   Abdomen:   Soft, non-tender, bowel sounds active all four quadrants,  no masses, no organomegaly   Extremities: Extremities normal, atraumatic, no cyanosis or edema   Pulses: 2+ and symmetric   Skin: Skin color, texture, turgor normal, no rashes or lesions   Pysch: Normal mood and affect   Neurologic: Normal gross motor and sensory exam.         Labs   CBC:   Lab Results   Component Value Date    WBC 8.7 03/08/2021    RBC 4.26 03/08/2021    HGB 12.9 03/08/2021    HCT 37.7 03/08/2021    MCV 88.7 03/08/2021    RDW 13.2 03/08/2021     03/08/2021     CMP:  Lab Results   Component Value Date     03/08/2021    K 4.1 03/10/2021    K 4.3 01/28/2021     03/08/2021    CO2 25 03/08/2021    BUN 9 03/08/2021    CREATININE 0.9 03/08/2021    GFRAA >60 inferior wall, and the entire inferolateral wall   appear more hypokinetic than the remaining segments. Direct comparison with the prior study dated 5/19/2020 is somewhat limited   as no contrast enhancement is used for the present study. Overall, however,   LV function and regional abnormalities appear similar to the prior study. Echo: 5/19/20: Summary   The left ventricular systolic function is moderately reduced with an   ejection fraction of 35 %. There is hypokinesis of the inferior, basal inferior and inferiolateral   walls. Grade I diastolic dysfunction with normal filling pressure. Mild mitral and aortic regurgitation. Systolic pulmonic artery pressure (SPAP) is normal estimated at 38 mmHg   (Right atrial pressure of 8 mmHg). Stress Test: 1/27/2021   Summary  There is no evidence of stress induced ischemia. Small fixed defect  posterobasal segment c/w infarct. LV function is normal with uniform wall  motion and ejection fraction of 56%. Low risk abnormal study. BYPASS: 6/2/2021 Dr. Tawanna Huertas  Procedure(s):  CORONARY ARTERY BYPASS GRAFTING X1, INTERNAL MAMMARY ARTERY, OFF PUMP (LIMA TO LAD), 5 LEVEL BILATERAL INTERCOSTAL NERVE BLOCK, BALLOON PUMP REMOVAL  Transesophageal echo  Removal of intra-aortic balloon pump  Surgeon(s):  Caroleen Halsted, MD      Cath: 5/19/20:   FINDINGS         LVGRAM     LVEDP  29   GRADIENT ACROSS AORTIC VALVE  no gradient   LV FUNCTION EF 40 %   WALL MOTION  inferior hypokinesis   MITRAL REGURGITATION  mild         CORONARY ARTERIES     LM  Less than 10% proximalmid stenosis, distally there is an eccentric 50% stenosis         LAD  Large vessel, proximalmid 30% stenosis. Distally less than 10% stenosis. D1 and D2 have a very high takeoff and D2 in particular has a patulous/aneurysmal segment with 70 to 80% proximal stenosis and less than 10% mid to distal stenosis. D3 has 10 to 20% proximalmiddistal stenosis.          LCX  Small vessel, 10% proximalmiddistal stenosis. RI  This vessel could also be described as the high first diagonal as noted above in the LAD section. RCA Large vessel, dominant, proximal less than 10% stenosis, mid 30 to 40% stenosis, distal 90 to 100% stenosis. PERCUTANEOUS INTERVENTION DESCRIPTION      Patient was preloaded with Brilinta, patient was given a single dose of Integrilin during the procedure and was treated otherwise with heparin for anticoagulation. A 6 [place] guiding catheter was taken upfront for PCI and a choice floppy wire was used to cross the lesion. Thrombectomy was then performed with the penumbra device and flow was restored. Lesion in the distal RCA was then dilated with a 3 mm balloon and then stented with Abbott Xience Becky 3.5 x 15 mm drug-eluting stent as well as a 3.0 x 18 mm Love Xience [address] drug-eluting stent. IVUS was performed and showed MLD was 3.5 mm. Stents were postdilated both a 3 and 3.5 mm noncompliant balloon. There was 0% residual stenosis. There was GAVIN 0 flow before and GAVIN-3 after PCI. During procedure, patient had hypotension which responded to vasopressors and these were able to be weaned partially at the end of the case and patient's EKG and symptoms had markedly improved at end of case. Remaining CAD/ASHD was felt to be treated medically followed by possible CABG surgery for left main disease. CONCLUSIONS:      Successful PCI of RCA with 2 drug-eluting stents  Will refer for consideration of CABG for left main disease     Addend, case d/w dr Hugo Carlson, plan for outpt cabg, order for life vest in interim, ef 35% on current review    Studies: n/a      I have reviewed labs and imaging/xray/diagnostic testing in this note. Assessment      1. Cardiomyopathy, unspecified type ([address]    2. Coronary artery disease involving native coronary artery of native heart without angina pectoris    3. Hypercholesteremia    4.  ST elevation myocardial infarction (STEMI), unspecified artery ([facility] [address]                 Plan   1. Referral to pulmonary given  2. LABS: FLP, CBC, CMP, BNP, and LFT, dig level   3. Call [phone] to schedule echocardiogram if ischemic cardiomyopathy   4. Follow in 3 months with NP       Thank you for allowing us to participate in the care of [facility]. Please call me with any questions [phone]. This note was scribed in the presence of Tennis Line MD by Max Johnston RN. Nakia Gill MD, [facility]   Interventional Cardiologist  [address]  [phone] [facility]  [phone] [facility]  8/24/2021 10:13 AM    I will address the patient's cardiac risk factors and adjusted pharmacologic treatment as needed. In addition, I have reinforced the need for patient directed risk factor modification. Tobacco use was discussed with the patient and educated on the negative effects and was asked not to use. All questions and concerns were addressed to the patient/family. Alternatives to my treatment were discussed. I, Dr Nakia Gill, personally performed the services described in this documentation, as scribed by the above signed scribe in my presence. It is both accurate and complete to my knowledge. I agree with the details independently gathered by the clinical support staff and the scribed note accurately describes my personal service to the patient.

## 2021-08-24 NOTE — PATIENT INSTRUCTIONS
1. Referral to pulmonary given  2. LABS: FLP, CBC, CMP, BNP, and LFT  3. Call [phone] to schedule echocardiogram  4.  Follow in 3 months with NP

## 2021-08-26 ENCOUNTER — TELEPHONE (OUTPATIENT)
Dept: CARDIOLOGY CLINIC | Age: 56
End: 2021-08-26

## 2021-08-26 ENCOUNTER — HOSPITAL ENCOUNTER (OUTPATIENT)
Age: 56
Discharge: HOME | End: 2021-08-26
Payer: COMMERCIAL

## 2021-08-26 DIAGNOSIS — I42.9 CARDIOMYOPATHY, UNSPECIFIED TYPE (HCC): ICD-10-CM

## 2021-08-26 DIAGNOSIS — E78.00 HYPERCHOLESTEREMIA: ICD-10-CM

## 2021-08-26 DIAGNOSIS — I25.10 CORONARY ARTERY DISEASE INVOLVING NATIVE CORONARY ARTERY OF NATIVE HEART WITHOUT ANGINA PECTORIS: ICD-10-CM

## 2021-08-26 DIAGNOSIS — Z95.1 HISTORY OF CORONARY ARTERY BYPASS GRAFT X 1: ICD-10-CM

## 2021-08-26 DIAGNOSIS — I21.3 ST ELEVATION MYOCARDIAL INFARCTION (STEMI), UNSPECIFIED ARTERY (HCC): ICD-10-CM

## 2021-08-26 LAB
A/G RATIO: 1.4 (ref 1.1–2.2)
ALBUMIN SERPL-MCNC: 4.3 G/DL (ref 3.4–5)
ALP BLD-CCNC: 111 U/L (ref 40–129)
ALT SERPL-CCNC: 19 U/L (ref 10–40)
ANION GAP SERPL CALCULATED.3IONS-SCNC: 9 MMOL/L (ref 3–16)
AST SERPL-CCNC: 22 U/L (ref 15–37)
BASOPHILS ABSOLUTE: 0.1 K/UL (ref 0–0.2)
BASOPHILS RELATIVE PERCENT: 1.1 %
BILIRUB SERPL-MCNC: <0.2 MG/DL (ref 0–1)
BILIRUBIN DIRECT: <0.2 MG/DL (ref 0–0.3)
BILIRUBIN, INDIRECT: NORMAL MG/DL (ref 0–1)
BUN BLDV-MCNC: 15 MG/DL (ref 7–20)
CALCIUM SERPL-MCNC: 9.1 MG/DL (ref 8.3–10.6)
CHLORIDE BLD-SCNC: 107 MMOL/L (ref 99–110)
CHOLESTEROL, FASTING: 176 MG/DL (ref 0–199)
CO2: 23 MMOL/L (ref 21–32)
CREAT SERPL-MCNC: 0.6 MG/DL (ref 0.6–1.1)
DIGOXIN LEVEL: 0.6 NG/ML (ref 0.8–2)
EOSINOPHILS ABSOLUTE: 0.4 K/UL (ref 0–0.6)
EOSINOPHILS RELATIVE PERCENT: 3.8 %
GFR AFRICAN AMERICAN: >60
GFR NON-AFRICAN AMERICAN: >60
GLOBULIN: 3 G/DL
GLUCOSE BLD-MCNC: 102 MG/DL (ref 70–99)
HCT VFR BLD CALC: 41.3 % (ref 36–48)
HDLC SERPL-MCNC: 47 MG/DL (ref 40–60)
HEMOGLOBIN: 13.9 G/DL (ref 12–16)
LDL CHOLESTEROL CALCULATED: 102 MG/DL
LYMPHOCYTES ABSOLUTE: 1.8 K/UL (ref 1–5.1)
LYMPHOCYTES RELATIVE PERCENT: 18 %
MCH RBC QN AUTO: 30 PG (ref 26–34)
MCHC RBC AUTO-ENTMCNC: 33.6 G/DL (ref 31–36)
MCV RBC AUTO: 89.3 FL (ref 80–100)
MONOCYTES ABSOLUTE: 0.8 K/UL (ref 0–1.3)
MONOCYTES RELATIVE PERCENT: 7.7 %
NEUTROPHILS ABSOLUTE: 6.9 K/UL (ref 1.7–7.7)
NEUTROPHILS RELATIVE PERCENT: 69.4 %
PDW BLD-RTO: 13.9 % (ref 12.4–15.4)
PLATELET # BLD: 215 K/UL (ref 135–450)
PMV BLD AUTO: 9.3 FL (ref 5–10.5)
POTASSIUM SERPL-SCNC: 4.1 MMOL/L (ref 3.5–5.1)
PRO-BNP: 283 PG/ML (ref 0–124)
RBC # BLD: 4.62 M/UL (ref 4–5.2)
SODIUM BLD-SCNC: 139 MMOL/L (ref 136–145)
TOTAL PROTEIN: 7.3 G/DL (ref 6.4–8.2)
TRIGLYCERIDE, FASTING: 136 MG/DL (ref 0–150)
VLDLC SERPL CALC-MCNC: 27 MG/DL
WBC # BLD: 10 K/UL (ref 4–11)

## 2021-08-26 PROCEDURE — 82248 BILIRUBIN DIRECT: CPT

## 2021-08-26 PROCEDURE — 36415 COLL VENOUS BLD VENIPUNCTURE: CPT

## 2021-08-26 PROCEDURE — 85025 COMPLETE CBC W/AUTO DIFF WBC: CPT

## 2021-08-26 PROCEDURE — 80061 LIPID PANEL: CPT

## 2021-08-26 PROCEDURE — 80162 ASSAY OF DIGOXIN TOTAL: CPT

## 2021-08-26 PROCEDURE — 83880 ASSAY OF NATRIURETIC PEPTIDE: CPT

## 2021-08-26 PROCEDURE — 80053 COMPREHEN METABOLIC PANEL: CPT

## 2021-08-26 NOTE — TELEPHONE ENCOUNTER
----- Message from Jose Alfredo Hansen MD sent at 8/26/2021  8:50 AM EDT -----  Let patient know their lab tests are stable to improved, continue current meds, no new orders or changes at this time. Thanks.

## 2021-08-26 NOTE — TELEPHONE ENCOUNTER
Spoke with pt, he needs to confirm medication and dose. She will call the office back regarding her statin.

## 2021-08-26 NOTE — TELEPHONE ENCOUNTER
----- Message from Jerald Valdovinos MD sent at 8/26/2021  2:44 PM EDT -----  Let patient know their lipid test is high, lets verify she is taking Lipitor 80 mg daily and if so, would switch that over to Crestor 40 mg p.o. nightly and get follow-up lipids/LFTs in 1 to 2 months. Thanks.

## 2021-08-27 DIAGNOSIS — I25.10 CORONARY ARTERY DISEASE INVOLVING NATIVE CORONARY ARTERY OF NATIVE HEART WITHOUT ANGINA PECTORIS: Primary | ICD-10-CM

## 2021-08-27 RX ORDER — ROSUVASTATIN CALCIUM 40 MG/1
40 TABLET, COATED ORAL DAILY
Qty: 90 TABLET | Refills: 1 | Status: SHIPPED | OUTPATIENT
Start: 2021-08-27 | End: 2022-02-22

## 2021-08-27 NOTE — TELEPHONE ENCOUNTER
Patient states she was taking 80 mg Lipitor nightly. Per [facility] notes, dc lipitor start crestor. Pended for [facility]. Repeat labs ordered, pt knows to have redone in 2 months. Patient requested [address] [place] [address].

## 2021-09-03 RX ORDER — POTASSIUM CHLORIDE 20 MEQ/1
TABLET, EXTENDED RELEASE ORAL
Qty: 60 TABLET | Refills: 11 | Status: SHIPPED | OUTPATIENT
Start: 2021-09-03 | End: 2021-11-30

## 2021-09-15 ENCOUNTER — HOSPITAL ENCOUNTER (OUTPATIENT)
Dept: MAMMOGRAPHY | Age: 56
Discharge: HOME | End: 2021-09-15
Payer: COMMERCIAL

## 2021-09-15 ENCOUNTER — TELEPHONE (OUTPATIENT)
Dept: MAMMOGRAPHY | Age: 56
End: 2021-09-15

## 2021-09-15 DIAGNOSIS — Z12.31 SCREENING MAMMOGRAM, ENCOUNTER FOR: ICD-10-CM

## 2021-09-15 PROCEDURE — 77063 BREAST TOMOSYNTHESIS BI: CPT

## 2021-09-16 ENCOUNTER — HOSPITAL ENCOUNTER (OUTPATIENT)
Dept: CARDIOLOGY | Age: 56
Discharge: HOME | End: 2021-09-16
Payer: COMMERCIAL

## 2021-09-16 DIAGNOSIS — I42.9 CARDIOMYOPATHY, UNSPECIFIED TYPE (HCC): ICD-10-CM

## 2021-09-16 DIAGNOSIS — I25.10 CORONARY ARTERY DISEASE INVOLVING NATIVE CORONARY ARTERY OF NATIVE HEART WITHOUT ANGINA PECTORIS: ICD-10-CM

## 2021-09-16 DIAGNOSIS — E78.00 HYPERCHOLESTEREMIA: ICD-10-CM

## 2021-09-16 DIAGNOSIS — Z95.1 HISTORY OF CORONARY ARTERY BYPASS GRAFT X 1: ICD-10-CM

## 2021-09-16 DIAGNOSIS — I21.3 ST ELEVATION MYOCARDIAL INFARCTION (STEMI), UNSPECIFIED ARTERY (HCC): ICD-10-CM

## 2021-09-16 LAB
LV EF: 38 %
LVEF MODALITY: NORMAL

## 2021-09-16 PROCEDURE — 93306 TTE W/DOPPLER COMPLETE: CPT

## 2021-09-20 ENCOUNTER — TELEPHONE (OUTPATIENT)
Dept: CARDIOLOGY CLINIC | Age: 56
End: 2021-09-20

## 2021-09-20 DIAGNOSIS — I25.10 CORONARY ARTERY DISEASE INVOLVING NATIVE CORONARY ARTERY OF NATIVE HEART WITHOUT ANGINA PECTORIS: ICD-10-CM

## 2021-09-20 DIAGNOSIS — I51.89 COMBINED SYSTOLIC AND DIASTOLIC CARDIAC DYSFUNCTION: Primary | ICD-10-CM

## 2021-09-20 NOTE — TELEPHONE ENCOUNTER
Spoke with Mahad Navarrete relayed  Echo results. Pt v/u and will schedule muga. She would like to hold off on Entresto until after muga to see if it is needed.

## 2021-09-20 NOTE — TELEPHONE ENCOUNTER
----- Message from Kristin Xiong MD sent at 9/20/2021  8:33 AM EDT -----  Let patient know echo test shows stable to slightly decrs heart function, rec: MUGA scan to f/u on this further. Thank you. Also, lets see if pt wants to try entresto as a med for cardiomyopathy.  Looks like she had intolerance of bblocker d/t fatigue and she held off on entresto d/t low BP.  It may be worthwhile to reconsider entresto.  Let her know, it can cause lower BP.  If she is willing to try it, would start 24/26mg po bid of entresto. And she will need to monitor BP/HR and report back abnl numbers and get f/u bmp and bnp in 1-2 wks after entresto is started. Thank you.

## 2021-09-23 ENCOUNTER — HOSPITAL ENCOUNTER (OUTPATIENT)
Dept: NUCLEAR MEDICINE | Age: 56
Discharge: HOME | End: 2021-09-23
Payer: COMMERCIAL

## 2021-09-23 ENCOUNTER — TELEPHONE (OUTPATIENT)
Dept: CARDIOLOGY CLINIC | Age: 56
End: 2021-09-23

## 2021-09-23 DIAGNOSIS — I51.89 COMBINED SYSTOLIC AND DIASTOLIC CARDIAC DYSFUNCTION: ICD-10-CM

## 2021-09-23 DIAGNOSIS — I25.10 CORONARY ARTERY DISEASE INVOLVING NATIVE CORONARY ARTERY OF NATIVE HEART WITHOUT ANGINA PECTORIS: ICD-10-CM

## 2021-09-23 LAB
LV EF: 47 %
LVEF MODALITY: NORMAL

## 2021-09-23 PROCEDURE — 78472 GATED HEART PLANAR SINGLE: CPT

## 2021-09-23 PROCEDURE — A9560 TC99M LABELED RBC: HCPCS | Performed by: INTERNAL MEDICINE

## 2021-09-23 PROCEDURE — 3430000000 HC RX DIAGNOSTIC RADIOPHARMACEUTICAL: Performed by: INTERNAL MEDICINE

## 2021-09-23 RX ADMIN — Medication 25 MILLICURIE: at 10:35

## 2021-09-23 NOTE — TELEPHONE ENCOUNTER
Created telephone encounter. Spoke with Eloise relayed message per [facility] regarding Jamillaureshoaib Jaquez. Pt verbalized understanding.

## 2021-09-23 NOTE — TELEPHONE ENCOUNTER
----- Message from Zehra Vegas MD sent at 9/23/2021 10:23 AM EDT -----  Let patient know their MUGA test shows stable heart function, continue current meds, no new orders or changes at this time. Thanks.

## 2021-09-30 ENCOUNTER — OFFICE VISIT (OUTPATIENT)
Dept: FAMILY MEDICINE CLINIC | Age: 56
End: 2021-09-30
Payer: COMMERCIAL

## 2021-09-30 ENCOUNTER — HOSPITAL ENCOUNTER (EMERGENCY)
Age: 56
Discharge: HOME | End: 2021-09-30
Attending: EMERGENCY MEDICINE
Payer: COMMERCIAL

## 2021-09-30 ENCOUNTER — APPOINTMENT (OUTPATIENT)
Dept: GENERAL RADIOLOGY | Age: 56
End: 2021-09-30
Payer: COMMERCIAL

## 2021-09-30 VITALS
WEIGHT: 123 LBS | HEART RATE: 74 BPM | TEMPERATURE: 98.1 F | RESPIRATION RATE: 16 BRPM | SYSTOLIC BLOOD PRESSURE: 116 MMHG | BODY MASS INDEX: 21.79 KG/M2 | OXYGEN SATURATION: 94 % | HEIGHT: 63 IN | DIASTOLIC BLOOD PRESSURE: 75 MMHG

## 2021-09-30 VITALS
OXYGEN SATURATION: 97 % | DIASTOLIC BLOOD PRESSURE: 65 MMHG | SYSTOLIC BLOOD PRESSURE: 101 MMHG | TEMPERATURE: 98.7 F | WEIGHT: 122 LBS | BODY MASS INDEX: 21.61 KG/M2 | HEART RATE: 91 BPM

## 2021-09-30 DIAGNOSIS — I42.9 CARDIOMYOPATHY, UNSPECIFIED TYPE (HCC): ICD-10-CM

## 2021-09-30 DIAGNOSIS — I25.10 CORONARY ARTERY DISEASE INVOLVING NATIVE CORONARY ARTERY OF NATIVE HEART WITHOUT ANGINA PECTORIS: Primary | ICD-10-CM

## 2021-09-30 DIAGNOSIS — R07.9 CHEST PAIN, UNSPECIFIED TYPE: Primary | ICD-10-CM

## 2021-09-30 DIAGNOSIS — R07.2 CHEST PAIN, PRECORDIAL: ICD-10-CM

## 2021-09-30 DIAGNOSIS — K21.9 GASTROESOPHAGEAL REFLUX DISEASE, UNSPECIFIED WHETHER ESOPHAGITIS PRESENT: ICD-10-CM

## 2021-09-30 DIAGNOSIS — I51.89 COMBINED SYSTOLIC AND DIASTOLIC CARDIAC DYSFUNCTION: ICD-10-CM

## 2021-09-30 DIAGNOSIS — I21.3 ST ELEVATION MYOCARDIAL INFARCTION (STEMI), UNSPECIFIED ARTERY (HCC): ICD-10-CM

## 2021-09-30 DIAGNOSIS — E78.00 HYPERCHOLESTEREMIA: ICD-10-CM

## 2021-09-30 LAB
A/G RATIO: 1.6 (ref 1.1–2.2)
ALBUMIN SERPL-MCNC: 4.7 G/DL (ref 3.4–5)
ALP BLD-CCNC: 117 U/L (ref 40–129)
ALT SERPL-CCNC: 17 U/L (ref 10–40)
ANION GAP SERPL CALCULATED.3IONS-SCNC: 13 MMOL/L (ref 3–16)
AST SERPL-CCNC: 23 U/L (ref 15–37)
BASOPHILS ABSOLUTE: 0.2 K/UL (ref 0–0.2)
BASOPHILS RELATIVE PERCENT: 2.1 %
BILIRUB SERPL-MCNC: 0.3 MG/DL (ref 0–1)
BUN BLDV-MCNC: 11 MG/DL (ref 7–20)
CALCIUM SERPL-MCNC: 9.4 MG/DL (ref 8.3–10.6)
CHLORIDE BLD-SCNC: 105 MMOL/L (ref 99–110)
CO2: 20 MMOL/L (ref 21–32)
CREAT SERPL-MCNC: <0.5 MG/DL (ref 0.6–1.1)
EOSINOPHILS ABSOLUTE: 0.4 K/UL (ref 0–0.6)
EOSINOPHILS RELATIVE PERCENT: 4.7 %
GFR AFRICAN AMERICAN: >60
GFR NON-AFRICAN AMERICAN: >60
GLOBULIN: 2.9 G/DL
GLUCOSE BLD-MCNC: 94 MG/DL (ref 70–99)
HCT VFR BLD CALC: 39.2 % (ref 36–48)
HEMOGLOBIN: 13.7 G/DL (ref 12–16)
LYMPHOCYTES ABSOLUTE: 2.5 K/UL (ref 1–5.1)
LYMPHOCYTES RELATIVE PERCENT: 26.9 %
MCH RBC QN AUTO: 30.4 PG (ref 26–34)
MCHC RBC AUTO-ENTMCNC: 35 G/DL (ref 31–36)
MCV RBC AUTO: 87 FL (ref 80–100)
MONOCYTES ABSOLUTE: 0.8 K/UL (ref 0–1.3)
MONOCYTES RELATIVE PERCENT: 8.2 %
NEUTROPHILS ABSOLUTE: 5.4 K/UL (ref 1.7–7.7)
NEUTROPHILS RELATIVE PERCENT: 58.1 %
PDW BLD-RTO: 13.7 % (ref 12.4–15.4)
PLATELET # BLD: 207 K/UL (ref 135–450)
PMV BLD AUTO: 10.3 FL (ref 5–10.5)
POTASSIUM REFLEX MAGNESIUM: 4.2 MMOL/L (ref 3.5–5.1)
PRO-BNP: 360 PG/ML (ref 0–124)
RBC # BLD: 4.5 M/UL (ref 4–5.2)
SODIUM BLD-SCNC: 138 MMOL/L (ref 136–145)
TOTAL PROTEIN: 7.6 G/DL (ref 6.4–8.2)
TROPONIN: <0.01 NG/ML
WBC # BLD: 9.3 K/UL (ref 4–11)

## 2021-09-30 PROCEDURE — 4004F PT TOBACCO SCREEN RCVD TLK: CPT | Performed by: FAMILY MEDICINE

## 2021-09-30 PROCEDURE — G8420 CALC BMI NORM PARAMETERS: HCPCS | Performed by: FAMILY MEDICINE

## 2021-09-30 PROCEDURE — 99214 OFFICE O/P EST MOD 30 MIN: CPT | Performed by: FAMILY MEDICINE

## 2021-09-30 PROCEDURE — 2500000003 HC RX 250 WO HCPCS: Performed by: EMERGENCY MEDICINE

## 2021-09-30 PROCEDURE — 80053 COMPREHEN METABOLIC PANEL: CPT

## 2021-09-30 PROCEDURE — 84484 ASSAY OF TROPONIN QUANT: CPT

## 2021-09-30 PROCEDURE — 93005 ELECTROCARDIOGRAM TRACING: CPT | Performed by: EMERGENCY MEDICINE

## 2021-09-30 PROCEDURE — 85025 COMPLETE CBC W/AUTO DIFF WBC: CPT

## 2021-09-30 PROCEDURE — 3017F COLORECTAL CA SCREEN DOC REV: CPT | Performed by: FAMILY MEDICINE

## 2021-09-30 PROCEDURE — 71045 X-RAY EXAM CHEST 1 VIEW: CPT

## 2021-09-30 PROCEDURE — 6370000000 HC RX 637 (ALT 250 FOR IP): Performed by: EMERGENCY MEDICINE

## 2021-09-30 PROCEDURE — 99285 EMERGENCY DEPT VISIT HI MDM: CPT

## 2021-09-30 PROCEDURE — G8427 DOCREV CUR MEDS BY ELIG CLIN: HCPCS | Performed by: FAMILY MEDICINE

## 2021-09-30 PROCEDURE — 83880 ASSAY OF NATRIURETIC PEPTIDE: CPT

## 2021-09-30 RX ORDER — SUCRALFATE 1 G/1
1 TABLET ORAL ONCE
Status: COMPLETED | OUTPATIENT
Start: 2021-09-30 | End: 2021-09-30

## 2021-09-30 RX ORDER — SUCRALFATE 1 G/1
1 TABLET ORAL 4 TIMES DAILY
Qty: 40 TABLET | Refills: 0 | Status: SHIPPED | OUTPATIENT
Start: 2021-09-30 | End: 2021-12-29

## 2021-09-30 RX ADMIN — SUCRALFATE 1 G: 1 TABLET ORAL at 21:14

## 2021-09-30 RX ADMIN — FAMOTIDINE 20 MG: 10 INJECTION, SOLUTION INTRAVENOUS at 21:09

## 2021-09-30 RX ADMIN — LIDOCAINE HYDROCHLORIDE: 20 SOLUTION ORAL; TOPICAL at 21:15

## 2021-09-30 ASSESSMENT — PAIN DESCRIPTION - DESCRIPTORS: DESCRIPTORS: ACHING

## 2021-09-30 ASSESSMENT — PAIN DESCRIPTION - ORIENTATION: ORIENTATION: MID

## 2021-09-30 ASSESSMENT — PAIN DESCRIPTION - LOCATION
LOCATION: CHEST
LOCATION: CHEST

## 2021-09-30 ASSESSMENT — PAIN SCALES - GENERAL
PAINLEVEL_OUTOF10: 1
PAINLEVEL_OUTOF10: 6

## 2021-09-30 ASSESSMENT — PAIN DESCRIPTION - PAIN TYPE
TYPE: ACUTE PAIN
TYPE: ACUTE PAIN

## 2021-09-30 NOTE — PATIENT INSTRUCTIONS
Please read the healthy family handout that you were given and share it with your family. Please compare this printed medication list with your medications at home to be sure they are the same. If you have any medications that are different please contact us immediately at [phone]. Also review your allergies that we have listed, these may also include medications that you have not been able to tolerate, make sure everything listed is correct. If you have any allergies that are different please contact us immediately at [phone].

## 2021-09-30 NOTE — PROGRESS NOTES
Subjective:  Chun Ross is here to discuss the following issues. She has coronary artery disease with 2 prior MIs and secondary cardiomyopathy. She has combined systolic and diastolic heart failure. She has been compliant with her medications. A recent MUGA scan was stable. On review of systems she has been suffering for several days some anterior chest tightness and this is similar to her prior heart attacks. She has a sensation of aching and mild burning in her chest.  She continues on Pepcid. No nausea or vomiting. Eating has no effect on her symptoms. She tried nitroglycerin only once without much improvement. Social History     Tobacco Use   Smoking Status Current Some Day Smoker    Packs/day: 1.00    Years: 10.00    Pack years: 10.00    Types: Cigarettes    Last attempt to quit: 2020    Years since quittin.4   Smokeless Tobacco Never Used   Allergies:     Patient has no known allergies. Objective:  /65   Pulse 91   Temp 98.7 °F (37.1 °C) (Oral)   Wt 122 lb (55.3 kg)   LMP 2017 (Approximate)   SpO2 97%   BMI 21.61 kg/m²    No acute distress, heart regular rate and rhythm without murmur, lungs clear to auscultation easy effort, abdomen soft nondistended, no clubbing or cyanosis    Assessment:  1. Coronary artery disease involving native coronary artery of native heart without angina pectoris    2. ST elevation myocardial infarction (STEMI), unspecified artery ([address]    3. Cardiomyopathy, unspecified type ([address]    4. Combined systolic and diastolic cardiac dysfunction    5. Hypercholesteremia    6. Chest pain, precordial            Plan:  Medications reviewed. Her symptoms today are consistent with those of her prior heart attack. Options for testing and treatment were discussed. I encouraged her to go to the nearest emergency room and she agrees stating her  will take her there now.   She is able to walk without distress and her vitals are good and she declines transport by squad  Follow-up after ER evaluation and in 3 months  The diagnoses listed in the assessment above are stable unless otherwise indicated. Age-specific preventative health recommendations were reviewed and the [facility]" was provided. Avoid exposure to tobacco products. Follow CDC guidelines for covid-19 prevention. Read and consider all information provided by the pharmacy regarding prescribed medications before use    Call or return for any problems that arise before the next scheduled appointment. Paulina Hopkins MD    This note was transcribed using a voice recognition software system. Proper technique and careful oversight were used to increase transcription accuracy but inadvertent errors may be present.

## 2021-10-01 LAB
EKG ATRIAL RATE: 91 BPM
EKG DIAGNOSIS: NORMAL
EKG P AXIS: 73 DEGREES
EKG P-R INTERVAL: 156 MS
EKG Q-T INTERVAL: 356 MS
EKG QRS DURATION: 102 MS
EKG QTC CALCULATION (BAZETT): 437 MS
EKG R AXIS: 59 DEGREES
EKG T AXIS: -23 DEGREES
EKG VENTRICULAR RATE: 91 BPM

## 2021-10-01 PROCEDURE — 93010 ELECTROCARDIOGRAM REPORT: CPT | Performed by: INTERNAL MEDICINE

## 2021-10-01 NOTE — ED PROVIDER NOTES
CHIEF COMPLAINT  Chest Pain (for a week, sent by PCP)      HISTORY OF PRESENT ILLNESS  Amina Schilling is a 50 y.o. female with a history of CABG, CHF, dyslipidemia, GERD, tobacco abuse who presents to the ED complaining of chest pain. Patient reports at least 5 days of mid substernal chest pain which is relatively constant and feels like burning, currently 6 out of 10 in intensity. She reports some dyspnea on exertion however also notes she is a smoker. Underwent recent cardiac work-up which was unremarkable. Denies any worsening of her pain with exertional activities. No report of diaphoresis, palpitations, near syncope, nausea, vomiting. Patient states she wonders if her symptoms are possibly related to her gastroesophageal reflux disease. States currently she is only on a daily dose of Pepcid. No other complaints, modifying factors or associated symptoms. I have reviewed the following from the nursing documentation. Past Medical History:   Diagnosis Date    Arthritis     CAD (coronary artery disease)     [facility] ([facility]     CHF (congestive heart failure) (HCC)     Depression     GERD (gastroesophageal reflux disease)     Hyperlipidemia     On intra-aortic balloon pump assist 05/31/2020    Removed during [facility] on 6/2/20     Past Surgical History:   Procedure Laterality Date    CARDIAC CATHETERIZATION  05/31/2020    Dr. Mary Rubio - IABP placed   [address] [place] [address] Drive GRAFT N/A 6/2/2020     - off pump x1 (LIMA-LAD), removal of IABP    CT INSERT CATH PLEURA W IMAGE  7/13/2020    Dr. Brad Guevaar - CT guided chest tube insertion    SINUS SURGERY      THORACENTESIS Left 07/11/2020    Dr. Janny Shepard - 1 L drained    TRANSESOPHAGEAL ECHOCARDIOGRAM  06/02/2020    during CABG     History reviewed. No pertinent family history.   Social History     Socioeconomic History    Marital status:      Spouse name: Not on file    Number of children: Not on file  Years of education: Not on file    Highest education level: Not on file   Occupational History    Not on file   Tobacco Use    Smoking status: Current Some Day Smoker     Packs/day: 1.00     Years: 10.00     Pack years: 10.00     Types: Cigarettes     Last attempt to quit: 2020     Years since quittin.4    Smokeless tobacco: Never Used   Vaping Use    Vaping Use: Never used   Substance and Sexual Activity    Alcohol use: No    Drug use: No    Sexual activity: Not on file   Other Topics Concern    Not on file   Social History Narrative    Not on file     Social Determinants of Health     Financial Resource Strain:     Difficulty of Paying Living Expenses:    Food Insecurity:     Worried About Running Out of Food in the Last Year:     [address] in the Last Year:    Transportation Needs:     Lack of Transportation (Medical):  Lack of Transportation (Non-Medical):    Physical Activity:     Days of Exercise per Week:     Minutes of Exercise per Session:    Stress:     Feeling of Stress :    Social Connections:     Frequency of Communication with Friends and Family:     Frequency of Social Gatherings with Friends and Family:     Attends Holiness Services:     Active Member of Clubs or Organizations:     Attends Club or Organization Meetings:     Marital Status:    Intimate Partner Violence:     Fear of Current or Ex-Partner:     Emotionally Abused:     Physically Abused:     Sexually Abused:      No current facility-administered medications for this encounter.      Current Outpatient Medications   Medication Sig Dispense Refill    sucralfate (CARAFATE) 1 GM tablet Take 1 tablet by mouth 4 times daily for 10 days 40 tablet 0    esomeprazole (NEXIUM) 20 MG delayed release capsule Take 1 capsule by mouth daily 30 capsule 0    potassium chloride (KLOR-CON M) 20 MEQ extended release tablet TAKE 1 TABLET BY MOUTH TWICE DAILY WITH MEALS (Patient not taking: Reported on 9/30/2021) 60 tablet 11    rosuvastatin (CRESTOR) 40 MG tablet Take 1 tablet by mouth daily 90 tablet 1    aspirin (ASPIRIN LOW DOSE) 81 MG EC tablet TAKE 1 TABLET BY MOUTH DAILY 90 tablet 3    digoxin (LANOXIN) 125 MCG tablet TAKE ONE TABLET BY MOUTH DAILY 90 tablet 3    nicotine (NICODERM CQ) 14 MG/24HR Place 1 patch onto the skin daily 42 patch 0    mirtazapine (REMERON) 15 MG tablet Take 1 tablet by mouth nightly 30 tablet 5    magnesium oxide (MAG-OX) 400 MG tablet TAKE ONE (1) TABLET BY MOUTH TWICE DAILY 60 tablet 5    famotidine (PEPCID) 20 MG tablet TAKE 2 TABLETS BY MOUTH EVERY EVENING 60 tablet 5    BRILINTA 90 MG TABS tablet TAKE 1 TABLET BY MOUTH TWICE DAILY 180 tablet 2    conjugated estrogens (PREMARIN) 0.625 MG/GM vaginal cream 3 days a week (Patient not taking: Reported on 8/24/2021) 1 Tube 3    FLUoxetine (PROZAC) 20 MG capsule 3 CAPSULES BY MOUTH DAILY (Patient taking differently: 20 mg 3 CAPSULES BY MOUTH DAILY) 90 capsule 5    diclofenac (VOLTAREN) 50 MG EC tablet  (Patient not taking: Reported on 8/24/2021)      tiZANidine (ZANAFLEX) 4 MG tablet       pramipexole (MIRAPEX) 0.25 MG tablet Take 1-2 tablets by mouth nightly as needed (RESTLESS LEG) (Patient not taking: Reported on 8/24/2021) 60 tablet 5     No Known Allergies    REVIEW OF SYSTEMS  10 systems reviewed, pertinent positives per HPI otherwise noted to be negative. PHYSICAL EXAM  /78   Pulse 79   Temp 98.1 °F (36.7 °C) (Oral)   Resp 16   Ht 5' 3\" (1.6 m)   Wt 123 lb (55.8 kg)   LMP 11/01/2017 (Approximate)   SpO2 96%   BMI 21.79 kg/m²   GENERAL APPEARANCE: Awake and alert. Cooperative. No acute distress. Appears older than stated age, somewhat cachectic. Nontoxic. HEAD: Normocephalic. Atraumatic. EYES: PERRL. EOM's grossly intact. ENT: Mucous membranes are moist.   NECK: Supple, trachea midline. HEART: RRR. Normal S1, S2. No murmurs, rubs or gallops. LUNGS: Respirations unlabored.   Moderate air - 37 U/L    Globulin 2.9 g/dL   Troponin   Result Value Ref Range    Troponin <0.01 <0.01 ng/mL   Brain Natriuretic Peptide   Result Value Ref Range    Pro- (H) 0 - 124 pg/mL   EKG 12 Lead   Result Value Ref Range    Ventricular Rate 91 BPM    Atrial Rate 91 BPM    P-R Interval 156 ms    QRS Duration 102 ms    Q-T Interval 356 ms    QTc Calculation (Bazett) 437 ms    P Axis 73 degrees    R Axis 59 degrees    T Axis -23 degrees    Diagnosis       Normal sinus rhythmPossible Left atrial enlargementPossible Inferior infarct , age undeterminedAbnormal ECGWhen compared with ECG of 28-JAN-2021 06:27,Vent. rate has increased BY  32 BPMBorderline criteria for Inferior infarct are now PresentNonspecific T wave abnormality now evident in Lateral leads       EKG  Normal sinus rhythm, rate 91, normal axis, QTC within normal limits, no acute ST or T wave changes compared with prior from 1/28/2021      RADIOLOGY  X-RAYS:  I have reviewed radiologic plain film image(s). ALL OTHER NON-PLAIN FILM IMAGES SUCH AS CT, ULTRASOUND AND MRI HAVE BEEN READ BY THE RADIOLOGIST. XR CHEST PORTABLE   Final Result   Hyperexpanded lung volume and diffusely prominent interstitium can be seen in   the setting of COPD. No focal pulmonary consolidation. Rechecks: Physical assessment performed. Resting comfortably    ED COURSE/MDM  Patient seen and evaluated. Old records reviewed. Labs and imaging reviewed and results discussed with patient. Patient is feeling much better after Carafate and GI cocktail. Feels her symptoms are most consistent with acid reflux and I believe she may be correct. Work-up is otherwise unremarkable. Patient offered admission to the hospital for further cardiac work-up however she declines to stay. States she will follow up with her cardiologist as an outpatient. I advised her we would also start her on some prescriptions and have her follow-up with gastroenterology.   Patient encouraged to return immediately with any concerns. Patient was given scripts for the following medications. I counseled patient how to take these medications. New Prescriptions    ESOMEPRAZOLE (NEXIUM) 20 MG DELAYED RELEASE CAPSULE    Take 1 capsule by mouth daily    SUCRALFATE (CARAFATE) 1 GM TABLET    Take 1 tablet by mouth 4 times daily for 10 days       CLINICAL IMPRESSION  1. Chest pain, unspecified type    2. Gastroesophageal reflux disease, unspecified whether esophagitis present        Blood pressure 120/78, pulse 79, temperature 98.1 °F (36.7 °C), temperature source Oral, resp. rate 16, height 5' 3\" (1.6 m), weight 123 lb (55.8 kg), last menstrual period 11/01/2017, SpO2 96 %, not currently breastfeeding. Hadley Sheets was discharged to home in stable condition.         Ochoa Estrada MD  09/30/21 [id number]

## 2021-10-01 NOTE — ED NOTES
Educated pt on x2 prescriptions and discharge paperwork as well as follow-up appointment. Pt verbalizes understanding of all instructions and denies questions. IV discontinued, catheter intact, minimal bleeding at IV site, 2x2 and tape applied, manual pressure held. Pt left ambulatory by self with all personal belongings, and discharge paperwork to private residence. Pt in no distress at this time. Prescriptions x2 sent as E-Scripts. Pt instructed on how to  prescriptions. Pt verbalizes understanding.        Abel Cabrales RN  09/30/21 [id number]

## 2021-10-05 RX ORDER — METOPROLOL SUCCINATE 25 MG/1
TABLET, EXTENDED RELEASE ORAL
Qty: 30 TABLET | Refills: 0 | OUTPATIENT
Start: 2021-10-05

## 2021-10-11 RX ORDER — METOPROLOL SUCCINATE 25 MG/1
TABLET, EXTENDED RELEASE ORAL
Qty: 30 TABLET | Refills: 1 | Status: SHIPPED | OUTPATIENT
Start: 2021-10-11 | End: 2021-11-24

## 2021-10-11 RX ORDER — FLUOXETINE HYDROCHLORIDE 20 MG/1
60 CAPSULE ORAL DAILY
Qty: 90 CAPSULE | Refills: 2 | Status: SHIPPED | OUTPATIENT
Start: 2021-10-11 | End: 2021-12-22

## 2021-10-28 ENCOUNTER — OFFICE VISIT (OUTPATIENT)
Dept: PULMONOLOGY | Age: 56
End: 2021-10-28
Payer: COMMERCIAL

## 2021-10-28 VITALS
BODY MASS INDEX: 22.32 KG/M2 | HEIGHT: 63 IN | TEMPERATURE: 97.7 F | SYSTOLIC BLOOD PRESSURE: 109 MMHG | RESPIRATION RATE: 16 BRPM | WEIGHT: 126 LBS | OXYGEN SATURATION: 97 % | DIASTOLIC BLOOD PRESSURE: 75 MMHG | HEART RATE: 91 BPM

## 2021-10-28 DIAGNOSIS — K21.9 GASTROESOPHAGEAL REFLUX DISEASE, UNSPECIFIED WHETHER ESOPHAGITIS PRESENT: ICD-10-CM

## 2021-10-28 DIAGNOSIS — G25.81 RLS (RESTLESS LEGS SYNDROME): ICD-10-CM

## 2021-10-28 DIAGNOSIS — Z95.1 S/P CABG (CORONARY ARTERY BYPASS GRAFT): ICD-10-CM

## 2021-10-28 DIAGNOSIS — G47.30 OBSERVED SLEEP APNEA: ICD-10-CM

## 2021-10-28 DIAGNOSIS — J43.8 OTHER EMPHYSEMA (HCC): ICD-10-CM

## 2021-10-28 DIAGNOSIS — F17.200 SMOKER: ICD-10-CM

## 2021-10-28 DIAGNOSIS — R06.02 SOB (SHORTNESS OF BREATH): ICD-10-CM

## 2021-10-28 DIAGNOSIS — I25.5 ISCHEMIC CARDIOMYOPATHY: Primary | ICD-10-CM

## 2021-10-28 PROCEDURE — G8420 CALC BMI NORM PARAMETERS: HCPCS | Performed by: INTERNAL MEDICINE

## 2021-10-28 PROCEDURE — G8484 FLU IMMUNIZE NO ADMIN: HCPCS | Performed by: INTERNAL MEDICINE

## 2021-10-28 PROCEDURE — 3023F SPIROM DOC REV: CPT | Performed by: INTERNAL MEDICINE

## 2021-10-28 PROCEDURE — 99243 OFF/OP CNSLTJ NEW/EST LOW 30: CPT | Performed by: INTERNAL MEDICINE

## 2021-10-28 PROCEDURE — G8427 DOCREV CUR MEDS BY ELIG CLIN: HCPCS | Performed by: INTERNAL MEDICINE

## 2021-10-28 PROCEDURE — G8926 SPIRO NO PERF OR DOC: HCPCS | Performed by: INTERNAL MEDICINE

## 2021-10-28 RX ORDER — FLUOXETINE HYDROCHLORIDE 20 MG/1
CAPSULE ORAL
Qty: 90 CAPSULE | Refills: 5 | Status: SHIPPED | OUTPATIENT
Start: 2021-10-28 | End: 2022-03-11

## 2021-10-28 ASSESSMENT — SLEEP AND FATIGUE QUESTIONNAIRES
HOW LIKELY ARE YOU TO NOD OFF OR FALL ASLEEP IN A CAR, WHILE STOPPED FOR A FEW MINUTES IN TRAFFIC: 0
HOW LIKELY ARE YOU TO NOD OFF OR FALL ASLEEP WHEN YOU ARE A PASSENGER IN A CAR FOR AN HOUR WITHOUT A BREAK: 1
HOW LIKELY ARE YOU TO NOD OFF OR FALL ASLEEP WHILE SITTING INACTIVE IN A PUBLIC PLACE: 0
HOW LIKELY ARE YOU TO NOD OFF OR FALL ASLEEP WHILE SITTING AND TALKING TO SOMEONE: 0
NECK CIRCUMFERENCE (INCHES): 14
HOW LIKELY ARE YOU TO NOD OFF OR FALL ASLEEP WHILE SITTING AND READING: 2
HOW LIKELY ARE YOU TO NOD OFF OR FALL ASLEEP WHILE WATCHING TV: 1
ESS TOTAL SCORE: 5
HOW LIKELY ARE YOU TO NOD OFF OR FALL ASLEEP WHILE SITTING QUIETLY AFTER LUNCH WITHOUT ALCOHOL: 0
HOW LIKELY ARE YOU TO NOD OFF OR FALL ASLEEP WHILE LYING DOWN TO REST IN THE AFTERNOON WHEN CIRCUMSTANCES PERMIT: 1

## 2021-10-28 NOTE — PROGRESS NOTES
Chief Complaint/Referring Provider:  Patient is being seen at the request of Damian Whitten  for a consultation for Cardiomyopathy/SOB     Presenting HPI: Patient is a 74-year-old female who has been referred to the office for a pulmonary evaluation for cardiomyopathy and shortness of breath by Dr. Abigail Torres    Patient states that she has history of having 2 heart attacks in May of last year followed by CABG in June of last year, patient continues to have a shortness of breath on exertion, patient was recently evaluated by her cardiologist and patient's cardiomyopathy has not improved, patient states that her shortness of breath on exertion is persistent and also patient cannot walk more than 1 block after which she starts having shortness of breath, patient has sinus congestion with postnasal drainage, patient does not have any epistaxis or hemoptysis, patient does not have any sore throat or difficulty in swallowing, no coughing or choking while eating, no odynophagia or dysphagia per se, patient has some left-sided pleuritic chest pain, patient does not have any radiation of the pain to the left shoulder and left upper extremities, patient does not have any significant purulent expectoration, patient does not have any palpitations or diaphoresis, patient on questioning further states that she does have reflux symptoms for which she takes Pepcid, patient does not have any altered bowel habits or any hematochezia or melena, patient does not have any dysuria no hematuria, patient does not have any small joint pains, patient has been a smoker in the past and used to smoke about 1 pack a day but she has gone down but once in a while she still smokes, patient does not have any significant change in the weight per se, patient does snore, patient does have increased tiredness and sleepiness in the daytime, patient also has restless legs, patient also has anxiety and depression, patient does not have any history of collagen vascular issues, patient does have a dog as a pet but nothing new, patient does not have any mold exposure, but no history of any occupational hazards, patient does not have a history of any recent travels, patient does not have any history of taking any over-the-counter medications including any decongestants, patient does not have any new medications for anything else at this time, patient when evaluated was alert and communicative, no other pertinent review of system of concern    Past Medical History:   Diagnosis Date    Arthritis     CAD (coronary artery disease)     Cancer ([facility] [address]     [facility]     Depression     GERD (gastroesophageal reflux disease)     Hyperlipidemia     On intra-aortic balloon pump assist 2020    Removed during [facility] on 20    [facility]       Past Surgical History:   Procedure Laterality Date    CARDIAC CATHETERIZATION  2020    Dr. Damian Patel - IABP placed   [address] [place] N/A 2020     - off pump x1 (LIMA-LAD), removal of IABP    CT INSERT CATH PLEURA W IMAGE  2020    Dr. Britni Lewis - CT guided chest tube insertion    SINUS SURGERY      THORACENTESIS Left 2020    Dr. Silvestre Douglas - 1 L drained    TRANSESOPHAGEAL ECHOCARDIOGRAM  2020    during CABG       No Known Allergies    Medication list was reviewed and updated as [facility] in [facility]    Social History     Socioeconomic History    Marital status:      Spouse name: Not on file    Number of children: Not on file    Years of education: Not on file    Highest education level: Not on file   Occupational History    Not on file   Tobacco Use    Smoking status: Current Some Day Smoker     Packs/day: 1.00     Years: 10.00     Pack years: 10.00     Types: Cigarettes     Last attempt to quit: 2020     Years since quittin.4    Smokeless tobacco: Never Used   Vaping Use    Vaping Use: Never used Substance and Sexual Activity    Alcohol use: No    Drug use: No    Sexual activity: Not on file   Other Topics Concern    Not on file   Social History Narrative    Not on file     Social Determinants of Health     Financial Resource Strain:     Difficulty of Paying Living Expenses:    Food Insecurity:     Worried About Running Out of Food in the Last Year:     [address] in the Last Year:    Transportation Needs:     Lack of Transportation (Medical):  Lack of Transportation (Non-Medical):    Physical Activity:     Days of Exercise per Week:     Minutes of Exercise per Session:    Stress:     Feeling of Stress :    Social Connections:     Frequency of Communication with Friends and Family:     Frequency of Social Gatherings with Friends and Family:     Attends Evangelical Services:     Active Member of Clubs or Organizations:     Attends Club or Organization Meetings:     Marital Status:    Intimate Partner Violence:     Fear of Current or Ex-Partner:     Emotionally Abused:     Physically Abused:     Sexually Abused:        History reviewed. No pertinent family history. Review of Systems same as above    Physical Exam:  Blood pressure 109/75, pulse 91, temperature 97.7 °F (36.5 °C), temperature source Temporal, resp. rate 16, height 5' 3\" (1.6 m), weight 126 lb (57.2 kg), last menstrual period 11/01/2017, SpO2 97 %, not currently breastfeeding.'  Constitutional:  No acute distress. HENT:  Oropharynx is clear and moist. No thyromegaly. Eyes:  Conjunctivae arenormal. Pupils equal, round, and reactive to light. No scleral icterus. Neck: . No tracheal deviation present. No obvious thyroid mass. Cardiovascular:Normal rate, regular rhythm, normal heart sounds. No right ventricular heave. Nolower extremity edema. Pulmonary/Chest: No wheezes. No rales. Chest wall is not dull to percussion. Noaccessory muscle usage or stridor.   Decreased breath sound intensity  Abdominal: Soft. Bowel sounds present. No distension or hernia. Notenderness. Musculoskeletal: No cyanosis. No clubbing. No obvious joint deformity. Lymphadenopathy: No cervical or supraclavicular adenopathy. Skin: Skin is warm and dry. No rash or nodules on the exposed extremities. Psychiatric: Normal mood and affect. Behavior is normal.  No anxiety. Neurologic: Alert, awake and oriented. PERRL. Speech fluent      Sleep Medicine Data:  Sitting and reading: Moderate chance of dozing  Watching TV: Slight chance of dozing  Sitting, inactive in a public place (e.g. a theatre or a meeting): Would never doze  As a passenger in a car for an hour without a break: Slight chance of dozing  Lying down to rest in the afternoon when circumstances permit: Slight chance of dozing  Sitting and talking to someone: Would never doze  Sitting quietly after a lunch without alcohol: Would never doze  In a car, while stopped for a few minutes in traffic: Would never doze  Total score: 5  Neck circumference: 14    Data:     Imaging:  I have reviewed radiology images personally. No orders to display     XR CHEST PORTABLE    Result Date: 9/30/2021  EXAMINATION: ONE XRAY VIEW OF THE CHEST 9/30/2021 8:30 pm COMPARISON: January 27, 2021. HISTORY: ORDERING SYSTEM PROVIDED HISTORY: CP TECHNOLOGIST PROVIDED HISTORY: Reason for exam:->CP Reason for Exam: Chest Pain (for a week, sent by PCP) FINDINGS: Frontal portable view of the chest.  Hyperexpanded lung volume. No focal consolidation. Diffusely prominent interstitium. Right apical bullous disease. No pleural effusion or pneumothorax. Stable cardiomediastinal silhouette and great vessels. Stable regional skeleton. Hyperexpanded lung volume and diffusely prominent interstitium can be seen in the setting of COPD. No focal pulmonary consolidation.      CTA OF THE CHEST 7/10/2020 11:42 am       TECHNIQUE:   CTA of the chest was performed after the administration of intravenous   contrast.  Multiplanar reformatted images are provided for review.  MIP   images are provided for review. Dose modulation, iterative reconstruction,   and/or weight based adjustment of the mA/kV was utilized to reduce the   radiation dose to as low as reasonably achievable.       COMPARISON:   None.       HISTORY:   ORDERING SYSTEM PROVIDED HISTORY: SOB, post op CABG on 6/2/2020, abnormal   chest xray   TECHNOLOGIST PROVIDED HISTORY:   Reason for exam:->SOB, post op CABG on 6/2/2020, abnormal chest xray   Reason for Exam: CABG last month, had an outpatient appt for a CT chest w/o,   felt sick and vomitting, called doctor and he/she told her to come to ER   Acuity: Unknown   Type of Exam: Subsequent/Follow-up   Relevant Medical/Surgical History: hx of Coronary artery bypass graft       FINDINGS:   Pulmonary Arteries: Pulmonary arteries are adequately opacified for   evaluation.  No evidence of intraluminal filling defect to suggest pulmonary   embolism.  Main pulmonary artery is normal in caliber.       Mediastinum: No evidence of mediastinal lymphadenopathy.  The heart and   pericardium demonstrate no acute abnormality.  There is no acute abnormality   of the thoracic aorta.       Lungs/pleura: There is a moderate-sized left pleural effusion with associated   underlying left basilar and lingular compressive atelectasis.  Moderate   emphysematous changes are present throughout the aerated portions of both   lungs.       Upper Abdomen: Limited images of the upper abdomen are unremarkable.       Soft Tissues/Bones: No acute bone or soft tissue abnormality.           Impression   1. Negative for pulmonary embolus. 2. Moderate-sized left effusion with associated underlying left basilar   compressive atelectasis.    3. Advanced emphysema.         No growth 60 to 72 hours    Gram Stain Result 07/11/2020 12:15  [facility] present   No organisms seen   Cytospin performed,no quantitation        Pleural Fluid:    - Negative for malignancy. ECHO in Sept 2021-Indications:Cardiomyopathy.     Patient Status: Routine     Height: 63 inches Weight: 117 pounds BSA: 1.54 m2 BMI: 20.73 kg/m2     BP: 100/70 mmHg      Conclusions      Summary   Normal left ventricle size and wall thickness. The left ventricular systolic function is mildly reduced with an ejection   fraction of 35-40%. Moderate Global with regional hypokinesis. 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   Normal left ventricular diastolic filling pressure. The right ventricle is normal in size and function. MIld aortic regurgitation. Mild mitral regurgitation. Compared with the prior study performed 2/10/21, on direct comparison of the   images, EF appears grossly similar while the basal inferior wall does now   appear aneurysmal.      Assessment:    1. SOB (shortness of breath)    - Full PFT Study With Bronchodilator; Future  - 6 Minute Walk Test; Future    2. Other emphysema ([address]    - Full PFT Study With Bronchodilator; Future  - 6 Minute Walk Test; Future    3. Observed sleep apnea    - Baseline Diagnostic Sleep Study; Future    4. Ischemic cardiomyopathy    - Baseline Diagnostic Sleep Study; Future    5. S/P CABG (coronary artery bypass graft)    - Baseline Diagnostic Sleep Study; Future    6. RLS (restless legs syndrome)    - Baseline Diagnostic Sleep Study; Future    7. Smoker      8.  Gastroesophageal reflux disease, unspecified whether esophagitis present          Plan:   · Patient's review of system were discussed  · Patient and family were told about the clinical finding including auscultation and implications  · Patient and family were shown the CT of the chest done in July of last year along with findings/interpretation/implications and options  · Patient does have history of advanced pulmonary emphysema along with that patient also had some pleural effusion with compression atelectasis and patient underwent a pigtail catheter placement at that time which was negative for any infections or malignancy  · Patient's echocardiogram results from September of this year as compared to earlier were discussed and patient continues to have cardiomyopathy along with that patient also has some aneurysmal dilatation of basal inferior segment which is new  · Patient was told about the etiology for shortness of breath which may be a combination of cardiomyopathy along with that patient also has pulmonary emphysema which may be contributing to patient's symptomatology  · Clinically on the basis of patient's symptomatology and review of system it appears that patient also has observed sleep apnea which may be contributing to patient's symptomatology  · Patient was told about the pathophysiology of the disease process and its modifying factors  · Patient was advised to stop smoking otherwise she will have increasing morbidity mentality  · Diet and lifestyle modifications discussed in detail  · Patient needs to cut down on salt intake fats and also patient needs to exercise on regular basis which she does not do at this time besides stopping smoking  · Patient was also told about the pathophysiology and sequelae of DAVE  · Cardiac medications as per cardiology to continue  · Will subject the patient to a PFT with a 6-minute walk test to assess the extent of liver disease or any restrictive lung disease especially in the view that patient had a CABG to define the prognosis and also to tailor the medications  · After discussion patient also to be subject to a baseline sleep study to see if patient has any DAVE contributing to the clinical picture which needs to be evaluated and managed  · Patient to use some saline nasal spray over-the-counter for sinus congestion  · Patient is not taking any over-the-counter decongestants  · Patient took continue with H2 blocker as given by PCP for GERD  · Patient is up-to-date on the flu shot  · Patient's further treatment depending on patient's clinical status and the follow-up on above recommendations along with the test result results

## 2021-10-28 NOTE — PATIENT INSTRUCTIONS
Remember to bring a list of pulmonary medications and any CPAP or BiPAP machines to your next appointment with the office. Please keep all of your future appointments scheduled by [facility] office. Out of respect for other patients and providers, you may be asked to reschedule your appointment if you arrive later than your scheduled appointment time. Appointments cancelled less than 24hrs in advance will be considered a no show. Patients with three missed appointments within 1 year or four missed appointments within 2 years can be dismissed from the practice. Please be aware that our physicians are required to work in the Intensive Care Unit at [facility].  Your appointment may need to be rescheduled if they are designated to work during your appointment time. You may receive a survey regarding the care you received during your visit. Your input is valuable to us. We encourage you to complete and return your survey. We hope you will choose us in the future for your healthcare needs. Pt instructed of all future appointment dates & times, including radiology, labs, procedures & referrals. If procedures were scheduled preparation instructions provided. Instructions on future appointments with [facility] were given. The [address]    Please call our office to schedule your next appointment after your sleep study is scheduled.

## 2021-10-28 NOTE — TELEPHONE ENCOUNTER
Future appt scheduled 12/29/2021               Last appt 09/30/2021      Last Written 10/11/2021    FLUoxetine (PROZAC) 20 MG capsule  #90  2 RF         Last Time this was sent to [address] [place] [address]

## 2021-10-28 NOTE — PROGRESS NOTES
MA Communication: The following orders are received by verbal communication from Kevin Moore MD    Orders include: [facility] to schedule the patient     PFT/6MW: 11/29/2021 10:30 am    Follow up:   Will call back to schedule

## 2021-11-16 ENCOUNTER — HOSPITAL ENCOUNTER (OUTPATIENT)
Dept: SLEEP CENTER | Age: 56
Discharge: HOME | End: 2021-11-18
Payer: COMMERCIAL

## 2021-11-16 DIAGNOSIS — I25.5 ISCHEMIC CARDIOMYOPATHY: ICD-10-CM

## 2021-11-16 DIAGNOSIS — G25.81 RLS (RESTLESS LEGS SYNDROME): ICD-10-CM

## 2021-11-16 DIAGNOSIS — G47.30 OBSERVED SLEEP APNEA: ICD-10-CM

## 2021-11-16 DIAGNOSIS — Z95.1 S/P CABG (CORONARY ARTERY BYPASS GRAFT): ICD-10-CM

## 2021-11-16 PROCEDURE — 95810 POLYSOM 6/> YRS 4/> PARAM: CPT

## 2021-11-17 PROCEDURE — 95810 POLYSOM 6/> YRS 4/> PARAM: CPT | Performed by: INTERNAL MEDICINE

## 2021-11-23 DIAGNOSIS — Z20.822 ENCOUNTER FOR PREPROCEDURE SCREENING LABORATORY TESTING FOR COVID-19: ICD-10-CM

## 2021-11-23 DIAGNOSIS — Z01.812 ENCOUNTER FOR PREPROCEDURE SCREENING LABORATORY TESTING FOR COVID-19: ICD-10-CM

## 2021-11-23 DIAGNOSIS — Z20.822 ENCOUNTER FOR PREPROCEDURE SCREENING LABORATORY TESTING FOR COVID-19: Primary | ICD-10-CM

## 2021-11-23 DIAGNOSIS — Z01.812 ENCOUNTER FOR PREPROCEDURE SCREENING LABORATORY TESTING FOR COVID-19: Primary | ICD-10-CM

## 2021-11-23 LAB — SARS-COV-2: NEGATIVE

## 2021-11-24 ENCOUNTER — TELEPHONE (OUTPATIENT)
Dept: ADMINISTRATIVE | Age: 56
End: 2021-11-24

## 2021-11-24 RX ORDER — NICOTINE 21 MG/24HR
1 PATCH, TRANSDERMAL 24 HOURS TRANSDERMAL DAILY
Qty: 30 PATCH | Refills: 0 | Status: SHIPPED | OUTPATIENT
Start: 2021-11-24 | End: 2021-12-22 | Stop reason: SDUPTHER

## 2021-11-24 RX ORDER — METOPROLOL SUCCINATE 25 MG/1
TABLET, EXTENDED RELEASE ORAL
Qty: 90 TABLET | Refills: 1 | Status: SHIPPED | OUTPATIENT
Start: 2021-11-24 | End: 2022-03-11

## 2021-11-26 RX ORDER — FAMOTIDINE 20 MG/1
40 TABLET, FILM COATED ORAL EVERY EVENING
Qty: 60 TABLET | Refills: 5 | Status: SHIPPED | OUTPATIENT
Start: 2021-11-26 | End: 2022-05-26

## 2021-11-29 ENCOUNTER — HOSPITAL ENCOUNTER (OUTPATIENT)
Dept: PULMONOLOGY | Age: 56
Discharge: HOME | End: 2021-11-29
Payer: COMMERCIAL

## 2021-11-29 DIAGNOSIS — R06.02 SOB (SHORTNESS OF BREATH): ICD-10-CM

## 2021-11-29 DIAGNOSIS — J43.8 OTHER EMPHYSEMA (HCC): ICD-10-CM

## 2021-11-29 LAB
DLCO %PRED: 74 %
DLCO PRED: NORMAL
DLCO/VA %PRED: NORMAL
DLCO/VA PRED: NORMAL
DLCO/VA: NORMAL
DLCO: NORMAL
EXPIRATORY TIME-POST: NORMAL
EXPIRATORY TIME: NORMAL
FEF 25-75% %CHNG: NORMAL
FEF 25-75% %PRED-POST: NORMAL
FEF 25-75% %PRED-PRE: NORMAL
FEF 25-75% PRED: NORMAL
FEF 25-75%-POST: NORMAL
FEF 25-75%-PRE: NORMAL
FEV1 %PRED-POST: 99 %
FEV1 %PRED-PRE: 90 %
FEV1 PRED: NORMAL
FEV1-POST: NORMAL
FEV1-PRE: NORMAL
FEV1/FVC %PRED-POST: NORMAL
FEV1/FVC %PRED-PRE: NORMAL
FEV1/FVC PRED: NORMAL
FEV1/FVC-POST: 70 %
FEV1/FVC-PRE: 69 %
FVC %PRED-POST: NORMAL
FVC %PRED-PRE: NORMAL
FVC PRED: NORMAL
FVC-POST: NORMAL
FVC-PRE: NORMAL
GAW %PRED: NORMAL
GAW PRED: NORMAL
GAW: NORMAL
IC %PRED: NORMAL
IC PRED: NORMAL
IC: NORMAL
MEP: NORMAL
MIP: NORMAL
MVV %PRED-PRE: NORMAL
MVV PRED: NORMAL
MVV-PRE: NORMAL
PEF %PRED-POST: NORMAL
PEF %PRED-PRE: NORMAL
PEF PRED: NORMAL
PEF%CHNG: NORMAL
PEF-POST: NORMAL
PEF-PRE: NORMAL
RAW %PRED: NORMAL
RAW PRED: NORMAL
RAW: NORMAL
RV %PRED: NORMAL
RV PRED: NORMAL
RV: NORMAL
SVC %PRED: NORMAL
SVC PRED: NORMAL
SVC: NORMAL
TLC %PRED: 112 %
TLC PRED: NORMAL
TLC: NORMAL
VA %PRED: NORMAL
VA PRED: NORMAL
VA: NORMAL
VTG %PRED: NORMAL
VTG PRED: NORMAL
VTG: NORMAL

## 2021-11-29 PROCEDURE — 94618 PULMONARY STRESS TESTING: CPT

## 2021-11-29 PROCEDURE — 94060 EVALUATION OF WHEEZING: CPT

## 2021-11-29 PROCEDURE — 94729 DIFFUSING CAPACITY: CPT

## 2021-11-29 PROCEDURE — 6370000000 HC RX 637 (ALT 250 FOR IP): Performed by: INTERNAL MEDICINE

## 2021-11-29 PROCEDURE — 94726 PLETHYSMOGRAPHY LUNG VOLUMES: CPT

## 2021-11-29 RX ORDER — ALBUTEROL SULFATE 90 UG/1
4 AEROSOL, METERED RESPIRATORY (INHALATION) ONCE
Status: COMPLETED | OUTPATIENT
Start: 2021-11-29 | End: 2021-11-29

## 2021-11-29 RX ADMIN — Medication 4 PUFF: at 10:28

## 2021-11-29 ASSESSMENT — PULMONARY FUNCTION TESTS
FEV1/FVC_POST: 70
FEV1_PERCENT_PREDICTED_POST: 99
FEV1/FVC_PRE: 69
FEV1_PERCENT_PREDICTED_PRE: 90

## 2021-11-30 ENCOUNTER — OFFICE VISIT (OUTPATIENT)
Dept: CARDIOLOGY CLINIC | Age: 56
End: 2021-11-30
Payer: COMMERCIAL

## 2021-11-30 VITALS
WEIGHT: 129 LBS | SYSTOLIC BLOOD PRESSURE: 90 MMHG | HEART RATE: 82 BPM | OXYGEN SATURATION: 97 % | BODY MASS INDEX: 22.86 KG/M2 | DIASTOLIC BLOOD PRESSURE: 60 MMHG | HEIGHT: 63 IN

## 2021-11-30 DIAGNOSIS — I25.5 ISCHEMIC CARDIOMYOPATHY: ICD-10-CM

## 2021-11-30 DIAGNOSIS — Z95.1 S/P CABG X 1: ICD-10-CM

## 2021-11-30 DIAGNOSIS — I51.89 COMBINED SYSTOLIC AND DIASTOLIC CARDIAC DYSFUNCTION: ICD-10-CM

## 2021-11-30 DIAGNOSIS — I25.10 CORONARY ARTERY DISEASE INVOLVING NATIVE CORONARY ARTERY OF NATIVE HEART WITHOUT ANGINA PECTORIS: Primary | ICD-10-CM

## 2021-11-30 DIAGNOSIS — F17.200 SMOKER: ICD-10-CM

## 2021-11-30 DIAGNOSIS — J43.8 OTHER EMPHYSEMA (HCC): ICD-10-CM

## 2021-11-30 DIAGNOSIS — E78.00 HYPERCHOLESTEREMIA: ICD-10-CM

## 2021-11-30 PROCEDURE — G8427 DOCREV CUR MEDS BY ELIG CLIN: HCPCS | Performed by: NURSE PRACTITIONER

## 2021-11-30 PROCEDURE — 3017F COLORECTAL CA SCREEN DOC REV: CPT | Performed by: NURSE PRACTITIONER

## 2021-11-30 PROCEDURE — 3023F SPIROM DOC REV: CPT | Performed by: NURSE PRACTITIONER

## 2021-11-30 PROCEDURE — G8420 CALC BMI NORM PARAMETERS: HCPCS | Performed by: NURSE PRACTITIONER

## 2021-11-30 PROCEDURE — 4004F PT TOBACCO SCREEN RCVD TLK: CPT | Performed by: NURSE PRACTITIONER

## 2021-11-30 PROCEDURE — 99214 OFFICE O/P EST MOD 30 MIN: CPT | Performed by: NURSE PRACTITIONER

## 2021-11-30 PROCEDURE — G8484 FLU IMMUNIZE NO ADMIN: HCPCS | Performed by: NURSE PRACTITIONER

## 2021-11-30 PROCEDURE — G8926 SPIRO NO PERF OR DOC: HCPCS | Performed by: NURSE PRACTITIONER

## 2021-11-30 RX ORDER — PRAMIPEXOLE DIHYDROCHLORIDE 0.25 MG/1
.25-.5 TABLET ORAL NIGHTLY PRN
Qty: 60 TABLET | Refills: 5
Start: 2021-11-30

## 2021-11-30 NOTE — PATIENT INSTRUCTIONS
1. Continue the metoprolol (Toprol XL) 25 mg once daily  2. Have the repeat cholesterol panel at your earliest convenience  3. No change in other heart medicines  4. Will plan to repeat MUGA 3 months after starting the metoprolol (January) - call [phone]) to schedule   5. Follow up with Dr. Carlos Santacruz or Sandra Simmons, [facility]) in 3 months        Ref.  Range 1/28/2021 04:57 8/26/2021 06:54   Cholesterol, Fasting Latest Ref Range: 0 - 199 mg/dL 130 176   HDL Cholesterol Latest Ref Range: 40 - 60 mg/dL 37 (L) 47   LDL Calculated Latest Ref Range: <70 mg/dL 69 102 (H)   Triglyceride, Fasting Latest Ref Range: 0 - 150 mg/dL 119 136

## 2021-11-30 NOTE — PROGRESS NOTES
[address]   Cardiac Evaluation    Primary Care Doctor: Latosha Rosario MD    Chief Complaint   Patient presents with    3 Month Follow-Up        Assessment:    1. Coronary artery disease involving native coronary artery of native heart without angina pectoris    2. S/P CABG x 1    3. Ischemic cardiomyopathy    4. Combined systolic and diastolic cardiac dysfunction    5. Hypercholesteremia    6. Other emphysema ([address]    7. Smoker        Plan:   1. Continue the metoprolol (Toprol XL) 25 mg once daily  2. Have the repeat cholesterol panel at your earliest convenience  3. No change in other heart medicines  4. Will plan to repeat MUGA 3 months after starting the metoprolol (January)  5. Follow up with Dr. Carlos Santacruz or Sandra Simmons, [facility]) in 3 months     Vitals:    11/30/21 1333   BP: 90/60   Pulse: 82   SpO2: 97%   Weight: 129 lb (58.5 kg)   Height: 5' 3\" (1.6 m)       History of Present Illness:   I had the pleasure of seeing Neita Phoenix in follow up for CAD with hx of IWMI s/p RONDA to RCA (5/2021) followed by CABG X1 (6/2021, LIMA-LAD), ICM, s/d CHF, HLD as well as emphysema and smoker. She reported symptoms of shortness of breath. She was referred to pulmonary for further evaluation. She also underwent echocardiogram which showed an EF of 35 to 40%, inferior hypokinesis with aneurysmal area. She then underwent a MUGA scan which calculated her LVEF of 47%. There is consideration of initiating Entresto for LV dysfunction but this was foregone following MUGA scan. Also of note she had blood work completed which showed an elevation of her LDL cholesterol. The Lipitor was then changed to Crestor 40 mg daily. Her breathing is about the same. Worse with exertion. She had PFT's yesterday along with 6 MWT - results not available as yet. She denies wheezing but was told she was wheezing yesterday. No cough.    Sleep study without significant apnea spells or hypoxia but noted RLS/ PLMD.  Occasional palpitations, not often, not daily. No lightheadedness or dizziness. Stays busy around the house but no dedicated exercise. Sleep is just fair. Goes to bed at 10 pm, sleep till 8am but then starts to feel tired about 12 noon. Appetite is good, eating okay. Ritesh Benitez describes symptoms including dyspnea, palpitations, fatigue but denies chest pain, orthopnea, PND, early saiety, edema, syncope. NYHA:   II  ACC/ AHA Stage:    C    Past Medical History:   has a past medical history of Arthritis, CAD (coronary artery disease), Cancer ([facility] [address] CHF (congestive heart failure) ([facility] [address] Depression, GERD (gastroesophageal reflux disease), Hyperlipidemia, On intra-aortic balloon pump assist, and Other emphysema ([facility] [address] Surgical History:   has a past surgical history that includes sinus surgery;  section; Coronary artery bypass graft (N/A, 2020); CT GUIDED PLEURAL DRAINAGE W CATH PERC (2020); thoracentesis (Left, 2020); transesophageal echocardiogram (2020); and Cardiac catheterization (2020). Social History:   reports that she has been smoking cigarettes. She has a 10.00 pack-year smoking history. She has never used smokeless tobacco. She reports that she does not drink alcohol and does not use drugs. Family History:   History reviewed. No pertinent family history. Home Medications:  Prior to Admission medications    Medication Sig Start Date End Date Taking?  Authorizing Provider   famotidine (PEPCID) 20 MG tablet TAKE 2 TABLETS BY MOUTH EVERY EVENING 21  Yes Macho Johnston MD   nicotine (NICODERM CQ) 14 MG/24HR PLACE 1 PATCH ONTO THE SKIN DAILY 21 Yes Marty Gomez MD   metoprolol succinate (TOPROL XL) 25 MG extended release tablet TAKE 1 TABLET BY MOUTH EACH EVENING 21  Yes Marty Gomez MD   esomeprazole (NEXIUM) 20 MG delayed release capsule Take 1 capsule by mouth daily 21  Yes Macho Johnston MD FLUoxetine (PROZAC) 20 MG capsule TAKE THREE CAPSULES BY MOUTH DAILY 10/28/21  Yes Carlitos Cosby MD   FLUoxetine [facility]) 20 MG capsule Take 3 capsules by mouth daily 10/11/21  Yes Carlitos Cosby MD   sucralfate (CARAFATE) 1 GM tablet Take 1 tablet by mouth 4 times daily for 10 days 9/30/21 11/30/21 Yes Elena Butler MD   rosuvastatin (CRESTOR) 40 MG tablet Take 1 tablet by mouth daily 8/27/21  Yes Ata Izaguirre MD   aspirin (ASPIRIN LOW DOSE) 81 MG EC tablet TAKE 1 TABLET BY MOUTH DAILY 8/24/21  Yes Ata Izaguirre MD   digoxin (LANOXIN) 125 MCG tablet TAKE ONE TABLET BY MOUTH DAILY 8/24/21  Yes Ata Izaguirre MD   mirtazapine (REMERON) 15 MG tablet Take 1 tablet by mouth nightly 7/26/21  Yes Carlitos Cosby MD   magnesium oxide (MAG-OX) 400 MG tablet TAKE ONE (1) TABLET BY MOUTH TWICE DAILY 6/25/21  Yes Carlitos Cosby MD   BRILINTA 90 MG TABS tablet TAKE 1 TABLET BY MOUTH TWICE DAILY 4/29/21  Yes Reyes Salts, APRN - CNP   tiZANidine (ZANAFLEX) 4 MG tablet  12/9/20  Yes Historical Provider, MD   potassium chloride (KLOR-CON M) 20 MEQ extended release tablet TAKE 1 TABLET BY MOUTH TWICE DAILY WITH MEALS  Patient not taking: Reported on 9/30/2021 9/3/21   Ata Izaguirre MD   conjugated estrogens (PREMARIN) 0.625 MG/GM vaginal cream 3 days a week  Patient not taking: Reported on 8/24/2021 4/23/21   Carlitos Cosby MD   diclofenac (VOLTAREN) 50 MG EC tablet  12/9/20   Historical Provider, MD   pramipexole (MIRAPEX) 0.25 MG tablet Take 1-2 tablets by mouth nightly as needed (RESTLESS LEG)  Patient not taking: Reported on 8/24/2021 11/5/20   Carlitos Cosby MD        Allergies:  Patient has no known allergies.      Physical Examination:    Vitals:    11/30/21 1333   BP: 90/60   Pulse: 82   SpO2: 97%   Weight: 129 lb (58.5 kg)   Height: 5' 3\" (1.6 m)     Constitutional and General Appearance: Warm and dry, no apparent distress, normal coloration  HEENT:  Normocephalic, atraumatic  Respiratory:  · Normal excursion and expansion without use of accessory muscles  · Resp Auscultation: Clear to auscultation   Cardiovascular:  · The apical impulses not displaced  · Heart tones are crisp and normal  · JVP 8 cm H2O  · Regular rate and rhythm, normal S1S2, no m/g/r  · Peripheral pulses are symmetrical and full  · There is no clubbing, cyanosis of the extremities.   · No BLE edema  · Pedal Pulses: 2+ and equal   Abdomen:  · No masses or tenderness  · Liver/Spleen: No Abnormalities Noted  Neurological/Psychiatric:  · Alert and oriented in all spheres  · Moves all extremities well  · Exhibits normal gait balance and coordination  · No abnormalities of mood, affect, memory, mentation, or behavior are noted    Lab Data:  Most recent lab results below reviewed in office    CBC:   Lab Results   Component Value Date    WBC 9.3 09/30/2021    WBC 10.0 08/26/2021    WBC 8.7 03/08/2021    RBC 4.50 09/30/2021    RBC 4.62 08/26/2021    RBC 4.26 03/08/2021    HGB 13.7 09/30/2021    HGB 13.9 08/26/2021    HGB 12.9 03/08/2021    HCT 39.2 09/30/2021    HCT 41.3 08/26/2021    HCT 37.7 03/08/2021    MCV 87.0 09/30/2021    MCV 89.3 08/26/2021    MCV 88.7 03/08/2021    RDW 13.7 09/30/2021    RDW 13.9 08/26/2021    RDW 13.2 03/08/2021     09/30/2021     08/26/2021     03/08/2021     BMP:  Lab Results   Component Value Date     09/30/2021     08/26/2021     03/08/2021    K 4.2 09/30/2021    K 4.1 08/26/2021    K 4.1 03/10/2021    K 5.3 03/08/2021    K 4.3 01/28/2021    K 3.9 01/27/2021     09/30/2021     08/26/2021     03/08/2021    CO2 20 09/30/2021    CO2 23 08/26/2021    CO2 25 03/08/2021    BUN 11 09/30/2021    BUN 15 08/26/2021    BUN 9 03/08/2021    CREATININE <0.5 09/30/2021    CREATININE 0.6 08/26/2021    CREATININE 0.9 03/08/2021     BNP:   Lab Results   Component Value Date    PROBNP 360 09/30/2021    PROBNP 283 08/26/2021    PROBNP 697 01/27/2021     LIPID:   Lab Results Component Value Date    TRIG 119 01/28/2021    TRIG 102 06/01/2020    HDL 47 08/26/2021    HDL 37 01/28/2021    HDL 45 05/28/2011    LDLCALC 102 08/26/2021    LDLCALC 69 01/28/2021       Cardiac Imaging:  MUGA 9/23/2021:    MUGA Conclusions  Abnormal resting left ventricular function with mild global hypokinesis and  EF= 47% . ECHO 9/16/2021:    Summary   Normal left ventricle size and wall thickness. The left ventricular systolic function is mildly reduced with an ejection fraction of 35-40%. Moderate Global with regional hypokinesis. Basal inferior segment does appear aneurysmal.     Normal left ventricular diastolic filling pressure. The right ventricle is normal in size and function. MIld aortic regurgitation. Mild mitral regurgitation. Compared with the prior study performed 2/10/21, on direct comparison of the images, EF appears grossly similar while the basal inferior wall does now appear aneurysmal.     Echo 2/10/2021:  The left ventricular systolic function is mildly reduced with an ejection fraction of 40-45 %. There is hypokinesis of the basal septal, basal inferior, and basal inferolateral walls. Normal left ventricular diastolic filling pressure. The left atrium is mildly dilated. Moderate aortic regurgitation. Mild mitral regurgitation. Systolic pulmonary artery pressure (SPAP) is normal estimated at 28 mmHg (Right atrial pressure of 3 mmHg). Compared to exam done 9/16/2020, Left ventricular systolic function appears improved. Stress test 1/28/2021:  There is no evidence of stress induced ischemia. Small fixed defect    posterobasal segment c/w infarct. LV function is normal with uniform wall    motion and ejection fraction of 56%. Low risk abnormal study. CABG 6/2/2020:  CORONARY ARTERY BYPASS GRAFTING X1, INTERNAL MAMMARY ARTERY, OFF PUMP (LIMA TO LAD), 5 LEVEL BILATERAL INTERCOSTAL NERVE BLOCK, BALLOON PUMP REMOVAL     Echo 5/2020:   The left ventricular systolic function is moderately reduced with an ejection fraction of 35 %. There is hypokinesis of the inferior, basal inferior and inferiolateral walls. Grade I diastolic dysfunction with normal filling pressure. Mild mitral and aortic regurgitation. Systolic pulmonic artery pressure (SPAP) is normal estimated at 38 mmHg (Right atrial pressure of 8 mmHg). Coronary angiogram 5/31/2020:  Dominance:Right   LM: 50% distal stenosis unchanged from prior  LAD: larger vessel with mild plaquing; gives off three diagonals of which the second is with aneursym and 70% proximal stenosis  LCx: smaller caliber with minimal plaque disease   RCA: mild plaquing proximal to mid vessel with widely patent distal stents and no significant disease of RPLB or RPDA   LVEDP: 19 mmHg , no gradient upon pullback   LVEF: 35%  Discussed films and distal LM disease with CTS Dr. Myrtle Zapata in person postprocedure. Plan is for continuation of IABP with Heparin and Cangrelor gtts as bridge to CABG this week. Last dose of Ticagrelor earlier this morning. Continue aspirin, high intensity statin, low dose beta blocker, and diurese as see fit. Coronary angiogram 5/19/2020:  Acute inferior STEMI  PROCEDURES PERFORMED    Left heart catheterization  LVgram  Coronary angiogam  Coronary cath  Thrombectomy of RCA  IVUS of RCA  PCI of RCA with 2 drug-eluting stents  PROCEDURE DESCRIPTION   Risks/benefits/alternatives/outcomes were discussed with patient and/or family and informed consent was obtained. This was an emergency procedure. patient was prepped draped in the usual sterile fashion. Local anaesthetic was applied over puncture site. Using a back wall technique, a 6 Uzbek Terumo sheath was inserted into right radial artery. Verapamil, nitroglycerin, nicardipine were administered through the sheath. Heparin was administered. Diagnostic 5 Setswana Medtronic pigtail and ultra catheters were used for diagnostic angiograms.   Initially IVUS was performed and showed MLD was 3.5 mm. Stents were postdilated both a 3 and 3.5 mm noncompliant balloon. There was 0% residual stenosis. There was GAVIN 0 flow before and GAVIN-3 after PCI. During procedure, patient had hypotension which responded to vasopressors and these were able to be weaned partially at the end of the case and patient's EKG and symptoms had markedly improved at end of case. Remaining CAD/ASHD was felt to be treated medically followed by possible CABG surgery for left main disease.   CONCLUSIONS:    Successful PCI of RCA with 2 drug-eluting stents  Will refer for consideration of CABG for left main disease  Addend, case d/w dr Femi Simmons, plan for outpt cabg, order for life vest in interim, ef 35% on current review      I appreciate the opportunity of cooperating in the care of this individual.    SUJATA Woodruff - CNP, 11/30/2021, 2:23 PM

## 2021-12-01 ENCOUNTER — HOSPITAL ENCOUNTER (OUTPATIENT)
Age: 56
Discharge: HOME | End: 2021-12-01
Payer: COMMERCIAL

## 2021-12-01 DIAGNOSIS — I25.10 CORONARY ARTERY DISEASE INVOLVING NATIVE CORONARY ARTERY OF NATIVE HEART WITHOUT ANGINA PECTORIS: ICD-10-CM

## 2021-12-01 LAB
ALBUMIN SERPL-MCNC: 4.5 G/DL (ref 3.4–5)
ALP BLD-CCNC: 107 U/L (ref 40–129)
ALT SERPL-CCNC: 16 U/L (ref 10–40)
AST SERPL-CCNC: 24 U/L (ref 15–37)
BILIRUB SERPL-MCNC: <0.2 MG/DL (ref 0–1)
BILIRUBIN DIRECT: <0.2 MG/DL (ref 0–0.3)
BILIRUBIN, INDIRECT: NORMAL MG/DL (ref 0–1)
CHOLESTEROL, FASTING: 157 MG/DL (ref 0–199)
HDLC SERPL-MCNC: 54 MG/DL (ref 40–60)
LDL CHOLESTEROL CALCULATED: 81 MG/DL
TOTAL PROTEIN: 7 G/DL (ref 6.4–8.2)
TRIGLYCERIDE, FASTING: 109 MG/DL (ref 0–150)
VLDLC SERPL CALC-MCNC: 22 MG/DL

## 2021-12-01 PROCEDURE — 80076 HEPATIC FUNCTION PANEL: CPT

## 2021-12-01 PROCEDURE — 80061 LIPID PANEL: CPT

## 2021-12-01 PROCEDURE — 36415 COLL VENOUS BLD VENIPUNCTURE: CPT

## 2021-12-08 NOTE — PROCEDURES
[address] [address]                               PULMONARY FUNCTION    PATIENT NAME: Delia York                 :        1965  MED REC NO:   [id number]                          ROOM:  ACCOUNT NO:   [de-identified]                           ADMIT DATE: 2021  PROVIDER:     Izabela Shay MD    DATE OF PROCEDURE:  2021    SIX-MINUTE WALK TEST    The patient is a 51-year-old female who underwent a PFT for shortness of  breath. Spirometry shows FVC to be 102%, FEV1 to be 90%, FEV1 to FVC  ratio was 87%, ZYQ89-55% was 60%. The patient had some improvement in  the postbronchodilator study. Lung volume shows the total lung capacity  was normal.  The patient has increase in air trapping. The patient has  also decrease in the ERV because of body habitus. The patient's  diffusion capacity when adjusted for volume was mild to moderately  reduced. The patient's flow-volume loop was suggestive of obstructive  pattern. The patient also had a 6-minute walk test which shows the  patient to have a baseline oxygen saturation of 99%, heart rate of 83,  respiratory rate of 16, dyspnea-modified Valerie scale of 0,  fatigue-modified Valerie scale of 0. The patient did not have any  exertional hypoxemia on the study. The patient walked 1400 feet. Total  expected 6-minute walk distance was 1843 feet. The patient achieved 76%  of the expected distance. On the basis of this PFT, the patient has  some small airway disease with reactive airway disease and also the  patient has decrease in diffusion capacity. Along with that, the  patient does not have any exertional hypoxemia on optimal exercise. Please correlate clinically.         Mundo Bosch MD    D: 2021 16:57:46       T: 2021 17:00:08     [email or web address]  Job#: [id number]     Doc#: [id number]

## 2021-12-10 ENCOUNTER — OFFICE VISIT (OUTPATIENT)
Dept: PULMONOLOGY | Age: 56
End: 2021-12-10
Payer: COMMERCIAL

## 2021-12-10 VITALS
OXYGEN SATURATION: 97 % | RESPIRATION RATE: 18 BRPM | TEMPERATURE: 97.7 F | HEART RATE: 86 BPM | HEIGHT: 63 IN | WEIGHT: 126 LBS | DIASTOLIC BLOOD PRESSURE: 61 MMHG | BODY MASS INDEX: 22.32 KG/M2 | SYSTOLIC BLOOD PRESSURE: 107 MMHG

## 2021-12-10 DIAGNOSIS — R06.02 SOB (SHORTNESS OF BREATH): ICD-10-CM

## 2021-12-10 DIAGNOSIS — Z95.1 S/P CABG (CORONARY ARTERY BYPASS GRAFT): ICD-10-CM

## 2021-12-10 DIAGNOSIS — J43.8 OTHER EMPHYSEMA (HCC): Primary | ICD-10-CM

## 2021-12-10 DIAGNOSIS — K21.9 GASTROESOPHAGEAL REFLUX DISEASE, UNSPECIFIED WHETHER ESOPHAGITIS PRESENT: ICD-10-CM

## 2021-12-10 PROCEDURE — 99213 OFFICE O/P EST LOW 20 MIN: CPT | Performed by: INTERNAL MEDICINE

## 2021-12-10 PROCEDURE — G8926 SPIRO NO PERF OR DOC: HCPCS | Performed by: INTERNAL MEDICINE

## 2021-12-10 PROCEDURE — 4004F PT TOBACCO SCREEN RCVD TLK: CPT | Performed by: INTERNAL MEDICINE

## 2021-12-10 PROCEDURE — G8420 CALC BMI NORM PARAMETERS: HCPCS | Performed by: INTERNAL MEDICINE

## 2021-12-10 PROCEDURE — 3017F COLORECTAL CA SCREEN DOC REV: CPT | Performed by: INTERNAL MEDICINE

## 2021-12-10 PROCEDURE — 3023F SPIROM DOC REV: CPT | Performed by: INTERNAL MEDICINE

## 2021-12-10 PROCEDURE — G8484 FLU IMMUNIZE NO ADMIN: HCPCS | Performed by: INTERNAL MEDICINE

## 2021-12-10 PROCEDURE — G8427 DOCREV CUR MEDS BY ELIG CLIN: HCPCS | Performed by: INTERNAL MEDICINE

## 2021-12-10 RX ORDER — ALBUTEROL SULFATE 90 UG/1
2 AEROSOL, METERED RESPIRATORY (INHALATION) EVERY 6 HOURS PRN
Qty: 1 EACH | Refills: 5 | Status: SHIPPED | OUTPATIENT
Start: 2021-12-10 | End: 2022-06-20 | Stop reason: SDUPTHER

## 2021-12-10 ASSESSMENT — SLEEP AND FATIGUE QUESTIONNAIRES
HOW LIKELY ARE YOU TO NOD OFF OR FALL ASLEEP WHILE SITTING INACTIVE IN A PUBLIC PLACE: 0
HOW LIKELY ARE YOU TO NOD OFF OR FALL ASLEEP WHILE WATCHING TV: 2
HOW LIKELY ARE YOU TO NOD OFF OR FALL ASLEEP WHILE SITTING AND TALKING TO SOMEONE: 0
HOW LIKELY ARE YOU TO NOD OFF OR FALL ASLEEP WHILE LYING DOWN TO REST IN THE AFTERNOON WHEN CIRCUMSTANCES PERMIT: 0
HOW LIKELY ARE YOU TO NOD OFF OR FALL ASLEEP WHILE SITTING AND READING: 2
HOW LIKELY ARE YOU TO NOD OFF OR FALL ASLEEP IN A CAR, WHILE STOPPED FOR A FEW MINUTES IN TRAFFIC: 0
ESS TOTAL SCORE: 8
NECK CIRCUMFERENCE (INCHES): 15
HOW LIKELY ARE YOU TO NOD OFF OR FALL ASLEEP WHILE SITTING QUIETLY AFTER LUNCH WITHOUT ALCOHOL: 2
HOW LIKELY ARE YOU TO NOD OFF OR FALL ASLEEP WHEN YOU ARE A PASSENGER IN A CAR FOR AN HOUR WITHOUT A BREAK: 2

## 2021-12-10 NOTE — PROGRESS NOTES
MA Communication:   The following orders are received by verbal communication from Keisha Lennon MD    Orders include:    6 month follow up

## 2021-12-10 NOTE — PATIENT INSTRUCTIONS
Remember to bring a list of pulmonary medications and any CPAP or BiPAP machines to your next appointment with the office. Please keep all of your future appointments scheduled by [facility] office. Out of respect for other patients and providers, you may be asked to reschedule your appointment if you arrive later than your scheduled appointment time. Appointments cancelled less than 24hrs in advance will be considered a no show. Patients with three missed appointments within 1 year or four missed appointments within 2 years can be dismissed from the practice. Please be aware that our physicians are required to work in the Intensive Care Unit at [facility].  Your appointment may need to be rescheduled if they are designated to work during your appointment time. You may receive a survey regarding the care you received during your visit. Your input is valuable to us. We encourage you to complete and return your survey. We hope you will choose us in the future for your healthcare needs. Pt instructed of all future appointment dates & times, including radiology, labs, procedures & referrals. If procedures were scheduled preparation instructions provided. Instructions on future appointments with [facility] were given.

## 2021-12-10 NOTE — PROGRESS NOTES
Chief Complaint/Referring Provider:  Patient has come to the office for pulmonary follow-up and to discuss the test results    Patient has come to the office for pulmonary follow-up and to discuss the test results, patient states that she does have some occasional sinus congestion but does not have any significant increasing cough or expectoration, patient does not have any increasing shortness of breath or wheezing, patient does not have any chest pain or palpitations, patient does not have any significant pleuritic chest pain, no abdominal discomfort of concern no increasing leg edema, patient still smokes off and on, patient does not have any change in them environment any sick contacts, patient does not have any other pertinent review of system of concern       Previous HPI: Patient is a 65-year-old female who has been referred to the office for a pulmonary evaluation for cardiomyopathy and shortness of breath by Dr. Margarito Bowers    Patient states that she has history of having 2 heart attacks in May of last year followed by CABG in June of last year, patient continues to have a shortness of breath on exertion, patient was recently evaluated by her cardiologist and patient's cardiomyopathy has not improved, patient states that her shortness of breath on exertion is persistent and also patient cannot walk more than 1 block after which she starts having shortness of breath, patient has sinus congestion with postnasal drainage, patient does not have any epistaxis or hemoptysis, patient does not have any sore throat or difficulty in swallowing, no coughing or choking while eating, no odynophagia or dysphagia per se, patient has some left-sided pleuritic chest pain, patient does not have any radiation of the pain to the left shoulder and left upper extremities, patient does not have any significant purulent expectoration, patient does not have any palpitations or diaphoresis, patient on questioning further states that she does have reflux symptoms for which she takes Pepcid, patient does not have any altered bowel habits or any hematochezia or melena, patient does not have any dysuria no hematuria, patient does not have any small joint pains, patient has been a smoker in the past and used to smoke about 1 pack a day but she has gone down but once in a while she still smokes, patient does not have any significant change in the weight per se, patient does snore, patient does have increased tiredness and sleepiness in the daytime, patient also has restless legs, patient also has anxiety and depression, patient does not have any history of collagen vascular issues, patient does have a dog as a pet but nothing new, patient does not have any mold exposure, but no history of any occupational hazards, patient does not have a history of any recent travels, patient does not have any history of taking any over-the-counter medications including any decongestants, patient does not have any new medications for anything else at this time, patient when evaluated was alert and communicative, no other pertinent review of system of concern    Past Medical History:   Diagnosis Date    Arthritis     CAD (coronary artery disease)     Cancer ([facility]     CHF (congestive heart failure) ([address]     Depression     GERD (gastroesophageal reflux disease)     Hyperlipidemia     On intra-aortic balloon pump assist 05/31/2020    Removed during [facility] on 6/2/20    Other emphysema ([facility] 10/28/2021       Past Surgical History:   Procedure Laterality Date    CARDIAC CATHETERIZATION  05/31/2020    Dr. Hiral Vázquez - IABP placed   [address] [place] [address] Drive GRAFT N/A 6/2/2020     - off pump x1 (LIMA-LAD), removal of IABP    CT INSERT CATH PLEURA W IMAGE  7/13/2020    Dr. Hao Ivey - CT guided chest tube insertion    SINUS SURGERY      THORACENTESIS Left 07/11/2020    Dr. Ephraim Richards - 1 L drained    TRANSESOPHAGEAL ECHOCARDIOGRAM 2020    during CABG       No Known Allergies    Medication list was reviewed and updated as needed in Epic    Social History     Socioeconomic History    Marital status:      Spouse name: Not on file    Number of children: Not on file    Years of education: Not on file    Highest education level: Not on file   Occupational History    Not on file   Tobacco Use    Smoking status: Current Some Day Smoker     Packs/day: 1.00     Years: 10.00     Pack years: 10.00     Types: Cigarettes     Last attempt to quit: 2020     Years since quittin.6    Smokeless tobacco: Never Used   Vaping Use    Vaping Use: Never used   Substance and Sexual Activity    Alcohol use: No    Drug use: No    Sexual activity: Not on file   Other Topics Concern    Not on file   Social History Narrative    Not on file     Social Determinants of Health     Financial Resource Strain:     Difficulty of Paying Living Expenses: Not on file   Food Insecurity:     Worried About [address] in the Last Year: Not on file    Neeta of Food in the Last Year: Not on file   Transportation Needs:     Lack of Transportation (Medical): Not on file    Lack of Transportation (Non-Medical):  Not on file   Physical Activity:     Days of Exercise per Week: Not on file    Minutes of Exercise per Session: Not on file   Stress:     Feeling of Stress : Not on file   Social Connections:     Frequency of Communication with Friends and Family: Not on file    Frequency of Social Gatherings with Friends and Family: Not on file    Attends Buddhism Services: Not on file    Active Member of Clubs or Organizations: Not on file    Attends Club or Organization Meetings: Not on file    Marital Status: Not on file   Intimate Partner Violence:     Fear of Current or Ex-Partner: Not on file    Emotionally Abused: Not on file    Physically Abused: Not on file    Sexually Abused: Not on file   Housing Stability:     Unable to Pay for Housing in the Last Year: Not on file    Number of Places Lived in the Last Year: Not on file    Unstable Housing in the Last Year: Not on file       History reviewed. No pertinent family history. Review of Systems same as above    Physical Exam:  Blood pressure 107/61, pulse 86, temperature 97.7 °F (36.5 °C), temperature source Temporal, resp. rate 18, height 5' 3\" (1.6 m), weight 126 lb (57.2 kg), last menstrual period 11/01/2017, SpO2 97 %, not currently breastfeeding.'  Constitutional:  No acute distress. HENT:  Oropharynx is clear and moist. No thyromegaly. Eyes:  Conjunctivae arenormal. Pupils equal, round, and reactive to light. No scleral icterus. Neck: . No tracheal deviation present. No obvious thyroid mass. Cardiovascular:Normal rate, regular rhythm, normal heart sounds. No right ventricular heave. Nolower extremity edema. Pulmonary/Chest: No wheezes. No rales. Chest wall is not dull to percussion. Noaccessory muscle usage or stridor. Decreased breath sound intensity  Abdominal: Soft. Bowel sounds present. No distension or hernia. Notenderness. Musculoskeletal: No cyanosis. No clubbing. No obvious joint deformity. Lymphadenopathy: No cervical or supraclavicular adenopathy. Skin: Skin is warm and dry. No rash or nodules on the exposed extremities. Psychiatric: Normal mood and affect. Behavior is normal.  No anxiety. Neurologic: Alert, awake and oriented. PERRL. Speech fluent      Sleep Medicine Data:  Sitting and reading: Moderate chance of dozing  Watching TV: Moderate chance of dozing  Sitting, inactive in a public place (e.g. a theatre or a meeting): Would never doze  As a passenger in a car for an hour without a break: Moderate chance of dozing  Lying down to rest in the afternoon when circumstances permit: Would never doze  Sitting and talking to someone: Would never doze  Sitting quietly after a lunch without alcohol:  Moderate chance of dozing  In a car, while stopped for a few minutes in traffic: Would never doze  Total score: 8  Neck circumference: 15    Data:     Imaging:  I have reviewed radiology images personally. No orders to display     XR CHEST PORTABLE    Result Date: 9/30/2021  EXAMINATION: ONE XRAY VIEW OF THE CHEST 9/30/2021 8:30 pm COMPARISON: January 27, 2021. HISTORY: ORDERING SYSTEM PROVIDED HISTORY: CP TECHNOLOGIST PROVIDED HISTORY: Reason for exam:->CP Reason for Exam: Chest Pain (for a week, sent by PCP) FINDINGS: Frontal portable view of the chest.  Hyperexpanded lung volume. No focal consolidation. Diffusely prominent interstitium. Right apical bullous disease. No pleural effusion or pneumothorax. Stable cardiomediastinal silhouette and great vessels. Stable regional skeleton. Hyperexpanded lung volume and diffusely prominent interstitium can be seen in the setting of COPD. No focal pulmonary consolidation. CTA OF THE CHEST 7/10/2020 11:42 am       TECHNIQUE:   CTA of the chest was performed after the administration of intravenous   contrast.  Multiplanar reformatted images are provided for review.  MIP   images are provided for review.  Dose modulation, iterative reconstruction,   and/or weight based adjustment of the mA/kV was utilized to reduce the   radiation dose to as low as reasonably achievable.       COMPARISON:   None.       HISTORY:   ORDERING SYSTEM PROVIDED HISTORY: SOB, post op CABG on 6/2/2020, abnormal   chest xray   TECHNOLOGIST PROVIDED HISTORY:   Reason for exam:->SOB, post op CABG on 6/2/2020, abnormal chest xray   Reason for Exam: CABG last month, had an outpatient appt for a CT chest w/o,   felt sick and vomitting, called doctor and he/she told her to come to ER   Acuity: Unknown   Type of Exam: Subsequent/Follow-up   Relevant Medical/Surgical History: hx of Coronary artery bypass graft       FINDINGS:   Pulmonary Arteries: Pulmonary arteries are adequately opacified for   evaluation.  No evidence of intraluminal filling defect to suggest pulmonary   embolism.  Main pulmonary artery is normal in caliber.       Mediastinum: No evidence of mediastinal lymphadenopathy.  The heart and   pericardium demonstrate no acute abnormality.  There is no acute abnormality   of the thoracic aorta.       Lungs/pleura: There is a moderate-sized left pleural effusion with associated   underlying left basilar and lingular compressive atelectasis.  Moderate   emphysematous changes are present throughout the aerated portions of both   lungs.       Upper Abdomen: Limited images of the upper abdomen are unremarkable.       Soft Tissues/Bones: No acute bone or soft tissue abnormality.           Impression   1. Negative for pulmonary embolus. 2. Moderate-sized left effusion with associated underlying left basilar   compressive atelectasis. 3. Advanced emphysema.         No growth 60 to 72 hours    Gram Stain Result 07/11/2020 12:15  [facility]   [facility] present   No organisms seen   Cytospin performed,no quantitation        Pleural Fluid:      - Negative for malignancy. ECHO in Sept 2021-Indications:Cardiomyopathy.     Patient Status: Routine     Height: 63 inches Weight: 117 pounds BSA: 1.54 m2 BMI: 20.73 kg/m2     BP: 100/70 mmHg      Conclusions      Summary   Normal left ventricle size and wall thickness. The left ventricular systolic function is mildly reduced with an ejection   fraction of 35-40%. Moderate Global with regional hypokinesis. 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   Normal left ventricular diastolic filling pressure. The right ventricle is normal in size and function. MIld aortic regurgitation. Mild mitral regurgitation.       Compared with the prior study performed 2/10/21, on direct comparison of the   images, EF appears grossly similar while the basal inferior wall does now   appear aneurysmal.    Patient sleep read shows patient does not have any DAVE    PFT-The patient is a 53-year-old female who underwent a PFT for shortness of  breath. Spirometry shows FVC to be 102%, FEV1 to be 90%, FEV1 to FVC  ratio was 87%, XNS13-89% was 60%. The patient had some improvement in  the postbronchodilator study. Lung volume shows the total lung capacity  was normal.  The patient has increase in air trapping. The patient has  also decrease in the ERV because of body habitus. The patient's  diffusion capacity when adjusted for volume was mild to moderately  reduced. The patient's flow-volume loop was suggestive of obstructive  pattern. The patient also had a 6-minute walk test which shows the  patient to have a baseline oxygen saturation of 99%, heart rate of 83,  respiratory rate of 16, dyspnea-modified Valerie scale of 0,  fatigue-modified Valerie scale of 0. The patient did not have any  exertional hypoxemia on the study. The patient walked 1400 feet. Total  expected 6-minute walk distance was 1843 feet. The patient achieved 76%  of the expected distance. On the basis of this PFT, the patient has  some small airway disease with reactive airway disease and also the  patient has decrease in diffusion capacity. Along with that, the  patient does not have any exertional hypoxemia on optimal exercise. Please correlate clinically. Assessment:    1. SOB (shortness of breath)        2. Other emphysema ([address]          3. Ischemic cardiomyopathy        4. S/P CABG (coronary artery bypass graft)      5. Smoker      6.  Gastroesophageal reflux disease, unspecified whether esophagitis present          Plan:   · Patient's review of system were discussed  · Patient was told about the clinical finding including auscultation and implications  · Patient does have history of advanced pulmonary emphysema along with that patient also had some pleural effusion with compression atelectasis and patient underwent a pigtail catheter placement at that time which was negative for any infections or malignancy  · Patient's echocardiogram results from September of this year as compared to earlier were discussed and patient continues to have cardiomyopathy along with that patient also has some aneurysmal dilatation of basal inferior segment which is new  · Patient was told about the etiology for shortness of breath which may be a combination of cardiomyopathy along with that patient also has pulmonary emphysema which may be contributing to patient's symptomatology  · Clinically on the basis of patient's symptomatology and review of system it appears that patient also has observed sleep apnea which may be contributing to patient's symptomatology  · Patient was told about the pathophysiology of the disease process and its modifying factors  · Patient was advised to stop smoking otherwise she will have increasing morbidity and mortality  · Diet and lifestyle modifications discussed in detail  · Patient needs to cut down on salt intake fats and also patient needs to exercise on regular basis which she does not do at this time besides stopping smoking  · Patient does not have any DAVE on the basis of sleep study  · Cardiac medications as per cardiology to continue  · Will subject the patient to a PFT with a 6-minute walk test to assess the extent of liver disease or any restrictive lung disease especially in the view that patient had a CABG to define the prognosis and also to tailor the medications  · Patient's PFT results were reviewed and discussed along with options  · Patient to use albuterol on as needed basis  · If patient requirement for inhalers go up then we will discuss about options  · Patient to use some saline nasal spray over-the-counter for sinus congestion  · Patient is not taking any over-the-counter decongestants  · Patient took continue with H2 blocker as given by PCP for GERD  · Patient is up-to-date on the flu shot  · Patient's further treatment depending on patient's clinical status and the follow-up in 6 months

## 2021-12-21 ENCOUNTER — TELEPHONE (OUTPATIENT)
Dept: FAMILY MEDICINE CLINIC | Age: 56
End: 2021-12-21

## 2021-12-21 RX ORDER — DOXYCYCLINE HYCLATE 100 MG
100 TABLET ORAL 2 TIMES DAILY
Qty: 20 TABLET | Refills: 0 | Status: SHIPPED | OUTPATIENT
Start: 2021-12-21 | End: 2021-12-29

## 2021-12-21 NOTE — TELEPHONE ENCOUNTER
Okay for Doxy 100 twice daily for 10 days and please offer free Covid test What Type Of Note Output Would You Prefer (Optional)?: Bullet Format How Severe Is Your Rash?: moderate Is This A New Presentation, Or A Follow-Up?: Follow Up Rash Additional History: Itch comes first

## 2021-12-21 NOTE — TELEPHONE ENCOUNTER
Patient c/o cough head congestion and nasal congestion x 1 week. No fever or sob.   Patient has not been tested for covid since the end of Nov. Can you prescribe something for her or do you want her to be retested first?

## 2021-12-22 DIAGNOSIS — I25.10 CORONARY ARTERY DISEASE INVOLVING NATIVE CORONARY ARTERY OF NATIVE HEART WITHOUT ANGINA PECTORIS: Primary | ICD-10-CM

## 2021-12-22 DIAGNOSIS — Z72.0 NICOTINE ABUSE: ICD-10-CM

## 2021-12-22 DIAGNOSIS — F17.200 SMOKER: ICD-10-CM

## 2021-12-22 RX ORDER — NICOTINE 21 MG/24HR
PATCH, TRANSDERMAL 24 HOURS TRANSDERMAL
Qty: 28 PATCH | Refills: 3 | Status: SHIPPED | OUTPATIENT
Start: 2021-12-22 | End: 2021-12-29

## 2021-12-22 RX ORDER — FLUOXETINE HYDROCHLORIDE 20 MG/1
60 CAPSULE ORAL DAILY
Qty: 90 CAPSULE | Refills: 2 | Status: SHIPPED | OUTPATIENT
Start: 2021-12-22 | End: 2022-03-11 | Stop reason: SDUPTHER

## 2021-12-22 NOTE — TELEPHONE ENCOUNTER
Future appt scheduled 12/29/21  Last appt 9/30/21  Refilled medication per verbal order from provider.

## 2021-12-23 RX ORDER — MAGNESIUM OXIDE 400 MG/1
TABLET ORAL
Qty: 60 TABLET | Refills: 5 | Status: SHIPPED | OUTPATIENT
Start: 2021-12-23 | End: 2022-06-21

## 2021-12-23 RX ORDER — MIRTAZAPINE 7.5 MG/1
TABLET, FILM COATED ORAL
Qty: 30 TABLET | Refills: 5 | Status: SHIPPED | OUTPATIENT
Start: 2021-12-23 | End: 2021-12-29

## 2021-12-29 ENCOUNTER — OFFICE VISIT (OUTPATIENT)
Dept: FAMILY MEDICINE CLINIC | Age: 56
End: 2021-12-29
Payer: COMMERCIAL

## 2021-12-29 VITALS
OXYGEN SATURATION: 95 % | HEIGHT: 63 IN | DIASTOLIC BLOOD PRESSURE: 64 MMHG | HEART RATE: 83 BPM | WEIGHT: 127.2 LBS | SYSTOLIC BLOOD PRESSURE: 99 MMHG | RESPIRATION RATE: 16 BRPM | BODY MASS INDEX: 22.54 KG/M2

## 2021-12-29 DIAGNOSIS — I51.89 COMBINED SYSTOLIC AND DIASTOLIC CARDIAC DYSFUNCTION: ICD-10-CM

## 2021-12-29 DIAGNOSIS — R06.02 SOB (SHORTNESS OF BREATH): ICD-10-CM

## 2021-12-29 DIAGNOSIS — I25.10 CORONARY ARTERY DISEASE INVOLVING NATIVE CORONARY ARTERY OF NATIVE HEART WITHOUT ANGINA PECTORIS: Primary | ICD-10-CM

## 2021-12-29 DIAGNOSIS — E78.00 HYPERCHOLESTEREMIA: ICD-10-CM

## 2021-12-29 DIAGNOSIS — J43.8 OTHER EMPHYSEMA (HCC): ICD-10-CM

## 2021-12-29 PROCEDURE — 3023F SPIROM DOC REV: CPT | Performed by: FAMILY MEDICINE

## 2021-12-29 PROCEDURE — G8420 CALC BMI NORM PARAMETERS: HCPCS | Performed by: FAMILY MEDICINE

## 2021-12-29 PROCEDURE — G8926 SPIRO NO PERF OR DOC: HCPCS | Performed by: FAMILY MEDICINE

## 2021-12-29 PROCEDURE — 4004F PT TOBACCO SCREEN RCVD TLK: CPT | Performed by: FAMILY MEDICINE

## 2021-12-29 PROCEDURE — G8427 DOCREV CUR MEDS BY ELIG CLIN: HCPCS | Performed by: FAMILY MEDICINE

## 2021-12-29 PROCEDURE — G8484 FLU IMMUNIZE NO ADMIN: HCPCS | Performed by: FAMILY MEDICINE

## 2021-12-29 PROCEDURE — 99214 OFFICE O/P EST MOD 30 MIN: CPT | Performed by: FAMILY MEDICINE

## 2021-12-29 PROCEDURE — 3017F COLORECTAL CA SCREEN DOC REV: CPT | Performed by: FAMILY MEDICINE

## 2021-12-29 NOTE — PROGRESS NOTES
needed  The diagnoses listed in the assessment above are stable unless otherwise indicated. Age-specific preventative health recommendations were reviewed and the [facility]" was provided. Avoid exposure to tobacco products. Follow CDC guidelines for covid-19 prevention. Read and consider all information provided by the pharmacy regarding prescribed medications before use    Call or return for any problems that arise before the next scheduled appointment. Maia Mccormack MD    This note was transcribed using a voice recognition software system. Proper technique and careful oversight were used to increase transcription accuracy but inadvertent errors may be present.

## 2022-01-14 ENCOUNTER — HOSPITAL ENCOUNTER (OUTPATIENT)
Dept: NUCLEAR MEDICINE | Age: 57
Discharge: HOME | End: 2022-01-14
Payer: COMMERCIAL

## 2022-01-14 DIAGNOSIS — I25.10 CORONARY ARTERY DISEASE INVOLVING NATIVE CORONARY ARTERY OF NATIVE HEART WITHOUT ANGINA PECTORIS: ICD-10-CM

## 2022-01-14 DIAGNOSIS — I25.5 ISCHEMIC CARDIOMYOPATHY: ICD-10-CM

## 2022-01-14 LAB
LV EF: 47 %
LVEF MODALITY: NORMAL

## 2022-01-14 PROCEDURE — 78472 GATED HEART PLANAR SINGLE: CPT

## 2022-01-14 PROCEDURE — 3430000000 HC RX DIAGNOSTIC RADIOPHARMACEUTICAL: Performed by: FAMILY MEDICINE

## 2022-01-14 PROCEDURE — A9560 TC99M LABELED RBC: HCPCS | Performed by: FAMILY MEDICINE

## 2022-01-14 RX ADMIN — Medication 21 MILLICURIE: at 08:45

## 2022-01-20 ENCOUNTER — TELEPHONE (OUTPATIENT)
Dept: CARDIOLOGY CLINIC | Age: 57
End: 2022-01-20

## 2022-01-24 DIAGNOSIS — I25.10 CORONARY ARTERY DISEASE INVOLVING NATIVE CORONARY ARTERY OF NATIVE HEART WITHOUT ANGINA PECTORIS: ICD-10-CM

## 2022-01-24 DIAGNOSIS — I21.3 ST ELEVATION MYOCARDIAL INFARCTION (STEMI), UNSPECIFIED ARTERY (HCC): Primary | ICD-10-CM

## 2022-01-25 RX ORDER — TICAGRELOR 90 MG/1
TABLET ORAL
Qty: 180 TABLET | Refills: 3 | Status: SHIPPED | OUTPATIENT
Start: 2022-01-25 | End: 2022-08-05 | Stop reason: ALTCHOICE

## 2022-01-31 RX ORDER — MIRTAZAPINE 15 MG/1
TABLET, FILM COATED ORAL
Qty: 30 TABLET | Refills: 5 | Status: SHIPPED | OUTPATIENT
Start: 2022-01-31 | End: 2022-02-07 | Stop reason: SDUPTHER

## 2022-02-07 RX ORDER — MIRTAZAPINE 15 MG/1
TABLET, FILM COATED ORAL
Qty: 30 TABLET | Refills: 5 | Status: SHIPPED | OUTPATIENT
Start: 2022-02-07 | End: 2022-07-22

## 2022-02-22 RX ORDER — ROSUVASTATIN CALCIUM 40 MG/1
TABLET, COATED ORAL
Qty: 90 TABLET | Refills: 1 | Status: SHIPPED | OUTPATIENT
Start: 2022-02-22 | End: 2022-08-05 | Stop reason: SDUPTHER

## 2022-03-07 RX ORDER — POTASSIUM CHLORIDE 20 MEQ/1
20 TABLET, EXTENDED RELEASE ORAL DAILY
COMMUNITY
Start: 2022-02-09 | End: 2022-03-09

## 2022-03-07 RX ORDER — OMEPRAZOLE 20 MG/1
20 CAPSULE, DELAYED RELEASE ORAL DAILY
COMMUNITY
Start: 2022-01-16

## 2022-03-07 RX ORDER — MIRTAZAPINE 7.5 MG/1
7.5 TABLET, FILM COATED ORAL NIGHTLY
COMMUNITY
Start: 2022-01-10

## 2022-03-07 RX ORDER — NICOTINE 21 MG/24HR
1 PATCH, TRANSDERMAL 24 HOURS TRANSDERMAL DAILY
COMMUNITY
Start: 2022-02-09 | End: 2022-07-22

## 2022-03-07 NOTE — PROGRESS NOTES
[address] [facility]  [phone]      Attending Physician: Yaniv Nicholas MD     Reason for Consultation/Chief Complaint: 3 month follow up, CAD, S/P CABG x 1, ischemic cardiomyopathy, combined systolic and diastolic cardiac dysfunction, hypercholesteremia    Subjective   History of Present Illness:  Becky Gutierrez is a 49 y.o. female with a history of CAD with hx of IWMI s/p RONDA to RCA (2020) followed by CABG X1 (2020, LIMA-LAD), ICM, s/d CHF, HLD as well as emphysema and smoker. Recent NM stress test 21 showed no evidence of stress induced ischemia. Recent ECHO testing from 21 showed an EF of 35-40%. Moderate Global with regional hypokinesis. Basal inferior segment appears aneurysmal. Mild aortic regurgitation and mild MR. MUGA scan from 22 showed abnormal resting LV function with mild global hypokinesis and EF of 47%. Today she reports that she has chest pain sometimes and gets shortness of breathe. She is still smoking. She reports that she had a nurse practioner that would come to her house, she reported the chest pain to her, the practioner said it could be related to acid reflux or her COPD. She reports that her right leg will go numb if she is standing for too long. She denies dizziness, syncope and edema. Past Medical History:   has a past medical history of Arthritis, CAD (coronary artery disease), Cancer ([address] CHF (congestive heart failure) ([address] Depression, GERD (gastroesophageal reflux disease), Hyperlipidemia, On intra-aortic balloon pump assist, and Other emphysema ([address] Surgical History:   has a past surgical history that includes sinus surgery;  section; Coronary artery bypass graft (N/A, 2020); CT GUIDED PLEURAL DRAINAGE W CATH PERC (2020); thoracentesis (Left, 2020); transesophageal echocardiogram (2020); and Cardiac catheterization (2020).      Social History:   reports that she has been smoking cigarettes. She has a 5.00 pack-year smoking history. She has never used smokeless tobacco. She reports that she does not drink alcohol and does not use drugs. Home Medications:  Were reviewed and are listed in nursing record and/or below  Prior to Admission medications    Medication Sig Start Date End Date Taking?  Authorizing Provider   mirtazapine (REMERON) 7.5 MG tablet Take 7.5 mg by mouth at bedtime 1/10/22  Yes Historical Provider, MD   nicotine (NICODERM CQ) 14 MG/24HR Place 1 patch onto the skin daily Follow instructions on package 2/9/22  Yes Historical Provider, MD   omeprazole (PRILOSEC) 20 MG delayed release capsule Take 20 mg by mouth daily 1/16/22  Yes Historical Provider, MD   rosuvastatin (CRESTOR) 40 MG tablet TAKE 1 TABLET BY MOUTH EVERY DAY 2/22/22  Yes SUAJTA Jett CNP   mirtazapine (REMERON) 15 MG tablet TAKE ONE TABLET BY MOUTH ONCE NIGHTLY 2/7/22  Yes Diya Cabello MD   BRILINTA 90 MG TABS tablet TAKE 1 TABLET BY MOUTH TWICE DAILY 1/25/22  Yes Royal Maria Esther MD   magnesium oxide (MAG-OX) 400 MG tablet TAKE ONE (1) TABLET BY MOUTH TWICE DAILY 12/23/21  Yes Diya Cabello MD   FLUoxetine (PROZAC) 20 MG capsule TAKE 3 CAPSULES BY MOUTH DAILY 12/22/21  Yes Diya Cabello MD   albuterol sulfate HFA (PROAIR HFA) 108 (90 Base) MCG/ACT inhaler Inhale 2 puffs into the lungs every 6 hours as needed for Wheezing 12/10/21  Yes Anastasia Linares MD   pramipexole (MIRAPEX) 0.25 MG tablet Take 1-2 tablets by mouth nightly as needed (RESTLESS LEG) 11/30/21  Yes SUJATA Jett CNP   famotidine (PEPCID) 20 MG tablet TAKE 2 TABLETS BY MOUTH EVERY EVENING 11/26/21  Yes Diya Cabello MD   metoprolol succinate (TOPROL XL) 25 MG extended release tablet TAKE 1 TABLET BY MOUTH EACH EVENING 11/24/21  Yes Damaso Hendesron MD   esomeprazole (NEXIUM) 20 MG delayed release capsule Take 1 capsule by mouth daily 11/24/21  Yes Diya Cabello MD   FLUoxetine (PROZAC) 20 MG capsule TAKE THREE CAPSULES BY MOUTH DAILY 10/28/21  Yes Sharita Morales MD   aspirin (ASPIRIN LOW DOSE) 81 MG EC tablet TAKE 1 TABLET BY MOUTH DAILY 8/24/21  Yes Jb Fairchild MD   digoxin [facility]) 125 MCG tablet TAKE ONE TABLET BY MOUTH DAILY 8/24/21  Yes Jb Fairchild MD   tiZANidine (ZANAFLEX) 4 MG tablet  12/9/20  Yes Historical Provider, MD        CURRENT Medications:  No current facility-administered medications for this visit. Allergies:  Patient has no known allergies. Review of Systems:   A 14 point review of symptoms completed. Pertinent positives identified in the HPI, all other review of symptoms negative as below.       Objective   PHYSICAL EXAM:    Vitals:    03/09/22 0832   BP: (!) 92/58   Pulse:    SpO2:     Weight: 133 lb (60.3 kg)         General Appearance:  Alert, cooperative, no distress, appears stated age   Head:  Normocephalic, without obvious abnormality, atraumatic   Eyes:  PERRL, conjunctiva/corneas clear   Nose: Nares normal, no drainage or sinus tenderness   Throat: Lips, mucosa, and tongue normal   Neck: Supple, symmetrical, trachea midline, no adenopathy, thyroid: not enlarged, symmetric, no tenderness/mass/nodules, no carotid bruit or JVD   Lungs:   Clear to auscultation bilaterally, respirations unlabored   Chest Wall:  No deformity or tenderness   Heart:  Regular rate and rhythm, S1, S2 normal, no murmur, rub or gallop   Abdomen:   Soft, non-tender, bowel sounds active all four quadrants,  no masses, no organomegaly   Extremities: Extremities normal, atraumatic, no cyanosis or edema   Pulses: 2+ and symmetric in UE, decrs in LE    Skin: Skin color, texture, turgor normal, no rashes or lesions   Pysch: Normal mood and affect   Neurologic: Normal gross motor and sensory exam.         Labs   CBC:   Lab Results   Component Value Date    WBC 9.3 09/30/2021    RBC 4.50 09/30/2021    HGB 13.7 09/30/2021    HCT 39.2 09/30/2021    MCV 87.0 09/30/2021    RDW 13.7 09/30/2021     09/30/2021 systolic function appears   improved. ECHO limited: 9/16/20  Summary   This is a limited study for CAD and ischemic cardiomyopathy follow-up. Left ventricular systolic function is reduced with ejection fraction   estimated at 35-40%. There is mild global hypokinesis with regional variation. The basal   inferoseptum, basal to mid inferior wall, and the entire inferolateral wall   appear more hypokinetic than the remaining segments. Direct comparison with the prior study dated 5/19/2020 is somewhat limited   as no contrast enhancement is used for the present study. Overall, however,   LV function and regional abnormalities appear similar to the prior study. ECHO complete: 5/19/20   Summary   The left ventricular systolic function is moderately reduced with an   ejection fraction of 35 %. There is hypokinesis of the inferior, basal inferior and inferiolateral   walls. Grade I diastolic dysfunction with normal filling pressure. Mild mitral and aortic regurgitation. Systolic pulmonic artery pressure (SPAP) is normal estimated at 38 mmHg   (Right atrial pressure of 8 mmHg). NM Stress Test:1/28/21  Summary   Fab Fuelling is no evidence of stress induced ischemia. Small fixed defect    posterobasal segment c/w infarct. LV function is normal with uniform wall    motion and ejection fraction of 56%. Low risk abnormal study. Cath: 5/19/20  FINDINGS         LVGRAM     LVEDP  29   GRADIENT ACROSS AORTIC VALVE  no gradient   LV FUNCTION EF 40 %   WALL MOTION  inferior hypokinesis   MITRAL REGURGITATION  mild         CORONARY ARTERIES     LM  Less than 10% proximal-mid stenosis, distally there is an eccentric 50% stenosis         LAD  Large vessel, proximal-mid 30% stenosis. Distally less than 10% stenosis.     D1 and D2 have a very high takeoff and D2 in particular has a patulous/aneurysmal segment with 70 to 80% proximal stenosis and less than 10% mid to distal stenosis.   D3 has 10 to 20% [id number]-mxm-ctwcrb stenosis.       LCX  Small vessel, 10% zefoxfvs-zep-uopjbu stenosis.       RI  This vessel could also be described as the high first diagonal as noted above in the LAD section.         RCA Large vessel, dominant, proximal less than 10% stenosis, mid 30 to 40% stenosis, distal 90 to 100% stenosis.      CONCLUSIONS:      Successful PCI of RCA with 2 drug-eluting stents  Will refer for consideration of CABG for left main disease     Addend, case d/w dr Tellez Files, plan for outpt cabg, order for life vest in interim, ef 35% on current review      Studies:   MUGA scan: 9/23/21  Conclusions        MUGA Procedure descriptor    The patient''s red blood cells were labeled with technetium 99m using the    modified in vivo technique. Imaging was performed at rest by planar    technique in multiple views including [place], Anterior, and Lt. Lateral.        MUGA Conclusions    Abnormal resting left ventricular function with mild global hypokinesis and    EF= 47% . MUGA scan: 1/14/22  Conclusions        MUGA Procedure descriptor    The patient''s red blood cells were labeled with technetium 99m using the    modified in vivo technique. Imaging was performed at rest by planar    technique in multiple views including Uzbek, Anterior, and Lt. Lateral.        MUGA Conclusions    Abnormal resting left ventricular function with mild global hypokinesis and    EF= 47% . I have reviewed labs and imaging/xray/diagnostic testing in this note.     Assessment and Plan     Patient Active Problem List   Diagnosis    Anxiety    Primary insomnia    Arthritis    Gastroesophageal reflux disease without esophagitis    Hypercholesteremia    Chronic bilateral low back pain without sciatica    Lumbar radiculopathy    Moderate episode of recurrent major depressive disorder ([address]    STEMI (ST elevation myocardial infarction) ([address]    Coronary artery disease involving native heart without angina pectoris    Combined systolic and diastolic cardiac dysfunction    S/P CABG (coronary artery bypass graft)    RLS (restless legs syndrome)    Cardiomyopathy (HCC)    Major depressive disorder with single episode, in partial remission (HCC)    Hypotension    SOB (shortness of breath)    Other emphysema (HCC)    Observed sleep apnea    Smoker    GERD (gastroesophageal reflux disease)       Plan:   1. Repeat EKG-chest pain  2. ECHO-chest pain and abnl ekg and ischemic CM, Call [phone] to schedule this test and exercise stress test for chest pain, abnl egk   3. Exercise Nuclear stress test-chest pain, shortness of breath and abnl ekg   4. MONROE doppler-leg numbness, claudication   5. Follow up 3 months NP           Scribe's attestation: This note was scribed in the presence of Dr. Michael Story MD   by Gopi Vogt LPN      I, Dr Michael Story, personally performed the services described in this documentation, as scribed by the above signed scribe in my presence. It is both accurate and complete to my knowledge. I agree with the details independently gathered by the clinical support staff and the scribed note accurately describes my personal service to the patient. Thank you for allowing us to participate in the care of [facility]. Please call me with any questions [phone].     Michael Story MD, [facility]   Interventional Cardiologist  [facility]  [phone] [facility]  [phone] [address] [place] [address]  3/9/2022 9:29 AM

## 2022-03-09 ENCOUNTER — OFFICE VISIT (OUTPATIENT)
Dept: CARDIOLOGY CLINIC | Age: 57
End: 2022-03-09
Payer: COMMERCIAL

## 2022-03-09 VITALS
DIASTOLIC BLOOD PRESSURE: 58 MMHG | WEIGHT: 133 LBS | SYSTOLIC BLOOD PRESSURE: 92 MMHG | HEART RATE: 75 BPM | OXYGEN SATURATION: 98 % | HEIGHT: 63 IN | BODY MASS INDEX: 23.57 KG/M2

## 2022-03-09 DIAGNOSIS — I21.3 ST ELEVATION MYOCARDIAL INFARCTION (STEMI), UNSPECIFIED ARTERY (HCC): ICD-10-CM

## 2022-03-09 DIAGNOSIS — I95.9 HYPOTENSION, UNSPECIFIED HYPOTENSION TYPE: ICD-10-CM

## 2022-03-09 DIAGNOSIS — I25.5 ISCHEMIC CARDIOMYOPATHY: ICD-10-CM

## 2022-03-09 DIAGNOSIS — R07.9 CHEST PAIN, UNSPECIFIED TYPE: ICD-10-CM

## 2022-03-09 DIAGNOSIS — E78.00 HYPERCHOLESTEREMIA: Primary | ICD-10-CM

## 2022-03-09 DIAGNOSIS — I25.10 CORONARY ARTERY DISEASE INVOLVING NATIVE HEART WITHOUT ANGINA PECTORIS, UNSPECIFIED VESSEL OR LESION TYPE: ICD-10-CM

## 2022-03-09 PROCEDURE — 93000 ELECTROCARDIOGRAM COMPLETE: CPT | Performed by: INTERNAL MEDICINE

## 2022-03-09 PROCEDURE — 99214 OFFICE O/P EST MOD 30 MIN: CPT | Performed by: INTERNAL MEDICINE

## 2022-03-09 PROCEDURE — G8420 CALC BMI NORM PARAMETERS: HCPCS | Performed by: INTERNAL MEDICINE

## 2022-03-09 PROCEDURE — G8484 FLU IMMUNIZE NO ADMIN: HCPCS | Performed by: INTERNAL MEDICINE

## 2022-03-09 PROCEDURE — 4004F PT TOBACCO SCREEN RCVD TLK: CPT | Performed by: INTERNAL MEDICINE

## 2022-03-09 PROCEDURE — G8427 DOCREV CUR MEDS BY ELIG CLIN: HCPCS | Performed by: INTERNAL MEDICINE

## 2022-03-09 PROCEDURE — 3017F COLORECTAL CA SCREEN DOC REV: CPT | Performed by: INTERNAL MEDICINE

## 2022-03-09 NOTE — PATIENT INSTRUCTIONS
Plan:   1. Repeat EKG-chest pain  2. ECHO-chest pain Call [phone] to schedule this test and exercise stress test  3. Exercise Nuclear stress test-chest pain, shortness of breathe  4. MONROE doppler-leg numbness  5.  Follow up 3 months NP

## 2022-03-09 NOTE — LETTER
Wayne Cabezas  1965      [address] [facility]  [phone]      Attending Physician: Amilcar Cabrales MD     Reason for Consultation/Chief Complaint: 3 month follow up, CAD, S/P CABG x 1, ischemic cardiomyopathy, combined systolic and diastolic cardiac dysfunction, hypercholesteremia    Subjective   History of Present Illness:  Wayne Cabezas is a 49 y.o. female with a history of CAD with hx of IWMI s/p RONDA to RCA (2020) followed by CABG X1 (2020, LIMA-LAD), ICM, s/d CHF, HLD as well as emphysema and smoker. Recent NM stress test 21 showed no evidence of stress induced ischemia. Recent ECHO testing from 21 showed an EF of 35-40%. Moderate Global with regional hypokinesis. Basal inferior segment appears aneurysmal. Mild aortic regurgitation and mild MR. MUGA scan from 22 showed abnormal resting LV function with mild global hypokinesis and EF of 47%. Today she reports that she has chest pain sometimes and gets shortness of breathe. She is still smoking. She reports that she had a nurse practioner that would come to her house, she reported the chest pain to her, the practioner said it could be related to acid reflux or her COPD. She reports that her right leg will go numb if she is standing for too long. She denies dizziness, syncope and edema. Past Medical History:   has a past medical history of Arthritis, CAD (coronary artery disease), Cancer ([address] CHF (congestive heart failure) ([address] Depression, GERD (gastroesophageal reflux disease), Hyperlipidemia, On intra-aortic balloon pump assist, and Other emphysema ([address] Surgical History:   has a past surgical history that includes sinus surgery;  section; Coronary artery bypass graft (N/A, 2020); CT GUIDED PLEURAL DRAINAGE W CATH PERC (2020); thoracentesis (Left, 2020); transesophageal echocardiogram (2020); and Cardiac catheterization (2020).      Social History: reports that she has been smoking cigarettes. She has a 5.00 pack-year smoking history. She has never used smokeless tobacco. She reports that she does not drink alcohol and does not use drugs. Home Medications:  Were reviewed and are listed in nursing record and/or below  Prior to Admission medications    Medication Sig Start Date End Date Taking?  Authorizing Provider   mirtazapine (REMERON) 7.5 MG tablet Take 7.5 mg by mouth at bedtime 1/10/22  Yes Historical Provider, MD   nicotine (NICODERM CQ) 14 MG/24HR Place 1 patch onto the skin daily Follow instructions on package 2/9/22  Yes Historical Provider, MD   omeprazole (PRILOSEC) 20 MG delayed release capsule Take 20 mg by mouth daily 1/16/22  Yes Historical Provider, MD   rosuvastatin (CRESTOR) 40 MG tablet TAKE 1 TABLET BY MOUTH EVERY DAY 2/22/22  Yes SUJATA Oliveira CNP   mirtazapine (REMERON) 15 MG tablet TAKE ONE TABLET BY MOUTH ONCE NIGHTLY 2/7/22  Yes Brittany Romero MD   BRILINTA 90 MG TABS tablet TAKE 1 TABLET BY MOUTH TWICE DAILY 1/25/22  Yes Paco Coronel MD   magnesium oxide (MAG-OX) 400 MG tablet TAKE ONE (1) TABLET BY MOUTH TWICE DAILY 12/23/21  Yes Brittany Romero MD   FLUoxetine (PROZAC) 20 MG capsule TAKE 3 CAPSULES BY MOUTH DAILY 12/22/21  Yes Brittany Romero MD   albuterol sulfate HFA (PROAIR HFA) 108 (90 Base) MCG/ACT inhaler Inhale 2 puffs into the lungs every 6 hours as needed for Wheezing 12/10/21  Yes Pinky Cleveland MD   pramipexole (MIRAPEX) 0.25 MG tablet Take 1-2 tablets by mouth nightly as needed (RESTLESS LEG) 11/30/21  Yes SUJATA Oliveira CNP   famotidine (PEPCID) 20 MG tablet TAKE 2 TABLETS BY MOUTH EVERY EVENING 11/26/21  Yes Brittany Romero MD   metoprolol succinate (TOPROL XL) 25 MG extended release tablet TAKE 1 TABLET BY MOUTH EACH EVENING 11/24/21  Yes Neymar Rosado MD   esomeprazole (NEXIUM) 20 MG delayed release capsule Take 1 capsule by mouth daily 11/24/21  Yes Brittany Romero MD   FLUoxetine (PROZAC) 20 MG capsule TAKE THREE CAPSULES BY MOUTH DAILY 10/28/21  Yes Diya Cabello MD   aspirin (ASPIRIN LOW DOSE) 81 MG EC tablet TAKE 1 TABLET BY MOUTH DAILY 8/24/21  Yes Royal Maria Esther MD   digoxin [facility]) 125 MCG tablet TAKE ONE TABLET BY MOUTH DAILY 8/24/21  Yes Royal Maria Esther MD   tiZANidine (ZANAFLEX) 4 MG tablet  12/9/20  Yes Historical Provider, MD        CURRENT Medications:  No current facility-administered medications for this visit. Allergies:  Patient has no known allergies. Review of Systems:   A 14 point review of symptoms completed. Pertinent positives identified in the HPI, all other review of symptoms negative as below.       Objective   PHYSICAL EXAM:    Vitals:    03/09/22 0832   BP: (!) 92/58   Pulse:    SpO2:     Weight: 133 lb (60.3 kg)         General Appearance:  Alert, cooperative, no distress, appears stated age   Head:  Normocephalic, without obvious abnormality, atraumatic   Eyes:  PERRL, conjunctiva/corneas clear   Nose: Nares normal, no drainage or sinus tenderness   Throat: Lips, mucosa, and tongue normal   Neck: Supple, symmetrical, trachea midline, no adenopathy, thyroid: not enlarged, symmetric, no tenderness/mass/nodules, no carotid bruit or JVD   Lungs:   Clear to auscultation bilaterally, respirations unlabored   Chest Wall:  No deformity or tenderness   Heart:  Regular rate and rhythm, S1, S2 normal, no murmur, rub or gallop   Abdomen:   Soft, non-tender, bowel sounds active all four quadrants,  no masses, no organomegaly   Extremities: Extremities normal, atraumatic, no cyanosis or edema   Pulses: 2+ and symmetric in UE, decrs in LE    Skin: Skin color, texture, turgor normal, no rashes or lesions   Pysch: Normal mood and affect   Neurologic: Normal gross motor and sensory exam.         Labs   CBC:   Lab Results   Component Value Date    WBC 9.3 09/30/2021    RBC 4.50 09/30/2021    HGB 13.7 09/30/2021    HCT 39.2 09/30/2021    MCV 87.0 09/30/2021    RDW 13.7 2021     2021     CMP:  Lab Results   Component Value Date     2021    K 4.2 2021     2021    CO2 20 2021    BUN 11 2021    CREATININE <0.5 2021    GFRAA >60 2021    GFRAA >60 2011    AGRATIO 1.6 2021    LABGLOM >60 2021    GLUCOSE 94 2021    PROT 7.0 2021    CALCIUM 9.4 2021    BILITOT <0.2 2021    ALKPHOS 107 2021    AST 24 2021    ALT 16 2021     PT/INR:  No results found for: PTINR  HgBA1c:  Lab Results   Component Value Date    LABA1C 5.5 2020     Lab Results   Component Value Date    TROPONINI <0.01 2021         Cardiac Data     Last EK21  NSR, possible left atrial enlargement, possible inferior infarct, HR 91    Today nsr, nonspec st changes     Echo complete: 21  Summary   Normal left ventricle size and wall thickness. The left ventricular systolic function is mildly reduced with an ejection   fraction of 35-40%. Moderate Global with regional hypokinesis. 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   Normal left ventricular diastolic filling pressure. The right ventricle is normal in size and function. MIld aortic regurgitation. Mild mitral regurgitation. Compared with the prior study performed 2/10/21, on direct comparison of the   images, EF appears grossly similar while the basal inferior wall does now   appear aneurysmal.      ECHO complete: 2/10/21  Summary   The left ventricular systolic function is mildly reduced with an ejection   fraction of 40-45 %. There is hypokinesis of the basal septal, basal inferior, and basal   inferolateral walls. Normal left ventricular diastolic filling pressure. The left atrium is mildly dilated. Moderate aortic regurgitation. Mild mitral regurgitation. Systolic pulmonary artery pressure (SPAP) is normal estimated at 28 mmHg   (Right atrial pressure of 3 mmHg).    Compared to exam done 9/16/2020, Left ventricular systolic function appears   improved. ECHO limited: 9/16/20  Summary   This is a limited study for CAD and ischemic cardiomyopathy follow-up. Left ventricular systolic function is reduced with ejection fraction   estimated at 35-40%. There is mild global hypokinesis with regional variation. The basal   inferoseptum, basal to mid inferior wall, and the entire inferolateral wall   appear more hypokinetic than the remaining segments. Direct comparison with the prior study dated 5/19/2020 is somewhat limited   as no contrast enhancement is used for the present study. Overall, however,   LV function and regional abnormalities appear similar to the prior study. ECHO complete: 5/19/20   Summary   The left ventricular systolic function is moderately reduced with an   ejection fraction of 35 %. There is hypokinesis of the inferior, basal inferior and inferiolateral   walls. Grade I diastolic dysfunction with normal filling pressure. Mild mitral and aortic regurgitation. Systolic pulmonic artery pressure (SPAP) is normal estimated at 38 mmHg   (Right atrial pressure of 8 mmHg). NM Stress Test:1/28/21  Summary   Regine Askew is no evidence of stress induced ischemia. Small fixed defect    posterobasal segment c/w infarct. LV function is normal with uniform wall    motion and ejection fraction of 56%. Low risk abnormal study. Cath: 5/19/20  FINDINGS         LVGRAM     LVEDP  29   GRADIENT ACROSS AORTIC VALVE  no gradient   LV FUNCTION EF 40 %   WALL MOTION  inferior hypokinesis   MITRAL REGURGITATION  mild         CORONARY ARTERIES     LM  Less than 10% proximalmid stenosis, distally there is an eccentric 50% stenosis         LAD  Large vessel, proximalmid 30% stenosis.   Distally less than 10% stenosis.     D1 and D2 have a very high takeoff and D2 in particular has a patulous/aneurysmal segment with 70 to 80% proximal stenosis and less than 10% mid to distal angina pectoris    Combined systolic and diastolic cardiac dysfunction    S/P CABG (coronary artery bypass graft)    RLS (restless legs syndrome)    Cardiomyopathy (HCC)    Major depressive disorder with single episode, in partial remission (HCC)    Hypotension    SOB (shortness of breath)    Other emphysema (HCC)    Observed sleep apnea    Smoker    GERD (gastroesophageal reflux disease)       Plan:   1. Repeat EKG-chest pain  2. ECHO-chest pain and abnl ekg and ischemic CM, Call [phone] to schedule this test and exercise stress test for chest pain, abnl egk   3. Exercise Nuclear stress test-chest pain, shortness of breath and abnl ekg   4. MONROE doppler-leg numbness, claudication   5. Follow up 3 months NP           Scribe's attestation: This note was scribed in the presence of Dr. Tg Anthony MD   by Annie Hsieh LPN      I, Dr Tg Anthony, personally performed the services described in this documentation, as scribed by the above signed scribe in my presence. It is both accurate and complete to my knowledge. I agree with the details independently gathered by the clinical support staff and the scribed note accurately describes my personal service to the patient. Thank you for allowing us to participate in the care of [facility]. Please call me with any questions [phone].     Tg Anthony MD, [facility]   Interventional Cardiologist  [address]  [phone] [facility]  [phone] [address] [place] [address]  3/9/2022 9:29 AM

## 2022-03-11 RX ORDER — FLUOXETINE HYDROCHLORIDE 20 MG/1
60 CAPSULE ORAL DAILY
Qty: 180 CAPSULE | Refills: 3 | Status: SHIPPED | OUTPATIENT
Start: 2022-03-11

## 2022-04-07 ENCOUNTER — HOSPITAL ENCOUNTER (OUTPATIENT)
Dept: NON INVASIVE DIAGNOSTICS | Age: 57
Discharge: HOME | End: 2022-04-07
Payer: COMMERCIAL

## 2022-04-07 ENCOUNTER — TELEPHONE (OUTPATIENT)
Dept: CARDIOLOGY CLINIC | Age: 57
End: 2022-04-07

## 2022-04-07 ENCOUNTER — HOSPITAL ENCOUNTER (OUTPATIENT)
Dept: NUCLEAR MEDICINE | Age: 57
Discharge: HOME | End: 2022-04-07
Payer: COMMERCIAL

## 2022-04-07 ENCOUNTER — HOSPITAL ENCOUNTER (OUTPATIENT)
Dept: VASCULAR LAB | Age: 57
Discharge: HOME | End: 2022-04-07
Payer: COMMERCIAL

## 2022-04-07 DIAGNOSIS — R07.9 CHEST PAIN, UNSPECIFIED TYPE: ICD-10-CM

## 2022-04-07 DIAGNOSIS — I25.10 CORONARY ARTERY DISEASE INVOLVING NATIVE HEART WITHOUT ANGINA PECTORIS, UNSPECIFIED VESSEL OR LESION TYPE: ICD-10-CM

## 2022-04-07 LAB
LV EF: 50 %
LV EF: 57 %
LVEF MODALITY: NORMAL
LVEF MODALITY: NORMAL

## 2022-04-07 PROCEDURE — 93017 CV STRESS TEST TRACING ONLY: CPT

## 2022-04-07 PROCEDURE — 93922 UPR/L XTREMITY ART 2 LEVELS: CPT

## 2022-04-07 PROCEDURE — A9502 TC99M TETROFOSMIN: HCPCS | Performed by: INTERNAL MEDICINE

## 2022-04-07 PROCEDURE — 93306 TTE W/DOPPLER COMPLETE: CPT

## 2022-04-07 PROCEDURE — 3430000000 HC RX DIAGNOSTIC RADIOPHARMACEUTICAL: Performed by: INTERNAL MEDICINE

## 2022-04-07 PROCEDURE — 78452 HT MUSCLE IMAGE SPECT MULT: CPT

## 2022-04-07 RX ADMIN — TETROFOSMIN 31.8 MILLICURIE: 1.38 INJECTION, POWDER, LYOPHILIZED, FOR SOLUTION INTRAVENOUS at 12:00

## 2022-04-07 RX ADMIN — TETROFOSMIN 11 MILLICURIE: 1.38 INJECTION, POWDER, LYOPHILIZED, FOR SOLUTION INTRAVENOUS at 10:15

## 2022-04-07 NOTE — TELEPHONE ENCOUNTER
----- Message from Moise Yung MD sent at 4/7/2022  2:05 PM EDT -----  Let patient know their MONROE test is normal, continue current meds, no new orders or changes at this time. Thanks.

## 2022-04-08 ENCOUNTER — TELEPHONE (OUTPATIENT)
Dept: CARDIOLOGY CLINIC | Age: 57
End: 2022-04-08

## 2022-04-08 NOTE — TELEPHONE ENCOUNTER
----- Message from Moise Yung MD sent at 4/7/2022  4:47 PM EDT -----  Let patient know their echo test shows low normal heart function, overall similar to prior studies, continue current meds, no new orders or changes at this time. Thanks. Let patient know their stress test showed low normal heart function, lower risk study, continue current meds, no new orders or changes at this time.  Thanks.

## 2022-04-08 NOTE — TELEPHONE ENCOUNTER
Created telephone encounter. Spoke with Eloise relayed message per SRJ regarding echo, stress test. Pt verbalized understanding.

## 2022-05-26 ENCOUNTER — OFFICE VISIT (OUTPATIENT)
Dept: PULMONOLOGY | Age: 57
End: 2022-05-26
Payer: COMMERCIAL

## 2022-05-26 VITALS
SYSTOLIC BLOOD PRESSURE: 100 MMHG | BODY MASS INDEX: 24.1 KG/M2 | HEIGHT: 63 IN | WEIGHT: 136 LBS | DIASTOLIC BLOOD PRESSURE: 65 MMHG | TEMPERATURE: 97.6 F | HEART RATE: 69 BPM | RESPIRATION RATE: 18 BRPM | OXYGEN SATURATION: 98 %

## 2022-05-26 DIAGNOSIS — Z95.1 S/P CABG (CORONARY ARTERY BYPASS GRAFT): ICD-10-CM

## 2022-05-26 DIAGNOSIS — J43.8 OTHER EMPHYSEMA (HCC): Primary | ICD-10-CM

## 2022-05-26 DIAGNOSIS — R06.02 SOB (SHORTNESS OF BREATH): ICD-10-CM

## 2022-05-26 DIAGNOSIS — F17.200 SMOKER: ICD-10-CM

## 2022-05-26 PROCEDURE — G8427 DOCREV CUR MEDS BY ELIG CLIN: HCPCS | Performed by: INTERNAL MEDICINE

## 2022-05-26 PROCEDURE — 3017F COLORECTAL CA SCREEN DOC REV: CPT | Performed by: INTERNAL MEDICINE

## 2022-05-26 PROCEDURE — 4004F PT TOBACCO SCREEN RCVD TLK: CPT | Performed by: INTERNAL MEDICINE

## 2022-05-26 PROCEDURE — 3023F SPIROM DOC REV: CPT | Performed by: INTERNAL MEDICINE

## 2022-05-26 PROCEDURE — 99213 OFFICE O/P EST LOW 20 MIN: CPT | Performed by: INTERNAL MEDICINE

## 2022-05-26 PROCEDURE — G8420 CALC BMI NORM PARAMETERS: HCPCS | Performed by: INTERNAL MEDICINE

## 2022-05-26 RX ORDER — FAMOTIDINE 20 MG/1
TABLET, FILM COATED ORAL
Qty: 60 TABLET | Refills: 10 | Status: SHIPPED | OUTPATIENT
Start: 2022-05-26

## 2022-05-26 RX ORDER — METOPROLOL SUCCINATE 25 MG/1
TABLET, EXTENDED RELEASE ORAL
Qty: 90 TABLET | Refills: 1 | Status: SHIPPED | OUTPATIENT
Start: 2022-05-26 | End: 2022-08-05 | Stop reason: SDUPTHER

## 2022-05-26 ASSESSMENT — SLEEP AND FATIGUE QUESTIONNAIRES
HOW LIKELY ARE YOU TO NOD OFF OR FALL ASLEEP IN A CAR, WHILE STOPPED FOR A FEW MINUTES IN TRAFFIC: 0
ESS TOTAL SCORE: 2
HOW LIKELY ARE YOU TO NOD OFF OR FALL ASLEEP WHEN YOU ARE A PASSENGER IN A CAR FOR AN HOUR WITHOUT A BREAK: 0
HOW LIKELY ARE YOU TO NOD OFF OR FALL ASLEEP WHILE SITTING QUIETLY AFTER LUNCH WITHOUT ALCOHOL: 0
HOW LIKELY ARE YOU TO NOD OFF OR FALL ASLEEP WHILE WATCHING TV: 1
HOW LIKELY ARE YOU TO NOD OFF OR FALL ASLEEP WHILE SITTING INACTIVE IN A PUBLIC PLACE: 0
HOW LIKELY ARE YOU TO NOD OFF OR FALL ASLEEP WHILE LYING DOWN TO REST IN THE AFTERNOON WHEN CIRCUMSTANCES PERMIT: 1
HOW LIKELY ARE YOU TO NOD OFF OR FALL ASLEEP WHILE SITTING AND READING: 0
NECK CIRCUMFERENCE (INCHES): 14
HOW LIKELY ARE YOU TO NOD OFF OR FALL ASLEEP WHILE SITTING AND TALKING TO SOMEONE: 0

## 2022-05-26 NOTE — PATIENT INSTRUCTIONS
Remember to bring a list of pulmonary medications and any CPAP or BiPAP machines to your next appointment with the office. Please keep all of your future appointments scheduled by [address], [facility] office. Out of respect for other patients and providers, you may be asked to reschedule your appointment if you arrive later than your scheduled appointment time. Appointments cancelled less than 24hrs in advance will be considered a no show. Patients with three missed appointments within 1 year or four missed appointments within 2 years can be dismissed from the practice. Please be aware that our physicians are required to work in the Intensive Care Unit at [facility].  Your appointment may need to be rescheduled if they are designated to work during your appointment time. You may receive a survey regarding the care you received during your visit. Your input is valuable to us. We encourage you to complete and return your survey. We hope you will choose us in the future for your healthcare needs. Pt instructed of all future appointment dates & times, including radiology, labs, procedures & referrals. If procedures were scheduled preparation instructions provided. Instructions on future appointments with [facility] were given.

## 2022-05-26 NOTE — PROGRESS NOTES
Chief Complaint/Referring Provider:  Pulmonary follow up      Patient is a 61-year-old female who has come to the office for a pulmonary follow-up, patient states that she continues to have increased coughing along with that patient has white respiratory secretions, patient also has some pleuritic chest pain with coughing, patient also has some occasional wheezing, patient does not have any increasing rhinorrhea nasal congestion sinus congestion postnasal drainage, patient does not have any significant palpitation or diaphoresis, no fever no chills, no abdominal symptoms of concern, no increasing leg edema, patient states that she does not have any constant symptoms of concern, patient continues to be a smoker, patient does not have any loss of appetite or loss of weight, patient does not have any change in the ambient environment, patient has a old dog as a pet, patient has to use her albuterol inhaler 1-2 times a day, patient does not have any confusion lethargy, no other pertinent review of system of concern    Previous HPI:Patient has come to the office for pulmonary follow-up and to discuss the test results, patient states that she does have some occasional sinus congestion but does not have any significant increasing cough or expectoration, patient does not have any increasing shortness of breath or wheezing, patient does not have any chest pain or palpitations, patient does not have any significant pleuritic chest pain, no abdominal discomfort of concern no increasing leg edema, patient still smokes off and on, patient does not have any change in them environment any sick contacts, patient does not have any other pertinent review of system of concern        Patient is a 61-year-old female who has been referred to the office for a pulmonary evaluation for cardiomyopathy and shortness of breath by Dr. Benoit Hernandez    Patient states that she has history of having 2 heart attacks in May of last year followed by CABG in June of last year, patient continues to have a shortness of breath on exertion, patient was recently evaluated by her cardiologist and patient's cardiomyopathy has not improved, patient states that her shortness of breath on exertion is persistent and also patient cannot walk more than 1 block after which she starts having shortness of breath, patient has sinus congestion with postnasal drainage, patient does not have any epistaxis or hemoptysis, patient does not have any sore throat or difficulty in swallowing, no coughing or choking while eating, no odynophagia or dysphagia per se, patient has some left-sided pleuritic chest pain, patient does not have any radiation of the pain to the left shoulder and left upper extremities, patient does not have any significant purulent expectoration, patient does not have any palpitations or diaphoresis, patient on questioning further states that she does have reflux symptoms for which she takes Pepcid, patient does not have any altered bowel habits or any hematochezia or melena, patient does not have any dysuria no hematuria, patient does not have any small joint pains, patient has been a smoker in the past and used to smoke about 1 pack a day but she has gone down but once in a while she still smokes, patient does not have any significant change in the weight per se, patient does snore, patient does have increased tiredness and sleepiness in the daytime, patient also has restless legs, patient also has anxiety and depression, patient does not have any history of collagen vascular issues, patient does have a dog as a pet but nothing new, patient does not have any mold exposure, but no history of any occupational hazards, patient does not have a history of any recent travels, patient does not have any history of taking any over-the-counter medications including any decongestants, patient does not have any new medications for anything else at this time, patient when evaluated was alert and communicative, no other pertinent review of system of concern    Past Medical History:   Diagnosis Date    Arthritis     CAD (coronary artery disease)     [facility] [address]     CHF (congestive heart failure) (HCC)     Depression     GERD (gastroesophageal reflux disease)     Hyperlipidemia     On intra-aortic balloon pump assist 05/31/2020    Removed during OHS on 6/2/20    Other emphysema ([facility] [address] 10/28/2021       Past Surgical History:   Procedure Laterality Date    CARDIAC CATHETERIZATION  05/31/2020    Dr. Alannah Armstrong - IABP placed   [address] [place] [address] Drive GRAFT N/A 6/2/2020     - off pump x1 (LIMA-LAD), removal of IABP    CT INSERT CATH PLEURA W IMAGE  7/13/2020    Dr. Simpson Phlegm - CT guided chest tube insertion    SINUS SURGERY      THORACENTESIS Left 07/11/2020    Dr. Thom Aguilar - 1 L drained    TRANSESOPHAGEAL ECHOCARDIOGRAM  06/02/2020    during CABG       No Known Allergies    Medication list was reviewed and updated as needed in Epic    Social History     Socioeconomic History    Marital status:      Spouse name: Not on file    Number of children: Not on file    Years of education: Not on file    Highest education level: Not on file   Occupational History    Not on file   Tobacco Use    Smoking status: Current Some Day Smoker     Packs/day: 0.50     Years: 10.00     Pack years: 5.00     Types: Cigarettes     Start date: 11/20/1983    Smokeless tobacco: Never Used   Vaping Use    Vaping Use: Never used   Substance and Sexual Activity    Alcohol use: No    Drug use: No    Sexual activity: Not on file   Other Topics Concern    Not on file   Social History Narrative    Not on file     Social Determinants of Health     Financial Resource Strain:     Difficulty of Paying Living Expenses: Not on file   Food Insecurity:     Worried About Running Out of Food in the Last Year: Not on file    Neeta of Food in the Last Year: Not on file Transportation Needs:     Lack of Transportation (Medical): Not on file    Lack of Transportation (Non-Medical): Not on file   Physical Activity:     Days of Exercise per Week: Not on file    Minutes of Exercise per Session: Not on file   Stress:     Feeling of Stress : Not on file   Social Connections:     Frequency of Communication with Friends and Family: Not on file    Frequency of Social Gatherings with Friends and Family: Not on file    Attends Voodoo Services: Not on file    Active Member of [address] [place] [address] or Organizations: Not on file    Attends Club or Organization Meetings: Not on file    Marital Status: Not on file   Intimate Partner Violence:     Fear of Current or Ex-Partner: Not on file    Emotionally Abused: Not on file    Physically Abused: Not on file    Sexually Abused: Not on file   Housing Stability:     Unable to Pay for Housing in the Last Year: Not on file    Number of [place] in the Last Year: Not on file    Unstable Housing in the Last Year: Not on file       History reviewed. No pertinent family history. Review of Systems same as above    Physical Exam:  Blood pressure 100/65, pulse 69, temperature 97.6 °F (36.4 °C), temperature source Temporal, resp. rate 18, height 5' 3\" (1.6 m), weight 136 lb (61.7 kg), last menstrual period 11/01/2017, SpO2 98 %, not currently breastfeeding.'  Constitutional:  No acute distress. HENT:  Oropharynx is clear and moist. No thyromegaly. Eyes:  Conjunctivae arenormal. Pupils equal, round, and reactive to light. No scleral icterus. Neck: . No tracheal deviation present. No obvious thyroid mass. Cardiovascular:Normal rate, regular rhythm, normal heart sounds. No right ventricular heave. Nolower extremity edema. Pulmonary/Chest: No wheezes. No rales. Chest wall is not dull to percussion. Noaccessory muscle usage or stridor. Decreased breath sound intensity  Abdominal: Soft. Bowel sounds present. No distension or hernia. Notenderness. Musculoskeletal: No cyanosis. No clubbing. No obvious joint deformity. Lymphadenopathy: No cervical or supraclavicular adenopathy. Skin: Skin is warm and dry. No rash or nodules on the exposed extremities. Psychiatric: Normal mood and affect. Behavior is normal.  No anxiety. Neurologic: Alert, awake and oriented. PERRL. Speech fluent        Data:     Imaging:  I have reviewed radiology images personally. No orders to display     XR CHEST PORTABLE    Result Date: 9/30/2021  EXAMINATION: ONE XRAY VIEW OF THE CHEST 9/30/2021 8:30 pm COMPARISON: January 27, 2021. HISTORY: ORDERING SYSTEM PROVIDED HISTORY: CP TECHNOLOGIST PROVIDED HISTORY: Reason for exam:->CP Reason for Exam: Chest Pain (for a week, sent by PCP) FINDINGS: Frontal portable view of the chest.  Hyperexpanded lung volume. No focal consolidation. Diffusely prominent interstitium. Right apical bullous disease. No pleural effusion or pneumothorax. Stable cardiomediastinal silhouette and great vessels. Stable regional skeleton. Hyperexpanded lung volume and diffusely prominent interstitium can be seen in the setting of COPD. No focal pulmonary consolidation. CTA OF THE CHEST 7/10/2020 11:42 am       TECHNIQUE:   CTA of the chest was performed after the administration of intravenous   contrast.  Multiplanar reformatted images are provided for review.  MIP   images are provided for review.  Dose modulation, iterative reconstruction,   and/or weight based adjustment of the mA/kV was utilized to reduce the   radiation dose to as low as reasonably achievable.       COMPARISON:   None.       HISTORY:   ORDERING SYSTEM PROVIDED HISTORY: SOB, post op CABG on 6/2/2020, abnormal   chest xray   TECHNOLOGIST PROVIDED HISTORY:   Reason for exam:->SOB, post op CABG on 6/2/2020, abnormal chest xray   Reason for Exam: CABG last month, had an outpatient appt for a CT chest w/o,   felt sick and vomitting, called doctor and he/she told her to come to ER   Acuity: Unknown   Type of Exam: Subsequent/Follow-up   Relevant Medical/Surgical History: hx of Coronary artery bypass graft       FINDINGS:   Pulmonary Arteries: Pulmonary arteries are adequately opacified for   evaluation.  No evidence of intraluminal filling defect to suggest pulmonary   embolism.  Main pulmonary artery is normal in caliber.       Mediastinum: No evidence of mediastinal lymphadenopathy.  The heart and   pericardium demonstrate no acute abnormality.  There is no acute abnormality   of the thoracic aorta.       Lungs/pleura: There is a moderate-sized left pleural effusion with associated   underlying left basilar and lingular compressive atelectasis.  Moderate   emphysematous changes are present throughout the aerated portions of both   lungs.       Upper Abdomen: Limited images of the upper abdomen are unremarkable.       Soft Tissues/Bones: No acute bone or soft tissue abnormality.           Impression   1. Negative for pulmonary embolus. 2. Moderate-sized left effusion with associated underlying left basilar   compressive atelectasis. 3. Advanced emphysema.         No growth 60 to 72 hours    Gram Stain Result 07/11/2020 12:15  [facility]   [facility] present   No organisms seen   Cytospin performed,no quantitation        Pleural Fluid:      - Negative for malignancy. ECHO in Sept 2021-Indications:Cardiomyopathy.     Patient Status: Routine     Height: 63 inches Weight: 117 pounds BSA: 1.54 m2 BMI: 20.73 kg/m2     BP: 100/70 mmHg      Conclusions      Summary   Normal left ventricle size and wall thickness. The left ventricular systolic function is mildly reduced with an ejection   fraction of 35-40%. Moderate Global with regional hypokinesis. 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   Normal left ventricular diastolic filling pressure. The right ventricle is normal in size and function. MIld aortic regurgitation.    Mild mitral regurgitation. Compared with the prior study performed 2/10/21, on direct comparison of the   images, EF appears grossly similar while the basal inferior wall does now   appear aneurysmal.    Patient sleep read shows patient does not have any DAVE    PFT-The patient is a 32-year-old female who underwent a PFT for shortness of  breath. Spirometry shows FVC to be 102%, FEV1 to be 90%, FEV1 to FVC  ratio was 87%, CDQ31-84% was 60%. The patient had some improvement in  the postbronchodilator study. Lung volume shows the total lung capacity  was normal.  The patient has increase in air trapping. The patient has  also decrease in the ERV because of body habitus. The patient's  diffusion capacity when adjusted for volume was mild to moderately  reduced. The patient's flow-volume loop was suggestive of obstructive  pattern. The patient also had a 6-minute walk test which shows the  patient to have a baseline oxygen saturation of 99%, heart rate of 83,  respiratory rate of 16, dyspnea-modified Valerie scale of 0,  fatigue-modified Valerie scale of 0. The patient did not have any  exertional hypoxemia on the study. The patient walked 1400 feet. Total  expected 6-minute walk distance was 1843 feet. The patient achieved 76%  of the expected distance. On the basis of this PFT, the patient has  some small airway disease with reactive airway disease and also the  patient has decrease in diffusion capacity. Along with that, the  patient does not have any exertional hypoxemia on optimal exercise. Please correlate clinically. Assessment:    1. SOB (shortness of breath)        2. Other emphysema ([address]          3. Ischemic cardiomyopathy        4. S/P CABG (coronary artery bypass graft)      5. Smoker      6.  Gastroesophageal reflux disease, unspecified whether esophagitis present          Plan:   · Patient's review of system were discussed  · Patient was told about the clinical finding including auscultation and implications  · Patient was told about the etiology for shortness of breath which may be a combination of cardiomyopathy along with that patient also has pulmonary emphysema which may be contributing to patient's symptomatology  · Patient continues to be symptomatic at this time and patient use of rescue inhaler is increasing  · Patient was given a sample of Stiolto Respimat from the office and was to take it once a day and patient was shown how to take it properly and patient understands  · Patient does not need any steroids or antibiotics from pulm standpoint review  · Patient was told to call the office to see if she is feeling better with her new inhaler  · Patient was told about the pathophysiology of the disease process and its modifying factors  · Patient was advised to stop smoking otherwise she will have increasing morbidity and mortality  · Diet and lifestyle modifications discussed in detail  · Patient needs to cut down on salt intake fats and also patient needs to exercise on regular basis which she does not do at this time besides stopping smoking  · Patient does not have any DAVE on the basis of sleep study  · Cardiac medications as per cardiology to continue  · Will subject the patient to a PFT with a 6-minute walk test to assess the extent of liver disease or any restrictive lung disease especially in the view that patient had a CABG to define the prognosis and also to tailor the medications  · Patient to use albuterol on as needed basis  · If patient requirement for inhalers go up then we will discuss about options  · Patient is not taking any over-the-counter decongestants  · Patient took continue with H2 blocker as given by PCP for GERD  · Patient is up-to-date on the flu shot  · Patient's further treatment depending on patient's clinical status and the follow-up in 6 months or earlier if required

## 2022-05-26 NOTE — PROGRESS NOTES
MA Communication:   The following orders are received by verbal communication from Sol Stockton MD    Orders include:    6 month follow up 11/22/22  Pt was given 2 samples of Stiolto 2.5mcg

## 2022-05-26 NOTE — TELEPHONE ENCOUNTER
Future appt scheduled 06/07/2022                 Last appt 12/29/2021      Last Written 11/26/2021    famotidine (PEPCID) 20 MG tablet  #60  5 RF

## 2022-06-08 ENCOUNTER — HOSPITAL ENCOUNTER (EMERGENCY)
Age: 57
Discharge: HOME | End: 2022-06-08
Attending: EMERGENCY MEDICINE
Payer: COMMERCIAL

## 2022-06-08 ENCOUNTER — APPOINTMENT (OUTPATIENT)
Dept: GENERAL RADIOLOGY | Age: 57
End: 2022-06-08
Payer: COMMERCIAL

## 2022-06-08 VITALS
TEMPERATURE: 97.8 F | HEART RATE: 71 BPM | WEIGHT: 132 LBS | BODY MASS INDEX: 23.39 KG/M2 | HEIGHT: 63 IN | SYSTOLIC BLOOD PRESSURE: 112 MMHG | DIASTOLIC BLOOD PRESSURE: 66 MMHG | RESPIRATION RATE: 18 BRPM | OXYGEN SATURATION: 98 %

## 2022-06-08 DIAGNOSIS — S80.11XA CONTUSION OF RIGHT LEG, INITIAL ENCOUNTER: Primary | ICD-10-CM

## 2022-06-08 PROCEDURE — 99283 EMERGENCY DEPT VISIT LOW MDM: CPT

## 2022-06-08 PROCEDURE — 73590 X-RAY EXAM OF LOWER LEG: CPT

## 2022-06-08 ASSESSMENT — PAIN DESCRIPTION - FREQUENCY: FREQUENCY: CONTINUOUS

## 2022-06-08 ASSESSMENT — PAIN DESCRIPTION - ORIENTATION: ORIENTATION: RIGHT;LOWER

## 2022-06-08 ASSESSMENT — PAIN SCALES - GENERAL: PAINLEVEL_OUTOF10: 7

## 2022-06-08 ASSESSMENT — PAIN - FUNCTIONAL ASSESSMENT: PAIN_FUNCTIONAL_ASSESSMENT: 0-10

## 2022-06-08 ASSESSMENT — PAIN DESCRIPTION - ONSET: ONSET: ON-GOING

## 2022-06-08 ASSESSMENT — PAIN DESCRIPTION - PAIN TYPE: TYPE: ACUTE PAIN

## 2022-06-08 ASSESSMENT — PAIN DESCRIPTION - LOCATION: LOCATION: LEG

## 2022-06-08 ASSESSMENT — PAIN DESCRIPTION - DESCRIPTORS: DESCRIPTORS: SHARP

## 2022-06-09 ENCOUNTER — TELEPHONE (OUTPATIENT)
Dept: ADMINISTRATIVE | Age: 57
End: 2022-06-09

## 2022-06-09 NOTE — TELEPHONE ENCOUNTER
Submitted PA for Esomeprazole Magnesium 20MG dr capsules, Key: [id number]. Approved. This drug has been approved. Approved quantity: 30 units per 30 day(s). The drug has been approved from 05/26/2022 to 12/06/2022. Please notify patient. Thank you.

## 2022-06-09 NOTE — ED NOTES
Discharge instructions and medications reviewed with patient. Patient verbalized understanding of medications and follow up. All questions answered at this time. Skin warm, pink, and dry. Patient alert and oriented x4. Pt ambulated to lobby with a stable gait. Patient discharged home with 0 prescriptions.       Vee Miller RN  06/08/22 [id number]

## 2022-06-09 NOTE — ED PROVIDER NOTES
Emergency Department Attending Note    Hai Devine MD    Date of ED VIsit: 6/8/2022    CHIEF COMPLAINT  Leg Injury (right lower leg, from a step on Monday, swelling and bruising noted)      HISTORY OF PRESENT ILLNESS  Vonnie Galvan is a 49 y.o. female  With Vital signs of /66   Pulse 71   Temp 97.8 °F (36.6 °C) (Oral)   Resp 18   Ht 5' 3\" (1.6 m)   Wt 132 lb (59.9 kg)   LMP 11/01/2017 (Approximate)   SpO2 98%   BMI 23.38 kg/m²  who presents to the ED with a complaint of right leg pain. Patient seen and evaluated in room 6. Patient states she was walking up the stairs on Monday when she apparently slipped and her foot went under the stairs skiving her shin on the upper stair. She did not come in because her mom had some open heart surgery and figured she probably finally get it looked at today so she presents to the emergency department for that. She is able to wiggle her toes she is able to flex and extend her ankle. She does have a area of ecchymosis over the shin. The skin is compartment is not tight. .  No other complaints, modifying factors or associated symptoms.     Patients Past medical history reviewed and listed below  Past Medical History:   Diagnosis Date    Arthritis     CAD (coronary artery disease)     Cancer ([address]     CHF (congestive heart failure) (HCC)     Depression     GERD (gastroesophageal reflux disease)     Hyperlipidemia     On intra-aortic balloon pump assist 05/31/2020    Removed during OHS on 6/2/20    Other emphysema ([address] 10/28/2021     Past Surgical History:   Procedure Laterality Date    CARDIAC CATHETERIZATION  05/31/2020    Dr. Bettye Olszewski - IABP placed   [address] [place] [address] Drive GRAFT N/A 6/2/2020     - off pump x1 (LIMA-LAD), removal of IABP    CT INSERT CATH PLEURA W IMAGE  7/13/2020    Dr. Cecily Pratt - CT guided chest tube insertion    SINUS SURGERY      THORACENTESIS Left 07/11/2020    Dr. Ten Bacon - 1 L Housing Stability:     Unable to Pay for Housing in the Last Year: Not on file    Number of Places Lived in the Last Year: Not on file    Unstable Housing in the Last Year: Not on file     No current facility-administered medications for this encounter.      Current Outpatient Medications   Medication Sig Dispense Refill    famotidine (PEPCID) 20 MG tablet TAKE 2 TABLETS BY MOUTH EACH EVENING 60 tablet 10    metoprolol succinate (TOPROL XL) 25 MG extended release tablet TAKE 1 TABLET BY MOUTH EACH EVENING 90 tablet 1    tiotropium-olodaterol (STIOLTO) 2.5-2.5 MCG/ACT AERS Inhale 2 puffs into the lungs daily 2 each 0    esomeprazole (NEXIUM) 20 MG delayed release capsule TAKE 1 CAPSULE BY MOUTH DAILY 30 capsule 10    FLUoxetine (PROZAC) 20 MG capsule Take 3 capsules by mouth daily 180 capsule 3    mirtazapine (REMERON) 7.5 MG tablet Take 7.5 mg by mouth at bedtime      nicotine (NICODERM CQ) 14 MG/24HR Place 1 patch onto the skin daily Follow instructions on package      omeprazole (PRILOSEC) 20 MG delayed release capsule Take 20 mg by mouth daily      rosuvastatin (CRESTOR) 40 MG tablet TAKE 1 TABLET BY MOUTH EVERY DAY 90 tablet 1    mirtazapine (REMERON) 15 MG tablet TAKE ONE TABLET BY MOUTH ONCE NIGHTLY 30 tablet 5    BRILINTA 90 MG TABS tablet TAKE 1 TABLET BY MOUTH TWICE DAILY 180 tablet 3    magnesium oxide (MAG-OX) 400 MG tablet TAKE ONE (1) TABLET BY MOUTH TWICE DAILY 60 tablet 5    albuterol sulfate HFA (PROAIR HFA) 108 (90 Base) MCG/ACT inhaler Inhale 2 puffs into the lungs every 6 hours as needed for Wheezing 1 each 5    pramipexole (MIRAPEX) 0.25 MG tablet Take 1-2 tablets by mouth nightly as needed (RESTLESS LEG) 60 tablet 5    aspirin (ASPIRIN LOW DOSE) 81 MG EC tablet TAKE 1 TABLET BY MOUTH DAILY 90 tablet 3    digoxin (LANOXIN) 125 MCG tablet TAKE ONE TABLET BY MOUTH DAILY 90 tablet 3    tiZANidine (ZANAFLEX) 4 MG tablet        No Known Allergies    REVIEW OF SYSTEMS  10 systems reviewed, pertinent positives per HPI otherwise noted to be negative    PHYSICAL EXAM  /66   Pulse 71   Temp 97.8 °F (36.6 °C) (Oral)   Resp 18   Ht 5' 3\" (1.6 m)   Wt 132 lb (59.9 kg)   LMP 11/01/2017 (Approximate)   SpO2 98%   BMI 23.38 kg/m²   GENERAL APPEARANCE: Awake and alert. Cooperative. In no obvious distress. HEAD: Normocephalic. Atraumatic. EYES: PERRL. EOM's grossly intact. ENT: Mucous membranes are pink and moist.   NECK: Supple. HEART: RRR. No murmurs. LUNGS: Respirations unlabored. CTAB. Good air exchange. ABDOMEN: Soft. Non-distended. Non-tender. No masses. No organomegaly. No guarding or rebound. EXTREMITIES: No peripheral edema. Moves all extremities equally. All extremities neurovascularly intact. Ecchymosis to the anterior shin on the right side but normal function distally. SKIN: Warm and dry. No acute rashes. NEUROLOGICAL: Alert and oriented. Neurovascularly intact on the right foot. Strength 5/5, sensation intact. Gait normal.   PSYCHIATRIC: Normal mood and affect. No HI or SI expressed to me. RADIOLOGY    If acquired see below     EKG:     If acquired see below       ED COURSE/MDM      ED Course as of 06/08/22 2102 Wed Jun 08, 2022 2102 FINDINGS:  The soft tissues are unremarkable. There is no fracture or dislocation. No  focal destructive osseous lesion. No radiopaque foreign body is identified.     IMPRESSION:  No acute abnormality    [DL]      ED Course User Index  [DL] Melba Ortega MD       The ED course and plan were reviewed and results discussed with the patient    The patient understood and agreed with the Discharge/transfer planning.     CLINICAL IMPRESSION and DISPOSITION    Manuela Franco was stable and diagnosed with right navarro contusion       Melba Ortega MD  06/08/22 2102

## 2022-06-10 ENCOUNTER — TELEPHONE (OUTPATIENT)
Dept: ADMINISTRATIVE | Age: 57
End: 2022-06-10

## 2022-06-10 NOTE — TELEPHONE ENCOUNTER
RECEIVED APPROVAL FOR Esomeprazole Magnesium 20MG dr capsules. [facility] STARTED THE CASE. If this requires a response please respond to the pool. [address]). Please advise patient thank you.

## 2022-06-20 DIAGNOSIS — J43.8 OTHER EMPHYSEMA (HCC): ICD-10-CM

## 2022-06-20 RX ORDER — ALBUTEROL SULFATE 90 UG/1
2 AEROSOL, METERED RESPIRATORY (INHALATION) EVERY 6 HOURS PRN
Qty: 3 EACH | Refills: 1 | Status: SHIPPED | OUTPATIENT
Start: 2022-06-20

## 2022-06-21 RX ORDER — MAGNESIUM OXIDE 400 MG/1
TABLET ORAL
Qty: 60 TABLET | Refills: 1 | Status: SHIPPED | OUTPATIENT
Start: 2022-06-21 | End: 2022-08-23

## 2022-07-22 RX ORDER — MIRTAZAPINE 15 MG/1
TABLET, FILM COATED ORAL
Qty: 30 TABLET | Refills: 10 | Status: SHIPPED | OUTPATIENT
Start: 2022-07-22

## 2022-07-22 NOTE — TELEPHONE ENCOUNTER
Future appt scheduled 08/02/2022               Last appt 12/29/2021      Last Written 02/07/2022    mirtazapine (REMERON) 15 MG tablet  #30  5 RF

## 2022-08-02 ENCOUNTER — OFFICE VISIT (OUTPATIENT)
Dept: FAMILY MEDICINE CLINIC | Age: 57
End: 2022-08-02
Payer: COMMERCIAL

## 2022-08-02 VITALS
SYSTOLIC BLOOD PRESSURE: 91 MMHG | BODY MASS INDEX: 23.38 KG/M2 | WEIGHT: 132 LBS | HEART RATE: 73 BPM | OXYGEN SATURATION: 96 % | TEMPERATURE: 97.9 F | DIASTOLIC BLOOD PRESSURE: 60 MMHG

## 2022-08-02 DIAGNOSIS — I51.89 COMBINED SYSTOLIC AND DIASTOLIC CARDIAC DYSFUNCTION: ICD-10-CM

## 2022-08-02 DIAGNOSIS — F41.9 ANXIETY: ICD-10-CM

## 2022-08-02 DIAGNOSIS — I42.9 CARDIOMYOPATHY, UNSPECIFIED TYPE (HCC): ICD-10-CM

## 2022-08-02 DIAGNOSIS — J43.8 OTHER EMPHYSEMA (HCC): ICD-10-CM

## 2022-08-02 DIAGNOSIS — I25.10 CORONARY ARTERY DISEASE INVOLVING NATIVE CORONARY ARTERY OF NATIVE HEART WITHOUT ANGINA PECTORIS: Primary | ICD-10-CM

## 2022-08-02 PROBLEM — K63.5 HYPERPLASTIC POLYP OF DESCENDING COLON: Status: ACTIVE | Noted: 2022-08-02

## 2022-08-02 PROCEDURE — G8420 CALC BMI NORM PARAMETERS: HCPCS | Performed by: FAMILY MEDICINE

## 2022-08-02 PROCEDURE — 3023F SPIROM DOC REV: CPT | Performed by: FAMILY MEDICINE

## 2022-08-02 PROCEDURE — 90677 PCV20 VACCINE IM: CPT | Performed by: FAMILY MEDICINE

## 2022-08-02 PROCEDURE — 4004F PT TOBACCO SCREEN RCVD TLK: CPT | Performed by: FAMILY MEDICINE

## 2022-08-02 PROCEDURE — G8427 DOCREV CUR MEDS BY ELIG CLIN: HCPCS | Performed by: FAMILY MEDICINE

## 2022-08-02 PROCEDURE — 99214 OFFICE O/P EST MOD 30 MIN: CPT | Performed by: FAMILY MEDICINE

## 2022-08-02 PROCEDURE — 36415 COLL VENOUS BLD VENIPUNCTURE: CPT | Performed by: FAMILY MEDICINE

## 2022-08-02 PROCEDURE — 3017F COLORECTAL CA SCREEN DOC REV: CPT | Performed by: FAMILY MEDICINE

## 2022-08-02 ASSESSMENT — PATIENT HEALTH QUESTIONNAIRE - PHQ9
SUM OF ALL RESPONSES TO PHQ QUESTIONS 1-9: 0
9. THOUGHTS THAT YOU WOULD BE BETTER OFF DEAD, OR OF HURTING YOURSELF: 0
8. MOVING OR SPEAKING SO SLOWLY THAT OTHER PEOPLE COULD HAVE NOTICED. OR THE OPPOSITE, BEING SO FIGETY OR RESTLESS THAT YOU HAVE BEEN MOVING AROUND A LOT MORE THAN USUAL: 0
6. FEELING BAD ABOUT YOURSELF - OR THAT YOU ARE A FAILURE OR HAVE LET YOURSELF OR YOUR FAMILY DOWN: 0
3. TROUBLE FALLING OR STAYING ASLEEP: 0
4. FEELING TIRED OR HAVING LITTLE ENERGY: 0
2. FEELING DOWN, DEPRESSED OR HOPELESS: 0
7. TROUBLE CONCENTRATING ON THINGS, SUCH AS READING THE NEWSPAPER OR WATCHING TELEVISION: 0
1. LITTLE INTEREST OR PLEASURE IN DOING THINGS: 0
SUM OF ALL RESPONSES TO PHQ9 QUESTIONS 1 & 2: 0
SUM OF ALL RESPONSES TO PHQ QUESTIONS 1-9: 0
5. POOR APPETITE OR OVEREATING: 0
10. IF YOU CHECKED OFF ANY PROBLEMS, HOW DIFFICULT HAVE THESE PROBLEMS MADE IT FOR YOU TO DO YOUR WORK, TAKE CARE OF THINGS AT HOME, OR GET ALONG WITH OTHER PEOPLE: 0
SUM OF ALL RESPONSES TO PHQ QUESTIONS 1-9: 0
SUM OF ALL RESPONSES TO PHQ QUESTIONS 1-9: 0

## 2022-08-02 NOTE — PROGRESS NOTES
Subjective:  Amina Schilling is here to discuss the following issues. She has coronary artery disease and has had no recent chest pain or chest pressure. She has a history of a stent followed soon after by CABG in 2020. She had a myocardial infarction in an EF of about 35%. Most recent echocardiogram showed EF estimated at 50%. Her energy level has improved. She is compliant with all medications. She had a recent favorable stress test.  No edema or orthopnea. She has a history of emphysema and was started on an inhaler from her pulmonologist and she feels this has been beneficial.  Less shortness of breath. She has anxiety disorder but on medicine symptoms are well controlled with no side effects. She sleeps well  Social History     Tobacco Use   Smoking Status Some Days    Packs/day: 0.50    Years: 10.00    Pack years: 5.00    Types: Cigarettes    Start date: 11/20/1983   Smokeless Tobacco Never   Allergies:     Patient has no known allergies. Objective:  BP 91/60   Pulse 73   Temp 97.9 °F (36.6 °C) (Oral)   Wt 132 lb (59.9 kg)   LMP 11/01/2017 (Approximate)   SpO2 96%   BMI 23.38 kg/m²    No acute distress, heart regular rate and rhythm without murmur, lungs clear to auscultation easy effort, abdomen soft nondistended, no clubbing or cyanosis no edema    Assessment:  1. Coronary artery disease involving native coronary artery of native heart without angina pectoris    2. Cardiomyopathy, unspecified type ([address]    3. Combined systolic and diastolic cardiac dysfunction    4. Other emphysema ([address]    5. Anxiety            Plan:  Labs ordered  Pneumococcal vaccine  Continue current medicines including Stiolto recently started by her pulmonologist  She had colon polyps on recent colonoscopy and this will be repeated in 3 years  Follow-up in 4 months or as needed  The diagnoses listed in the assessment above are stable unless otherwise indicated.    Age-specific preventative health recommendations were reviewed and the [facility]" was provided. Avoid exposure to tobacco products. Follow CDC guidelines for covid-19 prevention. Read and consider all information provided by the pharmacy regarding prescribed medications before use    Call or return for any problems that arise before the next scheduled appointment. Lilliana White MD    This note was transcribed using a voice recognition software system. Proper technique and careful oversight were used to increase transcription accuracy but inadvertent errors may be present.

## 2022-08-02 NOTE — PATIENT INSTRUCTIONS
Please read the healthy family handout that you were given and share it with your family. Please compare this printed medication list with your medications at home to be sure they are the same. If you have any medications that are different please contact us immediately at [phone]. Also review your allergies that we have listed, these may also include medications that you have not been able to tolerate, make sure everything listed is correct. If you have any allergies that are different please contact us immediately at [phone]. You may receive a survey in the mail or by email asking about your experience during your visit today. Please complete and return to us so we know how we are serving you.

## 2022-08-03 LAB
ALT SERPL-CCNC: 15 U/L (ref 10–40)
ANION GAP SERPL CALCULATED.3IONS-SCNC: 15 MMOL/L (ref 3–16)
BUN BLDV-MCNC: 13 MG/DL (ref 7–20)
CALCIUM SERPL-MCNC: 9.5 MG/DL (ref 8.3–10.6)
CHLORIDE BLD-SCNC: 105 MMOL/L (ref 99–110)
CHOLESTEROL, TOTAL: 151 MG/DL (ref 0–199)
CO2: 19 MMOL/L (ref 21–32)
CREAT SERPL-MCNC: 0.7 MG/DL (ref 0.6–1.1)
ESTIMATED AVERAGE GLUCOSE: 122.6 MG/DL
GFR AFRICAN AMERICAN: >60
GFR NON-AFRICAN AMERICAN: >60
GLUCOSE BLD-MCNC: 112 MG/DL (ref 70–99)
HBA1C MFR BLD: 5.9 %
HDLC SERPL-MCNC: 49 MG/DL (ref 40–60)
LDL CHOLESTEROL CALCULATED: 65 MG/DL
POTASSIUM SERPL-SCNC: 4.1 MMOL/L (ref 3.5–5.1)
SODIUM BLD-SCNC: 139 MMOL/L (ref 136–145)
TRIGL SERPL-MCNC: 187 MG/DL (ref 0–150)
VLDLC SERPL CALC-MCNC: 37 MG/DL

## 2022-08-05 ENCOUNTER — OFFICE VISIT (OUTPATIENT)
Dept: CARDIOLOGY CLINIC | Age: 57
End: 2022-08-05
Payer: COMMERCIAL

## 2022-08-05 VITALS
OXYGEN SATURATION: 98 % | BODY MASS INDEX: 23.39 KG/M2 | HEART RATE: 82 BPM | SYSTOLIC BLOOD PRESSURE: 90 MMHG | WEIGHT: 132 LBS | HEIGHT: 63 IN | DIASTOLIC BLOOD PRESSURE: 60 MMHG

## 2022-08-05 DIAGNOSIS — J43.8 OTHER EMPHYSEMA (HCC): ICD-10-CM

## 2022-08-05 DIAGNOSIS — E78.00 HYPERCHOLESTEREMIA: ICD-10-CM

## 2022-08-05 DIAGNOSIS — I25.10 CORONARY ARTERY DISEASE INVOLVING NATIVE HEART WITHOUT ANGINA PECTORIS, UNSPECIFIED VESSEL OR LESION TYPE: Primary | ICD-10-CM

## 2022-08-05 DIAGNOSIS — E78.5 DYSLIPIDEMIA: ICD-10-CM

## 2022-08-05 DIAGNOSIS — Z95.1 HISTORY OF CORONARY ARTERY BYPASS GRAFT X 1: ICD-10-CM

## 2022-08-05 DIAGNOSIS — I25.5 ISCHEMIC CARDIOMYOPATHY: ICD-10-CM

## 2022-08-05 DIAGNOSIS — Z95.1 S/P CABG X 1: ICD-10-CM

## 2022-08-05 DIAGNOSIS — I51.89 COMBINED SYSTOLIC AND DIASTOLIC CARDIAC DYSFUNCTION: ICD-10-CM

## 2022-08-05 DIAGNOSIS — I25.10 CORONARY ARTERY DISEASE INVOLVING NATIVE CORONARY ARTERY OF NATIVE HEART WITHOUT ANGINA PECTORIS: ICD-10-CM

## 2022-08-05 DIAGNOSIS — I21.3 ST ELEVATION MYOCARDIAL INFARCTION (STEMI), UNSPECIFIED ARTERY (HCC): ICD-10-CM

## 2022-08-05 DIAGNOSIS — Z87.891 HISTORY OF TOBACCO ABUSE: ICD-10-CM

## 2022-08-05 PROCEDURE — 3017F COLORECTAL CA SCREEN DOC REV: CPT | Performed by: NURSE PRACTITIONER

## 2022-08-05 PROCEDURE — 3023F SPIROM DOC REV: CPT | Performed by: NURSE PRACTITIONER

## 2022-08-05 PROCEDURE — 4004F PT TOBACCO SCREEN RCVD TLK: CPT | Performed by: NURSE PRACTITIONER

## 2022-08-05 PROCEDURE — G8420 CALC BMI NORM PARAMETERS: HCPCS | Performed by: NURSE PRACTITIONER

## 2022-08-05 PROCEDURE — 99214 OFFICE O/P EST MOD 30 MIN: CPT | Performed by: NURSE PRACTITIONER

## 2022-08-05 PROCEDURE — G8427 DOCREV CUR MEDS BY ELIG CLIN: HCPCS | Performed by: NURSE PRACTITIONER

## 2022-08-05 RX ORDER — METOPROLOL SUCCINATE 25 MG/1
TABLET, EXTENDED RELEASE ORAL
Qty: 90 TABLET | Refills: 3 | Status: SHIPPED | OUTPATIENT
Start: 2022-08-05

## 2022-08-05 RX ORDER — DIGOXIN 125 MCG
TABLET ORAL
Qty: 90 TABLET | Refills: 3 | Status: SHIPPED | OUTPATIENT
Start: 2022-08-05

## 2022-08-05 RX ORDER — ROSUVASTATIN CALCIUM 40 MG/1
TABLET, COATED ORAL
Qty: 90 TABLET | Refills: 3 | Status: SHIPPED | OUTPATIENT
Start: 2022-08-05

## 2022-08-05 RX ORDER — ASPIRIN 81 MG/1
TABLET ORAL
Qty: 90 TABLET | Refills: 3 | Status: SHIPPED | OUTPATIENT
Start: 2022-08-05

## 2022-08-05 NOTE — PROGRESS NOTES
09/30/2021 08:39 PM    WBC 10.0 08/26/2021 06:54 AM    WBC 8.7 03/08/2021 04:44 PM    RBC 4.50 09/30/2021 08:39 PM    RBC 4.62 08/26/2021 06:54 AM    RBC 4.26 03/08/2021 04:44 PM    HGB 13.7 09/30/2021 08:39 PM    HGB 13.9 08/26/2021 06:54 AM    HGB 12.9 03/08/2021 04:44 PM    HCT 39.2 09/30/2021 08:39 PM    HCT 41.3 08/26/2021 06:54 AM    HCT 37.7 03/08/2021 04:44 PM    MCV 87.0 09/30/2021 08:39 PM    MCV 89.3 08/26/2021 06:54 AM    MCV 88.7 03/08/2021 04:44 PM    RDW 13.7 09/30/2021 08:39 PM    RDW 13.9 08/26/2021 06:54 AM    RDW 13.2 03/08/2021 04:44 PM     09/30/2021 08:39 PM     08/26/2021 06:54 AM     03/08/2021 04:44 PM     BMP:  Lab Results   Component Value Date/Time     08/02/2022 03:05 PM     09/30/2021 08:39 PM     08/26/2021 06:54 AM    K 4.1 08/02/2022 03:05 PM    K 4.2 09/30/2021 08:39 PM    K 4.1 08/26/2021 06:54 AM    K 4.1 03/10/2021 01:29 PM    K 4.3 01/28/2021 04:57 AM    K 3.9 01/27/2021 11:11 AM     08/02/2022 03:05 PM     09/30/2021 08:39 PM     08/26/2021 06:54 AM    CO2 19 08/02/2022 03:05 PM    CO2 20 09/30/2021 08:39 PM    CO2 23 08/26/2021 06:54 AM    BUN 13 08/02/2022 03:05 PM    BUN 11 09/30/2021 08:39 PM    BUN 15 08/26/2021 06:54 AM    CREATININE 0.7 08/02/2022 03:05 PM    CREATININE <0.5 09/30/2021 08:39 PM    CREATININE 0.6 08/26/2021 06:54 AM     BNP:   Lab Results   Component Value Date/Time    PROBNP 360 09/30/2021 08:39 PM    PROBNP 283 08/26/2021 06:54 AM    PROBNP 697 01/27/2021 11:11 AM     LIPID:   Lab Results   Component Value Date/Time    TRIG 187 08/02/2022 03:05 PM    TRIG 119 01/28/2021 04:57 AM    HDL 49 08/02/2022 03:05 PM    HDL 54 12/01/2021 07:35 AM    HDL 45 05/28/2011 07:18 AM    LDLCALC 65 08/02/2022 03:05 PM    LDLCALC 81 12/01/2021 07:35 AM       Cardiac Imaging:  Exercise nuclear stress test 4/7/2022  Summary Technically difficult study  Low normal LVEF 57%  Normal wall motion  Difficult to evaluate inferior wall due to extracardiac uptake  Inferior/apical defect is likely due to attenuation artifact  No ischemia  Lower risk study     Echocardiogram 4/7/2022   Summary   Left ventricular systolic function is low normal with a 3D calculated EF of 50%. The left ventricle is normal in size with normal wall thickness. Basal inferior hypokinesis   Normal left ventricular diastolic function. Mild mitral and aortic regurgitation. Systolic pulmonary artery pressure (SPAP) is normal and estimated at 26 mmHg (right atrial pressure 3 mmHg). MUGA scan: 1/14/22  Conclusions        MUGA Procedure descriptor    The patient''s red blood cells were labeled with technetium 99m using the    modified in vivo technique. Imaging was performed at rest by planar    technique in multiple views including [place], Anterior, and Lt. Lateral.        MUGA Conclusions    Abnormal resting left ventricular function with mild global hypokinesis and    EF= 47% . MUGA scan: 9/23/21  Conclusions        MUGA Procedure descriptor    The patient''s red blood cells were labeled with technetium 99m using the    modified in vivo technique. Imaging was performed at rest by planar    technique in multiple views including [place], Anterior, and Lt. Lateral.        MUGA Conclusions    Abnormal resting left ventricular function with mild global hypokinesis and    EF= 47%       Echo complete: 9/16/21  Summary   Normal left ventricle size and wall thickness. The left ventricular systolic function is mildly reduced with an ejection   fraction of 35-40%. Moderate Global with regional hypokinesis. 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   Normal left ventricular diastolic filling pressure. The right ventricle is normal in size and function. MIld aortic regurgitation. Mild mitral regurgitation.       Compared with the prior study performed 2/10/21, on direct comparison of the   images, EF appears grossly similar while the basal inferior wall does now   appear aneurysmal.         Echo 2/10/2021:  The left ventricular systolic function is mildly reduced with an ejection   fraction of 40-45 %. There is hypokinesis of the basal septal, basal inferior, and basal   inferolateral walls. Normal left ventricular diastolic filling pressure. The left atrium is mildly dilated. Moderate aortic regurgitation. Mild mitral regurgitation. Systolic pulmonary artery pressure (SPAP) is normal estimated at 28 mmHg   (Right atrial pressure of 3 mmHg). Compared to exam done 9/16/2020, Left ventricular systolic function appears   improved. Stress test 1/28/2021:  There is no evidence of stress induced ischemia. Small fixed defect    posterobasal segment c/w infarct. LV function is normal with uniform wall    motion and ejection fraction of 56%. Low risk abnormal study. ECHO limited: 9/16/20  Summary   This is a limited study for CAD and ischemic cardiomyopathy follow-up. Left ventricular systolic function is reduced with ejection fraction   estimated at 35-40%. There is mild global hypokinesis with regional variation. The basal   inferoseptum, basal to mid inferior wall, and the entire inferolateral wall   appear more hypokinetic than the remaining segments. Direct comparison with the prior study dated 5/19/2020 is somewhat limited   as no contrast enhancement is used for the present study. Overall, however,   LV function and regional abnormalities appear similar to the prior study. CABG 6/2/2020:  CORONARY ARTERY BYPASS GRAFTING X1, INTERNAL MAMMARY ARTERY, OFF PUMP (LIMA TO LAD), 5 LEVEL BILATERAL INTERCOSTAL NERVE BLOCK, BALLOON PUMP REMOVAL     Echo 5/2020: The left ventricular systolic function is moderately reduced with an ejection fraction of 35 %. There is hypokinesis of the inferior, basal inferior and inferiolateral walls. Grade I diastolic dysfunction with normal filling pressure.    Mild mitral and aortic regurgitation. Systolic pulmonic artery pressure (SPAP) is normal estimated at 38 mmHg (Right atrial pressure of 8 mmHg). Coronary angiogram 5/31/2020:  Dominance:Right   LM: 50% distal stenosis unchanged from prior  LAD: larger vessel with mild plaquing; gives off three diagonals of which the second is with aneursym and 70% proximal stenosis  LCx: smaller caliber with minimal plaque disease   RCA: mild plaquing proximal to mid vessel with widely patent distal stents and no significant disease of RPLB or RPDA   LVEDP: 19 mmHg , no gradient upon pullback   LVEF: 35%  Discussed films and distal LM disease with CTS Dr. Sp Stone in person postprocedure. Plan is for continuation of IABP with Heparin and Cangrelor gtts as bridge to CABG this week. Last dose of Ticagrelor earlier this morning. Continue aspirin, high intensity statin, low dose beta blocker, and diurese as see fit. Coronary angiogram 5/19/2020:  Acute inferior STEMI  PROCEDURES PERFORMED    Left heart catheterization  LVgram  Coronary angiogam  Coronary cath  Thrombectomy of RCA  IVUS of RCA  PCI of RCA with 2 drug-eluting stents  PROCEDURE DESCRIPTION   Risks/benefits/alternatives/outcomes were discussed with patient and/or family and informed consent was obtained. This was an emergency procedure. patient was prepped draped in the usual sterile fashion. Local anaesthetic was applied over puncture site. Using a back wall technique, a 6 Brazilian Terumo sheath was inserted into right radial artery. Verapamil, nitroglycerin, nicardipine were administered through the sheath. Heparin was administered. Diagnostic 5 Telugu Medtronic pigtail and ultra catheters were used for diagnostic angiograms. Initially focus was on RCA angiography and PCI as noted below. At the conclusion of the procedure, a TR band was placed over the puncture site and hemostasis was obtained. There were no immediate complications.  I supervised sedation with versed 4 which responded to vasopressors and these were able to be weaned partially at the end of the case and patient's EKG and symptoms had markedly improved at end of case. Remaining CAD/ASHD was felt to be treated medically followed by possible CABG surgery for left main disease.   CONCLUSIONS:    Successful PCI of RCA with 2 drug-eluting stents  Will refer for consideration of CABG for left main disease  Addend, case d/w dr Janae Hidalgo, plan for outpt cabg, order for life vest in interim, ef 35% on current review      I appreciate the opportunity of cooperating in the care of this individual.    León Mcdonald, APRN - CNP, 8/5/2022, 2:39 PM

## 2022-08-05 NOTE — PATIENT INSTRUCTIONS
Finish out the Koby Schein then just stop  No change in other heart medicines  No need for repeat blood work or further testing at this time  Follow up with me or Dr. Jamaal Sullivan in 6 months        Latest Reference Range & Units 12/1/21 07:35 8/2/22 15:05   Cholesterol, Fasting 0 - 199 mg/dL 157 151   HDL Cholesterol 40 - 60 mg/dL 54 49   LDL Calculated <70 mg/dL 81 65   Triglycerides 0 - 150 mg/dL 109 187 (H)   (H): Data is abnormally high

## 2022-08-19 RX ORDER — POTASSIUM CHLORIDE 20 MEQ/1
TABLET, EXTENDED RELEASE ORAL
Qty: 60 TABLET | Refills: 10 | OUTPATIENT
Start: 2022-08-19

## 2022-08-23 RX ORDER — MAGNESIUM OXIDE 400 MG/1
TABLET ORAL
Qty: 60 TABLET | Refills: 10 | Status: SHIPPED | OUTPATIENT
Start: 2022-08-23

## 2022-09-02 RX ORDER — POTASSIUM CHLORIDE 20 MEQ/1
TABLET, EXTENDED RELEASE ORAL
Qty: 180 TABLET | Refills: 1 | Status: SHIPPED | OUTPATIENT
Start: 2022-09-02

## 2022-10-22 ENCOUNTER — HOSPITAL ENCOUNTER (OUTPATIENT)
Dept: MAMMOGRAPHY | Age: 57
Discharge: HOME | End: 2022-10-22
Payer: COMMERCIAL

## 2022-10-22 DIAGNOSIS — Z12.39 SCREENING BREAST EXAMINATION: ICD-10-CM

## 2022-10-22 PROCEDURE — 77063 BREAST TOMOSYNTHESIS BI: CPT

## 2022-10-24 ENCOUNTER — TELEPHONE (OUTPATIENT)
Dept: MAMMOGRAPHY | Age: 57
End: 2022-10-24

## 2022-11-07 RX ORDER — FLUOXETINE HYDROCHLORIDE 20 MG/1
60 CAPSULE ORAL DAILY
Qty: 90 CAPSULE | Refills: 10 | Status: SHIPPED | OUTPATIENT
Start: 2022-11-07

## 2022-11-07 NOTE — TELEPHONE ENCOUNTER
LOV 08/02/22  FOV  12/06/22  Last filledDate of last refill of this med was 03/11/22, # of pills given 180 and # of refills given 3. Their next appointment is 12/06/22, the last date patient was seen was 0/02/22. Does patient have medication agreement on file? No  Has drug screen been done in last 12 months if needed?  N/A

## 2022-11-22 ENCOUNTER — OFFICE VISIT (OUTPATIENT)
Dept: PULMONOLOGY | Age: 57
End: 2022-11-22
Payer: COMMERCIAL

## 2022-11-22 VITALS
BODY MASS INDEX: 24.27 KG/M2 | RESPIRATION RATE: 16 BRPM | OXYGEN SATURATION: 98 % | HEART RATE: 92 BPM | HEIGHT: 63 IN | SYSTOLIC BLOOD PRESSURE: 102 MMHG | WEIGHT: 137 LBS | TEMPERATURE: 97.6 F | DIASTOLIC BLOOD PRESSURE: 62 MMHG

## 2022-11-22 DIAGNOSIS — J43.8 OTHER EMPHYSEMA (HCC): Primary | ICD-10-CM

## 2022-11-22 DIAGNOSIS — F17.200 SMOKER: ICD-10-CM

## 2022-11-22 DIAGNOSIS — Z95.1 S/P CABG (CORONARY ARTERY BYPASS GRAFT): ICD-10-CM

## 2022-11-22 PROCEDURE — 4004F PT TOBACCO SCREEN RCVD TLK: CPT | Performed by: INTERNAL MEDICINE

## 2022-11-22 PROCEDURE — G8427 DOCREV CUR MEDS BY ELIG CLIN: HCPCS | Performed by: INTERNAL MEDICINE

## 2022-11-22 PROCEDURE — 99213 OFFICE O/P EST LOW 20 MIN: CPT | Performed by: INTERNAL MEDICINE

## 2022-11-22 PROCEDURE — 3023F SPIROM DOC REV: CPT | Performed by: INTERNAL MEDICINE

## 2022-11-22 PROCEDURE — G8420 CALC BMI NORM PARAMETERS: HCPCS | Performed by: INTERNAL MEDICINE

## 2022-11-22 PROCEDURE — G8484 FLU IMMUNIZE NO ADMIN: HCPCS | Performed by: INTERNAL MEDICINE

## 2022-11-22 PROCEDURE — 3017F COLORECTAL CA SCREEN DOC REV: CPT | Performed by: INTERNAL MEDICINE

## 2022-11-22 NOTE — PROGRESS NOTES
MA Communication:   The following orders are received by verbal communication from Eric Hu MD    Orders include:    6 MONTH F/U  LDCT same day

## 2022-11-22 NOTE — LETTER
[address] [facility]  [address] [facility] [address] [facility]  Phone: [phone]  Fax: [phone]    Jarek Rizvi MD    November 22, 2022     Moon Dimas, [address] [place] [address]    Patient: Js Conley   MR Number: [id number]   YOB: 1965   Date of Visit: 11/22/2022       Dear Moon Dimas: Thank you for referring Js Conley to me for evaluation/treatment. Below are the relevant portions of my assessment and plan of care. If you have questions, please do not hesitate to call me. I look forward to following Rich Emmanuel along with you.     Sincerely,      Jarek Rizvi MD

## 2022-11-22 NOTE — LETTER
[address] [facility]  [address] [facility] [address] [facility]  Phone: [phone]  Fax: [phone]    Naresh Sarkar MD    November 22, 2022     Dmitry Hendricks, [address] [place] [address]    Patient: Yoko Jimenez   MR Number: [id number]   YOB: 1965   Date of Visit: 11/22/2022       Dear Dmitry Hendricks: Thank you for referring Yoko Jimenez to me for evaluation/treatment. Below are the relevant portions of my assessment and plan of care. If you have questions, please do not hesitate to call me. I look forward to following Manjit Garcia along with you.     Sincerely,      Naresh Sarkar MD

## 2022-11-22 NOTE — PATIENT INSTRUCTIONS
Remember to bring a list of pulmonary medications and any CPAP or BiPAP machines to your next appointment with the office. Please keep all of your future appointments scheduled by [address], [facility] office. Out of respect for other patients and providers, you may be asked to reschedule your appointment if you arrive later than your scheduled appointment time. Appointments cancelled less than 24hrs in advance will be considered a no show. Patients with three missed appointments within 1 year or four missed appointments within 2 years can be dismissed from the practice. Please be aware that our physicians are required to work in the Intensive Care Unit at [facility].  Your appointment may need to be rescheduled if they are designated to work during your appointment time. You may receive a survey regarding the care you received during your visit. Your input is valuable to us. We encourage you to complete and return your survey. We hope you will choose us in the future for your healthcare needs. Pt instructed of all future appointment dates & times, including radiology, labs, procedures & referrals. If procedures were scheduled preparation instructions provided. Instructions on future appointments with [facility] were given.

## 2022-12-06 ENCOUNTER — OFFICE VISIT (OUTPATIENT)
Dept: FAMILY MEDICINE CLINIC | Age: 57
End: 2022-12-06
Payer: COMMERCIAL

## 2022-12-06 VITALS
BODY MASS INDEX: 23.74 KG/M2 | WEIGHT: 134 LBS | SYSTOLIC BLOOD PRESSURE: 91 MMHG | HEART RATE: 70 BPM | DIASTOLIC BLOOD PRESSURE: 55 MMHG | OXYGEN SATURATION: 94 % | TEMPERATURE: 98 F

## 2022-12-06 DIAGNOSIS — K21.9 GASTROESOPHAGEAL REFLUX DISEASE, UNSPECIFIED WHETHER ESOPHAGITIS PRESENT: ICD-10-CM

## 2022-12-06 DIAGNOSIS — J43.8 OTHER EMPHYSEMA (HCC): ICD-10-CM

## 2022-12-06 DIAGNOSIS — J20.9 ACUTE BRONCHITIS, UNSPECIFIED ORGANISM: Primary | ICD-10-CM

## 2022-12-06 DIAGNOSIS — I25.5 ISCHEMIC CARDIOMYOPATHY: ICD-10-CM

## 2022-12-06 DIAGNOSIS — Z79.899 MEDICATION MANAGEMENT: ICD-10-CM

## 2022-12-06 DIAGNOSIS — I25.10 CORONARY ARTERY DISEASE INVOLVING NATIVE HEART WITHOUT ANGINA PECTORIS, UNSPECIFIED VESSEL OR LESION TYPE: ICD-10-CM

## 2022-12-06 LAB
ALT SERPL-CCNC: 12 U/L (ref 10–40)
ANION GAP SERPL CALCULATED.3IONS-SCNC: 14 MMOL/L (ref 3–16)
BUN BLDV-MCNC: 12 MG/DL (ref 7–20)
CALCIUM SERPL-MCNC: 9.5 MG/DL (ref 8.3–10.6)
CHLORIDE BLD-SCNC: 106 MMOL/L (ref 99–110)
CHOLESTEROL, TOTAL: 141 MG/DL (ref 0–199)
CO2: 21 MMOL/L (ref 21–32)
CREAT SERPL-MCNC: 0.9 MG/DL (ref 0.6–1.1)
DIGOXIN LEVEL: 1.1 NG/ML (ref 0.8–2)
GFR SERPL CREATININE-BSD FRML MDRD: >60 ML/MIN/{1.73_M2}
GLUCOSE BLD-MCNC: 90 MG/DL (ref 70–99)
HDLC SERPL-MCNC: 44 MG/DL (ref 40–60)
LDL CHOLESTEROL CALCULATED: 74 MG/DL
POTASSIUM SERPL-SCNC: 4.9 MMOL/L (ref 3.5–5.1)
SODIUM BLD-SCNC: 141 MMOL/L (ref 136–145)
TRIGL SERPL-MCNC: 114 MG/DL (ref 0–150)
VLDLC SERPL CALC-MCNC: 23 MG/DL

## 2022-12-06 PROCEDURE — G8427 DOCREV CUR MEDS BY ELIG CLIN: HCPCS | Performed by: FAMILY MEDICINE

## 2022-12-06 PROCEDURE — 36415 COLL VENOUS BLD VENIPUNCTURE: CPT | Performed by: FAMILY MEDICINE

## 2022-12-06 PROCEDURE — 99214 OFFICE O/P EST MOD 30 MIN: CPT | Performed by: FAMILY MEDICINE

## 2022-12-06 PROCEDURE — 3023F SPIROM DOC REV: CPT | Performed by: FAMILY MEDICINE

## 2022-12-06 PROCEDURE — G8484 FLU IMMUNIZE NO ADMIN: HCPCS | Performed by: FAMILY MEDICINE

## 2022-12-06 PROCEDURE — G8420 CALC BMI NORM PARAMETERS: HCPCS | Performed by: FAMILY MEDICINE

## 2022-12-06 PROCEDURE — 4004F PT TOBACCO SCREEN RCVD TLK: CPT | Performed by: FAMILY MEDICINE

## 2022-12-06 PROCEDURE — 3017F COLORECTAL CA SCREEN DOC REV: CPT | Performed by: FAMILY MEDICINE

## 2022-12-06 RX ORDER — AMOXICILLIN 500 MG/1
1000 CAPSULE ORAL 2 TIMES DAILY
Qty: 40 CAPSULE | Refills: 0 | Status: SHIPPED | OUTPATIENT
Start: 2022-12-06 | End: 2022-12-16

## 2022-12-06 NOTE — PROGRESS NOTES
Subjective:  Js Conley is here to discuss the following issues. He has anterior chest congestion thick yellow mucus production and occasional cough. She has been sick about 2 weeks. No vomiting or diarrhea no fever sweats or chills. She has a history of bronchitis and believes this is a recurrence. She has history of coronary artery disease and ischemic cardiomyopathy and continues on a combination of medications including digoxin. She has history of emphysema and continues on her inhalers and recently saw pulmonology and cardiology and they made no changes in her treatment plan. She has difficulty performing some of her activities at home such as sweeping or mopping. She has a history of GERD and occasionally has had mid chest burning but is uncertain what medicine she is taking. At times she has been on Nexium Prilosec and Pepcid. No dysphagia or vomiting  Social History     Tobacco Use   Smoking Status Some Days    Packs/day: 0.25    Years: 10.00    Pack years: 2.50    Types: Cigarettes    Start date: 11/20/1983   Smokeless Tobacco Never   Allergies:     Patient has no known allergies. Objective:  BP (!) 91/55   Pulse 70   Temp 98 °F (36.7 °C) (Oral)   Wt 134 lb (60.8 kg)   LMP 11/01/2017 (Approximate)   SpO2 94%   BMI 23.74 kg/m²    No acute distress, heart regular rate and rhythm without murmur, lungs clear to auscultation easy effort, abdomen soft nondistended, no clubbing or cyanosis no edema ears clear throat clear no lymphadenopathy    Assessment:  1. Acute bronchitis, unspecified organism    2. Coronary artery disease involving native heart without angina pectoris, unspecified vessel or lesion type    3. Ischemic cardiomyopathy    4. Other emphysema ([address]    5.  Gastroesophageal reflux disease, unspecified whether esophagitis present            Plan:  Amoxil  Labs ordered  Continue to follow-up with pulmonology and cardiology as advised  She will let us know what she is taking for reflux. Her treatment will need to be intensified  She is accompanied by her young grandson today  Her shortness of breath makes it difficult to vacuum or perform similar activities  Follow-up in 6 months or as needed  The diagnoses listed in the assessment above are stable unless otherwise indicated. Age-specific preventative health recommendations were reviewed and a \"Healthy Family Handout\" has been provided. Avoid exposure to tobacco products. Follow COVID-19 CDC guidelines. Read and consider all information provided by the pharmacy regarding prescribed medications before use. Call or return for any problems that arise before the next scheduled appointment. Génesis Magallon MD    This note was transcribed using a voice recognition software system. Proper technique and careful oversight were used to increase transcription accuracy but inadvertent errors may be present.

## 2022-12-06 NOTE — PATIENT INSTRUCTIONS
Please read the healthy family handout that you were given and share it with your family. Please compare this printed medication list with your medications at home to be sure they are the same. If you have any medications that are different please contact us immediately at [phone]. Also review your allergies that we have listed, these may also include medications that you have not been able to tolerate, make sure everything listed is correct. If you have any allergies that are different please contact us immediately at [phone]. You may receive a survey in the mail or by email asking about your experience during your visit today. Please complete and return to us so we know how we are serving you.

## 2022-12-07 RX ORDER — TIOTROPIUM BROMIDE AND OLODATEROL 3.124; 2.736 UG/1; UG/1
SPRAY, METERED RESPIRATORY (INHALATION)
Qty: 4 G | Refills: 10 | Status: SHIPPED | OUTPATIENT
Start: 2022-12-07

## 2023-01-05 DIAGNOSIS — I25.10 CORONARY ARTERY DISEASE INVOLVING NATIVE CORONARY ARTERY OF NATIVE HEART WITHOUT ANGINA PECTORIS: ICD-10-CM

## 2023-01-06 RX ORDER — TICAGRELOR 90 MG/1
TABLET ORAL
Qty: 180 TABLET | Refills: 1 | OUTPATIENT
Start: 2023-01-06

## 2023-01-11 DIAGNOSIS — I25.10 CORONARY ARTERY DISEASE INVOLVING NATIVE CORONARY ARTERY OF NATIVE HEART WITHOUT ANGINA PECTORIS: ICD-10-CM

## 2023-01-12 RX ORDER — TICAGRELOR 90 MG/1
TABLET ORAL
Qty: 180 TABLET | Refills: 2 | Status: SHIPPED | OUTPATIENT
Start: 2023-01-12

## 2023-02-03 RX ORDER — POTASSIUM CHLORIDE 20 MEQ/1
TABLET, EXTENDED RELEASE ORAL
Qty: 180 TABLET | Refills: 1 | Status: SHIPPED | OUTPATIENT
Start: 2023-02-03

## 2023-02-08 NOTE — PROGRESS NOTES
[address] [facility]  [phone]      Attending Physician: Katelyn Arora MD     Reason for Consultation/Chief Complaint: 6 month follow up, CAD, S/P CABG x 1, ischemic cardiomyopathy, combined systolic and diastolic cardiac dysfunction, hypercholesteremia    Subjective   History of Present Illness:  Judith Mcneil is a 56 y.o. female with a history of CAD with hx of IWMI s/p RONDA to RCA (5/2020) followed by CABG X1 (6/2020, LIMA-LAD), ICM, s/d CHF, HLD as well as emphysema and smoker. Recent NM stress test 1/28/21 showed no evidence of stress induced ischemia. Recent ECHO testing from 9/16/21 showed an EF of 35-40%. Moderate Global with regional hypokinesis. Basal inferior segment appears aneurysmal. Mild aortic regurgitation and mild MR. MUGA scan from 1/14/22 showed abnormal resting LV function with mild global hypokinesis and EF of 47%. Today she reports that she has chest pain sometimes and gets shortness of breathe. She is still smoking. She reports that she had a nurse practioner that would come to her house, she reported the chest pain to her, the practioner said it could be related to acid reflux or her COPD. She reports that her right leg will go numb if she is standing for too long. She denies dizziness, syncope and edema. 4/7/22 ECHO showed EF of 50%. Basal inferior hypokinesis. Mild MR and AR. 4/7/22 NM Stress Test showed low normal LVEF 57%. Normal wall motion. Difficult to evaluate inferior wall due to extracardiac uptake. Inferior/apical defect is likely due to attenuation artifact. No ischemia. Lower risk study. 4/7/22 MONROE study showed right ankle/brachial index is 1.18. Pulse volume recordings at the ankle level reveal normal waveforms. Left ankle/brachial index is 1.13. Pulse volume recordings at the ankle level reveal normal waveforms. Conclusions: Normal bilateral lower extremity arterial study.    8/5/22 OV with Trupti Bailey NP she reported that she was not doing to bad/okay. She reported her BP was still running low. Reported a little lightheadedness and fatigue with that. She denied chest pain, palpitations, orthopnea, edema or syncope. Today she reports that she has been doing good. She reports that Aryan Quispe instructed her to stop taking Brilinta, but to remain on aspirin. She reports that she has all medications and is taking them as prescribed. She denies chest pain  shortness of breath dizziness syncope or edema. Past Medical History:   has a past medical history of Arthritis, CAD (coronary artery disease), Cancer ([address] CHF (congestive heart failure) ([address] Depression, GERD (gastroesophageal reflux disease), Hyperlipidemia, On intra-aortic balloon pump assist, and Other emphysema ([address] Surgical History:   has a past surgical history that includes sinus surgery;  section; Coronary artery bypass graft (N/A, 2020); CT GUIDED PLEURAL DRAINAGE W CATH PERC (2020); thoracentesis (Left, 2020); transesophageal echocardiogram (2020); and Cardiac catheterization (2020). Social History:   reports that she has been smoking cigarettes. She started smoking about 39 years ago. She has a 2.50 pack-year smoking history. She has never used smokeless tobacco. She reports that she does not drink alcohol and does not use drugs. Home Medications:  Were reviewed and are listed in nursing record and/or below  Prior to Admission medications    Medication Sig Start Date End Date Taking?  Authorizing Provider   potassium chloride (KLOR-CON M) 20 MEQ extended release tablet TAKE 1 TABLET BY MOUTH TWICE DAILY WITH MEALS 2/3/23  Yes Fabi Ruano, APRN - CNP   VENTOLIN  (90 Base) MCG/ACT inhaler INHALE TWO (2) PUFFS BY MOUTH EVERY 6 HOURS AS NEEDED FOR WHEEZING 22  Yes Kaley Cadet MD   STIOLTO RESPIMAT 2.5-2.5 MCG/ACT AERS INHALE TWO (2) PUFFS BY MOUTH DAILY 22  Yes Kaley Cadet MD   FLUoxetine (PROZAC) 20 MG capsule TAKE 3 CAPSULES BY MOUTH DAILY 11/7/22  Yes Rick Dickson MD   magnesium oxide (MAG-OX) 400 MG tablet TAKE ONE (1) TABLET BY MOUTH TWICE DAILY 8/23/22  Yes Rick Dickson MD   rosuvastatin (CRESTOR) 40 MG tablet TAKE 1 TABLET BY MOUTH EVERY DAY 8/5/22  Yes SUJATA Nava CNP   metoprolol succinate (TOPROL XL) 25 MG extended release tablet TAKE 1 TABLET BY MOUTH EACH EVENING 8/5/22  Yes SUJATA Nava CNP   digoxin (LANOXIN) 125 MCG tablet TAKE ONE TABLET BY MOUTH DAILY 8/5/22  Yes SUJATA Nava CNP   aspirin (ASPIRIN LOW DOSE) 81 MG EC tablet TAKE 1 TABLET BY MOUTH DAILY 8/5/22  Yes SUJATA Nava CNP   NICOTINE STEP 2 14 MG/24HR APPLY 1 PATCH TO SKIN DAILY 7/22/22  Yes Jacki Mayfield MD   mirtazapine (REMERON) 15 MG tablet TAKE 1 TABLET BY MOUTH NIGHTLY 7/22/22  Yes Rick Dickson MD   famotidine (PEPCID) 20 MG tablet TAKE 2 TABLETS BY MOUTH EACH EVENING 5/26/22  Yes Rick Dickson MD   esomeprazole ([address]) 20 MG delayed release capsule TAKE 1 CAPSULE BY MOUTH DAILY 4/28/22  Yes Rick Dickson MD   omeprazole (PRILOSEC) 20 MG delayed release capsule Take 20 mg by mouth daily 1/16/22  Yes Historical Provider, MD   pramipexole (MIRAPEX) 0.25 MG tablet Take 1-2 tablets by mouth nightly as needed (RESTLESS LEG) 11/30/21  Yes SUJATA Nava CNP        CURRENT Medications:  No current facility-administered medications for this visit. Allergies:  Patient has no known allergies. Review of Systems:   A 14 point review of symptoms completed. Pertinent positives identified in the HPI, all other review of symptoms negative as below.       Objective   PHYSICAL EXAM:    Vitals:    02/10/23 0837   BP: 88/60   Pulse: 73   SpO2: 98%      Weight: 132 lb (59.9 kg)         General Appearance:  Alert, cooperative, no distress, appears stated age   Head:  Normocephalic, without obvious abnormality, atraumatic   Eyes:  PERRL, conjunctiva/corneas clear   Nose: Nares normal, no drainage or sinus tenderness   Throat: Lips, mucosa, and tongue normal   Neck: Supple, symmetrical, trachea midline, no adenopathy, thyroid: not enlarged, symmetric, no tenderness/mass/nodules, no carotid bruit or JVD   Lungs:   Clear to auscultation bilaterally, respirations unlabored   Chest Wall:  No deformity or tenderness   Heart:  Regular rate and rhythm, S1, S2 normal, no murmur, rub or gallop   Abdomen:   Soft, non-tender, bowel sounds active all four quadrants,  no masses, no organomegaly   Extremities: Extremities normal, atraumatic, no cyanosis or edema   Pulses: 2+ and symmetric in UE, decrs in LE    Skin: Skin color, texture, turgor normal, no rashes or lesions   Pysch: Normal mood and affect   Neurologic: Normal gross motor and sensory exam.         Labs   CBC:   Lab Results   Component Value Date/Time    WBC 9.3 09/30/2021 08:39 PM    RBC 4.50 09/30/2021 08:39 PM    HGB 13.7 09/30/2021 08:39 PM    HCT 39.2 09/30/2021 08:39 PM    MCV 87.0 09/30/2021 08:39 PM    RDW 13.7 09/30/2021 08:39 PM     09/30/2021 08:39 PM     CMP:  Lab Results   Component Value Date/Time     12/06/2022 11:18 AM    K 4.9 12/06/2022 11:18 AM    K 4.2 09/30/2021 08:39 PM     12/06/2022 11:18 AM    CO2 21 12/06/2022 11:18 AM    BUN 12 12/06/2022 11:18 AM    CREATININE 0.9 12/06/2022 11:18 AM    GFRAA >60 08/02/2022 03:05 PM    GFRAA >60 05/28/2011 07:18 AM    AGRATIO 1.6 09/30/2021 08:39 PM    LABGLOM >60 12/06/2022 11:18 AM    GLUCOSE 90 12/06/2022 11:18 AM    PROT 7.0 12/01/2021 07:35 AM    CALCIUM 9.5 12/06/2022 11:18 AM    BILITOT <0.2 12/01/2021 07:35 AM    ALKPHOS 107 12/01/2021 07:35 AM    AST 24 12/01/2021 07:35 AM    ALT 12 12/06/2022 11:18 AM     PT/INR:  No results found for: PTINR  HgBA1c:  Lab Results   Component Value Date    LABA1C 5.9 08/02/2022     Lab Results   Component Value Date    TROPONINI <0.01 09/30/2021         Cardiac Data     Last EKG: 3/9/22  Sinus Bradycardia   -Nonspecific ST depression   +   Nonspecific T-abnormality  -Nondiagnostic. HR 59    9/30/21  NSR, possible left atrial enlargement, possible inferior infarct, HR 91    Today nsr, nonspec st changes     ECHO: 4/7/22  Summary   Left ventricular systolic function is low normal with a 3D calculated EF of   50%. The left ventricle is normal in size with normal wall thickness. Basal inferior hypokinesis   Normal left ventricular diastolic function. Mild mitral and aortic regurgitation. Systolic pulmonary artery pressure (SPAP) is normal and estimated at 26 mmHg   (right atrial pressure 3 mmHg). Echo complete: 9/16/21  Summary   Normal left ventricle size and wall thickness. The left ventricular systolic function is mildly reduced with an ejection   fraction of 35-40%. Moderate Global with regional hypokinesis. 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   Normal left ventricular diastolic filling pressure. The right ventricle is normal in size and function. MIld aortic regurgitation. Mild mitral regurgitation. Compared with the prior study performed 2/10/21, on direct comparison of the   images, EF appears grossly similar while the basal inferior wall does now   appear aneurysmal.      ECHO complete: 2/10/21  Summary   The left ventricular systolic function is mildly reduced with an ejection   fraction of 40-45 %. There is hypokinesis of the basal septal, basal inferior, and basal   inferolateral walls. Normal left ventricular diastolic filling pressure. The left atrium is mildly dilated. Moderate aortic regurgitation. Mild mitral regurgitation. Systolic pulmonary artery pressure (SPAP) is normal estimated at 28 mmHg   (Right atrial pressure of 3 mmHg). Compared to exam done 9/16/2020, Left ventricular systolic function appears   improved. ECHO limited: 9/16/20  Summary   This is a limited study for CAD and ischemic cardiomyopathy follow-up.    Left ventricular systolic function is reduced with ejection fraction   estimated at 35-40%. There is mild global hypokinesis with regional variation. The basal   inferoseptum, basal to mid inferior wall, and the entire inferolateral wall   appear more hypokinetic than the remaining segments. Direct comparison with the prior study dated 5/19/2020 is somewhat limited   as no contrast enhancement is used for the present study. Overall, however,   LV function and regional abnormalities appear similar to the prior study. ECHO complete: 5/19/20   Summary   The left ventricular systolic function is moderately reduced with an   ejection fraction of 35 %. There is hypokinesis of the inferior, basal inferior and inferiolateral   walls. Grade I diastolic dysfunction with normal filling pressure. Mild mitral and aortic regurgitation. Systolic pulmonic artery pressure (SPAP) is normal estimated at 38 mmHg   (Right atrial pressure of 8 mmHg). NM Stress Test: 4/7/22  Summary  Technically difficult study  Low normal LVEF 57%  Normal wall motion  Difficult to evaluate inferior wall due to extracardiac uptake  Inferior/apical defect is likely due to attenuation artifact  No ischemia  Lower risk study     NM Stress Test:1/28/21  Summary    There is no evidence of stress induced ischemia. Small fixed defect    posterobasal segment c/w infarct. LV function is normal with uniform wall    motion and ejection fraction of 56%. Low risk abnormal study. Cath: 5/19/20  FINDINGS         LVGRAM     LVEDP  29   GRADIENT ACROSS AORTIC VALVE  no gradient   LV FUNCTION EF 40 %   WALL MOTION  inferior hypokinesis   MITRAL REGURGITATION  mild         CORONARY ARTERIES     LM  Less than 10% proximal-mid stenosis, distally there is an eccentric 50% stenosis         LAD  Large vessel, proximal-mid 30% stenosis. Distally less than 10% stenosis.      D1 and D2 have a very high takeoff and D2 in particular has a patulous/aneurysmal segment with 70 to 80% proximal stenosis and less than 10% mid to distal stenosis. D3 has 10 to 20% tnhbpbaj-bwa-tzwobo stenosis. LCX  Small vessel, 10% ufuqmdgw-vlb-zpkwic stenosis. RI  This vessel could also be described as the high first diagonal as noted above in the LAD section. RCA Large vessel, dominant, proximal less than 10% stenosis, mid 30 to 40% stenosis, distal 90 to 100% stenosis. CONCLUSIONS:      Successful PCI of RCA with 2 drug-eluting stents  Will refer for consideration of CABG for left main disease     Addend, case d/w dr Abigail Cruz, plan for outpt cabg, order for life vest in interim, ef 35% on current review      Studies:   MUGA scan: 9/23/21  Conclusions        MUGA Procedure descriptor    The patient''s red blood cells were labeled with technetium 99m using the    modified in vivo technique. Imaging was performed at rest by planar    technique in multiple views including Ethiopian, Anterior, and Lt. Lateral.        MUGA Conclusions    Abnormal resting left ventricular function with mild global hypokinesis and    EF= 47% . MUGA scan: 1/14/22  Conclusions        MUGA Procedure descriptor    The patient''s red blood cells were labeled with technetium 99m using the    modified in vivo technique. Imaging was performed at rest by planar    technique in multiple views including Ethiopian, Anterior, and Lt. Lateral.        MUGA Conclusions    Abnormal resting left ventricular function with mild global hypokinesis and    EF= 47% . MONROE Study: 4/7/22  Impressions Right Impression 1. The right ankle/brachial index is 1.18. 2. Pulse Volume Recordings at the ankle level reveal normal waveforms. 3. Continuous PW Doppler reveals normal multiphasic signals in the dorsalis pedis and posterior tibial arteries. Left Impression 1. The left ankle/brachial index is 1.13. 2. Pulse Volume Recordings at the ankle level reveal normal waveforms.  3. Continuous PW Doppler reveals normal multiphasic signals in the dorsalis pedis and posterior tibial arteries. 4. There is no previous exam for comparison. Conclusions    Summary    Normal bilateral lower extremity arterial study. I have reviewed labs and imaging/xray/diagnostic testing in this note. Assessment and Plan     Patient Active Problem List   Diagnosis    Anxiety    Primary insomnia    Arthritis    Gastroesophageal reflux disease without esophagitis    Hypercholesteremia    Chronic bilateral low back pain without sciatica    Lumbar radiculopathy    Moderate episode of recurrent major depressive disorder (HCC)    STEMI (ST elevation myocardial infarction) ([address]    Coronary artery disease involving native heart without angina pectoris    Combined systolic and diastolic cardiac dysfunction    S/P CABG (coronary artery bypass graft)    RLS (restless legs syndrome)    Cardiomyopathy (HCC)    Major depressive disorder with single episode, in partial remission (HCC)    Hypotension    SOB (shortness of breath)    Other emphysema (HCC)    Smoker    GERD (gastroesophageal reflux disease)    Hyperplastic polyp of descending colon       Plan:   Continue taking all the same medications for chronic heart conditions above , bp remains stable on lower side, continue crestor 40mg daily for hchol, toprol 25mg daily for cad/ashd   No cardiac testing warranted for today  Lipids LFT TSH-due December 2023  Follow up with me in 1 year or sooner if needed        Scribe's attestation: This note was scribed in the presence of Dr. Redd Chanel MD   by Lamberto Crump LPN      I, Dr Redd Chanel, personally performed the services described in this documentation, as scribed by the above signed scribe in my presence. It is both accurate and complete to my knowledge. I agree with the details independently gathered by the clinical support staff and the scribed note accurately describes my personal service to the patient.       Thank you for allowing us to participate in the care of GREE. Please call me with any questions [phone].     Konrad Bennett MD, [facility]   Interventional Cardiologist  [facility]  [phone] [facility]  [phone] [address] [place] [address]  2/10/2023 8:49 AM

## 2023-02-10 ENCOUNTER — OFFICE VISIT (OUTPATIENT)
Dept: CARDIOLOGY CLINIC | Age: 58
End: 2023-02-10
Payer: COMMERCIAL

## 2023-02-10 VITALS
BODY MASS INDEX: 23.39 KG/M2 | SYSTOLIC BLOOD PRESSURE: 88 MMHG | HEART RATE: 73 BPM | HEIGHT: 63 IN | WEIGHT: 132 LBS | DIASTOLIC BLOOD PRESSURE: 60 MMHG | OXYGEN SATURATION: 98 %

## 2023-02-10 DIAGNOSIS — E78.00 HYPERCHOLESTEREMIA: Primary | ICD-10-CM

## 2023-02-10 PROCEDURE — 3017F COLORECTAL CA SCREEN DOC REV: CPT | Performed by: INTERNAL MEDICINE

## 2023-02-10 PROCEDURE — G8484 FLU IMMUNIZE NO ADMIN: HCPCS | Performed by: INTERNAL MEDICINE

## 2023-02-10 PROCEDURE — 4004F PT TOBACCO SCREEN RCVD TLK: CPT | Performed by: INTERNAL MEDICINE

## 2023-02-10 PROCEDURE — G8427 DOCREV CUR MEDS BY ELIG CLIN: HCPCS | Performed by: INTERNAL MEDICINE

## 2023-02-10 PROCEDURE — 99214 OFFICE O/P EST MOD 30 MIN: CPT | Performed by: INTERNAL MEDICINE

## 2023-02-10 PROCEDURE — G8420 CALC BMI NORM PARAMETERS: HCPCS | Performed by: INTERNAL MEDICINE

## 2023-02-10 NOTE — PATIENT INSTRUCTIONS
Plan:   Continue taking all the same medications for chronic heart conditions above   No cardiac testing warranted for today  Lipids LFT TSH-due December 2023  Follow up with me in 1 year or sooner if needed

## 2023-04-04 RX ORDER — FAMOTIDINE 20 MG/1
TABLET, FILM COATED ORAL
Qty: 60 TABLET | Refills: 10 | Status: SHIPPED | OUTPATIENT
Start: 2023-04-04

## 2023-05-26 ENCOUNTER — OFFICE VISIT (OUTPATIENT)
Dept: FAMILY MEDICINE CLINIC | Age: 58
End: 2023-05-26
Payer: COMMERCIAL

## 2023-05-26 VITALS
WEIGHT: 134 LBS | DIASTOLIC BLOOD PRESSURE: 58 MMHG | BODY MASS INDEX: 23.74 KG/M2 | HEART RATE: 72 BPM | SYSTOLIC BLOOD PRESSURE: 92 MMHG | OXYGEN SATURATION: 96 %

## 2023-05-26 DIAGNOSIS — G89.29 CHRONIC LEFT SHOULDER PAIN: Primary | ICD-10-CM

## 2023-05-26 DIAGNOSIS — F17.200 SMOKER: ICD-10-CM

## 2023-05-26 DIAGNOSIS — M25.512 CHRONIC LEFT SHOULDER PAIN: Primary | ICD-10-CM

## 2023-05-26 DIAGNOSIS — J43.8 OTHER EMPHYSEMA (HCC): ICD-10-CM

## 2023-05-26 DIAGNOSIS — Z79.899 MEDICATION MANAGEMENT: ICD-10-CM

## 2023-05-26 DIAGNOSIS — I25.10 CORONARY ARTERY DISEASE INVOLVING NATIVE HEART WITHOUT ANGINA PECTORIS, UNSPECIFIED VESSEL OR LESION TYPE: ICD-10-CM

## 2023-05-26 DIAGNOSIS — I42.9 CARDIOMYOPATHY, UNSPECIFIED TYPE (HCC): ICD-10-CM

## 2023-05-26 LAB
ALT SERPL-CCNC: 14 U/L (ref 10–40)
ANION GAP SERPL CALCULATED.3IONS-SCNC: 9 MMOL/L (ref 3–16)
BUN SERPL-MCNC: 11 MG/DL (ref 7–20)
CALCIUM SERPL-MCNC: 9.8 MG/DL (ref 8.3–10.6)
CHLORIDE SERPL-SCNC: 107 MMOL/L (ref 99–110)
CHOLEST SERPL-MCNC: 156 MG/DL (ref 0–199)
CO2 SERPL-SCNC: 24 MMOL/L (ref 21–32)
CREAT SERPL-MCNC: 0.9 MG/DL (ref 0.6–1.1)
DIGOXIN SERPL-MCNC: 1.7 NG/ML (ref 0.8–2)
GFR SERPLBLD CREATININE-BSD FMLA CKD-EPI: >60 ML/MIN/{1.73_M2}
GLUCOSE SERPL-MCNC: 84 MG/DL (ref 70–99)
HDLC SERPL-MCNC: 50 MG/DL (ref 40–60)
LDLC SERPL CALC-MCNC: 86 MG/DL
POTASSIUM SERPL-SCNC: 4.9 MMOL/L (ref 3.5–5.1)
SODIUM SERPL-SCNC: 140 MMOL/L (ref 136–145)
TRIGL SERPL-MCNC: 101 MG/DL (ref 0–150)
VLDLC SERPL CALC-MCNC: 20 MG/DL

## 2023-05-26 PROCEDURE — 36415 COLL VENOUS BLD VENIPUNCTURE: CPT | Performed by: FAMILY MEDICINE

## 2023-05-26 PROCEDURE — G8427 DOCREV CUR MEDS BY ELIG CLIN: HCPCS | Performed by: FAMILY MEDICINE

## 2023-05-26 PROCEDURE — 3017F COLORECTAL CA SCREEN DOC REV: CPT | Performed by: FAMILY MEDICINE

## 2023-05-26 PROCEDURE — 3023F SPIROM DOC REV: CPT | Performed by: FAMILY MEDICINE

## 2023-05-26 PROCEDURE — G8420 CALC BMI NORM PARAMETERS: HCPCS | Performed by: FAMILY MEDICINE

## 2023-05-26 PROCEDURE — 4004F PT TOBACCO SCREEN RCVD TLK: CPT | Performed by: FAMILY MEDICINE

## 2023-05-26 PROCEDURE — 99214 OFFICE O/P EST MOD 30 MIN: CPT | Performed by: FAMILY MEDICINE

## 2023-05-26 ASSESSMENT — PATIENT HEALTH QUESTIONNAIRE - PHQ9
9. THOUGHTS THAT YOU WOULD BE BETTER OFF DEAD, OR OF HURTING YOURSELF: 0
4. FEELING TIRED OR HAVING LITTLE ENERGY: 0
8. MOVING OR SPEAKING SO SLOWLY THAT OTHER PEOPLE COULD HAVE NOTICED. OR THE OPPOSITE, BEING SO FIGETY OR RESTLESS THAT YOU HAVE BEEN MOVING AROUND A LOT MORE THAN USUAL: 0
7. TROUBLE CONCENTRATING ON THINGS, SUCH AS READING THE NEWSPAPER OR WATCHING TELEVISION: 0
SUM OF ALL RESPONSES TO PHQ QUESTIONS 1-9: 0
SUM OF ALL RESPONSES TO PHQ QUESTIONS 1-9: 0
SUM OF ALL RESPONSES TO PHQ9 QUESTIONS 1 & 2: 0
1. LITTLE INTEREST OR PLEASURE IN DOING THINGS: 0
SUM OF ALL RESPONSES TO PHQ QUESTIONS 1-9: 0
2. FEELING DOWN, DEPRESSED OR HOPELESS: 0
SUM OF ALL RESPONSES TO PHQ QUESTIONS 1-9: 0
5. POOR APPETITE OR OVEREATING: 0
3. TROUBLE FALLING OR STAYING ASLEEP: 0
10. IF YOU CHECKED OFF ANY PROBLEMS, HOW DIFFICULT HAVE THESE PROBLEMS MADE IT FOR YOU TO DO YOUR WORK, TAKE CARE OF THINGS AT HOME, OR GET ALONG WITH OTHER PEOPLE: 0
6. FEELING BAD ABOUT YOURSELF - OR THAT YOU ARE A FAILURE OR HAVE LET YOURSELF OR YOUR FAMILY DOWN: 0

## 2023-05-26 NOTE — PROGRESS NOTES
Subjective:  Matthew Pardo is here to discuss the following issues. She has chronic but worsening left shoulder pain present for several months. No specific injury. No redness warmth or swelling. She is unable to raise her arm above horizontal.  She has a long history of coronary artery disease prior MI stent placement then bypass and subsequent cardiomyopathy and mixed congestive heart failure. No increased edema no orthopnea no chest pain or pressure. She has cardiology follow-up and no changes were made. His emphysema with no increased shortness of breath wheezing or cough and continues to smoke 1 pack/day. Her medications include gotten  Social History     Tobacco Use   Smoking Status Some Days    Packs/day: 0.25    Years: 10.00    Pack years: 2.50    Types: Cigarettes    Start date: 11/20/1983   Smokeless Tobacco Never   Allergies:     Patient has no known allergies. Objective:  BP (!) 92/58   Pulse 72   Wt 134 lb (60.8 kg)   LMP 11/01/2017 (Approximate)   SpO2 96%   BMI 23.74 kg/m²    No acute distress, heart regular rate and rhythm without murmur, lungs clear to auscultation easy effort, abdomen nondistended, no clubbing or cyanosis left shoulder shows a stable joint with a dropped shoulder and inability to raise her arm above horizontal and pain with liftoff and supraspinatus stressing    Assessment:  1. Chronic left shoulder pain    2. Cardiomyopathy, unspecified type ([address]    3. Coronary artery disease involving native heart without angina pectoris, unspecified vessel or lesion type    4. Other emphysema ([address]    5. Smoker    6. Medication management            Plan:  Labs ordered  Left shoulder x-ray  Left shoulder MRI  Continue current medicines  Chantix  1 year ago her most recent echo showed an EF of 50%  Follow-up in 6 months fasting or as needed  The diagnoses listed in the assessment above are stable unless otherwise indicated.    Age-specific preventative health recommendations

## 2023-05-26 NOTE — PATIENT INSTRUCTIONS
Please read the healthy family handout that you were given and share it with your family. Please compare this printed medication list with your medications at home to be sure they are the same. If you have any medications that are different please contact us immediately at [phone]. Also review your allergies that we have listed, these may also include medications that you have not been able to tolerate, make sure everything listed is correct. If you have any allergies that are different please contact us immediately at [phone]. You may receive a survey in the mail or by email asking about your experience during your visit today. Please complete and return to us so we know how we are serving you.

## 2023-05-30 ENCOUNTER — TELEPHONE (OUTPATIENT)
Dept: FAMILY MEDICINE CLINIC | Age: 58
End: 2023-05-30

## 2023-05-30 RX ORDER — VARENICLINE TARTRATE 0.5 MG/1
TABLET, FILM COATED ORAL
Qty: 11 TABLET | Refills: 0 | Status: SHIPPED | OUTPATIENT
Start: 2023-05-30

## 2023-05-30 RX ORDER — VARENICLINE TARTRATE 1 MG/1
1 TABLET, FILM COATED ORAL 2 TIMES DAILY
Qty: 60 TABLET | Refills: 5 | Status: SHIPPED | OUTPATIENT
Start: 2023-05-30

## 2023-05-30 NOTE — TELEPHONE ENCOUNTER
Patient saw you on 5/26 and she said you were going to send Chantix in for her but there is nothing at her pharmacy.   It needs to go to [facility]

## 2023-06-05 ENCOUNTER — HOSPITAL ENCOUNTER (OUTPATIENT)
Age: 58
Discharge: HOME | End: 2023-06-05
Payer: COMMERCIAL

## 2023-06-05 ENCOUNTER — HOSPITAL ENCOUNTER (OUTPATIENT)
Dept: GENERAL RADIOLOGY | Age: 58
Discharge: HOME | End: 2023-06-05
Payer: COMMERCIAL

## 2023-06-05 ENCOUNTER — HOSPITAL ENCOUNTER (OUTPATIENT)
Dept: CT IMAGING | Age: 58
Discharge: HOME | End: 2023-06-05
Payer: COMMERCIAL

## 2023-06-05 DIAGNOSIS — G89.29 CHRONIC LEFT SHOULDER PAIN: ICD-10-CM

## 2023-06-05 DIAGNOSIS — M25.512 CHRONIC LEFT SHOULDER PAIN: ICD-10-CM

## 2023-06-05 DIAGNOSIS — F17.200 SMOKER: ICD-10-CM

## 2023-06-05 DIAGNOSIS — J43.8 OTHER EMPHYSEMA (HCC): ICD-10-CM

## 2023-06-05 PROCEDURE — 73030 X-RAY EXAM OF SHOULDER: CPT

## 2023-06-05 PROCEDURE — 71250 CT THORAX DX C-: CPT

## 2023-06-05 RX ORDER — MIRTAZAPINE 15 MG/1
TABLET, FILM COATED ORAL
Qty: 30 TABLET | Refills: 10 | Status: SHIPPED | OUTPATIENT
Start: 2023-06-05

## 2023-06-05 NOTE — TELEPHONE ENCOUNTER
Future appt scheduled 11/27/2023                      Last appt 05/26/2023      Last Written 07/22/2022    mirtazapine (REMERON) 15 MG tablet  #30  10 RF

## 2023-06-10 ENCOUNTER — HOSPITAL ENCOUNTER (OUTPATIENT)
Dept: MRI IMAGING | Age: 58
Discharge: HOME | End: 2023-06-10
Payer: COMMERCIAL

## 2023-06-10 DIAGNOSIS — G89.29 CHRONIC LEFT SHOULDER PAIN: ICD-10-CM

## 2023-06-10 DIAGNOSIS — M25.512 CHRONIC LEFT SHOULDER PAIN: ICD-10-CM

## 2023-06-10 PROCEDURE — 73221 MRI JOINT UPR EXTREM W/O DYE: CPT

## 2023-06-13 PROBLEM — R91.1 LUNG NODULE: Status: ACTIVE | Noted: 2023-06-13

## 2023-07-05 RX ORDER — MAGNESIUM OXIDE 400 MG/1
TABLET ORAL
Qty: 60 TABLET | Refills: 10 | Status: SHIPPED | OUTPATIENT
Start: 2023-07-05

## 2023-07-05 RX ORDER — NICOTINE 21 MG/24HR
PATCH, TRANSDERMAL 24 HOURS TRANSDERMAL
Qty: 30 PATCH | Refills: 5 | Status: SHIPPED | OUTPATIENT
Start: 2023-07-05

## 2023-08-02 DIAGNOSIS — I25.10 CORONARY ARTERY DISEASE INVOLVING NATIVE CORONARY ARTERY OF NATIVE HEART WITHOUT ANGINA PECTORIS: ICD-10-CM

## 2023-08-02 DIAGNOSIS — I21.3 ST ELEVATION MYOCARDIAL INFARCTION (STEMI), UNSPECIFIED ARTERY (HCC): ICD-10-CM

## 2023-08-02 DIAGNOSIS — Z95.1 HISTORY OF CORONARY ARTERY BYPASS GRAFT X 1: ICD-10-CM

## 2023-08-03 RX ORDER — POTASSIUM CHLORIDE 20 MEQ/1
TABLET, EXTENDED RELEASE ORAL
Qty: 180 TABLET | Refills: 3 | Status: SHIPPED | OUTPATIENT
Start: 2023-08-03

## 2023-08-03 RX ORDER — DIGOXIN 125 MCG
62.5 TABLET ORAL DAILY
Qty: 45 TABLET | Refills: 3 | Status: SHIPPED | OUTPATIENT
Start: 2023-08-03

## 2023-08-03 RX ORDER — ROSUVASTATIN CALCIUM 40 MG/1
TABLET, COATED ORAL
Qty: 90 TABLET | Refills: 3 | Status: SHIPPED | OUTPATIENT
Start: 2023-08-03

## 2023-08-03 RX ORDER — ASPIRIN 81 MG/1
TABLET ORAL
Qty: 90 TABLET | Refills: 3 | Status: SHIPPED | OUTPATIENT
Start: 2023-08-03

## 2023-08-03 NOTE — TELEPHONE ENCOUNTER
Reviewed recent labs. Digoxin level increased on last blood test.    I recommend decreasing the digoxin to every other day or half tablet daily.    Thank you, Remi Tg

## 2023-08-15 RX ORDER — METOPROLOL SUCCINATE 25 MG/1
TABLET, EXTENDED RELEASE ORAL
Qty: 30 TABLET | Refills: 3 | Status: SHIPPED | OUTPATIENT
Start: 2023-08-15

## 2023-08-22 ENCOUNTER — HOSPITAL ENCOUNTER (OUTPATIENT)
Dept: CT IMAGING | Age: 58
Discharge: HOME | End: 2023-08-22
Payer: COMMERCIAL

## 2023-08-22 DIAGNOSIS — F17.200 SMOKER: ICD-10-CM

## 2023-08-22 DIAGNOSIS — R91.1 LUNG NODULE: ICD-10-CM

## 2023-08-22 PROCEDURE — 71250 CT THORAX DX C-: CPT

## 2023-09-11 ENCOUNTER — OFFICE VISIT (OUTPATIENT)
Dept: PULMONOLOGY | Age: 58
End: 2023-09-11
Payer: COMMERCIAL

## 2023-09-11 VITALS
DIASTOLIC BLOOD PRESSURE: 74 MMHG | BODY MASS INDEX: 24.98 KG/M2 | WEIGHT: 141 LBS | OXYGEN SATURATION: 98 % | HEIGHT: 63 IN | SYSTOLIC BLOOD PRESSURE: 108 MMHG | RESPIRATION RATE: 16 BRPM | HEART RATE: 57 BPM | TEMPERATURE: 98 F

## 2023-09-11 DIAGNOSIS — J43.1 PANLOBULAR EMPHYSEMA (HCC): ICD-10-CM

## 2023-09-11 DIAGNOSIS — F17.200 TOBACCO DEPENDENCE: ICD-10-CM

## 2023-09-11 DIAGNOSIS — R91.1 PULMONARY NODULE: Primary | ICD-10-CM

## 2023-09-11 DIAGNOSIS — J44.9 COPD, MILD (HCC): ICD-10-CM

## 2023-09-11 PROCEDURE — 4004F PT TOBACCO SCREEN RCVD TLK: CPT | Performed by: STUDENT IN AN ORGANIZED HEALTH CARE EDUCATION/TRAINING PROGRAM

## 2023-09-11 PROCEDURE — 3017F COLORECTAL CA SCREEN DOC REV: CPT | Performed by: STUDENT IN AN ORGANIZED HEALTH CARE EDUCATION/TRAINING PROGRAM

## 2023-09-11 PROCEDURE — G8420 CALC BMI NORM PARAMETERS: HCPCS | Performed by: STUDENT IN AN ORGANIZED HEALTH CARE EDUCATION/TRAINING PROGRAM

## 2023-09-11 PROCEDURE — G8428 CUR MEDS NOT DOCUMENT: HCPCS | Performed by: STUDENT IN AN ORGANIZED HEALTH CARE EDUCATION/TRAINING PROGRAM

## 2023-09-11 PROCEDURE — 3023F SPIROM DOC REV: CPT | Performed by: STUDENT IN AN ORGANIZED HEALTH CARE EDUCATION/TRAINING PROGRAM

## 2023-09-11 PROCEDURE — 99213 OFFICE O/P EST LOW 20 MIN: CPT | Performed by: STUDENT IN AN ORGANIZED HEALTH CARE EDUCATION/TRAINING PROGRAM

## 2023-09-11 ASSESSMENT — ENCOUNTER SYMPTOMS
STRIDOR: 0
DIARRHEA: 0
VOMITING: 0
EYE REDNESS: 0
COUGH: 0
EYE DISCHARGE: 0
WHEEZING: 0
SORE THROAT: 0
COLOR CHANGE: 0
EYE PAIN: 0
BACK PAIN: 0
ABDOMINAL DISTENTION: 0
NAUSEA: 0
CONSTIPATION: 0
TROUBLE SWALLOWING: 0
SHORTNESS OF BREATH: 0
ABDOMINAL PAIN: 0
EYE ITCHING: 0

## 2023-09-11 NOTE — PROGRESS NOTES
MA Communication:   The following orders are received by verbal communication from   Geo Crystal MD    Orders include:  LDCT 6 mo        FU Meaghan 6 mo

## 2023-09-11 NOTE — PATIENT INSTRUCTIONS
Remember to bring a list of pulmonary medications and any CPAP or BiPAP machines to your next appointment with the office. Please keep all of your future appointments scheduled by [facility] office. Out of respect for other patients and providers, you may be asked to reschedule your appointment if you arrive later than your scheduled appointment time. Appointments cancelled less than 24hrs in advance will be considered a no show. Patients with three missed appointments within 1 year or four missed appointments within 2 years can be dismissed from the practice. Please be aware that our physicians are required to work in the Intensive Care Unit at [facility].  Your appointment may need to be rescheduled if they are designated to work during your appointment time. You may receive a survey regarding the care you received during your visit. Your input is valuable to us. We encourage you to complete and return your survey. We hope you will choose us in the future for your healthcare needs. Pt instructed of all future appointment dates & times, including radiology, labs, procedures & referrals. If procedures were scheduled preparation instructions provided. Instructions on future appointments with [facility] were given.       1800- qiit now     [employer] to Stop Smoking

## 2023-10-02 ENCOUNTER — OFFICE VISIT (OUTPATIENT)
Dept: FAMILY MEDICINE CLINIC | Age: 58
End: 2023-10-02
Payer: COMMERCIAL

## 2023-10-02 VITALS
SYSTOLIC BLOOD PRESSURE: 102 MMHG | OXYGEN SATURATION: 94 % | WEIGHT: 142.25 LBS | TEMPERATURE: 97.4 F | HEIGHT: 63 IN | HEART RATE: 73 BPM | DIASTOLIC BLOOD PRESSURE: 65 MMHG | BODY MASS INDEX: 25.2 KG/M2

## 2023-10-02 DIAGNOSIS — I25.10 CORONARY ARTERY DISEASE INVOLVING NATIVE CORONARY ARTERY OF NATIVE HEART WITHOUT ANGINA PECTORIS: ICD-10-CM

## 2023-10-02 DIAGNOSIS — J40 BRONCHITIS: Primary | ICD-10-CM

## 2023-10-02 PROCEDURE — 3017F COLORECTAL CA SCREEN DOC REV: CPT | Performed by: NURSE PRACTITIONER

## 2023-10-02 PROCEDURE — G8484 FLU IMMUNIZE NO ADMIN: HCPCS | Performed by: NURSE PRACTITIONER

## 2023-10-02 PROCEDURE — 4004F PT TOBACCO SCREEN RCVD TLK: CPT | Performed by: NURSE PRACTITIONER

## 2023-10-02 PROCEDURE — G8419 CALC BMI OUT NRM PARAM NOF/U: HCPCS | Performed by: NURSE PRACTITIONER

## 2023-10-02 PROCEDURE — 99213 OFFICE O/P EST LOW 20 MIN: CPT | Performed by: NURSE PRACTITIONER

## 2023-10-02 PROCEDURE — G8427 DOCREV CUR MEDS BY ELIG CLIN: HCPCS | Performed by: NURSE PRACTITIONER

## 2023-10-02 RX ORDER — DOXYCYCLINE HYCLATE 100 MG
100 TABLET ORAL 2 TIMES DAILY
Qty: 20 TABLET | Refills: 0 | Status: SHIPPED | OUTPATIENT
Start: 2023-10-02 | End: 2023-10-12

## 2023-10-02 SDOH — ECONOMIC STABILITY: INCOME INSECURITY: HOW HARD IS IT FOR YOU TO PAY FOR THE VERY BASICS LIKE FOOD, HOUSING, MEDICAL CARE, AND HEATING?: NOT HARD AT ALL

## 2023-10-02 SDOH — ECONOMIC STABILITY: FOOD INSECURITY: WITHIN THE PAST 12 MONTHS, THE FOOD YOU BOUGHT JUST DIDN'T LAST AND YOU DIDN'T HAVE MONEY TO GET MORE.: NEVER TRUE

## 2023-10-02 SDOH — ECONOMIC STABILITY: FOOD INSECURITY: WITHIN THE PAST 12 MONTHS, YOU WORRIED THAT YOUR FOOD WOULD RUN OUT BEFORE YOU GOT MONEY TO BUY MORE.: NEVER TRUE

## 2023-10-02 SDOH — ECONOMIC STABILITY: HOUSING INSECURITY
IN THE LAST 12 MONTHS, WAS THERE A TIME WHEN YOU DID NOT HAVE A STEADY PLACE TO SLEEP OR SLEPT IN A SHELTER (INCLUDING NOW)?: NO

## 2023-10-02 ASSESSMENT — ENCOUNTER SYMPTOMS
GASTROINTESTINAL NEGATIVE: 1
EYES NEGATIVE: 1
COUGH: 1

## 2023-10-02 NOTE — PROGRESS NOTES
CHIEF COMPLAINT  Chief Complaint   Patient presents with    Cough     Just coughing unable to quit         HPI   Orlando Everett is a 56 y.o. female who presents to the office complaining of cough and congestion x 1 week. Patient reports no fever/chills. Patient reports taking mucinex. Patient reports that family numbers have similar symptoms. Patient denies any shortness of breath or chest pain. No nausea, vomiting or diarrhea. No other complaints, modifying factors or associated symptoms. Nursing notes reviewed. Past Medical History:   Diagnosis Date    Arthritis     CAD (coronary artery disease)     Cancer (HCC)     CHF (congestive heart failure) (HCC)     Depression     GERD (gastroesophageal reflux disease)     Hyperlipidemia     On intra-aortic balloon pump assist 05/31/2020    Removed during OHS on 6/2/20    Other emphysema ([address]) 10/28/2021     Past Surgical History:   Procedure Laterality Date    CARDIAC CATHETERIZATION  05/31/2020    Dr. Philippe Sample - IABP placed    [facility] GRAFT N/A 06/02/2020     - off pump x1 (LIMA-LAD), removal of IABP    CT INSERT CATH PLEURA W IMAGE  07/13/2020    Dr. Lashae Escobar - CT guided chest tube insertion    SINUS SURGERY      THORACENTESIS Left 07/11/2020    Dr. Kimberly Martinez - 1 L drained    TRANSESOPHAGEAL ECHOCARDIOGRAM  06/02/2020    during CABG     No family history on file.   Social History     Socioeconomic History    Marital status:      Spouse name: Not on file    Number of children: Not on file    Years of education: Not on file    Highest education level: Not on file   Occupational History    Not on file   Tobacco Use    Smoking status: Some Days     Packs/day: 0.70     Years: 22.00     Additional pack years: 0.00     Total pack years: 15.40     Types: Cigarettes     Start date: 11/20/1983    Smokeless tobacco: Never    Tobacco comments:     Per smoking history audit, patient smoked 10 yrs in 2011, at

## 2023-10-03 RX ORDER — FLUOXETINE HYDROCHLORIDE 20 MG/1
60 CAPSULE ORAL DAILY
Qty: 90 CAPSULE | Refills: 1 | Status: SHIPPED | OUTPATIENT
Start: 2023-10-03

## 2023-10-03 RX ORDER — TICAGRELOR 90 MG/1
TABLET ORAL
Qty: 60 TABLET | Refills: 10 | OUTPATIENT
Start: 2023-10-03

## 2023-10-03 NOTE — TELEPHONE ENCOUNTER
2/10/2023 SRJ  No upcoming appt  5/26/2023 bmp    Spoke with pt, Pt states she is no longer on brilinta.

## 2023-10-12 ENCOUNTER — TELEPHONE (OUTPATIENT)
Dept: CASE MANAGEMENT | Age: 58
End: 2023-10-12

## 2023-10-12 DIAGNOSIS — R91.1 PULMONARY NODULE: Primary | ICD-10-CM

## 2023-10-12 NOTE — TELEPHONE ENCOUNTER
Can this order be switched to a CT Chest low dose pulm use only please.     Thank you,  Anali Darling RN  [facility] Lung Navigator  [phone]

## 2023-10-23 ENCOUNTER — HOSPITAL ENCOUNTER (OUTPATIENT)
Dept: MAMMOGRAPHY | Age: 58
Discharge: HOME | End: 2023-10-23
Payer: COMMERCIAL

## 2023-10-23 DIAGNOSIS — Z12.39 SCREENING BREAST EXAMINATION: ICD-10-CM

## 2023-10-23 PROCEDURE — 77063 BREAST TOMOSYNTHESIS BI: CPT

## 2023-10-31 DIAGNOSIS — J43.8 OTHER EMPHYSEMA (HCC): ICD-10-CM

## 2023-11-01 RX ORDER — ALBUTEROL SULFATE 90 UG/1
AEROSOL, METERED RESPIRATORY (INHALATION)
Qty: 18 G | Refills: 10 | Status: SHIPPED | OUTPATIENT
Start: 2023-11-01

## 2023-11-01 RX ORDER — TIOTROPIUM BROMIDE AND OLODATEROL 3.124; 2.736 UG/1; UG/1
SPRAY, METERED RESPIRATORY (INHALATION)
Qty: 4 G | Refills: 10 | Status: SHIPPED | OUTPATIENT
Start: 2023-11-01

## 2023-11-01 NOTE — TELEPHONE ENCOUNTER
Last office visit 9/11/2023     Last written 12/7/22     Next office visit scheduled 3/11/2024    Requested Prescriptions     Pending Prescriptions Disp Refills    VENTOLIN  (90 Base) MCG/ACT inhaler [Pharmacy Med Name: VENTOLIN HFA 90MCG INH X1 108 (90 BAS Aerosol] 18 g 10     Sig: INHALE TWO (2) PUFFS BY MOUTH EVERY 6 HOURS AS NEEDED FOR WHEEZING    STIOLTO RESPIMAT 2.5-2.5 MCG/ACT AERS [Pharmacy Med Name: Carlen Sly 2.5-2.5 Aerosol] 4 g 10     Sig: INHALE TWO (2) PUFFS BY MOUTH DAILY

## 2023-12-04 RX ORDER — FLUOXETINE HYDROCHLORIDE 20 MG/1
60 CAPSULE ORAL DAILY
Qty: 90 CAPSULE | Refills: 10 | Status: SHIPPED | OUTPATIENT
Start: 2023-12-04

## 2023-12-04 RX ORDER — METOPROLOL SUCCINATE 25 MG/1
TABLET, EXTENDED RELEASE ORAL
Qty: 90 TABLET | Refills: 0 | Status: SHIPPED | OUTPATIENT
Start: 2023-12-04

## 2023-12-06 ENCOUNTER — OFFICE VISIT (OUTPATIENT)
Dept: FAMILY MEDICINE CLINIC | Age: 58
End: 2023-12-06
Payer: COMMERCIAL

## 2023-12-06 VITALS
DIASTOLIC BLOOD PRESSURE: 61 MMHG | WEIGHT: 142 LBS | BODY MASS INDEX: 25.15 KG/M2 | HEART RATE: 79 BPM | SYSTOLIC BLOOD PRESSURE: 98 MMHG | OXYGEN SATURATION: 97 %

## 2023-12-06 DIAGNOSIS — M79.671 PAIN IN BOTH FEET: Primary | ICD-10-CM

## 2023-12-06 DIAGNOSIS — F17.200 SMOKER: ICD-10-CM

## 2023-12-06 DIAGNOSIS — R53.83 FATIGUE, UNSPECIFIED TYPE: ICD-10-CM

## 2023-12-06 DIAGNOSIS — M79.672 PAIN IN BOTH FEET: Primary | ICD-10-CM

## 2023-12-06 DIAGNOSIS — J43.8 OTHER EMPHYSEMA (HCC): ICD-10-CM

## 2023-12-06 DIAGNOSIS — I25.10 CORONARY ARTERY DISEASE INVOLVING NATIVE HEART WITHOUT ANGINA PECTORIS, UNSPECIFIED VESSEL OR LESION TYPE: ICD-10-CM

## 2023-12-06 DIAGNOSIS — I51.89 COMBINED SYSTOLIC AND DIASTOLIC CARDIAC DYSFUNCTION: ICD-10-CM

## 2023-12-06 DIAGNOSIS — K21.9 GASTROESOPHAGEAL REFLUX DISEASE, UNSPECIFIED WHETHER ESOPHAGITIS PRESENT: ICD-10-CM

## 2023-12-06 DIAGNOSIS — Z79.899 MEDICATION MANAGEMENT: ICD-10-CM

## 2023-12-06 PROCEDURE — 3023F SPIROM DOC REV: CPT | Performed by: FAMILY MEDICINE

## 2023-12-06 PROCEDURE — 4004F PT TOBACCO SCREEN RCVD TLK: CPT | Performed by: FAMILY MEDICINE

## 2023-12-06 PROCEDURE — 36415 COLL VENOUS BLD VENIPUNCTURE: CPT | Performed by: FAMILY MEDICINE

## 2023-12-06 PROCEDURE — G8427 DOCREV CUR MEDS BY ELIG CLIN: HCPCS | Performed by: FAMILY MEDICINE

## 2023-12-06 PROCEDURE — 99214 OFFICE O/P EST MOD 30 MIN: CPT | Performed by: FAMILY MEDICINE

## 2023-12-06 PROCEDURE — G8484 FLU IMMUNIZE NO ADMIN: HCPCS | Performed by: FAMILY MEDICINE

## 2023-12-06 PROCEDURE — G8419 CALC BMI OUT NRM PARAM NOF/U: HCPCS | Performed by: FAMILY MEDICINE

## 2023-12-06 PROCEDURE — 3017F COLORECTAL CA SCREEN DOC REV: CPT | Performed by: FAMILY MEDICINE

## 2023-12-06 RX ORDER — VARENICLINE TARTRATE 1 MG/1
1 TABLET, FILM COATED ORAL 2 TIMES DAILY
Qty: 60 TABLET | Refills: 5 | Status: SHIPPED | OUTPATIENT
Start: 2023-12-06

## 2023-12-06 NOTE — PATIENT INSTRUCTIONS
Please read the healthy family handout that you were given and share it with your family. Please compare this printed medication list with your medications at home to be sure they are the same. If you have any medications that are different please contact us immediately at [phone]. Also review your allergies that we have listed, these may also include medications that you have not been able to tolerate, make sure everything listed is correct. If you have any allergies that are different please contact us immediately at [phone]. You may receive a survey in the mail or by email asking about your experience during your visit today. Please complete and return to us so we know how we are serving you.

## 2023-12-07 LAB
ALT SERPL-CCNC: 13 U/L (ref 10–40)
ANION GAP SERPL CALCULATED.3IONS-SCNC: 9 MMOL/L (ref 3–16)
BUN SERPL-MCNC: 14 MG/DL (ref 7–20)
CALCIUM SERPL-MCNC: 9.6 MG/DL (ref 8.3–10.6)
CHLORIDE SERPL-SCNC: 106 MMOL/L (ref 99–110)
CHOLEST SERPL-MCNC: 145 MG/DL (ref 0–199)
CO2 SERPL-SCNC: 25 MMOL/L (ref 21–32)
CREAT SERPL-MCNC: 0.9 MG/DL (ref 0.6–1.1)
DIGOXIN SERPL-MCNC: 0.6 NG/ML (ref 0.8–2)
GFR SERPLBLD CREATININE-BSD FMLA CKD-EPI: >60 ML/MIN/{1.73_M2}
GLUCOSE SERPL-MCNC: 91 MG/DL (ref 70–99)
HDLC SERPL-MCNC: 50 MG/DL (ref 40–60)
LDLC SERPL CALC-MCNC: 66 MG/DL
POTASSIUM SERPL-SCNC: 4.2 MMOL/L (ref 3.5–5.1)
SODIUM SERPL-SCNC: 140 MMOL/L (ref 136–145)
TRIGL SERPL-MCNC: 146 MG/DL (ref 0–150)
TSH SERPL DL<=0.005 MIU/L-ACNC: 1.43 UIU/ML (ref 0.27–4.2)
VLDLC SERPL CALC-MCNC: 29 MG/DL

## 2024-01-02 ENCOUNTER — OFFICE VISIT (OUTPATIENT)
Dept: FAMILY MEDICINE CLINIC | Age: 59
End: 2024-01-02
Payer: COMMERCIAL

## 2024-01-02 VITALS
BODY MASS INDEX: 24.94 KG/M2 | TEMPERATURE: 97.8 F | WEIGHT: 140.8 LBS | DIASTOLIC BLOOD PRESSURE: 67 MMHG | HEART RATE: 73 BPM | OXYGEN SATURATION: 94 % | SYSTOLIC BLOOD PRESSURE: 100 MMHG

## 2024-01-02 DIAGNOSIS — J44.1 CHRONIC OBSTRUCTIVE PULMONARY DISEASE WITH (ACUTE) EXACERBATION (HCC): Primary | ICD-10-CM

## 2024-01-02 LAB
INFLUENZA A ANTIGEN, POC: NEGATIVE
INFLUENZA B ANTIGEN, POC: NEGATIVE
LOT EXPIRE DATE: NORMAL
LOT KIT NUMBER: 2526
SARS-COV-2, POC: NORMAL
VALID INTERNAL CONTROL: NORMAL
VENDOR AND KIT NAME POC: NORMAL

## 2024-01-02 PROCEDURE — G8484 FLU IMMUNIZE NO ADMIN: HCPCS | Performed by: NURSE PRACTITIONER

## 2024-01-02 PROCEDURE — 3017F COLORECTAL CA SCREEN DOC REV: CPT | Performed by: NURSE PRACTITIONER

## 2024-01-02 PROCEDURE — 99214 OFFICE O/P EST MOD 30 MIN: CPT | Performed by: NURSE PRACTITIONER

## 2024-01-02 PROCEDURE — 3023F SPIROM DOC REV: CPT | Performed by: NURSE PRACTITIONER

## 2024-01-02 PROCEDURE — 87428 SARSCOV & INF VIR A&B AG IA: CPT | Performed by: NURSE PRACTITIONER

## 2024-01-02 PROCEDURE — 4004F PT TOBACCO SCREEN RCVD TLK: CPT | Performed by: NURSE PRACTITIONER

## 2024-01-02 PROCEDURE — G8420 CALC BMI NORM PARAMETERS: HCPCS | Performed by: NURSE PRACTITIONER

## 2024-01-02 PROCEDURE — G8427 DOCREV CUR MEDS BY ELIG CLIN: HCPCS | Performed by: NURSE PRACTITIONER

## 2024-01-02 RX ORDER — PREDNISONE 20 MG/1
20 TABLET ORAL DAILY
Qty: 10 TABLET | Refills: 0 | Status: SHIPPED | OUTPATIENT
Start: 2024-01-02 | End: 2024-01-12

## 2024-01-02 RX ORDER — DOXYCYCLINE HYCLATE 100 MG
100 TABLET ORAL 2 TIMES DAILY
Qty: 20 TABLET | Refills: 0 | Status: SHIPPED | OUTPATIENT
Start: 2024-01-02 | End: 2024-01-12

## 2024-01-02 RX ORDER — BENZONATATE 200 MG/1
200 CAPSULE ORAL 3 TIMES DAILY PRN
Qty: 30 CAPSULE | Refills: 0 | Status: SHIPPED | OUTPATIENT
Start: 2024-01-02 | End: 2024-01-12

## 2024-01-02 ASSESSMENT — ENCOUNTER SYMPTOMS
WHEEZING: 1
SHORTNESS OF BREATH: 0
ABDOMINAL PAIN: 0
CHEST TIGHTNESS: 0
ALLERGIC/IMMUNOLOGIC NEGATIVE: 1
EYES NEGATIVE: 1
COUGH: 1

## 2024-01-02 NOTE — PROGRESS NOTES
Lymphadenopathy:      Cervical: No cervical adenopathy.   Skin:     General: Skin is warm and dry.      Findings: No rash.   Neurological:      General: No focal deficit present.      Mental Status: She is alert and oriented to person, place, and time. Mental status is at baseline.   Psychiatric:         Attention and Perception: Attention normal.         Mood and Affect: Mood normal.         Speech: Speech normal.         Behavior: Behavior normal. Behavior is cooperative.         Assessment/Plan:   1. Chronic obstructive pulmonary disease with (acute) exacerbation (HCC)  Patient presents today with a 1 month history of cough, congestion and intermittent wheezing.  On exam noted slightly diminished breath sounds throughout with scattered expiratory wheezes and mild posterior oropharyngeal erythema otherwise exam is benign.  Patient does have a history of COPD and has smoked for approximately 40 years.  Recommend treatment as below.  Advised to drink plenty of fluids to stay well-hydrated, take medication as prescribed and to follow-up if symptoms do not improve or worsen.  Patient verbalized understanding agreeable to plan.  - doxycycline hyclate (VIBRA-TABS) 100 MG tablet; Take 1 tablet by mouth 2 times daily for 10 days  Dispense: 20 tablet; Refill: 0  - predniSONE (DELTASONE) 20 MG tablet; Take 1 tablet by mouth daily for 10 days  Dispense: 10 tablet; Refill: 0  - benzonatate (TESSALON) 200 MG capsule; Take 1 capsule by mouth 3 times daily as needed for Cough  Dispense: 30 capsule; Refill: 0  - POCT COVID-19 & Influenza A/B

## 2024-01-02 NOTE — PATIENT INSTRUCTIONS
Please read the healthy family handout that you were given and share it with your family.       Please compare this printed medication list with your medications at home to be sure they are the same.  If you have any medications that are different please contact us immediately at [phone].     Also review your allergies that we have listed, these may also include medications that you have not been able to tolerate, make sure everything listed is correct. If you have any allergies that are different please contact us immediately at [phone].     You may receive a survey in the mail or by email asking about your experience during your visit today. Please complete and return to us so we know how we are serving you.

## 2024-01-03 RX ORDER — NICOTINE 21 MG/24HR
PATCH, TRANSDERMAL 24 HOURS TRANSDERMAL
Refills: 10 | OUTPATIENT
Start: 2024-01-03

## 2024-01-17 ENCOUNTER — TELEPHONE (OUTPATIENT)
Dept: CARDIOLOGY CLINIC | Age: 59
End: 2024-01-17

## 2024-01-17 DIAGNOSIS — I25.10 CORONARY ARTERY DISEASE INVOLVING NATIVE HEART WITHOUT ANGINA PECTORIS, UNSPECIFIED VESSEL OR LESION TYPE: ICD-10-CM

## 2024-01-17 DIAGNOSIS — E78.00 HYPERCHOLESTEREMIA: ICD-10-CM

## 2024-01-17 DIAGNOSIS — Z79.899 MEDICATION MANAGEMENT: Primary | ICD-10-CM

## 2024-01-17 NOTE — TELEPHONE ENCOUNTER
Pt called to schedule yearly and pt asking if they needed to get labs before ov on 3.1.2024. Please advise

## 2024-02-26 NOTE — PROGRESS NOTES
[facility]   CONSULTATION  [phone]      Attending Physician: Ayo Salguero MD     Reason for Consultation/Chief Complaint: 1 year follow-up, CAD     Subjective   History of Present Illness:  Dasha Coleman is a 58 y.o. female with a history of CAD with hx of IWMI s/p RONDA to RCA (5/2020) followed by CABG X1 (6/2020, LIMA-LAD), ICM, s/d CHF, HLD as well as emphysema and smoker.  Recent NM stress test 1/28/21 showed no evidence of stress induced ischemia.  Recent ECHO testing from 9/16/21 showed an EF of 35-40%. Moderate Global with regional hypokinesis. Basal inferior segment appears aneurysmal. Mild aortic regurgitation and mild MR. MUGA scan from 1/14/22 showed abnormal resting LV function with mild global hypokinesis and EF of 47%.   Today she reports that she has chest pain sometimes and gets shortness of breathe. She is still smoking. She reports that she had a nurse practioner that would come to her house, she reported the chest pain to her, the practioner said it could be related to acid reflux or her COPD. She reports that her right leg will go numb if she is standing for too long. She denies dizziness, syncope and edema.    4/7/22 ECHO showed EF of 50%. Basal inferior hypokinesis. Mild MR and AR. 4/7/22 NM Stress Test showed low normal LVEF 57%. Normal wall motion. Difficult to evaluate inferior wall due to extracardiac uptake. Inferior/apical defect is likely due to attenuation artifact. No ischemia. Lower risk study. 4/7/22 MONROE study showed right ankle/brachial index is 1.18. Pulse volume recordings at the ankle level reveal normal waveforms. Left ankle/brachial index is 1.13. Pulse volume recordings at the ankle level reveal normal waveforms. Conclusions: Normal bilateral lower extremity arterial study.   8/5/22 OV with Mimi Ruano NP she reported that she was not doing to bad/okay. She reported her BP was still running low. Reported a little lightheadedness and fatigue

## 2024-03-01 ENCOUNTER — HOSPITAL ENCOUNTER (OUTPATIENT)
Age: 59
Discharge: HOME | End: 2024-03-01
Payer: COMMERCIAL

## 2024-03-01 ENCOUNTER — TELEPHONE (OUTPATIENT)
Dept: CARDIOLOGY CLINIC | Age: 59
End: 2024-03-01

## 2024-03-01 ENCOUNTER — OFFICE VISIT (OUTPATIENT)
Dept: CARDIOLOGY CLINIC | Age: 59
End: 2024-03-01
Payer: COMMERCIAL

## 2024-03-01 VITALS
WEIGHT: 144 LBS | OXYGEN SATURATION: 96 % | HEART RATE: 78 BPM | HEIGHT: 63 IN | BODY MASS INDEX: 25.52 KG/M2 | DIASTOLIC BLOOD PRESSURE: 60 MMHG | SYSTOLIC BLOOD PRESSURE: 92 MMHG

## 2024-03-01 DIAGNOSIS — I95.9 HYPOTENSION, UNSPECIFIED HYPOTENSION TYPE: ICD-10-CM

## 2024-03-01 DIAGNOSIS — I25.10 CORONARY ARTERY DISEASE INVOLVING NATIVE HEART WITHOUT ANGINA PECTORIS, UNSPECIFIED VESSEL OR LESION TYPE: Primary | ICD-10-CM

## 2024-03-01 DIAGNOSIS — I51.89 COMBINED SYSTOLIC AND DIASTOLIC CARDIAC DYSFUNCTION: ICD-10-CM

## 2024-03-01 DIAGNOSIS — Z95.1 S/P CABG (CORONARY ARTERY BYPASS GRAFT): ICD-10-CM

## 2024-03-01 DIAGNOSIS — I25.10 CORONARY ARTERY DISEASE INVOLVING NATIVE HEART WITHOUT ANGINA PECTORIS, UNSPECIFIED VESSEL OR LESION TYPE: ICD-10-CM

## 2024-03-01 DIAGNOSIS — E78.00 HYPERCHOLESTEREMIA: ICD-10-CM

## 2024-03-01 DIAGNOSIS — Z79.899 MEDICATION MANAGEMENT: ICD-10-CM

## 2024-03-01 DIAGNOSIS — I25.5 ISCHEMIC CARDIOMYOPATHY: ICD-10-CM

## 2024-03-01 LAB
ALBUMIN SERPL-MCNC: 4 G/DL (ref 3.4–5)
ALP SERPL-CCNC: 94 U/L (ref 40–129)
ALT SERPL-CCNC: 15 U/L (ref 10–40)
AST SERPL-CCNC: 26 U/L (ref 15–37)
BILIRUB DIRECT SERPL-MCNC: <0.2 MG/DL (ref 0–0.3)
BILIRUB INDIRECT SERPL-MCNC: NORMAL MG/DL (ref 0–1)
BILIRUB SERPL-MCNC: 0.3 MG/DL (ref 0–1)
CHOLEST SERPL-MCNC: 146 MG/DL (ref 0–199)
HDLC SERPL-MCNC: 46 MG/DL (ref 40–60)
LDLC SERPL CALC-MCNC: 55 MG/DL
PROT SERPL-MCNC: 6.9 G/DL (ref 6.4–8.2)
TRIGL SERPL-MCNC: 224 MG/DL (ref 0–150)
TSH SERPL DL<=0.005 MIU/L-ACNC: 1.71 UIU/ML (ref 0.27–4.2)
VLDLC SERPL CALC-MCNC: 45 MG/DL

## 2024-03-01 PROCEDURE — 80061 LIPID PANEL: CPT

## 2024-03-01 PROCEDURE — 36415 COLL VENOUS BLD VENIPUNCTURE: CPT

## 2024-03-01 PROCEDURE — G8427 DOCREV CUR MEDS BY ELIG CLIN: HCPCS | Performed by: INTERNAL MEDICINE

## 2024-03-01 PROCEDURE — G8419 CALC BMI OUT NRM PARAM NOF/U: HCPCS | Performed by: INTERNAL MEDICINE

## 2024-03-01 PROCEDURE — 99214 OFFICE O/P EST MOD 30 MIN: CPT | Performed by: INTERNAL MEDICINE

## 2024-03-01 PROCEDURE — 80076 HEPATIC FUNCTION PANEL: CPT

## 2024-03-01 PROCEDURE — 84443 ASSAY THYROID STIM HORMONE: CPT

## 2024-03-01 PROCEDURE — 4004F PT TOBACCO SCREEN RCVD TLK: CPT | Performed by: INTERNAL MEDICINE

## 2024-03-01 PROCEDURE — 93000 ELECTROCARDIOGRAM COMPLETE: CPT | Performed by: INTERNAL MEDICINE

## 2024-03-01 PROCEDURE — G8484 FLU IMMUNIZE NO ADMIN: HCPCS | Performed by: INTERNAL MEDICINE

## 2024-03-01 PROCEDURE — 3017F COLORECTAL CA SCREEN DOC REV: CPT | Performed by: INTERNAL MEDICINE

## 2024-03-01 RX ORDER — METOPROLOL SUCCINATE 25 MG/1
TABLET, EXTENDED RELEASE ORAL
Qty: 90 TABLET | Refills: 3 | Status: SHIPPED | OUTPATIENT
Start: 2024-03-01

## 2024-03-01 RX ORDER — FAMOTIDINE 20 MG/1
TABLET, FILM COATED ORAL
Qty: 60 TABLET | Refills: 10 | Status: SHIPPED | OUTPATIENT
Start: 2024-03-01

## 2024-03-01 NOTE — PATIENT INSTRUCTIONS
No refills warranted today  No cardiac testing warranted today   Continue current cardiac medications: aspirin 81 mg, digoxin 125 mcg, metoprolol 25 mg, crestor 40 mg  Follow up in 1 year or sooner if needed

## 2024-03-01 NOTE — TELEPHONE ENCOUNTER
Created telephone encounter. Spoke with Eloise relayed message per srj regarding labs. Pt verbalized understanding.

## 2024-03-01 NOTE — TELEPHONE ENCOUNTER
----- Message from Yovani Caldwell MD sent at 3/1/2024  9:15 AM EST -----  Let patient know their lft test is normal, continue current meds, no new orders or changes at this time.  Thanks.

## 2024-03-04 ENCOUNTER — TELEPHONE (OUTPATIENT)
Dept: CARDIOLOGY CLINIC | Age: 59
End: 2024-03-04

## 2024-03-04 DIAGNOSIS — I25.10 CORONARY ARTERY DISEASE INVOLVING NATIVE HEART WITHOUT ANGINA PECTORIS, UNSPECIFIED VESSEL OR LESION TYPE: Primary | ICD-10-CM

## 2024-03-04 RX ORDER — CHLORAL HYDRATE 500 MG
1000 CAPSULE ORAL DAILY
Qty: 90 CAPSULE | Refills: 3 | Status: SHIPPED | OUTPATIENT
Start: 2024-03-04 | End: 2024-03-27 | Stop reason: SDUPTHER

## 2024-03-04 NOTE — TELEPHONE ENCOUNTER
----- Message from Yovani Caldwell MD sent at 3/4/2024 10:26 AM EST -----  Let patient know their lipid test is high, lets see if pt interested in taking fish oil for this and if so, rec: fish oil 1g po daily and f/u lipids/lfts in 3-6mo. Thanks.

## 2024-03-11 ENCOUNTER — HOSPITAL ENCOUNTER (OUTPATIENT)
Dept: CT IMAGING | Age: 59
Discharge: HOME | End: 2024-03-11
Payer: COMMERCIAL

## 2024-03-11 DIAGNOSIS — R91.1 PULMONARY NODULE: ICD-10-CM

## 2024-03-11 PROCEDURE — 71250 CT THORAX DX C-: CPT

## 2024-03-12 ENCOUNTER — OFFICE VISIT (OUTPATIENT)
Dept: PULMONOLOGY | Age: 59
End: 2024-03-12
Payer: COMMERCIAL

## 2024-03-12 VITALS
DIASTOLIC BLOOD PRESSURE: 76 MMHG | SYSTOLIC BLOOD PRESSURE: 122 MMHG | HEIGHT: 63 IN | OXYGEN SATURATION: 96 % | TEMPERATURE: 97.6 F | HEART RATE: 67 BPM | BODY MASS INDEX: 25.69 KG/M2 | WEIGHT: 145 LBS | RESPIRATION RATE: 16 BRPM

## 2024-03-12 DIAGNOSIS — R06.02 SOB (SHORTNESS OF BREATH): ICD-10-CM

## 2024-03-12 DIAGNOSIS — Z95.1 S/P CABG (CORONARY ARTERY BYPASS GRAFT): ICD-10-CM

## 2024-03-12 DIAGNOSIS — I25.5 ISCHEMIC CARDIOMYOPATHY: Primary | ICD-10-CM

## 2024-03-12 DIAGNOSIS — F17.200 SMOKER: ICD-10-CM

## 2024-03-12 DIAGNOSIS — R91.1 LUNG NODULE: ICD-10-CM

## 2024-03-12 PROCEDURE — 3017F COLORECTAL CA SCREEN DOC REV: CPT | Performed by: INTERNAL MEDICINE

## 2024-03-12 PROCEDURE — G8484 FLU IMMUNIZE NO ADMIN: HCPCS | Performed by: INTERNAL MEDICINE

## 2024-03-12 PROCEDURE — G8427 DOCREV CUR MEDS BY ELIG CLIN: HCPCS | Performed by: INTERNAL MEDICINE

## 2024-03-12 PROCEDURE — 99213 OFFICE O/P EST LOW 20 MIN: CPT | Performed by: INTERNAL MEDICINE

## 2024-03-12 PROCEDURE — G8419 CALC BMI OUT NRM PARAM NOF/U: HCPCS | Performed by: INTERNAL MEDICINE

## 2024-03-12 PROCEDURE — 4004F PT TOBACCO SCREEN RCVD TLK: CPT | Performed by: INTERNAL MEDICINE

## 2024-03-12 NOTE — PATIENT INSTRUCTIONS
Remember to bring a list of pulmonary medications and any CPAP or BiPAP machines to your next appointment with the office.     Please keep all of your future appointments scheduled by [facility] [place] [facility] office. Out of respect for other patients and providers, you may be asked to reschedule your appointment if you arrive later than your scheduled appointment time. Appointments cancelled less than 24hrs in advance will be considered a no show. Patients with three missed appointments within 1 year or four missed appointments within 2 years can be dismissed from the practice.     Please be aware that our physicians are required to work in the Intensive Care Unit at [facility].  Your appointment may need to be rescheduled if they are designated to work during your appointment time.      You may receive a survey regarding the care you received during your visit.  Your input is valuable to us.  We encourage you to complete and return your survey.  We hope you will choose us in the future for your healthcare needs.     Pt instructed of all future appointment dates & times, including radiology, labs, procedures & referrals. If procedures were scheduled preparation instructions provided. Instructions on future appointments with [facility] were given.      In the next few weeks, you will be receiving a survey from [facility] regarding your visit today.  We would greatly appreciate it if you would take just a few minutes to fill that out.  It is very important to us that our patients receive top notch care and our surveys help keep us accountable. However, if your experience was not a good one, we want to hear about that as well. This is a key way we can keep track of problems and strive to correct any for future visits.    Again, we appreciate your time and thank you for choosing [facility]!    DENISSE Iglesias

## 2024-03-12 NOTE — PROGRESS NOTES
Chief Complaint/Referring Provider:  Pulmonary follow up to discuss the clinical status test results and options    Patient is a 58-year-old female who has come to the office for a pulm evaluation, patient states that overall she is doing reasonably well, patient does not have any significant cough or expectoration, patient does not have any chronic nasal congestion concern, no epistaxis or hemoptysis, no odynophagia or dysphagia, patient does not have any significant palpitation or diaphoresis, no abdominal symptoms of concern, patient's appetite is maintained, patient denies any sleep fragmentation, patient does not have any altered bowel habits, patient does not have any significant leg swelling, no change in the abdomen sick contacts, no other pertinent review of system of concern    Previous HPI:Patient states that she has been having some increased coughing along with that patient has phlegm which is brownish-yellow in color, patient does not have any increased sinus congestion, no fever no chills, patient does not have any pleuritic chest pain, patient does have reflux symptoms but she is on medications, patient does not have any significant altered bowel habits, no constitutional symptoms of concern, patient continues to be a smoker, patient does not have any significant increase in the leg edema, patient continues to be a smoker but patient states that she smokes only once in a great while not on a every day basis, patient does not have any confusion lethargy, no other new exposures or review of system of concern    Patient has come to the office for a pulmonary follow-up, patient states that she does not have any symptoms of concern, no increasing cough expectorant shortness of breath or wheezing, no increasing nasal congestion which is concerning to her at this time, patient does not have any chest pain or palpitations, no fever chills or any abdominal symptoms of concern, no increasing leg edema,

## 2024-03-12 NOTE — PROGRESS NOTES
MA Communication:  The following orders are received by verbal communication from Jarrod Harding MD    Orders include:    6 month follow up

## 2024-03-27 ENCOUNTER — TELEPHONE (OUTPATIENT)
Dept: CARDIOLOGY CLINIC | Age: 59
End: 2024-03-27

## 2024-03-27 RX ORDER — CHLORAL HYDRATE 500 MG
1000 CAPSULE ORAL DAILY
Qty: 90 CAPSULE | Refills: 3 | Status: SHIPPED | OUTPATIENT
Start: 2024-03-27

## 2024-03-27 NOTE — TELEPHONE ENCOUNTER
Pt was seen 3/1/24 and told she needed lab work. When should pt get lab work now or closer to the 1 ov to be scheduled 2025. Please advise.

## 2024-03-27 NOTE — TELEPHONE ENCOUNTER
Refill    Omega-3 Fatty Acids (FISH OIL) 1000 MG capsule  should go to  [facility] - [place], [place] - [address] - P [phone] - F [phone]     Lov 3/1/24  Labs 3/1/24  Next 2025

## 2024-04-30 RX ORDER — MIRTAZAPINE 15 MG/1
TABLET, FILM COATED ORAL
Qty: 30 TABLET | Refills: 10 | Status: SHIPPED | OUTPATIENT
Start: 2024-04-30

## 2024-05-20 ENCOUNTER — TELEPHONE (OUTPATIENT)
Dept: PULMONOLOGY | Age: 59
End: 2024-05-20

## 2024-05-20 NOTE — TELEPHONE ENCOUNTER
Patient contacted and was make acknowledge that she need to request her disability letter from her PCP, patient verbalize understanding.

## 2024-05-20 NOTE — TELEPHONE ENCOUNTER
Pt came to office in person to request records and to ask for letter from Dr Harding recommending her for disability. Registrar assisted pt with faxing request for records to [facility].     Please obtain letter from Dr Harding recommending pt for disability if appropriate and send to pt's home address:     [address]  [place], [place] [address]

## 2024-05-30 RX ORDER — MAGNESIUM OXIDE 400 MG/1
TABLET ORAL
Qty: 60 TABLET | Refills: 10 | Status: SHIPPED | OUTPATIENT
Start: 2024-05-30

## 2024-05-30 NOTE — TELEPHONE ENCOUNTER
Future appt scheduled 06/05/2024                 Last appt 01/02/2024      Last Written 07/05/2023      magnesium oxide (MAG-OX) 400 MG table   #60  10 RF

## 2024-06-05 ENCOUNTER — OFFICE VISIT (OUTPATIENT)
Dept: FAMILY MEDICINE CLINIC | Age: 59
End: 2024-06-05
Payer: COMMERCIAL

## 2024-06-05 VITALS
WEIGHT: 147 LBS | TEMPERATURE: 98 F | BODY MASS INDEX: 26.04 KG/M2 | SYSTOLIC BLOOD PRESSURE: 93 MMHG | OXYGEN SATURATION: 94 % | DIASTOLIC BLOOD PRESSURE: 52 MMHG | HEART RATE: 77 BPM

## 2024-06-05 DIAGNOSIS — M54.16 LUMBAR RADICULOPATHY: ICD-10-CM

## 2024-06-05 DIAGNOSIS — G89.29 CHRONIC BILATERAL LOW BACK PAIN WITHOUT SCIATICA: ICD-10-CM

## 2024-06-05 DIAGNOSIS — M54.50 CHRONIC BILATERAL LOW BACK PAIN WITHOUT SCIATICA: ICD-10-CM

## 2024-06-05 DIAGNOSIS — I42.9 CARDIOMYOPATHY, UNSPECIFIED TYPE (HCC): ICD-10-CM

## 2024-06-05 DIAGNOSIS — R53.83 FATIGUE, UNSPECIFIED TYPE: ICD-10-CM

## 2024-06-05 DIAGNOSIS — I21.3 ST ELEVATION MYOCARDIAL INFARCTION (STEMI), UNSPECIFIED ARTERY (HCC): ICD-10-CM

## 2024-06-05 DIAGNOSIS — Z79.899 MEDICATION MANAGEMENT: ICD-10-CM

## 2024-06-05 DIAGNOSIS — I51.89 COMBINED SYSTOLIC AND DIASTOLIC CARDIAC DYSFUNCTION: ICD-10-CM

## 2024-06-05 DIAGNOSIS — Z95.1 S/P CABG (CORONARY ARTERY BYPASS GRAFT): ICD-10-CM

## 2024-06-05 DIAGNOSIS — I25.10 CORONARY ARTERY DISEASE INVOLVING NATIVE HEART WITHOUT ANGINA PECTORIS, UNSPECIFIED VESSEL OR LESION TYPE: Primary | ICD-10-CM

## 2024-06-05 DIAGNOSIS — J43.8 OTHER EMPHYSEMA (HCC): ICD-10-CM

## 2024-06-05 PROCEDURE — G8427 DOCREV CUR MEDS BY ELIG CLIN: HCPCS | Performed by: FAMILY MEDICINE

## 2024-06-05 PROCEDURE — 3017F COLORECTAL CA SCREEN DOC REV: CPT | Performed by: FAMILY MEDICINE

## 2024-06-05 PROCEDURE — 99214 OFFICE O/P EST MOD 30 MIN: CPT | Performed by: FAMILY MEDICINE

## 2024-06-05 PROCEDURE — G8419 CALC BMI OUT NRM PARAM NOF/U: HCPCS | Performed by: FAMILY MEDICINE

## 2024-06-05 PROCEDURE — 36415 COLL VENOUS BLD VENIPUNCTURE: CPT | Performed by: FAMILY MEDICINE

## 2024-06-05 PROCEDURE — 3023F SPIROM DOC REV: CPT | Performed by: FAMILY MEDICINE

## 2024-06-05 PROCEDURE — 4004F PT TOBACCO SCREEN RCVD TLK: CPT | Performed by: FAMILY MEDICINE

## 2024-06-05 RX ORDER — MUPIROCIN CALCIUM 20 MG/G
CREAM TOPICAL
Qty: 15 G | Refills: 1 | Status: SHIPPED | OUTPATIENT
Start: 2024-06-05 | End: 2024-07-05

## 2024-06-05 RX ORDER — DOXYCYCLINE HYCLATE 100 MG
100 TABLET ORAL 2 TIMES DAILY
Qty: 20 TABLET | Refills: 0 | Status: SHIPPED | OUTPATIENT
Start: 2024-06-05 | End: 2024-06-15

## 2024-06-05 ASSESSMENT — PATIENT HEALTH QUESTIONNAIRE - PHQ9
2. FEELING DOWN, DEPRESSED OR HOPELESS: NOT AT ALL
4. FEELING TIRED OR HAVING LITTLE ENERGY: NOT AT ALL
10. IF YOU CHECKED OFF ANY PROBLEMS, HOW DIFFICULT HAVE THESE PROBLEMS MADE IT FOR YOU TO DO YOUR WORK, TAKE CARE OF THINGS AT HOME, OR GET ALONG WITH OTHER PEOPLE: NOT DIFFICULT AT ALL
7. TROUBLE CONCENTRATING ON THINGS, SUCH AS READING THE NEWSPAPER OR WATCHING TELEVISION: NOT AT ALL
8. MOVING OR SPEAKING SO SLOWLY THAT OTHER PEOPLE COULD HAVE NOTICED. OR THE OPPOSITE, BEING SO FIGETY OR RESTLESS THAT YOU HAVE BEEN MOVING AROUND A LOT MORE THAN USUAL: NOT AT ALL
3. TROUBLE FALLING OR STAYING ASLEEP: NOT AT ALL
SUM OF ALL RESPONSES TO PHQ QUESTIONS 1-9: 0
1. LITTLE INTEREST OR PLEASURE IN DOING THINGS: NOT AT ALL
9. THOUGHTS THAT YOU WOULD BE BETTER OFF DEAD, OR OF HURTING YOURSELF: NOT AT ALL
5. POOR APPETITE OR OVEREATING: NOT AT ALL
SUM OF ALL RESPONSES TO PHQ QUESTIONS 1-9: 0
6. FEELING BAD ABOUT YOURSELF - OR THAT YOU ARE A FAILURE OR HAVE LET YOURSELF OR YOUR FAMILY DOWN: NOT AT ALL
SUM OF ALL RESPONSES TO PHQ9 QUESTIONS 1 & 2: 0

## 2024-06-05 NOTE — PATIENT INSTRUCTIONS
Please read the healthy family handout that you were given and share it with your family.       Please compare this printed medication list with your medications at home to be sure they are the same.  If you have any medications that are different please contact us immediately at [phone].     Also review your allergies that we have listed, these may also include medications that you have not been able to tolerate, make sure everything listed is correct. If you have any allergies that are different please contact us immediately at [phone].     You may receive a survey in the mail or by email asking about your experience during your visit today. Please complete and return to us so we know how we are serving you.

## 2024-06-05 NOTE — PROGRESS NOTES
Subjective:  Dasha Coleman is here to discuss the following issues.  She has a history of coronary artery disease combined systolic and diastolic heart failure with cardiomyopathy.  She has a history of MI and CABG.  She follows up as advised with her cardiologist.  She is compliant with all medications and treatments.  She has all long history of lumbar radiculopathy and bilateral low back pain radiating into her legs limiting her activity level.  She has history of emphysema and works closely with her pulmonologist and is compliant with her inhalers.  Medications include digoxin and she is due for related blood work all above issues.  She has a small red raised area above her lip with some tenderness present for about 3 days  On review of systems she has significant fatigue.  No other findings  She continues to have very poor tolerance of physical activity.  Slow sweeping for example requires only 5 or 10 minutes until she is exhausted and must sit down and rest for an extended period of time  Social History     Tobacco Use   Smoking Status Some Days    Current packs/day: 0.70    Average packs/day: 0.7 packs/day for 40.5 years (28.4 ttl pk-yrs)    Types: Cigarettes    Start date: 11/20/1983   Smokeless Tobacco Never   Tobacco Comments    Per smoking history audit, patient smoked 10 yrs in 2011, at heaviest smoked 1 ppd, currently 0.25 ppd, average 0.7 ppd   Allergies:     Patient has no known allergies.    Objective:  BP (!) 93/52   Pulse 77   Temp 98 °F (36.7 °C) (Oral)   Wt 66.7 kg (147 lb)   LMP 11/01/2017 (Approximate)   SpO2 94%   BMI 26.04 kg/m²    No acute distress, heart regular rate and rhythm without murmur, lungs clear to auscultation easy effort, abdomen nondistended, no clubbing or cyanosis no edema small lesion above her lip as described above    Assessment:  1. Coronary artery disease involving native heart without angina pectoris, unspecified vessel or lesion type    2. Combined systolic

## 2024-06-06 LAB
ALT SERPL-CCNC: 15 U/L (ref 10–40)
ANION GAP SERPL CALCULATED.3IONS-SCNC: 14 MMOL/L (ref 3–16)
AST SERPL-CCNC: 23 U/L (ref 15–37)
BUN SERPL-MCNC: 11 MG/DL (ref 7–20)
CALCIUM SERPL-MCNC: 10.1 MG/DL (ref 8.3–10.6)
CHLORIDE SERPL-SCNC: 109 MMOL/L (ref 99–110)
CHOLEST SERPL-MCNC: 155 MG/DL (ref 0–199)
CO2 SERPL-SCNC: 22 MMOL/L (ref 21–32)
CREAT SERPL-MCNC: 0.9 MG/DL (ref 0.6–1.1)
DEPRECATED RDW RBC AUTO: 14 % (ref 12.4–15.4)
DIGOXIN SERPL-MCNC: <0.3 NG/ML (ref 0.8–2)
GFR SERPLBLD CREATININE-BSD FMLA CKD-EPI: 75 ML/MIN/{1.73_M2}
GLUCOSE SERPL-MCNC: 93 MG/DL (ref 70–99)
HCT VFR BLD AUTO: 38.7 % (ref 36–48)
HDLC SERPL-MCNC: 59 MG/DL (ref 40–60)
HGB BLD-MCNC: 13 G/DL (ref 12–16)
LDLC SERPL CALC-MCNC: 78 MG/DL
MCH RBC QN AUTO: 29.3 PG (ref 26–34)
MCHC RBC AUTO-ENTMCNC: 33.5 G/DL (ref 31–36)
MCV RBC AUTO: 87.6 FL (ref 80–100)
PLATELET # BLD AUTO: 184 K/UL (ref 135–450)
PMV BLD AUTO: 11.2 FL (ref 5–10.5)
POTASSIUM SERPL-SCNC: 5.2 MMOL/L (ref 3.5–5.1)
RBC # BLD AUTO: 4.42 M/UL (ref 4–5.2)
SODIUM SERPL-SCNC: 145 MMOL/L (ref 136–145)
TRIGL SERPL-MCNC: 92 MG/DL (ref 0–150)
VLDLC SERPL CALC-MCNC: 18 MG/DL
WBC # BLD AUTO: 9.3 K/UL (ref 4–11)

## 2024-06-28 RX ORDER — DIGOXIN 125 MCG
62.5 TABLET ORAL DAILY
Qty: 45 TABLET | Refills: 2 | Status: SHIPPED | OUTPATIENT
Start: 2024-06-28

## 2024-07-11 ENCOUNTER — OFFICE VISIT (OUTPATIENT)
Dept: FAMILY MEDICINE CLINIC | Age: 59
End: 2024-07-11

## 2024-07-11 ENCOUNTER — TELEPHONE (OUTPATIENT)
Dept: FAMILY MEDICINE CLINIC | Age: 59
End: 2024-07-11

## 2024-07-11 VITALS
OXYGEN SATURATION: 94 % | DIASTOLIC BLOOD PRESSURE: 66 MMHG | WEIGHT: 150.4 LBS | TEMPERATURE: 97.8 F | HEART RATE: 66 BPM | SYSTOLIC BLOOD PRESSURE: 100 MMHG | BODY MASS INDEX: 26.64 KG/M2

## 2024-07-11 DIAGNOSIS — M21.619 BUNION: ICD-10-CM

## 2024-07-11 DIAGNOSIS — Z01.818 PREOPERATIVE EXAMINATION: Primary | ICD-10-CM

## 2024-07-11 ASSESSMENT — ENCOUNTER SYMPTOMS
BLOOD IN STOOL: 0
RHINORRHEA: 0
NAUSEA: 0
COUGH: 0
ABDOMINAL PAIN: 0
VOMITING: 0
EYE DISCHARGE: 0
SORE THROAT: 0
SINUS PAIN: 0
ALLERGIC/IMMUNOLOGIC NEGATIVE: 1
SHORTNESS OF BREATH: 0

## 2024-07-11 NOTE — PROGRESS NOTES
1.  Do not eat or drink anything after 12 midnight prior to surgery.  This includes no water, chewing gum or mints.  2.  Take the following pills with a small sip of water on the morning of surgery nexium, digoxin.  3.  Aspirin, Ibuprofen, Advil, Naproxen, Vitamin E and other Anti-inflammatory products should be stopped for 5 days before surgery or as directed by your physician.  4.  Check with your doctor regarding stopping Plavix, Coumadin, Lovenox, Fragmin or other blood thinners.  5.  Do not smoke and do not drink alcoholic beverages 24 hours prior to surgery.  This includes NA Beer.  6.  You may brush your teeth and gargle the morning of surgery.  DO NOT SWALLOW WATER.  7.  You MUST make arrangements for a responsible adult to take you home after your surgery.  You will not be allowed to leave alone or drive yourself home.  It is strongly suggested someone stay with you the first 24 hours.  Your surgery will be cancelled if you do not have a ride home.  8.  A parent/legal guardian must accompany a child scheduled for surgery and plan to stay at the hospital until the child is discharged.  Please do not bring other children with you.  9.  Please wear simple, loose fitting clothing to the hospital.  Do not bring valuables ( money, credit cards, checkbooks, etc.)  Do not wear any makeup (including no eye makeup) or nail polish on your fingers or toes.  10.  Do not wear any jewelry or piercing on the day of surgery.  All body piercing jewelry must be removed.  11.  If you have dentures, they will be removed before going to the OR; we will provide you a container.  If you wear contact lenses or glasses, they will be removed; please bring a case for them.  12.  Please see your family doctor/pediatrician for a history & physical and/or concerning medications.  Bring any test results/reports from your physician's office the day of surgery.    13.  Remember to bring Blood Bank Bracelet to the hospital on the day of

## 2024-07-11 NOTE — PROGRESS NOTES
[facility]  Preoperative visit   7/11/2024    Patient is here for preop evaluation at the request of Dr. Alvarez for Ambrosio Bunionectomy with possible akin osteotomy right foot. Is scheduled for surgery on 7//25/2024    Functional Assessment:  Exercise tolerance: >4 METS using the DASI calculator   Denies any current chest pain or discomfort, sob or BLAS, orthopnea, PND or leg swelling.     Medications: reviewed, Over the counter (NSAIDs) use: No    Cardiac Risk:  High-risk Surgery: No / Hx of ischemic heart disease: Yes, 2 stents / Hx of CHF: No / Hx of CVA: No / Pre-op insulin: No / Pre-op creatinine >2.0: No (last cr 0.9 )    DAVE Screen:  Snore loudly? No / Feel tired/fatigued during the day? No / Stop breathing while sleeping? No / High blood pressure? No / Morning HA? No     History of surgery/ anesthesia:  - No hx of complications, anesthesia reaction, bleeding, bruising, clots  - No family Hx of reactions to anesthesia/ bleeding/clots  - Dentures     - Support systems after surgery:  [facility]   - Smoking/ alcohol/drugs: No  - Complicating medical diseases are currently well controlled.     Problem List  Patient Active Problem List   Diagnosis    Anxiety    Primary insomnia    Arthritis    Gastroesophageal reflux disease without esophagitis    Hypercholesteremia    Chronic bilateral low back pain without sciatica    Lumbar radiculopathy    Moderate episode of recurrent major depressive disorder (HCC)    STEMI (ST elevation myocardial infarction) ([facility])    Coronary artery disease involving native heart without angina pectoris    Combined systolic and diastolic cardiac dysfunction    S/P CABG (coronary artery bypass graft)    RLS (restless legs syndrome)    Cardiomyopathy ([facility])    Major depressive disorder with single episode, in partial remission ([facility])    Hypotension    SOB (shortness of breath)    Other emphysema ([facility])    Smoker    GERD (gastroesophageal reflux disease)    Hyperplastic

## 2024-07-11 NOTE — TELEPHONE ENCOUNTER
Marco with [facility] calling to request OV note for preop be faxed to them at [phone]. States he received EKG, and blank preop forms they had given patient but not the note.     Document printed and faxed.

## 2024-07-19 ENCOUNTER — TELEPHONE (OUTPATIENT)
Dept: FAMILY MEDICINE CLINIC | Age: 59
End: 2024-07-19

## 2024-07-19 NOTE — TELEPHONE ENCOUNTER
Patient had a pre-op on 7/11/24. Requesting the progress notes from that visit, any testing, clearance for surgery. Fax [phone]

## 2024-07-25 ENCOUNTER — ANESTHESIA (OUTPATIENT)
Dept: OPERATING ROOM | Age: 59
End: 2024-07-25
Payer: COMMERCIAL

## 2024-07-25 ENCOUNTER — HOSPITAL ENCOUNTER (OUTPATIENT)
Age: 59
Setting detail: OUTPATIENT SURGERY
Discharge: HOME | End: 2024-07-25
Attending: PODIATRIST | Admitting: PODIATRIST
Payer: COMMERCIAL

## 2024-07-25 ENCOUNTER — ANESTHESIA EVENT (OUTPATIENT)
Dept: OPERATING ROOM | Age: 59
End: 2024-07-25
Payer: COMMERCIAL

## 2024-07-25 VITALS
BODY MASS INDEX: 26.58 KG/M2 | OXYGEN SATURATION: 92 % | RESPIRATION RATE: 12 BRPM | SYSTOLIC BLOOD PRESSURE: 114 MMHG | WEIGHT: 150 LBS | DIASTOLIC BLOOD PRESSURE: 78 MMHG | HEIGHT: 63 IN | HEART RATE: 86 BPM | TEMPERATURE: 97.8 F

## 2024-07-25 DIAGNOSIS — G89.18 ACUTE POSTOPERATIVE PAIN: Primary | ICD-10-CM

## 2024-07-25 PROCEDURE — 3700000001 HC ADD 15 MINUTES (ANESTHESIA): Performed by: PODIATRIST

## 2024-07-25 PROCEDURE — 2500000003 HC RX 250 WO HCPCS: Performed by: PODIATRIST

## 2024-07-25 PROCEDURE — 3600000014 HC SURGERY LEVEL 4 ADDTL 15MIN: Performed by: PODIATRIST

## 2024-07-25 PROCEDURE — 2500000003 HC RX 250 WO HCPCS: Performed by: ANESTHESIOLOGY

## 2024-07-25 PROCEDURE — 7100000000 HC PACU RECOVERY - FIRST 15 MIN: Performed by: PODIATRIST

## 2024-07-25 PROCEDURE — 6360000002 HC RX W HCPCS: Performed by: PODIATRIST

## 2024-07-25 PROCEDURE — 3700000000 HC ANESTHESIA ATTENDED CARE: Performed by: PODIATRIST

## 2024-07-25 PROCEDURE — 2580000003 HC RX 258: Performed by: PODIATRIST

## 2024-07-25 PROCEDURE — C1713 ANCHOR/SCREW BN/BN,TIS/BN: HCPCS | Performed by: PODIATRIST

## 2024-07-25 PROCEDURE — 7100000001 HC PACU RECOVERY - ADDTL 15 MIN: Performed by: PODIATRIST

## 2024-07-25 PROCEDURE — 2709999900 HC NON-CHARGEABLE SUPPLY: Performed by: PODIATRIST

## 2024-07-25 PROCEDURE — 3600000004 HC SURGERY LEVEL 4 BASE: Performed by: PODIATRIST

## 2024-07-25 PROCEDURE — 6370000000 HC RX 637 (ALT 250 FOR IP): Performed by: ANESTHESIOLOGY

## 2024-07-25 PROCEDURE — 7100000011 HC PHASE II RECOVERY - ADDTL 15 MIN: Performed by: PODIATRIST

## 2024-07-25 PROCEDURE — 6360000002 HC RX W HCPCS: Performed by: ANESTHESIOLOGY

## 2024-07-25 PROCEDURE — 2720000010 HC SURG SUPPLY STERILE: Performed by: PODIATRIST

## 2024-07-25 PROCEDURE — 7100000010 HC PHASE II RECOVERY - FIRST 15 MIN: Performed by: PODIATRIST

## 2024-07-25 DEVICE — OSTEOSYNTHESIS COMPRESSION STAPLE
Type: IMPLANTABLE DEVICE | Site: FOOT | Status: FUNCTIONAL
Brand: EASY CLIP

## 2024-07-25 DEVICE — HEADLESS SCREW
Type: IMPLANTABLE DEVICE | Site: FOOT | Status: FUNCTIONAL
Brand: MINI

## 2024-07-25 RX ORDER — SODIUM CHLORIDE 9 MG/ML
INJECTION, SOLUTION INTRAVENOUS PRN
Status: DISCONTINUED | OUTPATIENT
Start: 2024-07-25 | End: 2024-07-25 | Stop reason: HOSPADM

## 2024-07-25 RX ORDER — BUPIVACAINE HYDROCHLORIDE 5 MG/ML
INJECTION, SOLUTION EPIDURAL; INTRACAUDAL
Status: DISCONTINUED
Start: 2024-07-25 | End: 2024-07-25 | Stop reason: HOSPADM

## 2024-07-25 RX ORDER — SUCCINYLCHOLINE CHLORIDE 20 MG/ML
INJECTION INTRAMUSCULAR; INTRAVENOUS PRN
Status: DISCONTINUED | OUTPATIENT
Start: 2024-07-25 | End: 2024-07-25 | Stop reason: SDUPTHER

## 2024-07-25 RX ORDER — SODIUM CHLORIDE 0.9 % (FLUSH) 0.9 %
5-40 SYRINGE (ML) INJECTION PRN
Status: DISCONTINUED | OUTPATIENT
Start: 2024-07-25 | End: 2024-07-25 | Stop reason: HOSPADM

## 2024-07-25 RX ORDER — SODIUM CHLORIDE, SODIUM LACTATE, POTASSIUM CHLORIDE, CALCIUM CHLORIDE 600; 310; 30; 20 MG/100ML; MG/100ML; MG/100ML; MG/100ML
INJECTION, SOLUTION INTRAVENOUS CONTINUOUS
Status: DISCONTINUED | OUTPATIENT
Start: 2024-07-25 | End: 2024-07-25 | Stop reason: HOSPADM

## 2024-07-25 RX ORDER — IPRATROPIUM BROMIDE AND ALBUTEROL SULFATE 2.5; .5 MG/3ML; MG/3ML
SOLUTION RESPIRATORY (INHALATION)
Status: DISCONTINUED
Start: 2024-07-25 | End: 2024-07-25 | Stop reason: HOSPADM

## 2024-07-25 RX ORDER — FENTANYL CITRATE 50 UG/ML
INJECTION, SOLUTION INTRAMUSCULAR; INTRAVENOUS PRN
Status: DISCONTINUED | OUTPATIENT
Start: 2024-07-25 | End: 2024-07-25 | Stop reason: SDUPTHER

## 2024-07-25 RX ORDER — IPRATROPIUM BROMIDE AND ALBUTEROL SULFATE 2.5; .5 MG/3ML; MG/3ML
1 SOLUTION RESPIRATORY (INHALATION) ONCE
Status: COMPLETED | OUTPATIENT
Start: 2024-07-25 | End: 2024-07-25

## 2024-07-25 RX ORDER — LIDOCAINE HYDROCHLORIDE 10 MG/ML
INJECTION, SOLUTION EPIDURAL; INFILTRATION; INTRACAUDAL; PERINEURAL PRN
Status: DISCONTINUED | OUTPATIENT
Start: 2024-07-25 | End: 2024-07-25 | Stop reason: ALTCHOICE

## 2024-07-25 RX ORDER — OXYCODONE HYDROCHLORIDE 5 MG/1
10 TABLET ORAL PRN
Status: DISCONTINUED | OUTPATIENT
Start: 2024-07-25 | End: 2024-07-25 | Stop reason: HOSPADM

## 2024-07-25 RX ORDER — MIDAZOLAM HYDROCHLORIDE 1 MG/ML
INJECTION INTRAMUSCULAR; INTRAVENOUS PRN
Status: DISCONTINUED | OUTPATIENT
Start: 2024-07-25 | End: 2024-07-25 | Stop reason: SDUPTHER

## 2024-07-25 RX ORDER — HYDROCODONE BITARTRATE AND ACETAMINOPHEN 5; 325 MG/1; MG/1
1 TABLET ORAL EVERY 6 HOURS PRN
Qty: 28 TABLET | Refills: 0 | Status: SHIPPED | OUTPATIENT
Start: 2024-07-25 | End: 2024-08-01

## 2024-07-25 RX ORDER — ROCURONIUM BROMIDE 10 MG/ML
INJECTION, SOLUTION INTRAVENOUS PRN
Status: DISCONTINUED | OUTPATIENT
Start: 2024-07-25 | End: 2024-07-25 | Stop reason: SDUPTHER

## 2024-07-25 RX ORDER — OXYCODONE HYDROCHLORIDE 5 MG/1
5 TABLET ORAL PRN
Status: DISCONTINUED | OUTPATIENT
Start: 2024-07-25 | End: 2024-07-25 | Stop reason: HOSPADM

## 2024-07-25 RX ORDER — ONDANSETRON 2 MG/ML
INJECTION INTRAMUSCULAR; INTRAVENOUS PRN
Status: DISCONTINUED | OUTPATIENT
Start: 2024-07-25 | End: 2024-07-25 | Stop reason: SDUPTHER

## 2024-07-25 RX ORDER — DEXAMETHASONE SODIUM PHOSPHATE 4 MG/ML
INJECTION, SOLUTION INTRA-ARTICULAR; INTRALESIONAL; INTRAMUSCULAR; INTRAVENOUS; SOFT TISSUE PRN
Status: DISCONTINUED | OUTPATIENT
Start: 2024-07-25 | End: 2024-07-25 | Stop reason: SDUPTHER

## 2024-07-25 RX ORDER — ONDANSETRON 2 MG/ML
4 INJECTION INTRAMUSCULAR; INTRAVENOUS EVERY 30 MIN PRN
Status: DISCONTINUED | OUTPATIENT
Start: 2024-07-25 | End: 2024-07-25 | Stop reason: HOSPADM

## 2024-07-25 RX ORDER — NALOXONE HYDROCHLORIDE 0.4 MG/ML
INJECTION, SOLUTION INTRAMUSCULAR; INTRAVENOUS; SUBCUTANEOUS PRN
Status: DISCONTINUED | OUTPATIENT
Start: 2024-07-25 | End: 2024-07-25 | Stop reason: HOSPADM

## 2024-07-25 RX ORDER — LIDOCAINE HYDROCHLORIDE 10 MG/ML
INJECTION, SOLUTION INFILTRATION; PERINEURAL
Status: DISCONTINUED
Start: 2024-07-25 | End: 2024-07-25 | Stop reason: HOSPADM

## 2024-07-25 RX ORDER — PROPOFOL 10 MG/ML
INJECTION, EMULSION INTRAVENOUS PRN
Status: DISCONTINUED | OUTPATIENT
Start: 2024-07-25 | End: 2024-07-25 | Stop reason: SDUPTHER

## 2024-07-25 RX ORDER — BUPIVACAINE HYDROCHLORIDE 5 MG/ML
INJECTION, SOLUTION EPIDURAL; INTRACAUDAL PRN
Status: DISCONTINUED | OUTPATIENT
Start: 2024-07-25 | End: 2024-07-25 | Stop reason: ALTCHOICE

## 2024-07-25 RX ORDER — GLYCOPYRROLATE 0.2 MG/ML
INJECTION INTRAMUSCULAR; INTRAVENOUS PRN
Status: DISCONTINUED | OUTPATIENT
Start: 2024-07-25 | End: 2024-07-25 | Stop reason: SDUPTHER

## 2024-07-25 RX ORDER — KETOROLAC TROMETHAMINE 30 MG/ML
INJECTION, SOLUTION INTRAMUSCULAR; INTRAVENOUS PRN
Status: DISCONTINUED | OUTPATIENT
Start: 2024-07-25 | End: 2024-07-25 | Stop reason: SDUPTHER

## 2024-07-25 RX ORDER — SODIUM CHLORIDE 0.9 % (FLUSH) 0.9 %
5-40 SYRINGE (ML) INJECTION EVERY 12 HOURS SCHEDULED
Status: DISCONTINUED | OUTPATIENT
Start: 2024-07-25 | End: 2024-07-25 | Stop reason: HOSPADM

## 2024-07-25 RX ADMIN — PHENYLEPHRINE HYDROCHLORIDE 50 MCG: 10 INJECTION INTRAVENOUS at 10:40

## 2024-07-25 RX ADMIN — PHENYLEPHRINE HYDROCHLORIDE 100 MCG: 10 INJECTION INTRAVENOUS at 11:22

## 2024-07-25 RX ADMIN — SUCCINYLCHOLINE CHLORIDE 120 MG: 20 INJECTION, SOLUTION INTRAMUSCULAR; INTRAVENOUS at 10:15

## 2024-07-25 RX ADMIN — PHENYLEPHRINE HYDROCHLORIDE 100 MCG: 10 INJECTION INTRAVENOUS at 10:53

## 2024-07-25 RX ADMIN — PHENYLEPHRINE HYDROCHLORIDE 100 MCG: 10 INJECTION INTRAVENOUS at 11:10

## 2024-07-25 RX ADMIN — PHENYLEPHRINE HYDROCHLORIDE 100 MCG: 10 INJECTION INTRAVENOUS at 11:00

## 2024-07-25 RX ADMIN — PHENYLEPHRINE HYDROCHLORIDE 50 MCG: 10 INJECTION INTRAVENOUS at 10:48

## 2024-07-25 RX ADMIN — GLYCOPYRROLATE 0.2 MG: 0.2 INJECTION, SOLUTION INTRAMUSCULAR; INTRAVENOUS at 10:20

## 2024-07-25 RX ADMIN — PHENYLEPHRINE HYDROCHLORIDE 200 MCG: 10 INJECTION INTRAVENOUS at 10:24

## 2024-07-25 RX ADMIN — FENTANYL CITRATE 25 MCG: 50 INJECTION INTRAMUSCULAR; INTRAVENOUS at 10:21

## 2024-07-25 RX ADMIN — PHENYLEPHRINE HYDROCHLORIDE 200 MCG: 10 INJECTION INTRAVENOUS at 11:16

## 2024-07-25 RX ADMIN — PROPOFOL 120 MG: 10 INJECTION, EMULSION INTRAVENOUS at 10:14

## 2024-07-25 RX ADMIN — SODIUM CHLORIDE, POTASSIUM CHLORIDE, SODIUM LACTATE AND CALCIUM CHLORIDE: 600; 310; 30; 20 INJECTION, SOLUTION INTRAVENOUS at 09:16

## 2024-07-25 RX ADMIN — DEXAMETHASONE SODIUM PHOSPHATE 8 MG: 4 INJECTION, SOLUTION INTRAMUSCULAR; INTRAVENOUS at 10:20

## 2024-07-25 RX ADMIN — PHENYLEPHRINE HYDROCHLORIDE 100 MCG: 10 INJECTION INTRAVENOUS at 10:19

## 2024-07-25 RX ADMIN — FENTANYL CITRATE 50 MCG: 50 INJECTION INTRAMUSCULAR; INTRAVENOUS at 10:52

## 2024-07-25 RX ADMIN — ONDANSETRON 4 MG: 2 INJECTION INTRAMUSCULAR; INTRAVENOUS at 11:05

## 2024-07-25 RX ADMIN — PHENYLEPHRINE HYDROCHLORIDE 100 MCG: 10 INJECTION INTRAVENOUS at 10:58

## 2024-07-25 RX ADMIN — MIDAZOLAM 2 MG: 1 INJECTION INTRAMUSCULAR; INTRAVENOUS at 10:13

## 2024-07-25 RX ADMIN — PHENYLEPHRINE HYDROCHLORIDE 100 MCG: 10 INJECTION INTRAVENOUS at 10:14

## 2024-07-25 RX ADMIN — IPRATROPIUM BROMIDE AND ALBUTEROL SULFATE 1 DOSE: .5; 2.5 SOLUTION RESPIRATORY (INHALATION) at 09:32

## 2024-07-25 RX ADMIN — FENTANYL CITRATE 25 MCG: 50 INJECTION INTRAMUSCULAR; INTRAVENOUS at 10:24

## 2024-07-25 RX ADMIN — ROCURONIUM BROMIDE 5 MG: 10 INJECTION, SOLUTION INTRAVENOUS at 10:13

## 2024-07-25 RX ADMIN — PHENYLEPHRINE HYDROCHLORIDE 100 MCG: 10 INJECTION INTRAVENOUS at 10:31

## 2024-07-25 RX ADMIN — KETOROLAC TROMETHAMINE 15 MG: 30 INJECTION, SOLUTION INTRAMUSCULAR at 10:38

## 2024-07-25 NOTE — ANESTHESIA PRE PROCEDURE
\"LABABO\"    Drug/Infectious Status (If Applicable):  No results found for: \"HIV\", \"HEPCAB\"    COVID-19 Screening (If Applicable):   Lab Results   Component Value Date/Time    COVID19 Not-Detected 01/02/2024 05:38 PM    COVID19 negative 11/23/2021 12:00 AM    COVID19 NEGATIVE 05/10/2021 12:30 PM           Anesthesia Evaluation    Airway: Mallampati: I          Dental:    (+) edentulous      Pulmonary:   (+)       decreased breath sounds                               Cardiovascular:            Rhythm: regular  Rate: normal                    Neuro/Psych:               GI/Hepatic/Renal:             Endo/Other:                     Abdominal:             Vascular:          Other Findings:       Anesthesia Plan      general     ASA 3                               Isma Toro MD   7/25/2024

## 2024-07-25 NOTE — BRIEF OP NOTE
Brief Postoperative Note      Patient: Dasha Coleman  YOB: 1965  MRN: [id number]    Date of Procedure: 7/25/2024    Pre-Op Diagnosis Codes:     * Hallux abductovalgus, right [M20.11]     * Pain in joint of right foot [M25.571]    Post-Op Diagnosis: Same       Procedure(s):  DEANGELO BUNIONECTOMY WITH YONY OSTEOTOMY RIGHT FOOT    Surgeon(s):  Eleni Alvarez DPM    Assistant:  Resdient: Hector Lenz, PGY-3    Anesthesia: General    Hemostasis: anatomic dissection and electrocautery, pneumatic ankle tourniquet right at 250mmHg for 75 minutes    Injectables: Pre-Op 10 cc of 1% Lidocaine plain and Post-Op 10 cc of 0.5 % Marcaine plain    Materials: 2-0/4-0 Vicryl, 4-0 Nylon    Implants:   -(2x) 3 x 16mm Styker Mini Headless screws  -(1x) 10 x 10mm EasyClip compression staple      Estimated Blood Loss (mL): Minimal    Complications: None    Specimens:   * No specimens in log *    Implants:  Implant Name Type Inv. Item Serial No.  Lot No. LRB No. Used Action   SCREW HEADLESS 3.0X16MM - [id number]  SCREW HEADLESS 3.0X16MM  [employer] [facility]  Right 2 Implanted   STAPLE INT FIX [id number] L1.2MM THK1.5MM FT ANK NIT SUP E - [id number]  STAPLE INT FIX [id number] L1.2MM THK1.5MM FT ANK NIT SUP E  [employer]- [id number] Right 1 Implanted         Drains: * No LDAs found *    Findings:  Infection Present At Time Of Surgery (PATOS) (choose all levels that have infection present):  No infection present  Other Findings: Adequate reduction of medial eminence. First ray with good alignment post operatively. Hardware was rigid and stable. Procedure went as planned.    Hector Lenz DPM  Podiatric Resident PGY-3  Pager [phone] or Perfect Serve

## 2024-07-25 NOTE — OP NOTE
Operative Note      Patient: Dasha Coleman  YOB: 1965  MRN: [id number]    Date of Procedure: 7/25/2024    Pre-Op Diagnosis Codes:     * Hallux abductovalgus, right [M20.11]     * Pain in joint of right foot [M25.571]    Post-Op Diagnosis: Same       Procedure(s):  DEANGELO BUNIONECTOMY WITH YONY OSTEOTOMY RIGHT FOOT    Surgeon(s):  Eleni Alvarez DPM    Assistant:  Resdient: Hector Lenz, PGY-3     Anesthesia: General     Hemostasis: anatomic dissection and electrocautery, pneumatic ankle tourniquet right at 250mmHg for 75 minutes     Injectables: Pre-Op 10 cc of 1% Lidocaine plain and Post-Op 10 cc of 0.5 % Marcaine plain     Materials: 2-0/4-0 Vicryl, 4-0 Nylon     Implants:   -(2x) 3 x 16mm Styker Mini Headless screws  -(1x) 10 x 10mm EasyClip compression staple      Estimated Blood Loss (mL): Minimal    Complications: None    Specimens:   * No specimens in log *    Implants:  Implant Name Type Inv. Item Serial No.  Lot No. LRB No. Used Action   SCREW HEADLESS 3.0X16MM - [id number]  SCREW HEADLESS 3.0X16MM  [employer] [facility]  Right 2 Implanted   STAPLE INT FIX [id number] L1.2MM THK1.5MM FT ANK NIT SUP E - [id number]  STAPLE INT FIX [id number] L1.2MM THK1.5MM FT ANK NIT SUP E  [employer]- [id number] Right 1 Implanted         Drains: * No LDAs found *    Findings:  Infection Present At Time Of Surgery (PATOS) (choose all levels that have infection present):  No infection present  Other Findings: Adequate reduction of medial eminence. First ray with good alignment post operatively. Hardware was rigid and stable. Procedure went as planned.     INDICATIONS FOR PROCEDURE: This patient has signs and symptoms clinically  consistent with the above mentioned preoperative diagnosis. Having failed conservative treatment, it was determined that the patient would benefit from surgical intervention. All potential risks, benefits, and complications were discussed

## 2024-07-25 NOTE — ANESTHESIA POSTPROCEDURE EVALUATION
Department of Anesthesiology  Postprocedure Note    Patient: Dasha Coleman  MRN: [id number]  YOB: 1965  Date of evaluation: 7/25/2024    Procedure Summary       Date: 07/25/24 Room / Location: [address]    Anesthesia Start: 1003 Anesthesia Stop: 1149    Procedure: DEANGELO BUNIONECTOMY WITH YONY OSTEOTOMY RIGHT FOOT (Right: Foot) Diagnosis:       Hallux abductovalgus, right      Pain in joint of right foot      (Hallux abductovalgus, right [M20.11])      (Pain in joint of right foot [M25.571])    Surgeons: Eleni Alvarez DPM Responsible Provider:     Anesthesia Type: general ASA Status: 3            Anesthesia Type: No value filed.    Mike Phase I: Mike Score: 10    Mike Phase II: Mike Score: 10    Anesthesia Post Evaluation    Patient location during evaluation: PACU  Level of consciousness: awake  Airway patency: patent  Nausea & Vomiting: no vomiting  Cardiovascular status: blood pressure returned to baseline  Respiratory status: acceptable  Hydration status: stable  Pain management: adequate    No notable events documented.

## 2024-07-25 NOTE — DISCHARGE INSTRUCTIONS
Discharge Instructions for Dr. Eleni Jonesine [place]    Procedure Date: 7/25/2024  Procedure Location: [facility]  Procedure: Ambrosio Bunionectomy with Don Osteotomy Right Foot       Leave post-operative dressing clean, dry, intact - do not remove.   Reinforce dressing as needed.   Rest, ice, elevate right lower extremity.  Apply ice to ankle or behind knee.     Weight bearing status: Non-weight bearing right for first 2 weeks.   Use surgical shoe on right foot always when up and about - remove when resting/sleeping.  Contact office if problems develop.   Prescriptions provided: Norco 5/325mg tablets Take 1 tablet by mouth every 6 hours as needed for up to 7 days.  Follow-up in office as scheduled: Friday 8/2/24 at 9:45AM [facility]   [phone]      ANESTHESIA DISCHARGE INSTRUCTIONS    You are under the influence of drugs- do not drink alcohol, drive a car, operate machinery(such as power tools, kitchen appliances, etc), sign legal documents, or make any important decisions for 24 hours (or while on pain medications).   Children should not ride bikes or skate boards or play on gym sets  for 24 hours after surgery.  A responsible adult should be with you for 24 hours.  Rest at home today- increase activity as tolerated.  Progress slowly to a regular diet unless your physician has instructed you otherwise. Drink plenty of water.    CALL YOUR DOCTOR IF YOU:  Have moderate to severe nausea or vomiting AND are unable to hold down fluids or prescribed medications.  Have bright red bloody drainage from your dressing that won't stop oozing.  Do not get relief with your pain medication    NORMAL (POSSIBLE) SIDE EFFECTS FROM ANESTHESIA:     Confusion, temporary memory loss, delayed reaction times in the first 24 hours  Lightheadedness, dizziness, difficulty focusing, blurred vision  Nausea/vomiting can happen  Shivering, feeling cold, sore throat,

## 2024-07-25 NOTE — H&P
I have examined the patient and reviewed the original history and physical completed and find no relevant changes.       The nature of the procedure, possible complications, alternative forms of therapy, post-op course, and post-op goals have been explained to patient/patient family.  All questions have been answered. The consent has been signed.  Patient's surgical site has been marked.     Eleni Alvarez DPM   7/25/2024 9:34 AM

## 2024-07-29 DIAGNOSIS — I25.10 CORONARY ARTERY DISEASE INVOLVING NATIVE CORONARY ARTERY OF NATIVE HEART WITHOUT ANGINA PECTORIS: ICD-10-CM

## 2024-07-29 DIAGNOSIS — I21.3 ST ELEVATION MYOCARDIAL INFARCTION (STEMI), UNSPECIFIED ARTERY (HCC): ICD-10-CM

## 2024-07-29 DIAGNOSIS — Z95.1 HISTORY OF CORONARY ARTERY BYPASS GRAFT X 1: ICD-10-CM

## 2024-07-31 RX ORDER — ROSUVASTATIN CALCIUM 40 MG/1
TABLET, COATED ORAL
Qty: 90 TABLET | Refills: 2 | Status: SHIPPED | OUTPATIENT
Start: 2024-07-31

## 2024-07-31 RX ORDER — POTASSIUM CHLORIDE 20 MEQ/1
TABLET, EXTENDED RELEASE ORAL
Qty: 180 TABLET | Refills: 2 | Status: SHIPPED | OUTPATIENT
Start: 2024-07-31

## 2024-07-31 RX ORDER — ASPIRIN 81 MG/1
TABLET ORAL
Qty: 90 TABLET | Refills: 2 | Status: SHIPPED | OUTPATIENT
Start: 2024-07-31

## 2024-10-16 DIAGNOSIS — J43.8 OTHER EMPHYSEMA (HCC): ICD-10-CM

## 2024-10-16 RX ORDER — ALBUTEROL SULFATE 90 UG/1
2 AEROSOL, METERED RESPIRATORY (INHALATION) EVERY 6 HOURS PRN
Qty: 3 EACH | Refills: 0 | Status: SHIPPED | OUTPATIENT
Start: 2024-10-16

## 2024-10-23 ENCOUNTER — OFFICE VISIT (OUTPATIENT)
Dept: PULMONOLOGY | Age: 59
End: 2024-10-23
Payer: COMMERCIAL

## 2024-10-23 VITALS
SYSTOLIC BLOOD PRESSURE: 114 MMHG | TEMPERATURE: 98 F | WEIGHT: 154.38 LBS | DIASTOLIC BLOOD PRESSURE: 72 MMHG | OXYGEN SATURATION: 94 % | HEIGHT: 63 IN | HEART RATE: 69 BPM | BODY MASS INDEX: 27.36 KG/M2 | RESPIRATION RATE: 18 BRPM

## 2024-10-23 DIAGNOSIS — J44.9 COPD, MILD (HCC): ICD-10-CM

## 2024-10-23 DIAGNOSIS — Z87.891 PERSONAL HISTORY OF TOBACCO USE: ICD-10-CM

## 2024-10-23 DIAGNOSIS — J43.1 PANLOBULAR EMPHYSEMA (HCC): ICD-10-CM

## 2024-10-23 DIAGNOSIS — R91.1 PULMONARY NODULE: Primary | ICD-10-CM

## 2024-10-23 PROCEDURE — G0296 VISIT TO DETERM LDCT ELIG: HCPCS | Performed by: STUDENT IN AN ORGANIZED HEALTH CARE EDUCATION/TRAINING PROGRAM

## 2024-10-23 PROCEDURE — 3017F COLORECTAL CA SCREEN DOC REV: CPT | Performed by: STUDENT IN AN ORGANIZED HEALTH CARE EDUCATION/TRAINING PROGRAM

## 2024-10-23 PROCEDURE — 3023F SPIROM DOC REV: CPT | Performed by: STUDENT IN AN ORGANIZED HEALTH CARE EDUCATION/TRAINING PROGRAM

## 2024-10-23 PROCEDURE — 1036F TOBACCO NON-USER: CPT | Performed by: STUDENT IN AN ORGANIZED HEALTH CARE EDUCATION/TRAINING PROGRAM

## 2024-10-23 PROCEDURE — G8427 DOCREV CUR MEDS BY ELIG CLIN: HCPCS | Performed by: STUDENT IN AN ORGANIZED HEALTH CARE EDUCATION/TRAINING PROGRAM

## 2024-10-23 PROCEDURE — G8419 CALC BMI OUT NRM PARAM NOF/U: HCPCS | Performed by: STUDENT IN AN ORGANIZED HEALTH CARE EDUCATION/TRAINING PROGRAM

## 2024-10-23 PROCEDURE — 99214 OFFICE O/P EST MOD 30 MIN: CPT | Performed by: STUDENT IN AN ORGANIZED HEALTH CARE EDUCATION/TRAINING PROGRAM

## 2024-10-23 PROCEDURE — G8484 FLU IMMUNIZE NO ADMIN: HCPCS | Performed by: STUDENT IN AN ORGANIZED HEALTH CARE EDUCATION/TRAINING PROGRAM

## 2024-10-23 ASSESSMENT — ENCOUNTER SYMPTOMS
SORE THROAT: 0
EYE DISCHARGE: 0
NAUSEA: 0
STRIDOR: 0
CONSTIPATION: 0
ABDOMINAL DISTENTION: 0
EYE REDNESS: 0
COLOR CHANGE: 0
EYE PAIN: 0
EYE ITCHING: 0
SHORTNESS OF BREATH: 0
VOMITING: 0
DIARRHEA: 0
BACK PAIN: 0
ABDOMINAL PAIN: 0
COUGH: 0
TROUBLE SWALLOWING: 0
WHEEZING: 0

## 2024-10-23 NOTE — PROGRESS NOTES
[facility] Follow-up  [address] [place] [address]  [phone]        Dasha Coleman (: 1965 ) is a 58 y.o. female here for an evaluation of   Chief Complaint   Patient presents with    Follow-up     6 mo pulmonary nodule and emphysema Meaghan pt.          SUBJECTIVE/OBJECTIVE:  Patient 58-year-old female with significant past medical history of CAD, GERD presents to [facility] for follow-up visit.  Patient has no complaints today.  She denies any fever, chills, chest pain, shortness of breath, cough, nausea, vomiting, abdominal pain.  Patient already received her flu shot and COVID shot.  She is on Stiolto with albuterol as needed for shortness of breath.      Review of Systems   Constitutional:  Negative for activity change, appetite change, chills, diaphoresis and fatigue.   HENT:  Negative for congestion, sore throat and trouble swallowing.    Eyes:  Negative for pain, discharge, redness and itching.   Respiratory:  Negative for cough, shortness of breath, wheezing and stridor.    Cardiovascular:  Negative for chest pain, palpitations and leg swelling.   Gastrointestinal:  Negative for abdominal distention, abdominal pain, constipation, diarrhea, nausea and vomiting.   Endocrine: Negative for polydipsia, polyphagia and polyuria.   Genitourinary:  Negative for difficulty urinating.   Musculoskeletal:  Negative for back pain, myalgias and neck pain.   Skin:  Negative for color change.   Neurological:  Negative for dizziness, weakness and light-headedness.   Psychiatric/Behavioral:  Negative for agitation and behavioral problems.          Vitals:    10/23/24 0811   BP: 114/72   Pulse: 69   Resp: 18   Temp: 98 °F (36.7 °C)   TempSrc: Temporal   SpO2: 94%   Weight: 70 kg (154 lb 6 oz)   Height: 1.6 m (5' 3\")        Physical Exam  Constitutional:       General: She is not in acute distress.     Appearance: She is not toxic-appearing.   HENT:      Head: Normocephalic and

## 2024-10-23 NOTE — PATIENT INSTRUCTIONS
Remember to bring a list of pulmonary medications and any CPAP or BiPAP machines to your next appointment with the office.     Please keep all of your future appointments scheduled by [facility] [place] [facility] office. Out of respect for other patients and providers, you may be asked to reschedule your appointment if you arrive later than your scheduled appointment time. Appointments cancelled less than 24hrs in advance will be considered a no show. Patients with three missed appointments within 1 year or four missed appointments within 2 years can be dismissed from the practice.     Please be aware that our physicians are required to work in the Intensive Care Unit at [facility].  Your appointment may need to be rescheduled if they are designated to work during your appointment time.      You may receive a survey regarding the care you received during your visit.  Your input is valuable to us.  We encourage you to complete and return your survey.  We hope you will choose us in the future for your healthcare needs.     Pt instructed of all future appointment dates & times, including radiology, labs, procedures & referrals. If procedures were scheduled preparation instructions provided. Instructions on future appointments with [facility] were given.     In the next few weeks, you will be receiving a survey from [facility] regarding your visit today.  We would greatly appreciate it if you would take just a few minutes to fill that out.  It is very important to us that our patients receive top notch care and our surveys help keep us accountable. However, if your experience was not a good one, we want to hear about that as well. This is a key way we can keep track of problems and strive to correct any for future visits.    Again, we appreciate your time and thank you for choosing [facility]!    Patricia TRIPATHI       Learning About Lung Cancer Screening  What is screening for

## 2024-10-23 NOTE — PROGRESS NOTES
MA Communication:  The following orders are received by verbal communication from   Napoleon Pierce MD    Orders include: fu March      LDCT March

## 2024-10-24 ENCOUNTER — HOSPITAL ENCOUNTER (OUTPATIENT)
Dept: MAMMOGRAPHY | Age: 59
Discharge: HOME | End: 2024-10-24
Payer: COMMERCIAL

## 2024-10-24 VITALS — HEIGHT: 63 IN | BODY MASS INDEX: 27.46 KG/M2 | WEIGHT: 155 LBS

## 2024-10-24 DIAGNOSIS — Z12.31 SCREENING MAMMOGRAM, ENCOUNTER FOR: ICD-10-CM

## 2024-10-24 PROCEDURE — 77063 BREAST TOMOSYNTHESIS BI: CPT

## 2024-12-11 ENCOUNTER — OFFICE VISIT (OUTPATIENT)
Dept: FAMILY MEDICINE CLINIC | Age: 59
End: 2024-12-11
Payer: COMMERCIAL

## 2024-12-11 VITALS
DIASTOLIC BLOOD PRESSURE: 74 MMHG | WEIGHT: 154.8 LBS | BODY MASS INDEX: 27.43 KG/M2 | SYSTOLIC BLOOD PRESSURE: 111 MMHG | OXYGEN SATURATION: 96 % | HEART RATE: 62 BPM | TEMPERATURE: 97 F | HEIGHT: 63 IN

## 2024-12-11 DIAGNOSIS — E78.00 HYPERCHOLESTEREMIA: Primary | ICD-10-CM

## 2024-12-11 DIAGNOSIS — I25.10 CORONARY ARTERY DISEASE INVOLVING NATIVE HEART WITHOUT ANGINA PECTORIS, UNSPECIFIED VESSEL OR LESION TYPE: ICD-10-CM

## 2024-12-11 DIAGNOSIS — Z95.1 S/P CABG (CORONARY ARTERY BYPASS GRAFT): ICD-10-CM

## 2024-12-11 DIAGNOSIS — J43.8 OTHER EMPHYSEMA (HCC): ICD-10-CM

## 2024-12-11 DIAGNOSIS — I25.5 ISCHEMIC CARDIOMYOPATHY: ICD-10-CM

## 2024-12-11 PROCEDURE — 36415 COLL VENOUS BLD VENIPUNCTURE: CPT | Performed by: FAMILY MEDICINE

## 2024-12-11 PROCEDURE — 1036F TOBACCO NON-USER: CPT | Performed by: FAMILY MEDICINE

## 2024-12-11 PROCEDURE — G8427 DOCREV CUR MEDS BY ELIG CLIN: HCPCS | Performed by: FAMILY MEDICINE

## 2024-12-11 PROCEDURE — 3023F SPIROM DOC REV: CPT | Performed by: FAMILY MEDICINE

## 2024-12-11 PROCEDURE — G8419 CALC BMI OUT NRM PARAM NOF/U: HCPCS | Performed by: FAMILY MEDICINE

## 2024-12-11 PROCEDURE — G8484 FLU IMMUNIZE NO ADMIN: HCPCS | Performed by: FAMILY MEDICINE

## 2024-12-11 PROCEDURE — 99214 OFFICE O/P EST MOD 30 MIN: CPT | Performed by: FAMILY MEDICINE

## 2024-12-11 PROCEDURE — 3017F COLORECTAL CA SCREEN DOC REV: CPT | Performed by: FAMILY MEDICINE

## 2024-12-11 SDOH — ECONOMIC STABILITY: FOOD INSECURITY: WITHIN THE PAST 12 MONTHS, THE FOOD YOU BOUGHT JUST DIDN'T LAST AND YOU DIDN'T HAVE MONEY TO GET MORE.: NEVER TRUE

## 2024-12-11 SDOH — ECONOMIC STABILITY: FOOD INSECURITY: WITHIN THE PAST 12 MONTHS, YOU WORRIED THAT YOUR FOOD WOULD RUN OUT BEFORE YOU GOT MONEY TO BUY MORE.: NEVER TRUE

## 2024-12-11 SDOH — ECONOMIC STABILITY: INCOME INSECURITY: HOW HARD IS IT FOR YOU TO PAY FOR THE VERY BASICS LIKE FOOD, HOUSING, MEDICAL CARE, AND HEATING?: NOT HARD AT ALL

## 2024-12-11 NOTE — PROGRESS NOTES
Subjective:  Dasha Coleman is here to discuss the following issues.  She has elevated cholesterol and continues on her medication with no side effects including muscle aches or weakness.  She has a history of coronary artery disease with prior MI and prior CABG.  She has ischemic cardiomyopathy and continues on a combination of medications.  No edema orthopnea chest pain pressure or other related symptoms.  She has emphysema and stopped smoking about 9 months ago.  No increased shortness of breath wheezing cough chest tightness.  Social History     Tobacco Use   Smoking Status Former    Current packs/day: 0.00    Average packs/day: 0.7 packs/day for 40.3 years (28.2 ttl pk-yrs)    Types: Cigarettes    Start date: 1983    Quit date: 2024    Years since quittin.7   Smokeless Tobacco Never   Tobacco Comments    Per smoking history audit, patient smoked 10 yrs in , at heaviest smoked 1 ppd, currently 0.25 ppd, average 0.7 ppd   Allergies:     Patient has no known allergies.    Objective:  /74 (Site: Left Upper Arm, Position: Sitting, Cuff Size: Medium Adult)   Pulse 62   Temp 97 °F (36.1 °C)   Ht 1.6 m (5' 3\")   Wt 70.2 kg (154 lb 12.8 oz)   LMP 2017 (Approximate)   SpO2 96%   BMI 27.42 kg/m²    No acute distress, heart regular rate and rhythm without murmur, lungs clear to auscultation easy effort, abdomen nondistended, no clubbing or cyanosis no edema    Assessment:  1. Hypercholesteremia    2. Coronary artery disease involving native heart without angina pectoris, unspecified vessel or lesion type    3. Ischemic cardiomyopathy    4. S/P CABG (coronary artery bypass graft)    5. Other emphysema (HCC)            Plan:  Labs ordered  Continue current medications  Continue working with providers  Her  was recently diagnosed with kidney cancer and will soon have fa nephrectomy.  follow-up in 6 months or as needed   the diagnoses listed in the assessment above are stable

## 2024-12-12 LAB
ALT SERPL-CCNC: 26 U/L (ref 10–40)
ANION GAP SERPL CALCULATED.3IONS-SCNC: 11 MMOL/L (ref 3–16)
AST SERPL-CCNC: 37 U/L (ref 15–37)
BUN SERPL-MCNC: 10 MG/DL (ref 7–20)
CALCIUM SERPL-MCNC: 10 MG/DL (ref 8.3–10.6)
CHLORIDE SERPL-SCNC: 111 MMOL/L (ref 99–110)
CHOLEST SERPL-MCNC: 148 MG/DL (ref 0–199)
CO2 SERPL-SCNC: 20 MMOL/L (ref 21–32)
CREAT SERPL-MCNC: 0.9 MG/DL (ref 0.6–1.1)
GFR SERPLBLD CREATININE-BSD FMLA CKD-EPI: 75 ML/MIN/{1.73_M2}
GLUCOSE SERPL-MCNC: 94 MG/DL (ref 70–99)
HDLC SERPL-MCNC: 59 MG/DL (ref 40–60)
LDLC SERPL CALC-MCNC: 72 MG/DL
POTASSIUM SERPL-SCNC: 4.6 MMOL/L (ref 3.5–5.1)
SODIUM SERPL-SCNC: 142 MMOL/L (ref 136–145)
TRIGL SERPL-MCNC: 84 MG/DL (ref 0–150)
VLDLC SERPL CALC-MCNC: 17 MG/DL

## 2024-12-26 DIAGNOSIS — J43.8 OTHER EMPHYSEMA (HCC): ICD-10-CM

## 2024-12-27 RX ORDER — TIOTROPIUM BROMIDE AND OLODATEROL 3.124; 2.736 UG/1; UG/1
SPRAY, METERED RESPIRATORY (INHALATION)
Qty: 4 G | Refills: 5 | Status: SHIPPED | OUTPATIENT
Start: 2024-12-27

## 2024-12-27 RX ORDER — ALBUTEROL SULFATE 90 UG/1
2 AEROSOL, METERED RESPIRATORY (INHALATION) EVERY 6 HOURS PRN
Qty: 18 G | Refills: 5 | Status: SHIPPED | OUTPATIENT
Start: 2024-12-27

## 2025-01-28 ENCOUNTER — TELEPHONE (OUTPATIENT)
Dept: FAMILY MEDICINE CLINIC | Age: 60
End: 2025-01-28

## 2025-01-28 RX ORDER — MULTIVIT-MIN/IRON FUM/FOLIC AC 7.5 MG-4
1 TABLET ORAL DAILY
Qty: 90 TABLET | Refills: 3 | Status: SHIPPED | OUTPATIENT
Start: 2025-01-28

## 2025-01-28 NOTE — TELEPHONE ENCOUNTER
Patient is requesting a rx for multi-vitamin because her insurance now covers it. [facility] pharmacy. Is this something she is okay to take?

## 2025-01-29 RX ORDER — FAMOTIDINE 20 MG/1
40 TABLET, FILM COATED ORAL EVERY EVENING
Qty: 60 TABLET | Refills: 10 | Status: SHIPPED | OUTPATIENT
Start: 2025-01-29

## 2025-02-14 ENCOUNTER — HOSPITAL ENCOUNTER (INPATIENT)
Age: 60
LOS: 4 days | Discharge: HOME | End: 2025-02-18
Attending: EMERGENCY MEDICINE | Admitting: INTERNAL MEDICINE
Payer: COMMERCIAL

## 2025-02-14 ENCOUNTER — APPOINTMENT (OUTPATIENT)
Dept: GENERAL RADIOLOGY | Age: 60
End: 2025-02-14
Payer: COMMERCIAL

## 2025-02-14 ENCOUNTER — APPOINTMENT (OUTPATIENT)
Dept: CT IMAGING | Age: 60
End: 2025-02-14
Payer: COMMERCIAL

## 2025-02-14 DIAGNOSIS — D62 ACUTE BLOOD LOSS ANEMIA: ICD-10-CM

## 2025-02-14 DIAGNOSIS — Z95.1 HISTORY OF CORONARY ARTERY BYPASS GRAFT X 1: ICD-10-CM

## 2025-02-14 DIAGNOSIS — K66.1 HEMOPERITONEUM: ICD-10-CM

## 2025-02-14 DIAGNOSIS — I25.10 CORONARY ARTERY DISEASE INVOLVING NATIVE CORONARY ARTERY OF NATIVE HEART WITHOUT ANGINA PECTORIS: ICD-10-CM

## 2025-02-14 DIAGNOSIS — I21.3 ST ELEVATION MYOCARDIAL INFARCTION (STEMI), UNSPECIFIED ARTERY (HCC): ICD-10-CM

## 2025-02-14 DIAGNOSIS — S36.039A SPLENIC LACERATION, INITIAL ENCOUNTER: Primary | ICD-10-CM

## 2025-02-14 LAB
ALBUMIN SERPL-MCNC: 4.3 G/DL (ref 3.4–5)
ALBUMIN/GLOB SERPL: 1.6 {RATIO} (ref 1.1–2.2)
ALP SERPL-CCNC: 84 U/L (ref 40–129)
ALT SERPL-CCNC: 18 U/L (ref 10–40)
ANION GAP SERPL CALCULATED.3IONS-SCNC: 15 MMOL/L (ref 3–16)
AST SERPL-CCNC: 28 U/L (ref 15–37)
BASOPHILS # BLD: 0.1 K/UL (ref 0–0.2)
BASOPHILS NFR BLD: 0.7 %
BILIRUB SERPL-MCNC: 0.5 MG/DL (ref 0–1)
BUN SERPL-MCNC: 14 MG/DL (ref 7–20)
CALCIUM SERPL-MCNC: 9.1 MG/DL (ref 8.3–10.6)
CHLORIDE SERPL-SCNC: 105 MMOL/L (ref 99–110)
CO2 SERPL-SCNC: 21 MMOL/L (ref 21–32)
CREAT SERPL-MCNC: 0.9 MG/DL (ref 0.6–1.1)
DEPRECATED RDW RBC AUTO: 13.5 % (ref 12.4–15.4)
EOSINOPHIL # BLD: 0.2 K/UL (ref 0–0.6)
EOSINOPHIL NFR BLD: 1.9 %
GFR SERPLBLD CREATININE-BSD FMLA CKD-EPI: 74 ML/MIN/{1.73_M2}
GLUCOSE SERPL-MCNC: 111 MG/DL (ref 70–99)
HCT VFR BLD AUTO: 22.7 % (ref 36–48)
HCT VFR BLD AUTO: 27.6 % (ref 36–48)
HGB BLD-MCNC: 7.7 G/DL (ref 12–16)
HGB BLD-MCNC: 9.1 G/DL (ref 12–16)
INR PPP: 1.2 (ref 0.85–1.15)
LACTATE BLDV-SCNC: 1.6 MMOL/L (ref 0.4–2)
LIPASE SERPL-CCNC: 33 U/L (ref 13–60)
LYMPHOCYTES # BLD: 1.8 K/UL (ref 1–5.1)
LYMPHOCYTES NFR BLD: 18.1 %
MAGNESIUM SERPL-MCNC: 2.01 MG/DL (ref 1.8–2.4)
MCH RBC QN AUTO: 29 PG (ref 26–34)
MCHC RBC AUTO-ENTMCNC: 32.8 G/DL (ref 31–36)
MCV RBC AUTO: 88.3 FL (ref 80–100)
MONOCYTES # BLD: 1 K/UL (ref 0–1.3)
MONOCYTES NFR BLD: 10 %
NEUTROPHILS # BLD: 7 K/UL (ref 1.7–7.7)
NEUTROPHILS NFR BLD: 69.3 %
PLATELET # BLD AUTO: 176 K/UL (ref 135–450)
PMV BLD AUTO: 10.9 FL (ref 5–10.5)
POTASSIUM SERPL-SCNC: 3.4 MMOL/L (ref 3.5–5.1)
PROT SERPL-MCNC: 7 G/DL (ref 6.4–8.2)
PROTHROMBIN TIME: 15.4 SEC (ref 11.9–14.9)
RBC # BLD AUTO: 3.13 M/UL (ref 4–5.2)
SODIUM SERPL-SCNC: 141 MMOL/L (ref 136–145)
WBC # BLD AUTO: 10.2 K/UL (ref 4–11)

## 2025-02-14 PROCEDURE — 74177 CT ABD & PELVIS W/CONTRAST: CPT

## 2025-02-14 PROCEDURE — 85025 COMPLETE CBC W/AUTO DIFF WBC: CPT

## 2025-02-14 PROCEDURE — 6360000002 HC RX W HCPCS: Performed by: INTERNAL MEDICINE

## 2025-02-14 PROCEDURE — 6360000002 HC RX W HCPCS: Performed by: PHYSICIAN ASSISTANT

## 2025-02-14 PROCEDURE — 99285 EMERGENCY DEPT VISIT HI MDM: CPT

## 2025-02-14 PROCEDURE — 2500000003 HC RX 250 WO HCPCS: Performed by: INTERNAL MEDICINE

## 2025-02-14 PROCEDURE — 2580000003 HC RX 258: Performed by: PHYSICIAN ASSISTANT

## 2025-02-14 PROCEDURE — 6370000000 HC RX 637 (ALT 250 FOR IP): Performed by: INTERNAL MEDICINE

## 2025-02-14 PROCEDURE — 85018 HEMOGLOBIN: CPT

## 2025-02-14 PROCEDURE — 2060000000 HC ICU INTERMEDIATE R&B

## 2025-02-14 PROCEDURE — 2580000003 HC RX 258: Performed by: INTERNAL MEDICINE

## 2025-02-14 PROCEDURE — 80053 COMPREHEN METABOLIC PANEL: CPT

## 2025-02-14 PROCEDURE — 71046 X-RAY EXAM CHEST 2 VIEWS: CPT

## 2025-02-14 PROCEDURE — 85610 PROTHROMBIN TIME: CPT

## 2025-02-14 PROCEDURE — 96375 TX/PRO/DX INJ NEW DRUG ADDON: CPT

## 2025-02-14 PROCEDURE — 6360000002 HC RX W HCPCS: Performed by: NURSE PRACTITIONER

## 2025-02-14 PROCEDURE — 96374 THER/PROPH/DIAG INJ IV PUSH: CPT

## 2025-02-14 PROCEDURE — 99254 IP/OBS CNSLTJ NEW/EST MOD 60: CPT | Performed by: SURGERY

## 2025-02-14 PROCEDURE — 83735 ASSAY OF MAGNESIUM: CPT

## 2025-02-14 PROCEDURE — 36415 COLL VENOUS BLD VENIPUNCTURE: CPT

## 2025-02-14 PROCEDURE — 83605 ASSAY OF LACTIC ACID: CPT

## 2025-02-14 PROCEDURE — 6360000004 HC RX CONTRAST MEDICATION: Performed by: PHYSICIAN ASSISTANT

## 2025-02-14 PROCEDURE — 85014 HEMATOCRIT: CPT

## 2025-02-14 PROCEDURE — 83690 ASSAY OF LIPASE: CPT

## 2025-02-14 RX ORDER — ONDANSETRON 2 MG/ML
4 INJECTION INTRAMUSCULAR; INTRAVENOUS EVERY 6 HOURS PRN
Status: DISCONTINUED | OUTPATIENT
Start: 2025-02-14 | End: 2025-02-18 | Stop reason: HOSPADM

## 2025-02-14 RX ORDER — KETOROLAC TROMETHAMINE 30 MG/ML
30 INJECTION, SOLUTION INTRAMUSCULAR; INTRAVENOUS ONCE
Status: COMPLETED | OUTPATIENT
Start: 2025-02-14 | End: 2025-02-14

## 2025-02-14 RX ORDER — ONDANSETRON 2 MG/ML
4 INJECTION INTRAMUSCULAR; INTRAVENOUS ONCE
Status: COMPLETED | OUTPATIENT
Start: 2025-02-14 | End: 2025-02-14

## 2025-02-14 RX ORDER — PANTOPRAZOLE SODIUM 40 MG/10ML
40 INJECTION, POWDER, LYOPHILIZED, FOR SOLUTION INTRAVENOUS DAILY
Status: DISCONTINUED | OUTPATIENT
Start: 2025-02-14 | End: 2025-02-16

## 2025-02-14 RX ORDER — SODIUM CHLORIDE 9 MG/ML
INJECTION, SOLUTION INTRAVENOUS PRN
Status: DISCONTINUED | OUTPATIENT
Start: 2025-02-14 | End: 2025-02-18 | Stop reason: HOSPADM

## 2025-02-14 RX ORDER — MORPHINE SULFATE 2 MG/ML
2 INJECTION, SOLUTION INTRAMUSCULAR; INTRAVENOUS
Status: COMPLETED | OUTPATIENT
Start: 2025-02-14 | End: 2025-02-16

## 2025-02-14 RX ORDER — DIGOXIN 125 MCG
62.5 TABLET ORAL DAILY
Status: DISCONTINUED | OUTPATIENT
Start: 2025-02-14 | End: 2025-02-18 | Stop reason: HOSPADM

## 2025-02-14 RX ORDER — POTASSIUM CHLORIDE 7.45 MG/ML
10 INJECTION INTRAVENOUS ONCE
Status: COMPLETED | OUTPATIENT
Start: 2025-02-14 | End: 2025-02-14

## 2025-02-14 RX ORDER — METOPROLOL TARTRATE 1 MG/ML
2.5 INJECTION, SOLUTION INTRAVENOUS EVERY 6 HOURS PRN
Status: DISCONTINUED | OUTPATIENT
Start: 2025-02-14 | End: 2025-02-18 | Stop reason: HOSPADM

## 2025-02-14 RX ORDER — SODIUM CHLORIDE 9 MG/ML
INJECTION, SOLUTION INTRAVENOUS CONTINUOUS
Status: ACTIVE | OUTPATIENT
Start: 2025-02-14 | End: 2025-02-15

## 2025-02-14 RX ORDER — ONDANSETRON 4 MG/1
4 TABLET, ORALLY DISINTEGRATING ORAL EVERY 8 HOURS PRN
Status: DISCONTINUED | OUTPATIENT
Start: 2025-02-14 | End: 2025-02-18 | Stop reason: HOSPADM

## 2025-02-14 RX ORDER — SODIUM CHLORIDE 0.9 % (FLUSH) 0.9 %
5-40 SYRINGE (ML) INJECTION EVERY 12 HOURS SCHEDULED
Status: DISCONTINUED | OUTPATIENT
Start: 2025-02-14 | End: 2025-02-18 | Stop reason: HOSPADM

## 2025-02-14 RX ORDER — ALBUTEROL SULFATE 90 UG/1
2 INHALANT RESPIRATORY (INHALATION) EVERY 6 HOURS PRN
Status: DISCONTINUED | OUTPATIENT
Start: 2025-02-14 | End: 2025-02-18 | Stop reason: HOSPADM

## 2025-02-14 RX ORDER — 0.9 % SODIUM CHLORIDE 0.9 %
1000 INTRAVENOUS SOLUTION INTRAVENOUS ONCE
Status: COMPLETED | OUTPATIENT
Start: 2025-02-14 | End: 2025-02-14

## 2025-02-14 RX ORDER — IOPAMIDOL 755 MG/ML
75 INJECTION, SOLUTION INTRAVASCULAR
Status: COMPLETED | OUTPATIENT
Start: 2025-02-14 | End: 2025-02-14

## 2025-02-14 RX ORDER — SODIUM CHLORIDE 0.9 % (FLUSH) 0.9 %
5-40 SYRINGE (ML) INJECTION PRN
Status: DISCONTINUED | OUTPATIENT
Start: 2025-02-14 | End: 2025-02-18 | Stop reason: HOSPADM

## 2025-02-14 RX ADMIN — SODIUM CHLORIDE 1000 ML: 9 INJECTION, SOLUTION INTRAVENOUS at 15:08

## 2025-02-14 RX ADMIN — PANTOPRAZOLE SODIUM 40 MG: 40 INJECTION, POWDER, FOR SOLUTION INTRAVENOUS at 20:21

## 2025-02-14 RX ADMIN — KETOROLAC TROMETHAMINE 30 MG: 30 INJECTION, SOLUTION INTRAMUSCULAR at 13:22

## 2025-02-14 RX ADMIN — MORPHINE SULFATE 2 MG: 2 INJECTION, SOLUTION INTRAMUSCULAR; INTRAVENOUS at 20:24

## 2025-02-14 RX ADMIN — SODIUM CHLORIDE, PRESERVATIVE FREE 10 ML: 5 INJECTION INTRAVENOUS at 20:22

## 2025-02-14 RX ADMIN — IOPAMIDOL 75 ML: 755 INJECTION, SOLUTION INTRAVENOUS at 14:25

## 2025-02-14 RX ADMIN — POTASSIUM CHLORIDE 10 MEQ: 7.46 INJECTION, SOLUTION INTRAVENOUS at 20:33

## 2025-02-14 RX ADMIN — ONDANSETRON 4 MG: 2 INJECTION, SOLUTION INTRAMUSCULAR; INTRAVENOUS at 13:22

## 2025-02-14 RX ADMIN — SODIUM CHLORIDE: 0.9 INJECTION, SOLUTION INTRAVENOUS at 20:28

## 2025-02-14 ASSESSMENT — PAIN DESCRIPTION - PAIN TYPE
TYPE: ACUTE PAIN

## 2025-02-14 ASSESSMENT — PAIN DESCRIPTION - DESCRIPTORS
DESCRIPTORS: SHARP;SHOOTING
DESCRIPTORS: SHARP

## 2025-02-14 ASSESSMENT — PAIN DESCRIPTION - DIRECTION: RADIATING_TOWARDS: LEFT SHOULDER

## 2025-02-14 ASSESSMENT — PAIN DESCRIPTION - LOCATION
LOCATION: FLANK

## 2025-02-14 ASSESSMENT — PAIN - FUNCTIONAL ASSESSMENT: PAIN_FUNCTIONAL_ASSESSMENT: 0-10

## 2025-02-14 ASSESSMENT — PAIN DESCRIPTION - ORIENTATION
ORIENTATION: LEFT

## 2025-02-14 ASSESSMENT — PAIN SCALES - GENERAL
PAINLEVEL_OUTOF10: 10
PAINLEVEL_OUTOF10: 4
PAINLEVEL_OUTOF10: 8
PAINLEVEL_OUTOF10: 5
PAINLEVEL_OUTOF10: 8

## 2025-02-14 ASSESSMENT — PAIN DESCRIPTION - FREQUENCY: FREQUENCY: CONTINUOUS

## 2025-02-14 ASSESSMENT — PAIN DESCRIPTION - ONSET: ONSET: ON-GOING

## 2025-02-14 NOTE — ED NOTES
Dasha Coleman is a 59 y.o. female admitted for  Principal Problem:    Splenic laceration, initial encounter  Resolved Problems:    * No resolved hospital problems. *  .   Patient Home via self with   Chief Complaint   Patient presents with    Flank Pain     Left flank pain began on Monday after colonoscopy.   Patient states her side hurts all the way up to her left shoulder.    .  Patient is alert and Person, Place, Time, and Situation  Patient's baseline mobility: Baseline Mobility: Independent   Code Status: Prior   Cardiac Rhythm:       Is patient on baseline Oxygen: no how many Liters:   Abnormal Assessment Findings:     Isolation: None      NIH Score:    C-SSRS: Risk of Suicide: No Risk  Bedside swallow:        Active LDA's:   Peripheral IV 02/14/25 Proximal;Right;Ventral Forearm (Active)       Peripheral IV 02/14/25 Left Hand (Active)   Site Assessment Clean, dry & intact 02/14/25 1516   Line Status Blood return noted 02/14/25 1516     Patient admitted with a pierre:  If the pierre is chronic was it exchanged:  Reason for pierre:   Patient admitted with Central Line:  . PICC line placement confirmed: YES OR NO:[id number]}   Reason for Central line:   Was central line Inserted from an outside facility:        Family/Caregiver Present yes Any Concerns: no   Restraints no  Sitter no         Vitals: MEWS Score: 4    Vitals:    02/14/25 1241 02/14/25 1504 02/14/25 1515 02/14/25 1705   BP: 113/60 136/76 136/76 108/72   Pulse: (!) 114  98 89   Resp: 23  16 28   Temp: 98 °F (36.7 °C)      TempSrc: Oral      SpO2: 97%  98%    Weight:       Height:           Last documented pain score (0-10 scale) Pain Level: 10  Pain medication administered Yes- see MAR.    Pertinent or High Risk Medications/Drips: No.    Pending Blood Product Administration: no    Abnormal labs:   Abnormal Labs Reviewed   CBC WITH AUTO DIFFERENTIAL - Abnormal; Notable for the following components:       Result Value    RBC 3.13 (*)     Hemoglobin 9.1

## 2025-02-14 NOTE — ED NOTES
Called [facility] General Surgery @4086.  Per; HUSSEIN Arguelles.  RE; splenic lac  Called Back 1128

## 2025-02-14 NOTE — H&P
[facility] Medicine History & Physical    V 1.6    Date of Admission: 2/14/2025    Date of Service:  Pt seen/examined on  2/14/2025      [x]Admitted to Inpatient with expected LOS greater than two midnights due to medical therapy.  []Placed in Observation status.    Chief Admission Complaint:   LUQ pain for few days     Presenting Admission History:      59 y.o. female who presented to [facility] with LUQ pain.  PMHx significant for CAD CABG, CHF, COPD, GERD.  Patient had colonoscopy on Monday, 10 February  He started with left shoulder pain following day and  left upper quadrant pain later  Because of worsening left upper quadrant pain patient came to the emergency room.  She does not have chest pain shortness of breath dizziness syncope or change in mental status  She denies hematemesis melena or OK bleed  No abdominal distention    Assessment/Plan:      Current Principal Problem:  Splenic laceration, initial encounter    Left shoulder / LUQ pain 2/2 splenic laceration with hemoperitoneum possibly following colonoscopy 2/10- admit to ICU, , NPO ,  IVF, monitor h/h  6 hourly , PRBC prn , IR eval pending progress , surgery was consulted fro m ER, input appreciated , following   BP stable  Hold po BB , metoprolol  IV prn   IV Protonix    Acute blood loss  Anemia -Hb dropped  4 points from 6/2024 - possibly 2/2 above     COPD- stable -cont home inhalers     Chronic sys CHF - EF 50 % - no fluid overload -     CAD/ CABG- hold aspirin and statin , BB prn    Hypokalemia - replace and monitor       Discussed management and the need for Hospitalization of the patient w/ the Emergency Department Provider: Bird SAVAGE    CXR: I have reviewed the CXR with the following interpretation:   EKG:  I have reviewed the EKG with the following interpretation: not available     Physical Exam Performed:      /76   Pulse 98   Temp 98 °F (36.7 °C) (Oral)   Resp 16   Ht 1.6 m (5' 3\")   Wt 71.1 kg (156 lb 12.8 oz)

## 2025-02-14 NOTE — PROGRESS NOTES
Patient admitted to room 211 from ER.  Patient oriented to room, call light, bed rails, phone, lights and bathroom.  Patient instructed about the schedule of the day including: vital sign frequency, lab draws, possible tests, frequency of MD and staff rounds, including RN/MD rounding together at bedside, daily weights, and I &O's.  Patient instructed about prescribed diet, how to use 8MENU, and television.  Telemetry box  in place, patient aware of placement and reason.  Bed locked, in lowest position, side rails up 2/4, call light within reach.  Will continue to monitor.

## 2025-02-14 NOTE — CONSULTS
Department of General Surgery Consult    PATIENT NAME: Dasha Coleman   YOB: 1965    ADMISSION DATE: 2025  2:59 PM      TODAY'S DATE: 2025    Reason for Consult:  splenic laceration    Chief Complaint: LUQ pain    Historian: patient    Requesting Practitioner:  Ko Pang    HISTORY OF PRESENT ILLNESS:              The patient is a 59 y.o. female who presents with abd pain. Underwent colonosocpy on Monday.  Afterwards developed sharp LUQ pain. Has persisted throughout the week. Some sob.    Past Medical History:        Diagnosis Date    Arthritis     CAD (coronary artery disease)     Cancer (HCC)     CHF (congestive heart failure) (HCC)     Depression     GERD (gastroesophageal reflux disease)     Hyperlipidemia     On intra-aortic balloon pump assist 2020    Removed during OHS on 20    Other emphysema (HCC) 10/28/2021       Past Surgical History:        Procedure Laterality Date    BUNIONECTOMY Right 2024    DEANGELO BUNIONECTOMY WITH YONY OSTEOTOMY RIGHT FOOT performed by Eleni Alvarez DPM at [facility] OR    CARDIAC CATHETERIZATION  2020    Dr. Hernandez - IABP placed     SECTION      CORONARY ARTERY BYPASS GRAFT N/A 2020     - off pump x1 (LIMA-LAD), removal of IABP    CT GUIDED PLEURAL DRAINAGE W CATH PERC  2020    Dr. River - CT guided chest tube insertion    FOOT SURGERY Right 2024    DEANGELO BUNIONECTOMY WITH YONY OSTEOTOMY RIGHT FOOT    SINUS SURGERY      THORACENTESIS Left 2020    Dr. Landis - 1 L drained    TRANSESOPHAGEAL ECHOCARDIOGRAM  2020    during CABG       Current Medications:   Current Facility-Administered Medications: sodium chloride 0.9 % bolus 1,000 mL, 1,000 mL, IntraVENous, Once  Prior to Admission medications    Medication Sig Start Date End Date Taking? Authorizing Provider   famotidine (PEPCID) 20 MG tablet TAKE 2 TABLETS BY MOUTH EVERY EVENING 25   Ayo Salguero

## 2025-02-15 ENCOUNTER — APPOINTMENT (OUTPATIENT)
Dept: GENERAL RADIOLOGY | Age: 60
End: 2025-02-15
Payer: COMMERCIAL

## 2025-02-15 ENCOUNTER — APPOINTMENT (OUTPATIENT)
Dept: INTERVENTIONAL RADIOLOGY/VASCULAR | Age: 60
End: 2025-02-15
Attending: RADIOLOGY
Payer: COMMERCIAL

## 2025-02-15 LAB
ABO + RH BLD: NORMAL
ANION GAP SERPL CALCULATED.3IONS-SCNC: 11 MMOL/L (ref 3–16)
BASOPHILS # BLD: 0 K/UL (ref 0–0.2)
BASOPHILS NFR BLD: 0.6 %
BLD GP AB SCN SERPL QL: NORMAL
BLOOD BANK DISPENSE STATUS: NORMAL
BLOOD BANK DISPENSE STATUS: NORMAL
BLOOD BANK PRODUCT CODE: NORMAL
BLOOD BANK PRODUCT CODE: NORMAL
BPU ID: NORMAL
BPU ID: NORMAL
BUN SERPL-MCNC: 14 MG/DL (ref 7–20)
CALCIUM SERPL-MCNC: 7.9 MG/DL (ref 8.3–10.6)
CHLORIDE SERPL-SCNC: 109 MMOL/L (ref 99–110)
CO2 SERPL-SCNC: 20 MMOL/L (ref 21–32)
CREAT SERPL-MCNC: 0.8 MG/DL (ref 0.6–1.1)
DEPRECATED RDW RBC AUTO: 14 % (ref 12.4–15.4)
DESCRIPTION BLOOD BANK: NORMAL
DESCRIPTION BLOOD BANK: NORMAL
EOSINOPHIL # BLD: 0.1 K/UL (ref 0–0.6)
EOSINOPHIL NFR BLD: 1.3 %
GFR SERPLBLD CREATININE-BSD FMLA CKD-EPI: 86 ML/MIN/{1.73_M2}
GLUCOSE SERPL-MCNC: 98 MG/DL (ref 70–99)
HCT VFR BLD AUTO: 17.4 % (ref 36–48)
HCT VFR BLD AUTO: 19.7 % (ref 36–48)
HCT VFR BLD AUTO: 21.5 % (ref 36–48)
HCT VFR BLD AUTO: 21.6 % (ref 36–48)
HCT VFR BLD AUTO: 29.8 % (ref 36–48)
HGB BLD-MCNC: 10.1 G/DL (ref 12–16)
HGB BLD-MCNC: 5.8 G/DL (ref 12–16)
HGB BLD-MCNC: 6.4 G/DL (ref 12–16)
HGB BLD-MCNC: 7.3 G/DL (ref 12–16)
HGB BLD-MCNC: 7.4 G/DL (ref 12–16)
LYMPHOCYTES # BLD: 1.1 K/UL (ref 1–5.1)
LYMPHOCYTES NFR BLD: 18.8 %
MCH RBC QN AUTO: 30 PG (ref 26–34)
MCHC RBC AUTO-ENTMCNC: 34.2 G/DL (ref 31–36)
MCV RBC AUTO: 87.9 FL (ref 80–100)
MONOCYTES # BLD: 0.8 K/UL (ref 0–1.3)
MONOCYTES NFR BLD: 13.3 %
NEUTROPHILS # BLD: 3.9 K/UL (ref 1.7–7.7)
NEUTROPHILS NFR BLD: 66 %
PLATELET # BLD AUTO: 140 K/UL (ref 135–450)
PMV BLD AUTO: 10.1 FL (ref 5–10.5)
POTASSIUM SERPL-SCNC: 3.6 MMOL/L (ref 3.5–5.1)
RBC # BLD AUTO: 2.46 M/UL (ref 4–5.2)
SODIUM SERPL-SCNC: 140 MMOL/L (ref 136–145)
WBC # BLD AUTO: 5.8 K/UL (ref 4–11)

## 2025-02-15 PROCEDURE — 85025 COMPLETE CBC W/AUTO DIFF WBC: CPT

## 2025-02-15 PROCEDURE — 75726 ARTERY X-RAYS ABDOMEN: CPT

## 2025-02-15 PROCEDURE — 36430 TRANSFUSION BLD/BLD COMPNT: CPT

## 2025-02-15 PROCEDURE — 94761 N-INVAS EAR/PLS OXIMETRY MLT: CPT

## 2025-02-15 PROCEDURE — 86850 RBC ANTIBODY SCREEN: CPT

## 2025-02-15 PROCEDURE — 86901 BLOOD TYPING SEROLOGIC RH(D): CPT

## 2025-02-15 PROCEDURE — 6370000000 HC RX 637 (ALT 250 FOR IP): Performed by: INTERNAL MEDICINE

## 2025-02-15 PROCEDURE — 6360000002 HC RX W HCPCS: Performed by: INTERNAL MEDICINE

## 2025-02-15 PROCEDURE — 6360000002 HC RX W HCPCS: Performed by: RADIOLOGY

## 2025-02-15 PROCEDURE — 2060000000 HC ICU INTERMEDIATE R&B

## 2025-02-15 PROCEDURE — 6360000004 HC RX CONTRAST MEDICATION: Performed by: SURGERY

## 2025-02-15 PROCEDURE — 86923 COMPATIBILITY TEST ELECTRIC: CPT

## 2025-02-15 PROCEDURE — 04V43DZ RESTRICTION OF SPLENIC ARTERY WITH INTRALUMINAL DEVICE, PERCUTANEOUS APPROACH: ICD-10-PCS | Performed by: RADIOLOGY

## 2025-02-15 PROCEDURE — 94640 AIRWAY INHALATION TREATMENT: CPT

## 2025-02-15 PROCEDURE — 36246 INS CATH ABD/L-EXT ART 2ND: CPT

## 2025-02-15 PROCEDURE — P9016 RBC LEUKOCYTES REDUCED: HCPCS

## 2025-02-15 PROCEDURE — 2700000000 HC OXYGEN THERAPY PER DAY

## 2025-02-15 PROCEDURE — 75710 ARTERY X-RAYS ARM/LEG: CPT

## 2025-02-15 PROCEDURE — 6360000002 HC RX W HCPCS: Performed by: NURSE PRACTITIONER

## 2025-02-15 PROCEDURE — 36415 COLL VENOUS BLD VENIPUNCTURE: CPT

## 2025-02-15 PROCEDURE — 80048 BASIC METABOLIC PNL TOTAL CA: CPT

## 2025-02-15 PROCEDURE — 85014 HEMATOCRIT: CPT

## 2025-02-15 PROCEDURE — 37244 VASC EMBOLIZE/OCCLUDE BLEED: CPT

## 2025-02-15 PROCEDURE — 30233N1 TRANSFUSION OF NONAUTOLOGOUS RED BLOOD CELLS INTO PERIPHERAL VEIN, PERCUTANEOUS APPROACH: ICD-10-PCS | Performed by: RADIOLOGY

## 2025-02-15 PROCEDURE — APPNB45 APP NON BILLABLE 31-45 MINUTES: Performed by: CLINICAL NURSE SPECIALIST

## 2025-02-15 PROCEDURE — 85018 HEMOGLOBIN: CPT

## 2025-02-15 PROCEDURE — C1889 IMPLANT/INSERT DEVICE, NOC: HCPCS

## 2025-02-15 PROCEDURE — 2500000003 HC RX 250 WO HCPCS: Performed by: INTERNAL MEDICINE

## 2025-02-15 PROCEDURE — 99232 SBSQ HOSP IP/OBS MODERATE 35: CPT | Performed by: SURGERY

## 2025-02-15 PROCEDURE — 71045 X-RAY EXAM CHEST 1 VIEW: CPT

## 2025-02-15 PROCEDURE — 86900 BLOOD TYPING SEROLOGIC ABO: CPT

## 2025-02-15 RX ORDER — LIDOCAINE HYDROCHLORIDE 20 MG/ML
INJECTION, SOLUTION EPIDURAL; INFILTRATION; INTRACAUDAL; PERINEURAL PRN
Status: COMPLETED | OUTPATIENT
Start: 2025-02-15 | End: 2025-02-15

## 2025-02-15 RX ORDER — SODIUM CHLORIDE 9 MG/ML
INJECTION, SOLUTION INTRAVENOUS PRN
Status: DISCONTINUED | OUTPATIENT
Start: 2025-02-15 | End: 2025-02-17

## 2025-02-15 RX ORDER — SODIUM CHLORIDE 9 MG/ML
INJECTION, SOLUTION INTRAVENOUS PRN
Status: DISCONTINUED | OUTPATIENT
Start: 2025-02-15 | End: 2025-02-15

## 2025-02-15 RX ADMIN — TIOTROPIUM BROMIDE AND OLODATEROL 2 PUFF: 3.124; 2.736 SPRAY, METERED RESPIRATORY (INHALATION) at 08:52

## 2025-02-15 RX ADMIN — IOVERSOL 50 ML: 678 INJECTION INTRA-ARTERIAL; INTRAVENOUS at 15:06

## 2025-02-15 RX ADMIN — MORPHINE SULFATE 2 MG: 2 INJECTION, SOLUTION INTRAMUSCULAR; INTRAVENOUS at 17:02

## 2025-02-15 RX ADMIN — DIGOXIN 62.5 MCG: 125 TABLET ORAL at 09:19

## 2025-02-15 RX ADMIN — LIDOCAINE HYDROCHLORIDE 5 ML: 20 INJECTION, SOLUTION EPIDURAL; INFILTRATION; INTRACAUDAL at 13:45

## 2025-02-15 RX ADMIN — ONDANSETRON 4 MG: 2 INJECTION, SOLUTION INTRAMUSCULAR; INTRAVENOUS at 18:45

## 2025-02-15 RX ADMIN — PANTOPRAZOLE SODIUM 40 MG: 40 INJECTION, POWDER, FOR SOLUTION INTRAVENOUS at 09:19

## 2025-02-15 RX ADMIN — ONDANSETRON 4 MG: 2 INJECTION, SOLUTION INTRAMUSCULAR; INTRAVENOUS at 22:16

## 2025-02-15 RX ADMIN — FLUOXETINE HYDROCHLORIDE 60 MG: 20 CAPSULE ORAL at 09:19

## 2025-02-15 RX ADMIN — SODIUM CHLORIDE, PRESERVATIVE FREE 10 ML: 5 INJECTION INTRAVENOUS at 19:44

## 2025-02-15 RX ADMIN — SODIUM CHLORIDE, PRESERVATIVE FREE 10 ML: 5 INJECTION INTRAVENOUS at 09:20

## 2025-02-15 ASSESSMENT — PAIN SCALES - GENERAL
PAINLEVEL_OUTOF10: 0
PAINLEVEL_OUTOF10: 7
PAINLEVEL_OUTOF10: 0
PAINLEVEL_OUTOF10: 4

## 2025-02-15 ASSESSMENT — PAIN DESCRIPTION - ORIENTATION: ORIENTATION: LEFT

## 2025-02-15 ASSESSMENT — PAIN DESCRIPTION - LOCATION: LOCATION: FLANK

## 2025-02-15 ASSESSMENT — PAIN DESCRIPTION - DESCRIPTORS: DESCRIPTORS: ACHING

## 2025-02-15 ASSESSMENT — PAIN DESCRIPTION - FREQUENCY: FREQUENCY: CONTINUOUS

## 2025-02-15 ASSESSMENT — PAIN DESCRIPTION - ONSET: ONSET: ON-GOING

## 2025-02-15 ASSESSMENT — PAIN DESCRIPTION - PAIN TYPE: TYPE: ACUTE PAIN

## 2025-02-15 NOTE — PROGRESS NOTES
[facility] Medicine Progress Note  V 1.6      Date of Admission: 2/14/2025    Hospital Day: 2      Chief Admission Complaint:  LUQ pain    Subjective:  pain unchanged. Increased SOB overnight    Presenting Admission History:       59 y.o. female who presented to [facility] with LUQ pain.  PMHx significant for CAD CABG, CHF, COPD, GERD.  Patient had colonoscopy on Monday, 10 February  He started with left shoulder pain following day and  left upper quadrant pain later  Because of worsening left upper quadrant pain patient came to the emergency room.  She does not have chest pain shortness of breath dizziness syncope or change in mental status  She denies hematemesis melena or CT bleed  No abdominal distention    Assessment/Plan:      Current Principal Problem:  Splenic laceration, initial encounter    Acute blood loss anemia 2/2 splenic laceration  - 2/2 colonoscopy complication  - general surgery consulted  - discussed with IR. May need embolization if not improving  - monitor H/H  - continue IVF for now  - continue IV PPI    Acute hypoxic respiratory failure  - possible pulmonary edema.  - CXR ordered  - if edema noted, will stop IVF and order IV lasix X1  - wean O2 as able    Chronic systolic heart failure  - appears euvolemic  - echo 3/22 with EF 50%  - monitor closely while getting IVF  - holding metoprolol. Continue digoxin    CAD  - no active chest pain  - holding ASA, statin, metoprolol    COPD  - no evidence of exacerbation  - continue inhalers    HTN  - currently hypotensive  - holding metoprolol    HLD  - holding statin for now    Hypokalemia  - monitor and replete    Ongoing threat to life and/or bodily function without ongoing treatment due to:  anemia, hypoxia    Consults:      IP CONSULT TO GENERAL SURGERY  IP CONSULT TO HOSPITALIST  IP CONSULT TO GI  IP CONSULT TO GENERAL SURGERY      General Surgery consulted and appreciated.  Available consultant notes from yesterday and today were

## 2025-02-15 NOTE — OR NURSING
Pt arrived for image guided splenic laceration embolization. Procedure explained including the risk and benefits of the procedure. All questions answered. Pt verbalizes understanding of the of procedure and states no more questions. Consent signed. Vital signs stable, labs, allergies, medications, and code status reviewed. No contraindications noted.     Vitals:    02/15/25 1219   BP: 107/64   Pulse: 96   Resp: 18   Temp: 98.8 °F (37.1 °C)   SpO2: 94%    (vital signs in table format)    Mike Score  2 - Able to move 4 extremities voluntarily on command  2 - BP+/- 20mmHg of normal  2 - Fully awake  2 - Able to maintain oxygen saturation >92% on room air  2 - Able to breathe deeply and cough freely    Allergies  Patient has no known allergies.    Labs  Lab Results   Component Value Date    INR 1.20 (H) 02/14/2025    PROTIME 15.4 (H) 02/14/2025       Lab Results   Component Value Date    CREATININE 0.8 02/15/2025    BUN 14 02/15/2025     02/15/2025    K 3.6 02/15/2025     02/15/2025    CO2 20 (L) 02/15/2025       Lab Results   Component Value Date    WBC 5.8 02/15/2025    HGB 5.8 (LL) 02/15/2025    HCT 17.4 (LL) 02/15/2025    MCV 87.9 02/15/2025     02/15/2025

## 2025-02-15 NOTE — PLAN OF CARE
Problem: Chronic Conditions and Co-morbidities  Goal: Patient's chronic conditions and co-morbidity symptoms are monitored and maintained or improved  2/15/2025 1103 by Fred Weems, RN  Outcome: Progressing     Problem: Discharge Planning  Goal: Discharge to home or other facility with appropriate resources  2/15/2025 1103 by Fred Weems, RN  Outcome: Progressing     Problem: Pain  Goal: Verbalizes/displays adequate comfort level or baseline comfort level  2/15/2025 1103 by Fred Weems, RN  Outcome: Progressing     Problem: Safety - Adult  Goal: Free from fall injury  2/15/2025 1103 by Fred Weems, RN  Outcome: Progressing     Problem: Neurosensory - Adult  Goal: Achieves stable or improved neurological status  2/15/2025 1103 by Fred Weems, RN  Outcome: Progressing

## 2025-02-15 NOTE — BRIEF OP NOTE
Brief Postoperative Note      Patient: Dasha Coleman  YOB: 1965  MRN: [id number]    Date of Procedure: 2/15/2025    Indication:  splenic laceration Grade III-IV    Post-Op Diagnosis: Same       * No procedures listed *    * No surgeons listed *    Assistant:  * No surgical staff found *    Anesthesia: * No anesthesia type entered *    Estimated Blood Loss (mL): Minimal    Complications: None    Specimens:   * No specimens in log *    Implants:  Concerto coils - 5 , 7, 9 mm      Drains: * No LDAs found *    Findings:  Infection Present At Time Of Surgery (PATOS) (choose all levels that have infection present):  No infection present  Other Findings: patent celiac branches    Electronically signed by Dat Christine MD on 2/15/2025 at 2:43 PM

## 2025-02-15 NOTE — OR NURSING
Image guided splenic laceration embolization completed by Dr. Christine inserted 5 coils. Pt tolerated procedure without any signs or symptoms of distress. Vital signs stable. Report called to RN. Pt transported back to 211 in stable condition via bed by transport.     No sedation given.  Bedrest, and do not bend or move right leg until 1830 per Dr. Christine orders.    Vital Signs  Vitals:    02/15/25 1408   BP: 113/62   Pulse: 93   Resp: 25   Temp:    SpO2: 98%    (vital signs in table format)        Coils Placed:  1 5mm x 20cm Concerto Helix LOT #: [id number]   1 7mm x 30 cm Concerto Helix LOT #: [id number]    (2) 7mm x 30 cm Concerto Helix LOT #: [id number]   1 9mm x 30cm Concerto Helix LOT #: [id number]

## 2025-02-15 NOTE — PROGRESS NOTES
Pt back in room from procedure.  Fem site clean and dry, no evidence of bleeding.  VSS.  Will cont to monitor.

## 2025-02-15 NOTE — PLAN OF CARE
Problem: Chronic Conditions and Co-morbidities  Goal: Patient's chronic conditions and co-morbidity symptoms are monitored and maintained or improved  Outcome: Progressing  Flowsheets (Taken 2/14/2025 2011)  Care Plan - Patient's Chronic Conditions and Co-Morbidity Symptoms are Monitored and Maintained or Improved: Monitor and assess patient's chronic conditions and comorbid symptoms for stability, deterioration, or improvement     Problem: Discharge Planning  Goal: Discharge to home or other facility with appropriate resources  Outcome: Progressing  Flowsheets (Taken 2/14/2025 2011)  Discharge to home or other facility with appropriate resources: Identify barriers to discharge with patient and caregiver     Problem: Pain  Goal: Verbalizes/displays adequate comfort level or baseline comfort level  Outcome: Progressing     Problem: Safety - Adult  Goal: Free from fall injury  Outcome: Progressing     Problem: Neurosensory - Adult  Goal: Achieves stable or improved neurological status  Outcome: Progressing  Flowsheets (Taken 2/14/2025 2011)  Achieves stable or improved neurological status: Assess for and report changes in neurological status     Problem: Respiratory - Adult  Goal: Achieves optimal ventilation and oxygenation  Outcome: Progressing  Flowsheets (Taken 2/14/2025 2011)  Achieves optimal ventilation and oxygenation: Assess for changes in respiratory status     Problem: Cardiovascular - Adult  Goal: Maintains optimal cardiac output and hemodynamic stability  Outcome: Progressing  Flowsheets (Taken 2/14/2025 2011)  Maintains optimal cardiac output and hemodynamic stability:   Monitor blood pressure and heart rate   Monitor urine output and notify Licensed Independent Practitioner for values outside of normal range     Problem: Skin/Tissue Integrity - Adult  Goal: Skin integrity remains intact  Outcome: Progressing  Flowsheets (Taken 2/14/2025 2011)  Skin Integrity Remains Intact: Monitor for areas of

## 2025-02-15 NOTE — ED PROVIDER NOTES
[facility] A2 CARD TELEMETRY     EMERGENCY DEPARTMENT ENCOUNTER     Location: [facility] A2 CARD TELEMETRY  2/14/2025  Note Started: 11:50 PM EST 2/14/25      Patient Identification  Dasha Coleman is a 59 y.o. female  Chief Complaint   Patient presents with    Flank Pain     Left flank pain began on Monday after colonoscopy.   Patient states her side hurts all the way up to her left shoulder.        HPI:Dasha Coleman was evaluated in the Emergency Department for abdominal pain.  Patient reports on Monday she had colonoscopy performed and that night she developed left upper quadrant pain and left shoulder pain.  By the next morning she had diffuse abdominal pain and has had diffuse pain since.  She denies any black or bloody stools or any vomiting.. Although initial history and physical exam information was obtained by FELA/NPP/MD/DO (who also dictated a record of this visit), I personally saw the patient and performed and made/approved the management plan and take responsibility for the patient management.      PHYSICAL EXAM:  Heart regular rate and rhythm and abdomen diffusely tender with rebound but no guarding.        XR CHEST (2 VW)   Final Result      Mild bibasilar atelectasis.      Electronically signed by Shady Manuel MD      CT ABDOMEN PELVIS W IV CONTRAST Additional Contrast? None   Final Result      1.  Splenic laceration with large pericapsular hematoma involving greater than 50% surface area (grade 3).   2.  Moderate volume hemoperitoneum.   3.  Hepatic steatosis.      Electronically signed by Isaac Rios MD          Patient seen and evaluated.  Relevant records reviewed.  MDM     Patient's imaging shows hemoperitoneum and a splenic laceration.  IR and general surgery were consulted and came to the bedside.  They both agree the patient appears stable and the history fits well with an injury 4 days ago and there is no evidence of extravasation on the current CT.  Admitting for observation with plan 
Oral, resp. rate 16, height 1.6 m (5' 3\"), weight 71.1 kg (156 lb 12.8 oz), last menstrual period 11/01/2017, SpO2 98%, not currently breastfeeding.   DISPOSITION  Patient was admitted to the hospital in stable condition.         Ko Pang PA-C  02/14/25 1048

## 2025-02-15 NOTE — PROGRESS NOTES
[facility]    PATIENT NAME: Dasha Coleman     TODAY'S DATE: 2/15/2025    CHIEF COMPLAINT: abd pain    INTERVAL HISTORY/HPI:    Pt reports stable abd pain. hungry.    Pt and her  report that her bp normally runs low, ever since she had her heart surgery     OBJECTIVE:  VITALS:  BP (!) 91/57   Pulse 96   Temp 99.5 °F (37.5 °C) (Oral)   Resp 22   Ht 1.6 m (5' 3\")   Wt 72.2 kg (159 lb 1.6 oz)   LMP 11/01/2017 (Approximate)   SpO2 96%   BMI 28.18 kg/m²     INTAKE/OUTPUT:    No intake/output data recorded.  No intake/output data recorded.    CONSTITUTIONAL:  awake and alert  LUNGS:  no crackles or wheezing  ABDOMEN:   soft, non-distended, no bruising, tender left side     Data:  CBC:   Recent Labs     02/14/25  1320 02/14/25  2156 02/15/25  0417   WBC 10.2  --  5.8   HGB 9.1* 7.7* 7.3*  7.4*   HCT 27.6* 22.7* 21.5*  21.6*     --  140     BMP:    Recent Labs     02/14/25  1320 02/15/25  0417    140   K 3.4* 3.6    109   CO2 21 20*   BUN 14 14   CREATININE 0.9 0.8   GLUCOSE 111* 98     Hepatic:   Recent Labs     02/14/25  1320   AST 28   ALT 18   BILITOT 0.5   ALKPHOS 84     Mag:      Recent Labs     02/14/25  1320   MG 2.01      Phos:   No results for input(s): \"PHOS\" in the last 72 hours.   INR:   Recent Labs     02/14/25  1507   INR 1.20*         ASSESSMENT AND PLAN:  58 yo with splenic laceration post colonoscopy   H/H trended down overnight. BP lower, without sig tachycardia    Continue with current plans - will review with Dr. Correia. If concern for active bleeding, than will contact IR for angioembolization.     Electronically signed by SUJATA Jin - GIL Correia is on call this weekend for our service, please call the office with questions or concerns.   I have personally performed a face to face diagnostic evaluation on this patient and agree with the progress note and care plan of Ness Douglass NP. More than half of the time spent on this

## 2025-02-15 NOTE — PRE SEDATION
Sedation Pre-Procedure Note    Patient Name: Dasha Coleman   YOB: 1965  Room/Bed: 0211/0211-01  Medical Record Number: [id number]  Date: 2/15/2025   Time: 2:42 PM       Indication:  splenic trauma    Consent: I have discussed with the patient and/or the patient representative the indication, alternatives, and the possible risks and/or complications of the planned procedure and the anesthesia methods. The patient and/or patient representative appear to understand and agree to proceed.    Vital Signs:   Vitals:    02/15/25 1408   BP: 113/62   Pulse: 93   Resp: 25   Temp:    SpO2: 98%       Past Medical History:   has a past medical history of Arthritis, CAD (coronary artery disease), Cancer ([facility]), CHF (congestive heart failure) ([facility]), Depression, GERD (gastroesophageal reflux disease), Hyperlipidemia, On intra-aortic balloon pump assist, and Other emphysema ([facility]).    Past Surgical History:   has a past surgical history that includes sinus surgery;  section; Coronary artery bypass graft (N/A, 2020); CT GUIDED PLEURAL DRAINAGE W CATH PERC (2020); thoracentesis (Left, 2020); transesophageal echocardiogram (2020); Cardiac catheterization (2020); Foot surgery (Right, 2024); and Bunionectomy (Right, 2024).    Medications:   Scheduled Meds:    digoxin  62.5 mcg Oral Daily    FLUoxetine  60 mg Oral Daily    tiotropium-olodaterol  2 puff Inhalation Daily    sodium chloride flush  5-40 mL IntraVENous 2 times per day    pantoprazole  40 mg IntraVENous Daily     Continuous Infusions:    sodium chloride      sodium chloride      sodium chloride 75 mL/hr at 25     PRN Meds: sodium chloride, albuterol sulfate HFA, sodium chloride flush, sodium chloride, ondansetron **OR** ondansetron, metoprolol, morphine  Home Meds:   Prior to Admission medications    Medication Sig Start Date End Date Taking? Authorizing Provider   famotidine (PEPCID) 20 MG tablet TAKE

## 2025-02-15 NOTE — CONSENT
Informed Consent for Blood Component Transfusion Note    I have discussed with the patient the rationale for blood component transfusion; its benefits in treating or preventing fatigue, organ damage, or death; and its risk which includes mild transfusion reactions, rare risk of blood borne infection, or more serious but rare reactions. I have discussed the alternatives to transfusion, including the risk and consequences of not receiving transfusion. The patient had an opportunity to ask questions and had agreed to proceed with transfusion of blood components.    Electronically signed by Marco Araujo MD on 2/15/25 at 10:30 AM EST

## 2025-02-16 LAB
HCT VFR BLD AUTO: 28.5 % (ref 36–48)
HCT VFR BLD AUTO: 30.4 % (ref 36–48)
HCT VFR BLD AUTO: 30.6 % (ref 36–48)
HCT VFR BLD AUTO: 30.7 % (ref 36–48)
HGB BLD-MCNC: 10.2 G/DL (ref 12–16)
HGB BLD-MCNC: 10.2 G/DL (ref 12–16)
HGB BLD-MCNC: 10.3 G/DL (ref 12–16)
HGB BLD-MCNC: 9.8 G/DL (ref 12–16)

## 2025-02-16 PROCEDURE — 6360000002 HC RX W HCPCS

## 2025-02-16 PROCEDURE — 2500000003 HC RX 250 WO HCPCS: Performed by: INTERNAL MEDICINE

## 2025-02-16 PROCEDURE — 94761 N-INVAS EAR/PLS OXIMETRY MLT: CPT

## 2025-02-16 PROCEDURE — 6360000002 HC RX W HCPCS: Performed by: NURSE PRACTITIONER

## 2025-02-16 PROCEDURE — 94640 AIRWAY INHALATION TREATMENT: CPT

## 2025-02-16 PROCEDURE — 85018 HEMOGLOBIN: CPT

## 2025-02-16 PROCEDURE — 2700000000 HC OXYGEN THERAPY PER DAY

## 2025-02-16 PROCEDURE — 99232 SBSQ HOSP IP/OBS MODERATE 35: CPT | Performed by: SURGERY

## 2025-02-16 PROCEDURE — 85014 HEMATOCRIT: CPT

## 2025-02-16 PROCEDURE — 6370000000 HC RX 637 (ALT 250 FOR IP): Performed by: INTERNAL MEDICINE

## 2025-02-16 PROCEDURE — APPNB45 APP NON BILLABLE 31-45 MINUTES: Performed by: CLINICAL NURSE SPECIALIST

## 2025-02-16 PROCEDURE — 36415 COLL VENOUS BLD VENIPUNCTURE: CPT

## 2025-02-16 PROCEDURE — 2060000000 HC ICU INTERMEDIATE R&B

## 2025-02-16 PROCEDURE — 6360000002 HC RX W HCPCS: Performed by: INTERNAL MEDICINE

## 2025-02-16 RX ORDER — PANTOPRAZOLE SODIUM 40 MG/1
40 TABLET, DELAYED RELEASE ORAL
Status: DISCONTINUED | OUTPATIENT
Start: 2025-02-17 | End: 2025-02-18 | Stop reason: HOSPADM

## 2025-02-16 RX ORDER — MIRTAZAPINE 15 MG/1
15 TABLET, FILM COATED ORAL NIGHTLY
Status: DISCONTINUED | OUTPATIENT
Start: 2025-02-16 | End: 2025-02-18 | Stop reason: HOSPADM

## 2025-02-16 RX ORDER — ROSUVASTATIN CALCIUM 10 MG/1
40 TABLET, COATED ORAL NIGHTLY
Status: DISCONTINUED | OUTPATIENT
Start: 2025-02-16 | End: 2025-02-18 | Stop reason: HOSPADM

## 2025-02-16 RX ADMIN — MIRTAZAPINE 15 MG: 15 TABLET, FILM COATED ORAL at 20:22

## 2025-02-16 RX ADMIN — DIGOXIN 62.5 MCG: 125 TABLET ORAL at 09:15

## 2025-02-16 RX ADMIN — HYDROMORPHONE HYDROCHLORIDE 0.25 MG: 1 INJECTION, SOLUTION INTRAMUSCULAR; INTRAVENOUS; SUBCUTANEOUS at 18:10

## 2025-02-16 RX ADMIN — MORPHINE SULFATE 2 MG: 2 INJECTION, SOLUTION INTRAMUSCULAR; INTRAVENOUS at 13:36

## 2025-02-16 RX ADMIN — TIOTROPIUM BROMIDE AND OLODATEROL 2 PUFF: 3.124; 2.736 SPRAY, METERED RESPIRATORY (INHALATION) at 07:48

## 2025-02-16 RX ADMIN — SODIUM CHLORIDE, PRESERVATIVE FREE 10 ML: 5 INJECTION INTRAVENOUS at 20:22

## 2025-02-16 RX ADMIN — FLUOXETINE HYDROCHLORIDE 60 MG: 20 CAPSULE ORAL at 09:14

## 2025-02-16 RX ADMIN — SODIUM CHLORIDE, PRESERVATIVE FREE 10 ML: 5 INJECTION INTRAVENOUS at 09:15

## 2025-02-16 RX ADMIN — ROSUVASTATIN CALCIUM 40 MG: 10 TABLET, FILM COATED ORAL at 20:21

## 2025-02-16 RX ADMIN — PANTOPRAZOLE SODIUM 40 MG: 40 INJECTION, POWDER, FOR SOLUTION INTRAVENOUS at 09:14

## 2025-02-16 ASSESSMENT — PAIN SCALES - GENERAL
PAINLEVEL_OUTOF10: 7
PAINLEVEL_OUTOF10: 0
PAINLEVEL_OUTOF10: 7
PAINLEVEL_OUTOF10: 0

## 2025-02-16 NOTE — PROGRESS NOTES
[facility] Medicine Progress Note  V 1.6      Date of Admission: 2/14/2025    Hospital Day: 3      Chief Admission Complaint:  LUQ pain    Subjective:  pain unchanged. Increased SOB overnight    Presenting Admission History:       59 y.o. female who presented to [facility] with LUQ pain.  PMHx significant for CAD CABG, CHF, COPD, GERD.  Patient had colonoscopy on Monday, 10 February  He started with left shoulder pain following day and  left upper quadrant pain later  Because of worsening left upper quadrant pain patient came to the emergency room.  She does not have chest pain shortness of breath dizziness syncope or change in mental status  She denies hematemesis melena or MA bleed  No abdominal distention    Assessment/Plan:      Current Principal Problem:  Splenic laceration, initial encounter    Acute blood loss anemia 2/2 splenic laceration  - 2/2 colonoscopy complication  - general surgery consulted  - s/p IR embolization  - s/p IVF  - monitor H/H. Currently stable  - continue IV PPI    Acute hypoxic respiratory failure  - likely due to severe anemia  - wean O2 as able    Chronic systolic heart failure  - appears euvolemic  - echo 3/22 with EF 50%  - s/p IVF  - holding metoprolol. Continue digoxin    CAD  - no active chest pain  - holding ASA, statin, metoprolol    COPD  - no evidence of exacerbation  - continue inhalers    HTN  - currently hypotensive  - holding metoprolol    HLD  - holding statin for now    Hypokalemia  - monitor and replete    Ongoing threat to life and/or bodily function without ongoing treatment due to:  anemia, hypoxia    Consults:      IP CONSULT TO GENERAL SURGERY  IP CONSULT TO HOSPITALIST  IP CONSULT TO GI  IP CONSULT TO GENERAL SURGERY      General Surgery consulted and appreciated.  Available consultant notes from yesterday and today were reviewed on 2/16/2025, w/ plan for continued inpatient w/up and management - s/p IR

## 2025-02-16 NOTE — PLAN OF CARE
Problem: Chronic Conditions and Co-morbidities  Goal: Patient's chronic conditions and co-morbidity symptoms are monitored and maintained or improved  2/16/2025 1114 by Fred Weems, RN  Outcome: Progressing     Problem: Discharge Planning  Goal: Discharge to home or other facility with appropriate resources  Outcome: Progressing     Problem: Pain  Goal: Verbalizes/displays adequate comfort level or baseline comfort level  2/16/2025 1114 by Fred Weems, RN  Outcome: Progressing     Problem: Safety - Adult  Goal: Free from fall injury  2/16/2025 1114 by Fred Weems, RN  Outcome: Progressing     Problem: Neurosensory - Adult  Goal: Achieves stable or improved neurological status  Outcome: Progressing

## 2025-02-16 NOTE — PROGRESS NOTES
[facility]    PATIENT NAME: Dasha Coleman     TODAY'S DATE: 2/16/2025    CHIEF COMPLAINT: abd pain    INTERVAL HISTORY/HPI:    Pt reports stable abd pain improved  H/H stable after transfusion and IR embolization yesterday      OBJECTIVE:  VITALS:  /69   Pulse 87   Temp 97.5 °F (36.4 °C) (Oral)   Resp 18   Ht 1.6 m (5' 3\")   Wt 72.2 kg (159 lb 1.6 oz)   LMP 11/01/2017 (Approximate)   SpO2 93%   BMI 28.18 kg/m²     INTAKE/OUTPUT:    I/O last 3 completed shifts:  In: 964 [P.O.:240; Blood:724]  Out: -   No intake/output data recorded.    CONSTITUTIONAL:  awake and alert  LUNGS:  no crackles or wheezing  ABDOMEN:   soft, non-distended, no bruising, tender left side     Data:  CBC:   Recent Labs     02/14/25  1320 02/14/25  2156 02/15/25  0417 02/15/25  1005 02/15/25  2135 02/16/25  0419 02/16/25  0947   WBC 10.2  --  5.8  --   --   --   --    HGB 9.1*   < > 7.3*  7.4*   < > 10.1* 9.8* 10.3*   HCT 27.6*   < > 21.5*  21.6*   < > 29.8* 28.5* 30.7*     --  140  --   --   --   --     < > = values in this interval not displayed.     BMP:    Recent Labs     02/14/25  1320 02/15/25  0417    140   K 3.4* 3.6    109   CO2 21 20*   BUN 14 14   CREATININE 0.9 0.8   GLUCOSE 111* 98     Hepatic:   Recent Labs     02/14/25  1320   AST 28   ALT 18   BILITOT 0.5   ALKPHOS 84     Mag:      Recent Labs     02/14/25  1320   MG 2.01      Phos:   No results for input(s): \"PHOS\" in the last 72 hours.   INR:   Recent Labs     02/14/25  1507   INR 1.20*         ASSESSMENT AND PLAN:  60 yo with splenic laceration post colonoscopy   S/p IR embolization yesterday.     H/H stable after transfusion.    Electronically signed by SUJATA Jin - GIL Correia is on call this weekend for our service, please call the office with questions or concerns.     I have personally performed a face to face diagnostic evaluation on this patient and agree with the progress note and care plan of

## 2025-02-17 LAB
HCT VFR BLD AUTO: 29.9 % (ref 36–48)
HCT VFR BLD AUTO: 32.4 % (ref 36–48)
HCT VFR BLD AUTO: 32.6 % (ref 36–48)
HGB BLD-MCNC: 10 G/DL (ref 12–16)
HGB BLD-MCNC: 11 G/DL (ref 12–16)
HGB BLD-MCNC: 11 G/DL (ref 12–16)

## 2025-02-17 PROCEDURE — 94640 AIRWAY INHALATION TREATMENT: CPT

## 2025-02-17 PROCEDURE — 99232 SBSQ HOSP IP/OBS MODERATE 35: CPT | Performed by: SURGERY

## 2025-02-17 PROCEDURE — 1200000000 HC SEMI PRIVATE

## 2025-02-17 PROCEDURE — 2500000003 HC RX 250 WO HCPCS: Performed by: INTERNAL MEDICINE

## 2025-02-17 PROCEDURE — 36415 COLL VENOUS BLD VENIPUNCTURE: CPT

## 2025-02-17 PROCEDURE — 6370000000 HC RX 637 (ALT 250 FOR IP): Performed by: INTERNAL MEDICINE

## 2025-02-17 PROCEDURE — 2700000000 HC OXYGEN THERAPY PER DAY

## 2025-02-17 PROCEDURE — 94761 N-INVAS EAR/PLS OXIMETRY MLT: CPT

## 2025-02-17 PROCEDURE — 85014 HEMATOCRIT: CPT

## 2025-02-17 PROCEDURE — 85018 HEMOGLOBIN: CPT

## 2025-02-17 RX ADMIN — PANTOPRAZOLE SODIUM 40 MG: 40 TABLET, DELAYED RELEASE ORAL at 05:05

## 2025-02-17 RX ADMIN — DIGOXIN 62.5 MCG: 125 TABLET ORAL at 09:04

## 2025-02-17 RX ADMIN — SODIUM CHLORIDE, PRESERVATIVE FREE 5 ML: 5 INJECTION INTRAVENOUS at 20:18

## 2025-02-17 RX ADMIN — SODIUM CHLORIDE, PRESERVATIVE FREE 10 ML: 5 INJECTION INTRAVENOUS at 09:04

## 2025-02-17 RX ADMIN — FLUOXETINE HYDROCHLORIDE 60 MG: 20 CAPSULE ORAL at 09:04

## 2025-02-17 RX ADMIN — TIOTROPIUM BROMIDE AND OLODATEROL 2 PUFF: 3.124; 2.736 SPRAY, METERED RESPIRATORY (INHALATION) at 08:01

## 2025-02-17 RX ADMIN — MIRTAZAPINE 15 MG: 15 TABLET, FILM COATED ORAL at 20:18

## 2025-02-17 RX ADMIN — ROSUVASTATIN CALCIUM 40 MG: 10 TABLET, FILM COATED ORAL at 20:18

## 2025-02-17 ASSESSMENT — PAIN SCALES - GENERAL
PAINLEVEL_OUTOF10: 0
PAINLEVEL_OUTOF10: 0

## 2025-02-17 NOTE — PLAN OF CARE
Problem: Chronic Conditions and Co-morbidities  Goal: Patient's chronic conditions and co-morbidity symptoms are monitored and maintained or improved  2/17/2025 1022 by Fred Weems, RN  Outcome: Progressing     Problem: Discharge Planning  Goal: Discharge to home or other facility with appropriate resources  2/17/2025 1022 by Fred Weems, RN  Outcome: Progressing     Problem: Pain  Goal: Verbalizes/displays adequate comfort level or baseline comfort level  2/17/2025 1022 by Fred Weems, RN  Outcome: Progressing     Problem: Safety - Adult  Goal: Free from fall injury  Outcome: Progressing     Problem: Neurosensory - Adult  Goal: Achieves stable or improved neurological status  Outcome: Progressing

## 2025-02-17 NOTE — PROGRESS NOTES
[facility]    PATIENT NAME: Dasha Coleman     TODAY'S DATE: 2/17/2025    CHIEF COMPLAINT: abdominal pain     INTERVAL HISTORY/HPI:    Pt doing well overall. Reports abdominal pain is improving  Has not required further blood transfusion as hemoglobin has been stable      OBJECTIVE:  VITALS:  /67   Pulse (!) 106   Temp 98.8 °F (37.1 °C) (Axillary)   Resp 18   Ht 1.6 m (5' 3\")   Wt 72.2 kg (159 lb 1.6 oz)   LMP 11/01/2017 (Approximate)   SpO2 92%   BMI 28.18 kg/m²     INTAKE/OUTPUT:    I/O last 3 completed shifts:  In: 1399 [P.O.:1080; Blood:319]  Out: 0   No intake/output data recorded.    CONSTITUTIONAL:  awake and somnolent  LUNGS:  on RA without distress  ABDOMEN:  soft, mild tenderness    Data:  CBC:   Recent Labs     02/14/25  1320 02/14/25  2156 02/15/25  0417 02/15/25  1005 02/16/25  1609 02/16/25  2224 02/17/25  0421   WBC 10.2  --  5.8  --   --   --   --    HGB 9.1*   < > 7.3*  7.4*   < > 10.2* 10.2* 10.0*   HCT 27.6*   < > 21.5*  21.6*   < > 30.4* 30.6* 29.9*     --  140  --   --   --   --     < > = values in this interval not displayed.     BMP:    Recent Labs     02/14/25  1320 02/15/25  0417    140   K 3.4* 3.6    109   CO2 21 20*   BUN 14 14   CREATININE 0.9 0.8   GLUCOSE 111* 98     Hepatic:   Recent Labs     02/14/25  1320   AST 28   ALT 18   BILITOT 0.5   ALKPHOS 84     Mag:      Recent Labs     02/14/25  1320   MG 2.01      Phos:   No results for input(s): \"PHOS\" in the last 72 hours.   INR:   Recent Labs     02/14/25  1507   INR 1.20*       Radiology Review:  no new       ASSESSMENT AND PLAN:  58 yo with splenic laceration post colonoscopy   S/p IR embolization 2/15     H/H has remained stable after transfusion 2/15  Tolerating regular diet   Would recommend 48 hours of stable H/H prior to discharge which would be this evening     Electronically signed by Edelmira Grissom MD       SURGERY STAFF      I have personally performed a

## 2025-02-17 NOTE — PROGRESS NOTES
[facility] Medicine Progress Note  V 1.6      Date of Admission: 2/14/2025    Hospital Day: 4      Chief Admission Complaint:  LUQ pain    Subjective:  pain controlled.     Presenting Admission History:       59 y.o. female who presented to [facility] with LUQ pain.  PMHx significant for CAD CABG, CHF, COPD, GERD.  Patient had colonoscopy on Monday, 10 February  He started with left shoulder pain following day and  left upper quadrant pain later  Because of worsening left upper quadrant pain patient came to the emergency room.  She does not have chest pain shortness of breath dizziness syncope or change in mental status  She denies hematemesis melena or MI bleed  No abdominal distention    Assessment/Plan:      Current Principal Problem:  Splenic laceration, initial encounter    Acute blood loss anemia 2/2 splenic laceration  - 2/2 colonoscopy complication  - general surgery consulted  - s/p IR embolization  - s/p IVF  - monitor H/H. Currently stable  - continue IV PPI    Acute hypoxic respiratory failure  - likely due to severe anemia  - weaned O2    Chronic systolic heart failure  - appears euvolemic  - echo 3/22 with EF 50%  - s/p IVF  - holding metoprolol.  Continue digoxin    CAD  - no active chest pain  - holding ASA (resume in a week if OK with surgery?), statin, metoprolol    COPD  - no evidence of exacerbation  - continue inhalers    HTN  - currently hypotensive  - holding metoprolol    HLD  - holding statin for now    Hypokalemia  - monitor and replete      Home 2/18 if hb stable      Billing info:  I discuss expected discharge date and discharge needs with the  for this patient today.  This patient has an acute illness or a severe exacerbation of a chronic illness and without ongoing treatment there would be threat to life or bodily function.  Therefore, today's billing level will in part depend upon whether any of the following criteria are met today:  [] drugs with

## 2025-02-17 NOTE — CARE COORDINATION
Case Management Assessment  Initial Evaluation    Date/Time of Evaluation: 2/17/2025 4:00 PM  Assessment Completed by: Mimi Ledesma RN    If patient is discharged prior to next notation, then this note serves as note for discharge by case management.    Patient Name: Dasha Coleman                   YOB: 1965  Diagnosis: Acute blood loss anemia [D62]  Hemoperitoneum [K66.1]  Splenic laceration, initial encounter [S36.039A]                   Date / Time: 2/14/2025  2:59 PM    Patient Admission Status: Inpatient   Readmission Risk (Low < 19, Mod (19-27), High > 27): Readmission Risk Score: 6.4    Current PCP: Ayo Salguero MD  PCP verified by CM? Yes    Chart Reviewed: Yes      History Provided by: Patient  Patient Orientation: Alert and Oriented    Patient Cognition: Alert    Hospitalization in the last 30 days (Readmission):  No    If yes, Readmission Assessment in CM Navigator will be completed.    Advance Directives:      Code Status: Full Code   Patient's Primary Decision Maker is: Patient Declined (Legal Next of Kin Remains as Decision Maker)    Primary Decision Maker: Stas Coleman - Spouse - [phone]    Discharge Planning:    Patient lives with: Spouse/Significant Other Type of Home: House  Primary Care Giver: Self  Patient Support Systems include: Spouse/Significant Other, Parent, Family Members   Current Financial resources: Medicaid  Current community resources: None  Current services prior to admission: None            Current DME:              Type of Home Care services:  None    ADLS  Prior functional level: Independent in ADLs/IADLs  Current functional level: Independent in ADLs/IADLs    PT AM-PAC:   /24  OT AM-PAC:   /24    Family can provide assistance at DC: Yes  Would you like Case Management to discuss the discharge plan with any other family members/significant others, and if so, who? No  Plans to Return to Present Housing: Yes  Other Identified Issues/Barriers

## 2025-02-18 VITALS
WEIGHT: 159.1 LBS | OXYGEN SATURATION: 92 % | TEMPERATURE: 98.2 F | DIASTOLIC BLOOD PRESSURE: 71 MMHG | SYSTOLIC BLOOD PRESSURE: 100 MMHG | BODY MASS INDEX: 28.19 KG/M2 | HEIGHT: 63 IN | RESPIRATION RATE: 18 BRPM | HEART RATE: 99 BPM

## 2025-02-18 LAB
DEPRECATED RDW RBC AUTO: 14.7 % (ref 12.4–15.4)
HCT VFR BLD AUTO: 32.5 % (ref 36–48)
HGB BLD-MCNC: 11.1 G/DL (ref 12–16)
MCH RBC QN AUTO: 29 PG (ref 26–34)
MCHC RBC AUTO-ENTMCNC: 34.2 G/DL (ref 31–36)
MCV RBC AUTO: 84.8 FL (ref 80–100)
PLATELET # BLD AUTO: 323 K/UL (ref 135–450)
PMV BLD AUTO: 8.8 FL (ref 5–10.5)
RBC # BLD AUTO: 3.83 M/UL (ref 4–5.2)
WBC # BLD AUTO: 12 K/UL (ref 4–11)

## 2025-02-18 PROCEDURE — 2500000003 HC RX 250 WO HCPCS: Performed by: INTERNAL MEDICINE

## 2025-02-18 PROCEDURE — 85027 COMPLETE CBC AUTOMATED: CPT

## 2025-02-18 PROCEDURE — 94640 AIRWAY INHALATION TREATMENT: CPT

## 2025-02-18 PROCEDURE — 99231 SBSQ HOSP IP/OBS SF/LOW 25: CPT | Performed by: SURGERY

## 2025-02-18 PROCEDURE — 6370000000 HC RX 637 (ALT 250 FOR IP): Performed by: INTERNAL MEDICINE

## 2025-02-18 RX ORDER — ASPIRIN 81 MG/1
TABLET ORAL
Qty: 90 TABLET | Refills: 2
Start: 2025-02-25

## 2025-02-18 RX ADMIN — DIGOXIN 62.5 MCG: 125 TABLET ORAL at 08:26

## 2025-02-18 RX ADMIN — TIOTROPIUM BROMIDE AND OLODATEROL 2 PUFF: 3.124; 2.736 SPRAY, METERED RESPIRATORY (INHALATION) at 08:47

## 2025-02-18 RX ADMIN — PANTOPRAZOLE SODIUM 40 MG: 40 TABLET, DELAYED RELEASE ORAL at 05:12

## 2025-02-18 RX ADMIN — FLUOXETINE HYDROCHLORIDE 60 MG: 20 CAPSULE ORAL at 08:26

## 2025-02-18 RX ADMIN — SODIUM CHLORIDE, PRESERVATIVE FREE 10 ML: 5 INJECTION INTRAVENOUS at 08:28

## 2025-02-18 ASSESSMENT — PAIN SCALES - GENERAL: PAINLEVEL_OUTOF10: 0

## 2025-02-18 NOTE — CARE COORDINATION
CASE MANAGEMENT DISCHARGE SUMMARY      Discharge to: Home with [employer] O[facility]    Transportation:    Family/car: private    Confirmed discharge plan with:     Patient: yes     Family:  yes     RN, name: Laly Dye RN

## 2025-02-18 NOTE — DISCHARGE SUMMARY
Discharge Summary    Name:  Dasha Coleman /Age/Sex: 1965 (59 y.o. female)   Admit Date: 2025  Discharge Date: 25   MRN & CSN:  [id number] & [id number] Encounter Date and Time 25 11:11 AM EST    Attending:  Leonel Tenorio MD Discharging Provider: LEONEL TENORIO MD       Hospital Course:       \"59 y.o. female who presented to [facility] with LUQ pain.  PMHx significant for CAD CABG, CHF, COPD, GERD.  Patient had colonoscopy on Monday, 10 February  He started with left shoulder pain following day and  left upper quadrant pain later  Because of worsening left upper quadrant pain patient came to the emergency room.\"      Traumatic splenic laceration secondary to colonoscopy, with ABLA.  Required transfusion and IR embolization.  Hb now stable, patient feels fine, general surgery signed off.    She has some pulmonary insufficiency due to atelectasis (abdominal pain, lying in bed for 4 days, had anesthesia).  She weaned to RA at rest, still 87% with exertion.  She follows with pulmonary for underlying emphysema.  I will ordered home O2 with exertion for now, but I expect her to wean off of this as outpatient once she is more active.     CAD.  Resume aspirin in a week.    Otherwise continue same medications for chronic systolic CHF, COPD, HTN, HLD.         Discharge Diagnosis:   Splenic laceration, initial encounter      Discharge Instructions:     Follow up with PCP within 1-2 weeks.      Diet: ADULT DIET; Regular  Activity: activity as tolerated  Discharged to:  home  Condition on discharge: Stable    Objective Findings at Discharge:   /71   Pulse 99   Temp 98.2 °F (36.8 °C) (Oral)   Resp 18   Ht 1.6 m (5' 3\")   Wt 72.2 kg (159 lb 1.6 oz)   LMP 2017 (Approximate)   SpO2 92%   BMI 28.18 kg/m²       Physical Exam:     General appearance: No apparent distress, appears stated age and cooperative.  HEENT: Pupils equal, round.  Conjunctivae/corneas

## 2025-02-18 NOTE — PLAN OF CARE
Problem: Chronic Conditions and Co-morbidities  Goal: Patient's chronic conditions and co-morbidity symptoms are monitored and maintained or improved  2/18/2025 1038 by Laly Cui RN  Outcome: Progressing  Flowsheets (Taken 2/18/2025 0829)  Care Plan - Patient's Chronic Conditions and Co-Morbidity Symptoms are Monitored and Maintained or Improved: Monitor and assess patient's chronic conditions and comorbid symptoms for stability, deterioration, or improvement  2/18/2025 0402 by Caro Bowens RN  Outcome: Progressing  Flowsheets (Taken 2/17/2025 2015)  Care Plan - Patient's Chronic Conditions and Co-Morbidity Symptoms are Monitored and Maintained or Improved: Monitor and assess patient's chronic conditions and comorbid symptoms for stability, deterioration, or improvement     Problem: Discharge Planning  Goal: Discharge to home or other facility with appropriate resources  2/18/2025 1038 by Laly Cui RN  Outcome: Progressing  Flowsheets (Taken 2/18/2025 0829)  Discharge to home or other facility with appropriate resources: Identify barriers to discharge with patient and caregiver  2/18/2025 0402 by Caro Bowens RN  Outcome: Progressing  Flowsheets (Taken 2/17/2025 2015)  Discharge to home or other facility with appropriate resources: Identify barriers to discharge with patient and caregiver     Problem: Pain  Goal: Verbalizes/displays adequate comfort level or baseline comfort level  2/18/2025 1038 by Laly Cui RN  Outcome: Progressing  2/18/2025 0402 by Caro Bowens RN  Outcome: Progressing     Problem: Safety - Adult  Goal: Free from fall injury  2/18/2025 1038 by Laly Cui RN  Outcome: Progressing  2/18/2025 0402 by Caro Bowens RN  Outcome: Progressing     Problem: Neurosensory - Adult  Goal: Achieves stable or improved neurological status  2/18/2025 1038 by Laly Cui RN  Outcome: Progressing  Flowsheets (Taken 2/18/2025 0829)  Achieves stable or improved neurological status: Assess for and report

## 2025-02-18 NOTE — PROGRESS NOTES
[facility]    PATIENT NAME: Dasha Coleman     TODAY'S DATE: 2/18/2025    CHIEF COMPLAINT: abdominal pain     INTERVAL HISTORY/HPI:    Pt doing well. She states her pain is gradually improving. She continues to tolerate PO     OBJECTIVE:  VITALS:  /69   Pulse 91   Temp 98.1 °F (36.7 °C) (Oral)   Resp 16   Ht 1.6 m (5' 3\")   Wt 72.2 kg (159 lb 1.6 oz)   LMP 11/01/2017 (Approximate)   SpO2 92%   BMI 28.18 kg/m²     INTAKE/OUTPUT:    I/O last 3 completed shifts:  In: 1000 [P.O.:1000]  Out: -   No intake/output data recorded.    CONSTITUTIONAL:  awake and alert  LUNGS:  on RA without distress  ABDOMEN:  soft, mild tenderness on LLQ, non distended     Data:  CBC:   Recent Labs     02/17/25  1008 02/17/25  1932 02/18/25  0649   WBC  --   --  12.0*   HGB 11.0* 11.0* 11.1*   HCT 32.4* 32.6* 32.5*   PLT  --   --  323     BMP:    No results for input(s): \"NA\", \"K\", \"CL\", \"CO2\", \"BUN\", \"CREATININE\", \"GLUCOSE\" in the last 72 hours.    Hepatic:   No results for input(s): \"AST\", \"ALT\", \"BILITOT\", \"ALKPHOS\" in the last 72 hours.    Invalid input(s): \"ALB\"    Mag:      No results for input(s): \"MG\" in the last 72 hours.     Phos:   No results for input(s): \"PHOS\" in the last 72 hours.   INR:   No results for input(s): \"INR\" in the last 72 hours.      Radiology Review:  no new       ASSESSMENT AND PLAN:  58 yo with splenic laceration post colonoscopy   S/p IR embolization 2/15     H/H has remained stable after transfusion 2/15 and now greater than 48h  Tolerating regular diet   Ok to discharge from surgery standpoint    Electronically signed by Edelmira Grissom MD

## 2025-02-18 NOTE — PROGRESS NOTES
Patient discharged to home. IVs removed with no complications, telemetry removed [facility] notified. Discharge education complete with verbal understanding. All belongings sent home with patient. Patient transported via wheelchair to vehicle.

## 2025-02-18 NOTE — PLAN OF CARE
Problem: Chronic Conditions and Co-morbidities  Goal: Patient's chronic conditions and co-morbidity symptoms are monitored and maintained or improved  Outcome: Progressing  Flowsheets (Taken 2/17/2025 2015)  Care Plan - Patient's Chronic Conditions and Co-Morbidity Symptoms are Monitored and Maintained or Improved: Monitor and assess patient's chronic conditions and comorbid symptoms for stability, deterioration, or improvement     Problem: Discharge Planning  Goal: Discharge to home or other facility with appropriate resources  Outcome: Progressing  Flowsheets (Taken 2/17/2025 2015)  Discharge to home or other facility with appropriate resources: Identify barriers to discharge with patient and caregiver     Problem: Pain  Goal: Verbalizes/displays adequate comfort level or baseline comfort level  Outcome: Progressing     Problem: Safety - Adult  Goal: Free from fall injury  Outcome: Progressing     Problem: Neurosensory - Adult  Goal: Achieves stable or improved neurological status  Outcome: Progressing  Flowsheets (Taken 2/17/2025 2015)  Achieves stable or improved neurological status: Assess for and report changes in neurological status     Problem: Respiratory - Adult  Goal: Achieves optimal ventilation and oxygenation  Outcome: Progressing     Problem: Cardiovascular - Adult  Goal: Maintains optimal cardiac output and hemodynamic stability  Outcome: Progressing  Flowsheets (Taken 2/17/2025 2015)  Maintains optimal cardiac output and hemodynamic stability: Monitor blood pressure and heart rate     Problem: Skin/Tissue Integrity - Adult  Goal: Skin integrity remains intact  Outcome: Progressing  Flowsheets  Taken 2/18/2025 0402  Skin Integrity Remains Intact: Monitor for areas of redness and/or skin breakdown  Taken 2/17/2025 2015  Skin Integrity Remains Intact: Monitor for areas of redness and/or skin breakdown     Problem: Musculoskeletal - Adult  Goal: Return mobility to safest level of function  Outcome:

## 2025-02-18 NOTE — PROGRESS NOTES
Pulse oximetry assessment   94% at rest on room air (if 88% or less, skip next steps)  86% while ambulating on room air  91% while ambulating on 2LPM

## 2025-02-19 ENCOUNTER — TELEPHONE (OUTPATIENT)
Dept: FAMILY MEDICINE CLINIC | Age: 60
End: 2025-02-19

## 2025-02-19 NOTE — TELEPHONE ENCOUNTER
Care Transitions Initial Follow Up Call    Outreach made within 2 business days of discharge: Yes    Patient: Dasha Coleman Patient : 1965   MRN: [id number]  Reason for Admission: Spleen laceration  Discharge Date: 25       Spoke with: patient    Discharge department/facility: [facility] Interactive Patient Contact:  Was patient able to fill all prescriptions: Yes  Was patient instructed to bring all medications to the follow-up visit: Yes  Is patient taking all medications as directed in the discharge summary? No  Does patient understand their discharge instructions: Yes  Does patient have questions or concerns that need addressed prior to 7-14 day follow up office visit: no    Additional needs identified to be addressed with provider  No needs identified             Scheduled appointment with PCP within 7-14 days    Follow Up  Future Appointments   Date Time Provider Department Center   3/4/2025  8:30 AM Yovani Caldwell MD [facility]   3/31/2025  8:30 AM [facility]   3/31/2025  9:20 AM Napoleon Pierce MD [facility]   2025  2:00 PM Ayo Salguero MD [facility]       Korin Howard MA

## 2025-02-24 NOTE — PROGRESS NOTES
[facility]   CONSULTATION  [phone]      Attending Physician: Ayo Salguero MD     Reason for Consultation/Chief Complaint: 1 year follow-up, CAD     Subjective   History of Present Illness:  Dasha Coleman is a 59 y.o. female with a history of CAD with hx of IWMI s/p RONDA to RCA (5/2020) followed by CABG X1 (6/2020, LIMA-LAD), ICM, s/d CHF, HLD as well as emphysema and smoker.  Recent NM stress test 1/28/21 showed no evidence of stress induced ischemia.  Recent ECHO testing from 9/16/21 showed an EF of 35-40%. Moderate Global with regional hypokinesis. Basal inferior segment appears aneurysmal. Mild aortic regurgitation and mild MR. MUGA scan from 1/14/22 showed abnormal resting LV function with mild global hypokinesis and EF of 47%.   Today she reports that she has chest pain sometimes and gets shortness of breathe. She is still smoking. She reports that she had a nurse practioner that would come to her house, she reported the chest pain to her, the practioner said it could be related to acid reflux or her COPD. She reports that her right leg will go numb if she is standing for too long. She denies dizziness, syncope and edema.    4/7/22 ECHO showed EF of 50%. Basal inferior hypokinesis. Mild MR and AR. 4/7/22 NM Stress Test showed low normal LVEF 57%. Normal wall motion. Difficult to evaluate inferior wall due to extracardiac uptake. Inferior/apical defect is likely due to attenuation artifact. No ischemia. Lower risk study. 4/7/22 MONROE study showed right ankle/brachial index is 1.18. Pulse volume recordings at the ankle level reveal normal waveforms. Left ankle/brachial index is 1.13. Pulse volume recordings at the ankle level reveal normal waveforms. Conclusions: Normal bilateral lower extremity arterial study.   8/5/22 OV with Mimi Ruano NP she reported that she was not doing to bad/okay. She reported her BP was still running low. Reported a little lightheadedness and fatigue

## 2025-02-26 ENCOUNTER — OFFICE VISIT (OUTPATIENT)
Dept: FAMILY MEDICINE CLINIC | Age: 60
End: 2025-02-26
Payer: COMMERCIAL

## 2025-02-26 VITALS
HEART RATE: 91 BPM | WEIGHT: 151 LBS | OXYGEN SATURATION: 94 % | SYSTOLIC BLOOD PRESSURE: 92 MMHG | DIASTOLIC BLOOD PRESSURE: 60 MMHG | BODY MASS INDEX: 26.75 KG/M2

## 2025-02-26 DIAGNOSIS — S36.039A SPLENIC LACERATION, INITIAL ENCOUNTER: Primary | ICD-10-CM

## 2025-02-26 DIAGNOSIS — J43.8 OTHER EMPHYSEMA (HCC): ICD-10-CM

## 2025-02-26 DIAGNOSIS — I42.9 CARDIOMYOPATHY, UNSPECIFIED TYPE (HCC): ICD-10-CM

## 2025-02-26 PROCEDURE — G8427 DOCREV CUR MEDS BY ELIG CLIN: HCPCS | Performed by: FAMILY MEDICINE

## 2025-02-26 PROCEDURE — G8419 CALC BMI OUT NRM PARAM NOF/U: HCPCS | Performed by: FAMILY MEDICINE

## 2025-02-26 PROCEDURE — 1036F TOBACCO NON-USER: CPT | Performed by: FAMILY MEDICINE

## 2025-02-26 PROCEDURE — 1111F DSCHRG MED/CURRENT MED MERGE: CPT | Performed by: FAMILY MEDICINE

## 2025-02-26 PROCEDURE — 3023F SPIROM DOC REV: CPT | Performed by: FAMILY MEDICINE

## 2025-02-26 PROCEDURE — 99214 OFFICE O/P EST MOD 30 MIN: CPT | Performed by: FAMILY MEDICINE

## 2025-02-26 PROCEDURE — 3017F COLORECTAL CA SCREEN DOC REV: CPT | Performed by: FAMILY MEDICINE

## 2025-02-26 RX ORDER — CHLORAL HYDRATE 500 MG
1000 CAPSULE ORAL DAILY
Qty: 15 CAPSULE | Refills: 0 | Status: SHIPPED | OUTPATIENT
Start: 2025-02-26

## 2025-02-26 ASSESSMENT — PATIENT HEALTH QUESTIONNAIRE - PHQ9
SUM OF ALL RESPONSES TO PHQ QUESTIONS 1-9: 0
SUM OF ALL RESPONSES TO PHQ QUESTIONS 1-9: 0
9. THOUGHTS THAT YOU WOULD BE BETTER OFF DEAD, OR OF HURTING YOURSELF: NOT AT ALL
8. MOVING OR SPEAKING SO SLOWLY THAT OTHER PEOPLE COULD HAVE NOTICED. OR THE OPPOSITE, BEING SO FIGETY OR RESTLESS THAT YOU HAVE BEEN MOVING AROUND A LOT MORE THAN USUAL: NOT AT ALL
SUM OF ALL RESPONSES TO PHQ9 QUESTIONS 1 & 2: 0
SUM OF ALL RESPONSES TO PHQ QUESTIONS 1-9: 0
2. FEELING DOWN, DEPRESSED OR HOPELESS: NOT AT ALL
5. POOR APPETITE OR OVEREATING: NOT AT ALL
10. IF YOU CHECKED OFF ANY PROBLEMS, HOW DIFFICULT HAVE THESE PROBLEMS MADE IT FOR YOU TO DO YOUR WORK, TAKE CARE OF THINGS AT HOME, OR GET ALONG WITH OTHER PEOPLE: NOT DIFFICULT AT ALL
3. TROUBLE FALLING OR STAYING ASLEEP: NOT AT ALL
SUM OF ALL RESPONSES TO PHQ QUESTIONS 1-9: 0
7. TROUBLE CONCENTRATING ON THINGS, SUCH AS READING THE NEWSPAPER OR WATCHING TELEVISION: NOT AT ALL
4. FEELING TIRED OR HAVING LITTLE ENERGY: NOT AT ALL
6. FEELING BAD ABOUT YOURSELF - OR THAT YOU ARE A FAILURE OR HAVE LET YOURSELF OR YOUR FAMILY DOWN: NOT AT ALL
1. LITTLE INTEREST OR PLEASURE IN DOING THINGS: NOT AT ALL

## 2025-02-26 NOTE — PATIENT INSTRUCTIONS
Please read the healthy family handout that you were given and share it with your family.       Please compare this printed medication list with your medications at home to be sure they are the same.  If you have any medications that are different please contact us immediately at [phone].     Also review your allergies that we have listed, these may also include medications that you have not been able to tolerate, make sure everything listed is correct. If you have any allergies that are different please contact us immediately at [phone].     You may receive a survey in the mail or by email asking about your experience during your visit today. Please complete and return to us so we know how we are serving you.

## 2025-02-26 NOTE — TELEPHONE ENCOUNTER
Last Office Visit: 3/1/2024 Provider: BRIGETTE  **Is provider OOT? No    Next Office Visit: 3/4/2025 Provider: BRIGETTE    **Has patient already had 30 day supply? No    Lab orders needed?no  Encounter provider correct? Yes If not, change provider  Script changes since last refill? no    LAST LABS:   CBC:   Lab Results   Component Value Date    WBC 12.0 (H) 02/18/2025    HGB 11.1 (L) 02/18/2025    HCT 32.5 (L) 02/18/2025    MCV 84.8 02/18/2025     02/18/2025     Liver:   Lab Results   Component Value Date    ALT 18 02/14/2025    AST 28 02/14/2025    ALKPHOS 84 02/14/2025    BILITOT 0.5 02/14/2025      Last 3 Lipids:   Lab Results   Component Value Date    CHOL 148 12/11/2024    CHOL 155 06/05/2024    CHOL 146 03/01/2024     Lab Results   Component Value Date    TRIG 84 12/11/2024    TRIG 92 06/05/2024    TRIG 224 (H) 03/01/2024     Lab Results   Component Value Date    HDL 59 12/11/2024    HDL 59 06/05/2024    HDL 46 03/01/2024     Lab Results   Component Value Date    LDL 72 12/11/2024    LDL 78 06/05/2024    LDL 55 03/01/2024     Lab Results   Component Value Date    VLDL 17 12/11/2024    VLDL 18 06/05/2024    VLDL 45 03/01/2024

## 2025-02-26 NOTE — PROGRESS NOTES
Subjective:  Dasha Coleman is here to discuss the following issues.  She has recently suffered a spleen laceration requiring hospitalization and transfusion and embolization.  She has recovered well.  All related symptoms have resolved.  Labs are improving.  Her embolization procedure was well-tolerated without complications.  This resulted from a colonoscopy complication.  No bowel symptoms.  She has history of cardiomyopathy and has had no increased shortness of breath chest pain lightheadedness edema.  She has emphysema and has had no increased wheezing cough or shortness of breath.  She had some hypoxia during her hospital stay but this resolved and she does not require home oxygen currently to maintain good saturation levels.  She is compliant with all medicines.  No medicine side effects.  Social History     Tobacco Use   Smoking Status Former    Current packs/day: 0.00    Average packs/day: 0.7 packs/day for 40.3 years (28.2 ttl pk-yrs)    Types: Cigarettes    Start date: 1983    Quit date: 2024    Years since quittin.9   Smokeless Tobacco Never   Tobacco Comments    Per smoking history audit, patient smoked 10 yrs in , at heaviest smoked 1 ppd, currently 0.25 ppd, average 0.7 ppd   Allergies:     Patient has no known allergies.    Objective:  BP 92/60   Pulse 91   Wt 68.5 kg (151 lb)   LMP 2017 (Approximate)   SpO2 94%   BMI 26.75 kg/m²    No acute distress, heart regular rate and rhythm without murmur, lungs clear to auscultation easy effort, abdomen nondistended, no clubbing or cyanosis    Assessment:  1. Splenic laceration, initial encounter    2. Cardiomyopathy, unspecified type (HCC)    3. Other emphysema (HCC)            Plan:  Today reviewed with the patient findings and recommendations from her hospitalization from  to .  Remain off oxygen  Resume aspirin  Continue other medications and treatments as prior to admission  Continue to work with other providers as

## 2025-02-27 RX ORDER — METOPROLOL SUCCINATE 25 MG/1
TABLET, EXTENDED RELEASE ORAL
Qty: 90 TABLET | Refills: 3 | Status: SHIPPED | OUTPATIENT
Start: 2025-02-27

## 2025-02-27 NOTE — TELEPHONE ENCOUNTER
Last Office Visit: 3/1/2024 Provider: BRIGETTE  **Is provider OOT? No    Next Office Visit: 3/4/2025 Provider: BRIGETTE    **Has patient already had 30 day supply? No    Lab orders needed? no   Encounter provider correct? Yes If not, change provider  Script changes since last refill? no    LAST LABS:   CBC:   Lab Results   Component Value Date    WBC 12.0 (H) 02/18/2025    HGB 11.1 (L) 02/18/2025    HCT 32.5 (L) 02/18/2025    MCV 84.8 02/18/2025     02/18/2025     CMP:   Lab Results   Component Value Date     02/15/2025    K 3.6 02/15/2025     02/15/2025    CO2 20 (L) 02/15/2025    BUN 14 02/15/2025    CREATININE 0.8 02/15/2025    GLUCOSE 98 02/15/2025    CALCIUM 7.9 (L) 02/15/2025    BILITOT 0.5 02/14/2025    ALKPHOS 84 02/14/2025    AST 28 02/14/2025    ALT 18 02/14/2025    LABGLOM 86 02/15/2025    GFRAA >60 08/02/2022    AGRATIO 1.6 02/14/2025    GLOB 2.9 09/30/2021

## 2025-03-04 ENCOUNTER — ANCILLARY PROCEDURE (OUTPATIENT)
Dept: CARDIOLOGY CLINIC | Age: 60
End: 2025-03-04

## 2025-03-04 ENCOUNTER — HOSPITAL ENCOUNTER (OUTPATIENT)
Age: 60
Discharge: HOME | End: 2025-03-04
Payer: COMMERCIAL

## 2025-03-04 ENCOUNTER — TELEPHONE (OUTPATIENT)
Dept: CARDIOLOGY CLINIC | Age: 60
End: 2025-03-04

## 2025-03-04 ENCOUNTER — OFFICE VISIT (OUTPATIENT)
Dept: CARDIOLOGY CLINIC | Age: 60
End: 2025-03-04
Payer: COMMERCIAL

## 2025-03-04 VITALS
HEIGHT: 63 IN | HEART RATE: 64 BPM | WEIGHT: 150 LBS | DIASTOLIC BLOOD PRESSURE: 58 MMHG | OXYGEN SATURATION: 96 % | BODY MASS INDEX: 26.58 KG/M2 | SYSTOLIC BLOOD PRESSURE: 110 MMHG

## 2025-03-04 DIAGNOSIS — Z79.899 MEDICATION MANAGEMENT: ICD-10-CM

## 2025-03-04 DIAGNOSIS — I21.3 ST ELEVATION MYOCARDIAL INFARCTION (STEMI), UNSPECIFIED ARTERY (HCC): ICD-10-CM

## 2025-03-04 DIAGNOSIS — R00.2 PALPITATIONS: ICD-10-CM

## 2025-03-04 DIAGNOSIS — Z95.1 S/P CABG (CORONARY ARTERY BYPASS GRAFT): ICD-10-CM

## 2025-03-04 DIAGNOSIS — I25.10 CORONARY ARTERY DISEASE INVOLVING NATIVE HEART WITHOUT ANGINA PECTORIS, UNSPECIFIED VESSEL OR LESION TYPE: Primary | ICD-10-CM

## 2025-03-04 DIAGNOSIS — R79.89 ABNORMAL CBC: Primary | ICD-10-CM

## 2025-03-04 DIAGNOSIS — F17.200 SMOKER: ICD-10-CM

## 2025-03-04 DIAGNOSIS — I25.5 ISCHEMIC CARDIOMYOPATHY: ICD-10-CM

## 2025-03-04 DIAGNOSIS — I25.10 CORONARY ARTERY DISEASE INVOLVING NATIVE HEART, UNSPECIFIED VESSEL OR LESION TYPE, UNSPECIFIED WHETHER ANGINA PRESENT: ICD-10-CM

## 2025-03-04 DIAGNOSIS — I95.9 HYPOTENSION, UNSPECIFIED HYPOTENSION TYPE: ICD-10-CM

## 2025-03-04 DIAGNOSIS — E78.00 HYPERCHOLESTEREMIA: ICD-10-CM

## 2025-03-04 DIAGNOSIS — I51.89 COMBINED SYSTOLIC AND DIASTOLIC CARDIAC DYSFUNCTION: ICD-10-CM

## 2025-03-04 DIAGNOSIS — I25.118 CORONARY ARTERY DISEASE INVOLVING NATIVE HEART WITH OTHER FORM OF ANGINA PECTORIS, UNSPECIFIED VESSEL OR LESION TYPE: ICD-10-CM

## 2025-03-04 DIAGNOSIS — R06.02 SOB (SHORTNESS OF BREATH): ICD-10-CM

## 2025-03-04 LAB
ANION GAP SERPL CALCULATED.3IONS-SCNC: 16 MMOL/L (ref 3–16)
BASOPHILS # BLD: 0.2 K/UL (ref 0–0.2)
BASOPHILS NFR BLD: 1.6 %
BUN SERPL-MCNC: 10 MG/DL (ref 7–20)
CALCIUM SERPL-MCNC: 9.6 MG/DL (ref 8.3–10.6)
CHLORIDE SERPL-SCNC: 108 MMOL/L (ref 99–110)
CO2 SERPL-SCNC: 21 MMOL/L (ref 21–32)
CREAT SERPL-MCNC: 0.7 MG/DL (ref 0.6–1.1)
DEPRECATED RDW RBC AUTO: 15.5 % (ref 12.4–15.4)
DIGOXIN SERPL-MCNC: 0.8 NG/ML (ref 0.8–2)
EOSINOPHIL # BLD: 0.3 K/UL (ref 0–0.6)
EOSINOPHIL NFR BLD: 2.7 %
GFR SERPLBLD CREATININE-BSD FMLA CKD-EPI: >90 ML/MIN/{1.73_M2}
GLUCOSE SERPL-MCNC: 103 MG/DL (ref 70–99)
HCT VFR BLD AUTO: 43.3 % (ref 36–48)
HGB BLD-MCNC: 13.7 G/DL (ref 12–16)
LYMPHOCYTES # BLD: 1.7 K/UL (ref 1–5.1)
LYMPHOCYTES NFR BLD: 16.2 %
MCH RBC QN AUTO: 27.4 PG (ref 26–34)
MCHC RBC AUTO-ENTMCNC: 31.6 G/DL (ref 31–36)
MCV RBC AUTO: 86.7 FL (ref 80–100)
MONOCYTES # BLD: 1.2 K/UL (ref 0–1.3)
MONOCYTES NFR BLD: 11.1 %
NEUTROPHILS # BLD: 7.2 K/UL (ref 1.7–7.7)
NEUTROPHILS NFR BLD: 68.4 %
PLATELET # BLD AUTO: 834 K/UL (ref 135–450)
PMV BLD AUTO: 8.9 FL (ref 5–10.5)
POTASSIUM SERPL-SCNC: 4.4 MMOL/L (ref 3.5–5.1)
RBC # BLD AUTO: 5 M/UL (ref 4–5.2)
SODIUM SERPL-SCNC: 145 MMOL/L (ref 136–145)
WBC # BLD AUTO: 10.5 K/UL (ref 4–11)

## 2025-03-04 PROCEDURE — 85025 COMPLETE CBC W/AUTO DIFF WBC: CPT

## 2025-03-04 PROCEDURE — 93000 ELECTROCARDIOGRAM COMPLETE: CPT | Performed by: INTERNAL MEDICINE

## 2025-03-04 PROCEDURE — 3017F COLORECTAL CA SCREEN DOC REV: CPT | Performed by: INTERNAL MEDICINE

## 2025-03-04 PROCEDURE — 36415 COLL VENOUS BLD VENIPUNCTURE: CPT

## 2025-03-04 PROCEDURE — 80162 ASSAY OF DIGOXIN TOTAL: CPT

## 2025-03-04 PROCEDURE — G8419 CALC BMI OUT NRM PARAM NOF/U: HCPCS | Performed by: INTERNAL MEDICINE

## 2025-03-04 PROCEDURE — 80048 BASIC METABOLIC PNL TOTAL CA: CPT

## 2025-03-04 PROCEDURE — 1036F TOBACCO NON-USER: CPT | Performed by: INTERNAL MEDICINE

## 2025-03-04 PROCEDURE — 99214 OFFICE O/P EST MOD 30 MIN: CPT | Performed by: INTERNAL MEDICINE

## 2025-03-04 PROCEDURE — 1111F DSCHRG MED/CURRENT MED MERGE: CPT | Performed by: INTERNAL MEDICINE

## 2025-03-04 PROCEDURE — G8427 DOCREV CUR MEDS BY ELIG CLIN: HCPCS | Performed by: INTERNAL MEDICINE

## 2025-03-04 NOTE — TELEPHONE ENCOUNTER
Monitor placed by KASHIF Driver  Monitor company [employer]  Length of monitor 14 days  Monitor ordered by BRIGETTE  Phone ID [id number]   First Patch ID [id number], [id number]  Activation successful prior to pt leaving office? Yes

## 2025-03-04 NOTE — RESULT ENCOUNTER NOTE
Called and spoke to pt, related results and pt v/u. Routing to PCP for review per SRJ. Next cbc ordered.

## 2025-03-04 NOTE — PATIENT INSTRUCTIONS
Continue current cardiac medications:  Medications reviewed. Medications refilled as warranted.   EKG done- reviewed.  Blood work: CBC, BMP, Dig level  Echocardiogram to further assess heart size and function: CAD  Stress test: to check for potential blockages within the heart arteries: CAD  Cardiac event monitor to further assess palpitations.  Follow up with me in 1 year or sooner if needed.     Your provider has ordered testing for further evaluation.  An order/prescription has been included in your paper work.   To schedule outpatient testing, contact Central Scheduling by calling [address] [place] [address] ([phone]).

## 2025-03-05 ENCOUNTER — HOSPITAL ENCOUNTER (OUTPATIENT)
Age: 60
Discharge: HOME | End: 2025-03-05
Payer: COMMERCIAL

## 2025-03-05 DIAGNOSIS — I25.5 ISCHEMIC CARDIOMYOPATHY: ICD-10-CM

## 2025-03-05 DIAGNOSIS — I21.3 ST ELEVATION MYOCARDIAL INFARCTION (STEMI), UNSPECIFIED ARTERY (HCC): ICD-10-CM

## 2025-03-05 DIAGNOSIS — I25.10 CORONARY ARTERY DISEASE INVOLVING NATIVE HEART WITHOUT ANGINA PECTORIS, UNSPECIFIED VESSEL OR LESION TYPE: ICD-10-CM

## 2025-03-05 LAB
DEPRECATED RDW RBC AUTO: 15.1 % (ref 12.4–15.4)
ECHO BSA: 1.74 M2
HCT VFR BLD AUTO: 41 % (ref 36–48)
HGB BLD-MCNC: 13 G/DL (ref 12–16)
MCH RBC QN AUTO: 27.3 PG (ref 26–34)
MCHC RBC AUTO-ENTMCNC: 31.8 G/DL (ref 31–36)
MCV RBC AUTO: 85.9 FL (ref 80–100)
PLATELET # BLD AUTO: 772 K/UL (ref 135–450)
PMV BLD AUTO: 8.3 FL (ref 5–10.5)
RBC # BLD AUTO: 4.77 M/UL (ref 4–5.2)
WBC # BLD AUTO: 10.1 K/UL (ref 4–11)

## 2025-03-05 PROCEDURE — 85027 COMPLETE CBC AUTOMATED: CPT

## 2025-03-05 PROCEDURE — 36415 COLL VENOUS BLD VENIPUNCTURE: CPT

## 2025-03-06 ENCOUNTER — TELEPHONE (OUTPATIENT)
Dept: FAMILY MEDICINE CLINIC | Age: 60
End: 2025-03-06

## 2025-03-06 DIAGNOSIS — D75.839 THROMBOCYTOSIS: Primary | ICD-10-CM

## 2025-03-06 NOTE — TELEPHONE ENCOUNTER
[facility] pharmacy called and stating that Potassium and the Magnesium directions have changed to be taken only at bedtime per the patient.   I called patient and she states that she already talked to Dr Salguero and he wants her taking these twice a day. Patient said she was contacting the pharmacy.

## 2025-03-06 NOTE — TELEPHONE ENCOUNTER
Patient notified of results. Dr Caldwell referred her to see [facility] so she will call them to schedule.

## 2025-03-06 NOTE — TELEPHONE ENCOUNTER
Noted  her platelets are still high but have improved somewhat. This is sometimes temporary and goes back to normal without treatment. Please recheck cbc in our office in about 2 weeks.

## 2025-03-06 NOTE — TELEPHONE ENCOUNTER
Patient says that her heart doctor, Dr Caldwell, told her to call her PCP about lab results, platelets are high, to see what Dr Salguero wants to do      See results in epic.

## 2025-03-06 NOTE — TELEPHONE ENCOUNTER
Patient says that her heart doctor, Dr Caldwell, told her to call her PCP about lab results, platelets are high, to see what Dr Salguero wants to do

## 2025-03-17 RX ORDER — METOPROLOL SUCCINATE 25 MG/1
TABLET, EXTENDED RELEASE ORAL
Qty: 90 TABLET | Refills: 3 | Status: SHIPPED | OUTPATIENT
Start: 2025-03-17

## 2025-03-27 RX ORDER — DIGOXIN 125 MCG
62.5 TABLET ORAL DAILY
Qty: 45 TABLET | Refills: 3 | Status: SHIPPED | OUTPATIENT
Start: 2025-03-27

## 2025-03-27 RX ORDER — CHLORAL HYDRATE 500 MG
1000 CAPSULE ORAL DAILY
Qty: 90 CAPSULE | Refills: 3 | Status: SHIPPED | OUTPATIENT
Start: 2025-03-27

## 2025-03-27 RX ORDER — MIRTAZAPINE 15 MG/1
15 TABLET, FILM COATED ORAL NIGHTLY
Qty: 30 TABLET | Refills: 10 | Status: SHIPPED | OUTPATIENT
Start: 2025-03-27

## 2025-03-27 NOTE — TELEPHONE ENCOUNTER
BRIGETTE oot  FXW RX pending     Last Office Visit: 3/4/2025 Provider: BRIGETTE  **Is provider OOT? Yes    Next Office Visit: Visit date not found Provider:   **If no OV, when does pt need to be seen? in 1 year(s)

## 2025-03-30 ENCOUNTER — RESULTS FOLLOW-UP (OUTPATIENT)
Dept: CARDIOLOGY | Age: 60
End: 2025-03-30

## 2025-03-30 LAB — ECHO BSA: 1.74 M2

## 2025-03-31 ENCOUNTER — OFFICE VISIT (OUTPATIENT)
Dept: PULMONOLOGY | Age: 60
End: 2025-03-31
Payer: COMMERCIAL

## 2025-03-31 ENCOUNTER — HOSPITAL ENCOUNTER (OUTPATIENT)
Dept: CT IMAGING | Age: 60
Discharge: HOME | End: 2025-03-31
Payer: COMMERCIAL

## 2025-03-31 VITALS
WEIGHT: 151 LBS | HEIGHT: 63 IN | HEART RATE: 70 BPM | BODY MASS INDEX: 26.75 KG/M2 | TEMPERATURE: 97.9 F | RESPIRATION RATE: 18 BRPM | SYSTOLIC BLOOD PRESSURE: 112 MMHG | OXYGEN SATURATION: 92 % | DIASTOLIC BLOOD PRESSURE: 67 MMHG

## 2025-03-31 DIAGNOSIS — R91.1 PULMONARY NODULE: Primary | ICD-10-CM

## 2025-03-31 DIAGNOSIS — J43.1 PANLOBULAR EMPHYSEMA (HCC): ICD-10-CM

## 2025-03-31 DIAGNOSIS — Z87.891 PERSONAL HISTORY OF TOBACCO USE: ICD-10-CM

## 2025-03-31 DIAGNOSIS — J44.9 COPD, MILD (HCC): ICD-10-CM

## 2025-03-31 PROCEDURE — G0296 VISIT TO DETERM LDCT ELIG: HCPCS | Performed by: STUDENT IN AN ORGANIZED HEALTH CARE EDUCATION/TRAINING PROGRAM

## 2025-03-31 PROCEDURE — 71271 CT THORAX LUNG CANCER SCR C-: CPT

## 2025-03-31 PROCEDURE — 99213 OFFICE O/P EST LOW 20 MIN: CPT | Performed by: STUDENT IN AN ORGANIZED HEALTH CARE EDUCATION/TRAINING PROGRAM

## 2025-03-31 PROCEDURE — G2211 COMPLEX E/M VISIT ADD ON: HCPCS | Performed by: STUDENT IN AN ORGANIZED HEALTH CARE EDUCATION/TRAINING PROGRAM

## 2025-03-31 PROCEDURE — 1036F TOBACCO NON-USER: CPT | Performed by: STUDENT IN AN ORGANIZED HEALTH CARE EDUCATION/TRAINING PROGRAM

## 2025-03-31 PROCEDURE — 3023F SPIROM DOC REV: CPT | Performed by: STUDENT IN AN ORGANIZED HEALTH CARE EDUCATION/TRAINING PROGRAM

## 2025-03-31 PROCEDURE — G8419 CALC BMI OUT NRM PARAM NOF/U: HCPCS | Performed by: STUDENT IN AN ORGANIZED HEALTH CARE EDUCATION/TRAINING PROGRAM

## 2025-03-31 PROCEDURE — 3017F COLORECTAL CA SCREEN DOC REV: CPT | Performed by: STUDENT IN AN ORGANIZED HEALTH CARE EDUCATION/TRAINING PROGRAM

## 2025-03-31 PROCEDURE — G8427 DOCREV CUR MEDS BY ELIG CLIN: HCPCS | Performed by: STUDENT IN AN ORGANIZED HEALTH CARE EDUCATION/TRAINING PROGRAM

## 2025-03-31 ASSESSMENT — ENCOUNTER SYMPTOMS
VOMITING: 0
EYE REDNESS: 0
SHORTNESS OF BREATH: 0
COLOR CHANGE: 0
WHEEZING: 0
EYE ITCHING: 0
STRIDOR: 0
EYE PAIN: 0
EYE DISCHARGE: 0
NAUSEA: 0
ABDOMINAL DISTENTION: 0
BACK PAIN: 0
CONSTIPATION: 0
TROUBLE SWALLOWING: 0
COUGH: 0
DIARRHEA: 0
SORE THROAT: 0
ABDOMINAL PAIN: 0

## 2025-03-31 NOTE — PATIENT INSTRUCTIONS
Remember to bring a list of pulmonary medications and any CPAP or BiPAP machines to your next appointment with the office.     Please keep all of your future appointments scheduled by [facility], [place] [facility] office. Out of respect for other patients and providers, you may be asked to reschedule your appointment if you arrive later than your scheduled appointment time. Appointments cancelled less than 24hrs in advance will be considered a no show. Patients with three missed appointments within 1 year or four missed appointments within 2 years can be dismissed from the practice.     Please be aware that our physicians are required to work in the Intensive Care Unit at [facility].  Your appointment may need to be rescheduled if they are designated to work during your appointment time.      You may receive a survey regarding the care you received during your visit.  Your input is valuable to us.  We encourage you to complete and return your survey.  We hope you will choose us in the future for your healthcare needs.     Pt instructed of all future appointment dates & times, including radiology, labs, procedures & referrals. If procedures were scheduled preparation instructions provided. Instructions on future appointments with [facility] were given.     In the next few weeks, you will be receiving a survey from [facility] regarding your visit today.  We would greatly appreciate it if you would take just a few minutes to fill that out.  It is very important to us that our patients receive top notch care and our surveys help keep us accountable. However, if your experience was not a good one, we want to hear about that as well. This is a key way we can keep track of problems and strive to correct any for future visits.    Again, we appreciate your time and thank you for choosing [facility]!    Patricia ALCALA      Learning About Lung Cancer Screening  What is screening for lung  Satisfactory

## 2025-03-31 NOTE — PROGRESS NOTES
MA Communication:  The following orders are received by verbal communication from   Napoleon Pierce MD    Orders include:  FU 1 yr       CTLS

## 2025-03-31 NOTE — PROGRESS NOTES
[facility] Follow-up  [address] [place] [address]  [phone]        Dasha Coleman (: 1965 ) is a 59 y.o. female here for an evaluation of   Chief Complaint   Patient presents with    Follow-up     Emphysema and Pulmonary Nodule     Results     CT         SUBJECTIVE/OBJECTIVE:  Patient 59-year-old female with significant past medical history of CAD, GERD presents to [facility] for follow-up visit.  Patient has no complaints today.  She is on Stiolto with albuterol as needed for shortness of breath.  She denies any fever, chills, chest pain, cough, nausea, vomit, abdominal pain.      Review of Systems   Constitutional:  Negative for activity change, appetite change, chills, diaphoresis and fatigue.   HENT:  Negative for congestion, sore throat and trouble swallowing.    Eyes:  Negative for pain, discharge, redness and itching.   Respiratory:  Negative for cough, shortness of breath, wheezing and stridor.    Cardiovascular:  Negative for chest pain, palpitations and leg swelling.   Gastrointestinal:  Negative for abdominal distention, abdominal pain, constipation, diarrhea, nausea and vomiting.   Endocrine: Negative for polydipsia, polyphagia and polyuria.   Genitourinary:  Negative for difficulty urinating.   Musculoskeletal:  Negative for back pain, myalgias and neck pain.   Skin:  Negative for color change.   Neurological:  Negative for dizziness, weakness and light-headedness.   Psychiatric/Behavioral:  Negative for agitation and behavioral problems.          Vitals:    25 0857   BP: 112/67   BP Site: Left Upper Arm   Patient Position: Sitting   BP Cuff Size: Medium Adult   Pulse: 70   Resp: 18   Temp: 97.9 °F (36.6 °C)   TempSrc: Temporal   SpO2: 92%   Weight: 68.5 kg (151 lb)   Height: 1.6 m (5' 3\")        Physical Exam  Constitutional:       General: She is not in acute distress.     Appearance: She is not toxic-appearing.   HENT:      Head: Normocephalic and

## 2025-04-01 ENCOUNTER — RESULTS FOLLOW-UP (OUTPATIENT)
Dept: CARDIOLOGY | Age: 60
End: 2025-04-01

## 2025-04-01 ENCOUNTER — HOSPITAL ENCOUNTER (OUTPATIENT)
Dept: CARDIOLOGY | Age: 60
Discharge: HOME | End: 2025-04-03
Attending: INTERNAL MEDICINE
Payer: COMMERCIAL

## 2025-04-01 ENCOUNTER — HOSPITAL ENCOUNTER (OUTPATIENT)
Dept: NUCLEAR MEDICINE | Age: 60
Discharge: HOME | End: 2025-04-01
Attending: INTERNAL MEDICINE
Payer: COMMERCIAL

## 2025-04-01 ENCOUNTER — HOSPITAL ENCOUNTER (OUTPATIENT)
Age: 60
Discharge: HOME | End: 2025-04-03
Attending: INTERNAL MEDICINE
Payer: COMMERCIAL

## 2025-04-01 DIAGNOSIS — I25.118 CORONARY ARTERY DISEASE INVOLVING NATIVE HEART WITH OTHER FORM OF ANGINA PECTORIS, UNSPECIFIED VESSEL OR LESION TYPE: ICD-10-CM

## 2025-04-01 DIAGNOSIS — R93.1 ABNORMAL ECHOCARDIOGRAM: Primary | ICD-10-CM

## 2025-04-01 DIAGNOSIS — I25.10 CORONARY ARTERY DISEASE INVOLVING NATIVE HEART, UNSPECIFIED VESSEL OR LESION TYPE, UNSPECIFIED WHETHER ANGINA PRESENT: ICD-10-CM

## 2025-04-01 LAB
ECHO AO ROOT DIAM: 3.3 CM
ECHO AV AREA PEAK VELOCITY: 2.8 CM2
ECHO AV AREA VTI: 2.7 CM2
ECHO AV MEAN GRADIENT: 4 MMHG
ECHO AV MEAN VELOCITY: 0.9 M/S
ECHO AV PEAK GRADIENT: 7 MMHG
ECHO AV PEAK GRADIENT: 7 MMHG
ECHO AV PEAK VELOCITY: 1.4 M/S
ECHO AV VELOCITY RATIO: 0.71
ECHO AV VTI: 24.9 CM
ECHO EST RA PRESSURE: 8 MMHG
ECHO LA AREA 2C: 16.2 CM2
ECHO LA AREA 4C: 18.9 CM2
ECHO LA DIAMETER: 3.2 CM
ECHO LA MAJOR AXIS: 4.8 CM
ECHO LA MINOR AXIS: 4.7 CM
ECHO LA TO AORTIC ROOT RATIO: 0.97
ECHO LA VOL BP: 51 ML (ref 22–52)
ECHO LA VOL MOD A2C: 45 ML (ref 22–52)
ECHO LA VOL MOD A4C: 56 ML (ref 22–52)
ECHO LV E' LATERAL VELOCITY: 17.5 CM/S
ECHO LV E' SEPTAL VELOCITY: 9.85 CM/S
ECHO LV EDV A2C: 102 ML
ECHO LV EDV A4C: 114 ML
ECHO LV EF PHYSICIAN: 39 %
ECHO LV EJECTION FRACTION A2C: 47 %
ECHO LV EJECTION FRACTION A4C: 52 %
ECHO LV EJECTION FRACTION BIPLANE: 50 % (ref 55–100)
ECHO LV ESV A2C: 54 ML
ECHO LV ESV A4C: 55 ML
ECHO LV FRACTIONAL SHORTENING: 29 % (ref 28–44)
ECHO LV INTERNAL DIMENSION DIASTOLIC: 5.1 CM (ref 3.9–5.3)
ECHO LV INTERNAL DIMENSION SYSTOLIC: 3.6 CM
ECHO LV ISOVOLUMETRIC RELAXATION TIME (IVRT): 93 MS
ECHO LV IVSD: 0.7 CM (ref 0.6–0.9)
ECHO LV MASS 2D: 118.7 G (ref 67–162)
ECHO LV POSTERIOR WALL DIASTOLIC: 0.7 CM (ref 0.6–0.9)
ECHO LV RELATIVE WALL THICKNESS RATIO: 0.27
ECHO LVOT AREA: 3.8 CM2
ECHO LVOT AV VTI INDEX: 0.71
ECHO LVOT DIAM: 2.2 CM
ECHO LVOT MEAN GRADIENT: 2 MMHG
ECHO LVOT PEAK GRADIENT: 4 MMHG
ECHO LVOT PEAK VELOCITY: 1 M/S
ECHO LVOT SV: 67.2 ML
ECHO LVOT VTI: 17.7 CM
ECHO MV A VELOCITY: 0.91 M/S
ECHO MV AREA VTI: 3.6 CM2
ECHO MV E DECELERATION TIME (DT): 166 MS
ECHO MV E VELOCITY: 0.68 M/S
ECHO MV E/A RATIO: 0.75
ECHO MV E/E' LATERAL: 3.89
ECHO MV E/E' RATIO (AVERAGED): 5.39
ECHO MV E/E' SEPTAL: 6.9
ECHO MV LVOT VTI INDEX: 1.05
ECHO MV MAX VELOCITY: 1.2 M/S
ECHO MV MEAN GRADIENT: 3 MMHG
ECHO MV MEAN VELOCITY: 0.7 M/S
ECHO MV PEAK GRADIENT: 6 MMHG
ECHO MV VTI: 18.6 CM
ECHO RA AREA 4C: 13.2 CM2
ECHO RA VOLUME: 32 ML
ECHO RV BASAL DIMENSION: 3.5 CM
ECHO RV FREE WALL PEAK S': 13.8 CM/S
ECHO RV LONGITUDINAL DIMENSION: 7.1 CM
ECHO RV MID DIMENSION: 2.3 CM
ECHO RV TAPSE: 1.6 CM (ref 1.7–?)
NUC STRESS EJECTION FRACTION: 66 %
NUC STRESS LV EDV: 108 ML (ref 56–104)
NUC STRESS LV ESV: 37 ML (ref 19–49)
NUC STRESS LV MASS: 131 G
STRESS ANGINA INDEX: 0
STRESS BASELINE DIAS BP: 77 MMHG
STRESS BASELINE HR: 63 BPM
STRESS BASELINE SYS BP: 115 MMHG
STRESS ESTIMATED WORKLOAD: 1 METS
STRESS PEAK DIAS BP: 72 MMHG
STRESS PEAK SYS BP: 214 MMHG
STRESS PERCENT HR ACHIEVED: 91 %
STRESS POST PEAK HR: 148 BPM
STRESS RATE PRESSURE PRODUCT: ABNORMAL BPM*MMHG
STRESS TARGET HR: 163 BPM

## 2025-04-01 PROCEDURE — 78452 HT MUSCLE IMAGE SPECT MULT: CPT

## 2025-04-01 PROCEDURE — A9502 TC99M TETROFOSMIN: HCPCS | Performed by: INTERNAL MEDICINE

## 2025-04-01 PROCEDURE — 93017 CV STRESS TEST TRACING ONLY: CPT

## 2025-04-01 PROCEDURE — 3430000000 HC RX DIAGNOSTIC RADIOPHARMACEUTICAL: Performed by: INTERNAL MEDICINE

## 2025-04-01 PROCEDURE — 93306 TTE W/DOPPLER COMPLETE: CPT

## 2025-04-01 RX ADMIN — TETROFOSMIN 10.9 MILLICURIE: 1.38 INJECTION, POWDER, LYOPHILIZED, FOR SOLUTION INTRAVENOUS at 08:45

## 2025-04-01 RX ADMIN — TETROFOSMIN 31.9 MILLICURIE: 1.38 INJECTION, POWDER, LYOPHILIZED, FOR SOLUTION INTRAVENOUS at 10:15

## 2025-04-02 NOTE — TELEPHONE ENCOUNTER
----- Message from KASHIF MCCAULEY MA sent at 4/2/2025  8:49 AM EDT -----    ----- Message -----  From: Yovani Caldwell MD  Sent: 4/1/2025   1:59 PM EDT  To: [facility] Staff    Let patient know echo test shows mildly reduced heart function, rec: rt/lt ht cath to further assess, lets set that up if pt agreeable.  Thanks.

## 2025-04-02 NOTE — TELEPHONE ENCOUNTER
Cardiac Cath orders: [facility] and C  Ordering Provider: Dr. Caldwell  Performing Provider: (If not ordering provider)  Length of time for procedure:  IV Sedation: Y  Reps needed:  Specific equip needed: NA  Medications to hold: Nexium morning of, Mag-ox morning of, Multi vitamin morning of, Fish oil, potassium.   Pt aware of meds to hold?: No-would like a call back to review after proc scheduled.   Urgent? (Need time frame):  (All echos should be completed prior to the scheduled procedure date, preferably [place] or other outreach)

## 2025-04-03 PROBLEM — R93.1 ABNORMAL ECHOCARDIOGRAM: Status: ACTIVE | Noted: 2025-04-01

## 2025-04-03 NOTE — RESULT ENCOUNTER NOTE
Procedure:  C/C  Doctor:  Dr. Caldwell  Date:  4/7/25  Time:  10am  Arrival:  8:30am  Reps:  n/a  Anesthesia:  n/a      Spoke with patient. Please have patient arrive to the main entrance of [facility] ([address] [place] [id number]) and check in with the registration desk.  They will be directed to the Cath Lab.  Please call patient regarding medication instructions. Remind patient to be NPO after midnight (8 hours prior). Do not apply lotions/creams on skin the day of procedure.

## 2025-04-07 ENCOUNTER — HOSPITAL ENCOUNTER (OUTPATIENT)
Age: 60
Discharge: HOME | End: 2025-04-07
Attending: INTERNAL MEDICINE | Admitting: INTERNAL MEDICINE
Payer: COMMERCIAL

## 2025-04-07 ENCOUNTER — TELEPHONE (OUTPATIENT)
Dept: TELEMETRY | Age: 60
End: 2025-04-07

## 2025-04-07 ENCOUNTER — RESULTS FOLLOW-UP (OUTPATIENT)
Dept: CARDIOLOGY | Age: 60
End: 2025-04-07

## 2025-04-07 VITALS
DIASTOLIC BLOOD PRESSURE: 59 MMHG | OXYGEN SATURATION: 93 % | BODY MASS INDEX: 26.93 KG/M2 | WEIGHT: 152 LBS | SYSTOLIC BLOOD PRESSURE: 97 MMHG | HEART RATE: 67 BPM | HEIGHT: 63 IN | RESPIRATION RATE: 17 BRPM

## 2025-04-07 DIAGNOSIS — R93.1 ABNORMAL ECHOCARDIOGRAM: ICD-10-CM

## 2025-04-07 DIAGNOSIS — I42.8 OTHER CARDIOMYOPATHY: Primary | ICD-10-CM

## 2025-04-07 DIAGNOSIS — Z79.899 MEDICATION MANAGEMENT: Primary | ICD-10-CM

## 2025-04-07 LAB
ANION GAP SERPL CALCULATED.3IONS-SCNC: 14 MMOL/L (ref 3–16)
BUN SERPL-MCNC: 15 MG/DL (ref 7–20)
CALCIUM SERPL-MCNC: 9.7 MG/DL (ref 8.3–10.6)
CHLORIDE SERPL-SCNC: 106 MMOL/L (ref 99–110)
CHOLEST SERPL-MCNC: 194 MG/DL (ref 0–199)
CO2 SERPL-SCNC: 25 MMOL/L (ref 21–32)
CREAT SERPL-MCNC: 1 MG/DL (ref 0.6–1.1)
DEPRECATED RDW RBC AUTO: 15.7 % (ref 12.4–15.4)
ECHO BSA: 1.75 M2
EKG ATRIAL RATE: 68 BPM
EKG DIAGNOSIS: NORMAL
EKG P AXIS: 56 DEGREES
EKG P-R INTERVAL: 198 MS
EKG Q-T INTERVAL: 418 MS
EKG QRS DURATION: 102 MS
EKG QTC CALCULATION (BAZETT): 444 MS
EKG R AXIS: 52 DEGREES
EKG T AXIS: 1 DEGREES
EKG VENTRICULAR RATE: 68 BPM
GFR SERPLBLD CREATININE-BSD FMLA CKD-EPI: 66 ML/MIN/{1.73_M2}
GLUCOSE SERPL-MCNC: 98 MG/DL (ref 70–99)
HCT VFR BLD AUTO: 44.5 % (ref 36–48)
HDLC SERPL-MCNC: 61 MG/DL (ref 40–60)
HGB BLD-MCNC: 14.7 G/DL (ref 12–16)
LDLC SERPL CALC-MCNC: 105 MG/DL
MCH RBC QN AUTO: 28.6 PG (ref 26–34)
MCHC RBC AUTO-ENTMCNC: 32.9 G/DL (ref 31–36)
MCV RBC AUTO: 86.7 FL (ref 80–100)
PLATELET # BLD AUTO: 329 K/UL (ref 135–450)
PMV BLD AUTO: 9.2 FL (ref 5–10.5)
POC ACT LR: 248 SEC
POC ACT LR: 263 SEC
POTASSIUM SERPL-SCNC: 4.1 MMOL/L (ref 3.5–5.1)
RBC # BLD AUTO: 5.13 M/UL (ref 4–5.2)
SODIUM SERPL-SCNC: 145 MMOL/L (ref 136–145)
TRIGL SERPL-MCNC: 139 MG/DL (ref 0–150)
VLDLC SERPL CALC-MCNC: 28 MG/DL
WBC # BLD AUTO: 9 K/UL (ref 4–11)

## 2025-04-07 PROCEDURE — 99152 MOD SED SAME PHYS/QHP 5/>YRS: CPT | Performed by: INTERNAL MEDICINE

## 2025-04-07 PROCEDURE — 6360000004 HC RX CONTRAST MEDICATION: Performed by: INTERNAL MEDICINE

## 2025-04-07 PROCEDURE — 6360000002 HC RX W HCPCS: Performed by: INTERNAL MEDICINE

## 2025-04-07 PROCEDURE — 93010 ELECTROCARDIOGRAM REPORT: CPT | Performed by: INTERNAL MEDICINE

## 2025-04-07 PROCEDURE — 6370000000 HC RX 637 (ALT 250 FOR IP): Performed by: INTERNAL MEDICINE

## 2025-04-07 PROCEDURE — 93461 R&L HRT ART/VENTRICLE ANGIO: CPT | Performed by: INTERNAL MEDICINE

## 2025-04-07 PROCEDURE — 85027 COMPLETE CBC AUTOMATED: CPT

## 2025-04-07 PROCEDURE — 2709999900 HC NON-CHARGEABLE SUPPLY: Performed by: INTERNAL MEDICINE

## 2025-04-07 PROCEDURE — 80048 BASIC METABOLIC PNL TOTAL CA: CPT

## 2025-04-07 PROCEDURE — 99153 MOD SED SAME PHYS/QHP EA: CPT | Performed by: INTERNAL MEDICINE

## 2025-04-07 PROCEDURE — 80061 LIPID PANEL: CPT

## 2025-04-07 PROCEDURE — 7100000010 HC PHASE II RECOVERY - FIRST 15 MIN: Performed by: INTERNAL MEDICINE

## 2025-04-07 PROCEDURE — C1887 CATHETER, GUIDING: HCPCS | Performed by: INTERNAL MEDICINE

## 2025-04-07 PROCEDURE — 2500000003 HC RX 250 WO HCPCS: Performed by: INTERNAL MEDICINE

## 2025-04-07 PROCEDURE — C1769 GUIDE WIRE: HCPCS | Performed by: INTERNAL MEDICINE

## 2025-04-07 PROCEDURE — 7100000011 HC PHASE II RECOVERY - ADDTL 15 MIN: Performed by: INTERNAL MEDICINE

## 2025-04-07 PROCEDURE — 93005 ELECTROCARDIOGRAM TRACING: CPT | Performed by: INTERNAL MEDICINE

## 2025-04-07 PROCEDURE — 85347 COAGULATION TIME ACTIVATED: CPT

## 2025-04-07 PROCEDURE — C1894 INTRO/SHEATH, NON-LASER: HCPCS | Performed by: INTERNAL MEDICINE

## 2025-04-07 RX ORDER — SODIUM CHLORIDE 0.9 % (FLUSH) 0.9 %
5-40 SYRINGE (ML) INJECTION EVERY 12 HOURS SCHEDULED
Status: CANCELLED | OUTPATIENT
Start: 2025-04-07

## 2025-04-07 RX ORDER — SODIUM CHLORIDE 9 MG/ML
INJECTION, SOLUTION INTRAVENOUS PRN
Status: DISCONTINUED | OUTPATIENT
Start: 2025-04-07 | End: 2025-04-07 | Stop reason: HOSPADM

## 2025-04-07 RX ORDER — MIDAZOLAM HYDROCHLORIDE 1 MG/ML
INJECTION, SOLUTION INTRAMUSCULAR; INTRAVENOUS PRN
Status: DISCONTINUED | OUTPATIENT
Start: 2025-04-07 | End: 2025-04-07 | Stop reason: HOSPADM

## 2025-04-07 RX ORDER — SODIUM CHLORIDE 0.9 % (FLUSH) 0.9 %
5-40 SYRINGE (ML) INJECTION PRN
Status: CANCELLED | OUTPATIENT
Start: 2025-04-07

## 2025-04-07 RX ORDER — HEPARIN SODIUM 1000 [USP'U]/ML
INJECTION, SOLUTION INTRAVENOUS; SUBCUTANEOUS PRN
Status: DISCONTINUED | OUTPATIENT
Start: 2025-04-07 | End: 2025-04-07 | Stop reason: HOSPADM

## 2025-04-07 RX ORDER — ACETAMINOPHEN 325 MG/1
650 TABLET ORAL EVERY 4 HOURS PRN
Status: CANCELLED | OUTPATIENT
Start: 2025-04-07

## 2025-04-07 RX ORDER — FENTANYL CITRATE 50 UG/ML
INJECTION, SOLUTION INTRAMUSCULAR; INTRAVENOUS PRN
Status: DISCONTINUED | OUTPATIENT
Start: 2025-04-07 | End: 2025-04-07 | Stop reason: HOSPADM

## 2025-04-07 RX ORDER — SODIUM CHLORIDE 0.9 % (FLUSH) 0.9 %
5-40 SYRINGE (ML) INJECTION EVERY 12 HOURS SCHEDULED
Status: DISCONTINUED | OUTPATIENT
Start: 2025-04-07 | End: 2025-04-07 | Stop reason: HOSPADM

## 2025-04-07 RX ORDER — ONDANSETRON 2 MG/ML
4 INJECTION INTRAMUSCULAR; INTRAVENOUS EVERY 6 HOURS PRN
Status: DISCONTINUED | OUTPATIENT
Start: 2025-04-07 | End: 2025-04-07 | Stop reason: HOSPADM

## 2025-04-07 RX ORDER — SODIUM CHLORIDE 0.9 % (FLUSH) 0.9 %
5-40 SYRINGE (ML) INJECTION PRN
Status: DISCONTINUED | OUTPATIENT
Start: 2025-04-07 | End: 2025-04-07 | Stop reason: HOSPADM

## 2025-04-07 RX ORDER — ASPIRIN 325 MG
325 TABLET ORAL ONCE
Status: COMPLETED | OUTPATIENT
Start: 2025-04-07 | End: 2025-04-07

## 2025-04-07 RX ORDER — SODIUM CHLORIDE 9 MG/ML
INJECTION, SOLUTION INTRAVENOUS PRN
Status: CANCELLED | OUTPATIENT
Start: 2025-04-07

## 2025-04-07 RX ORDER — IOPAMIDOL 755 MG/ML
INJECTION, SOLUTION INTRAVASCULAR PRN
Status: DISCONTINUED | OUTPATIENT
Start: 2025-04-07 | End: 2025-04-07 | Stop reason: HOSPADM

## 2025-04-07 RX ORDER — LORAZEPAM 0.5 MG/1
0.5 TABLET ORAL
Status: DISCONTINUED | OUTPATIENT
Start: 2025-04-07 | End: 2025-04-07 | Stop reason: HOSPADM

## 2025-04-07 RX ADMIN — ASPIRIN 325 MG: 325 TABLET ORAL at 09:02

## 2025-04-07 NOTE — DISCHARGE INSTRUCTIONS
Cath Labs at  [facility]   Discharge Instructions        4/7/2025  Dasha Coleman   Date of Birth 1965       Activity:  No driving for 24 hours.  In 24 hours you may remove dressing and shower, wash site gently with soap and water and leave open to air  Avoid submerging your arm in sitting water for 5 days.  Do not use your right hand for 24 hours, then  No lifting more than 5 pounds for 5 days.   No lotions, powders, or ointments near site for 5 days.   No work/school for 5 days unless instructed otherwise by your cardiologist.    Diet:   Resume previous diet, if a cardiac diet is specified you will receive a handout with  general guidelines.   Drink extra non-alcoholic/decaffienated fluids for first 24 hours after your procedure.    Arm Management:  If bleeding occurs from the site or a hematoma (lump) begins to increase in size, apply pressure directly over the site, call 911 to return to the hospital.    Special Instructions:  Report any coolness or numbness in the arm  Report any chills, fever, itching, red bumps or rash   Report any of the following to the MD: drainage from the site, redness and/or swelling at the site, increased tenderness at the site   If you are currently taking Metformin or Metformin combination medications for Diabetes, hold your dose for 48 hours after your procedure.  Consult your Cardiologist before taking any NSAIDS, vitamin supplements, estrogen, or estrogen plus progestin.  Do not stop taking Plavix, Brilinta or Effient, without first consulting your cardiologist.    Sedation Discharge Instructions:  For the next 24 hours do not drive a car, operate machinery, power tools or kitchen appliances.    Do not drink alcohol; including beer or wine.    Do not make any important decisions or sign any important papers.  For the next 24 hours you can expect drowsiness, light-headed or dizziness, nausea/ vomiting, inability to concentrate, fatigue and desire to sleep.  We  pressure, or a strange feeling in your back, neck or jaw, or upper belly or in one or both shoulders or arms.  Lightheadedness or sudden weakness.  A fast or irregular heartbeat.       After you call 911, the  may tell you to chew 1 adult-strength or 2 to 4                  low-dose aspirin. Wait for an ambulance. Do not try to drive yourself.  Call your doctor today if :  You have any trouble breathing.  Your chest pain gets worse.  You are dizzy or lightheaded, or you feel like you may faint.  You are not getting better as expected.  You are having new or different chest pain.     Dr. Caldwell's office will call you with an appointment to see one of the Electrophysiologist in his office.  [phone].      Please call Central Scheduling at [phone] to scheduled a Cardiac MRI at [facility].   Please have patient arrive to the main entrance of [facility] ([address] [place] [address]) and check in with the registration desk. They will be directed to the MRI department.  If you are claustrophobic or have metal in your body please notify the .  If you have any implants please bring the cards with you.  Also bring you insurance card and photo ID.  The test will take about 2 1/2 hours.  You will need to lay still for the procedure.   No caffeine for 12 hours prior to the MRI.  Do not eat or drink 4 hours prior to exam expect a sip of water to take your medications as usual.  Bring a list of your medications.  Do not wear any metal, loose fitting dentures or hair pins.  Please bring photo ID and insurance card.  Call Central Scheduling at [phone] for questions.

## 2025-04-07 NOTE — PROCEDURES
CARDIAC CATHETERIZATION REPORT     Procedure Date:  2025  Patient Name: Dasha Coleman  MRN: [id number] : 1965      INDICATION     Abnl stress test  ICM    PROCEDURES PERFORMED     Right heart cath  Left heart catheterization  Lvgram  LIMA angiogram  Coronary angiogam  Coronary cath  Monitoring of moderate conscious sedation        PROCEDURE DESCRIPTION   Immediately before procedure, sedation assessment was performed again and prior findings as per sedation note (see that note for details) are unchanged. Risks/benefits/alternatives/outcomes were discussed with patient and/or family and informed consent was obtained.  Using the Barbeau scale, the patient's right radial artery was found to be a level B.  Patient was prepped draped in the usual sterile fashion.  Local anaesthetic was applied over puncture sites.   Prior to procedure, right peripheral antecubital IV was placed and this was exchanged using a micropuncture kit for a new sheath and PA catheter was then inserted through this with Byron-Jodi wire to perform right heart catheterization.  Venous sheath was secured in place for later removal.  Using a front wall technique, a 4/5 Hebrew Terumo sheath was inserted into right radial artery.  Verapamil, nitroglycerin, Cardene were administered through the sheath.  Heparin was administered.  Diagnostic 5 Turkish pigtail, ultra catheters were used for diagnostic angiograms.  At the conclusion of the procedure, a TR band was placed over the puncture site and hemostasis was obtained.  There were no immediate complications. I supervised sedationduring the procedure. An independent trained observer pushed meds at my direction.  We monitored the patient's level of consciousness and vital signs/physiologic status throughout the procedure duration (see Cupid).  100 cc contrast was utilized. <20cc EBL.      FINDINGS     HEMODYNAMICS    CHAMBER PRESSURE SATURATION   RA 5 68%   RV 25/4    PA 26/12 65%  medication adjustments.  Will plan on obtaining digoxin level as well.

## 2025-04-07 NOTE — H&P
Brief Pre-Op Note/Sedation Assessment      Dasha Coleman  1965  [id number]  10:14 AM    Planned Procedure: Cardiac Catheterization Procedure  Post Procedure Plan: Return to same level of care  Consent: I have discussed with the patient and/or the patient representative the indication, alternatives, and the possible risks and/or complications of the planned procedure and the anesthesia methods. The patient and/or patient representative appear to understand and agree to proceed.    DISCUSSION OF CARDIAC CATHETERIZATION PROCEDURES: The procedures, indications, risks and alternatives have been discussed with the patient and, as appropriate, with the patient's guardian . Risks discussed included, but are not limited to, bleeding, development of blood clots/emboli, damage to blood vessels, renal failure, malignant cardiac arrhythmias, stroke, heart attack, emergent coronary bypass surgery, death, dye allergy.  The patient (and guardian as appropriate) expressed understanding of the aforementioned and wished to proceed.          Chief Complaint:   Chest Pain/Pressure  Dyspnea      Indications for Cath Procedure:  Presentation:  Worsening Angina  2.  Anginal Classification within 2 weeks:  CCS III - Symptoms with everyday living activities, i.e., moderate limitation  3.  Angina Symptoms Assessment:  Typical Chest Pain  4.  Heart Failure Class within last 2 weeks:  No symptoms  5.  Cardiovascular Instability:  No    Prior Ischemic Workup/Eval:  Pre-Procedural Medications: Yes: Aspirin, Beta Blockers, and STATIN  2.   Stress Test Completed?  Yes:  Stress or Imaging Studies Performed (within ANY time period):   Type:  Stress Nuclear  Results:  Positive:  Myocardial Perfusion Defects (Nuclear) Extent of Ischemia:  Intermediate    Does Patient need surgery?  Cath Valve Surgery:  No    Pre-Procedure Medical History:  Vital Signs:  /79   Ht 1.6 m (5' 3\")   Wt 68.9 kg (152 lb)   LMP 11/01/2017 (Approximate)                Pre-Sedation:  Pre-Sedation Documentation and Exam:  I have personally completed a history, physical exam & review of systems for this patient (see notes).    Prior History of Anesthesia Complications:   none    Modified Mallampati:  III (soft palate, base of uvula visible)    ASA Classification:  Class 3 - A patient with severe systemic disease that limits activity but is not incapacitating    Mike Scale:  Activity:  2 - Able to move 4 extremities voluntarily on command  Respiration:  2 - Able to breathe deeply and cough freely  Circulation:  2 - BP+/- 20mmHg of normal  Consciousness:  2 - Fully awake  Oxygen Saturation (color):  2 - Able to maintain oxygen saturation >92% on room air    Sedation/Anesthesia Plan:  Guard the patient's safety and welfare.  Minimize physical discomfort and pain.  Minimize negative psychological responses to treatment by providing sedation and analgesia and maximize the potential amnesia.  Patient to meet pre-procedure discharge plan.    Medication Planned:  midazolam intravenously and fentanyl intravenously    Patient is an appropriate candidate for plan of sedation:   yes      Electronically signed by Yovani Caldwell MD on 4/7/2025 at 10:14 AM

## 2025-04-07 NOTE — TELEPHONE ENCOUNTER
Please call the patient and schedule a new patient appointment with EP physician to discuss ICD per Dr. Caldwell's request.  Patient had a cardiac cath on 4/7/25 and is to get an OP cardiac MRI.

## 2025-04-09 NOTE — RESULT ENCOUNTER NOTE
Yovani Caldwell MD to [facility] Cardio Practice Staff        4/8/25 12:39 PM  Result Note  Pt had cath, lets have her get f/u bmp and dig level to assess labs post cath. Thank you.      Called and spoke to pt, related results and pt v/u

## 2025-04-10 ENCOUNTER — RESULTS FOLLOW-UP (OUTPATIENT)
Dept: CARDIOLOGY | Age: 60
End: 2025-04-10

## 2025-04-10 ENCOUNTER — HOSPITAL ENCOUNTER (OUTPATIENT)
Dept: LAB | Age: 60
Discharge: HOME | End: 2025-04-10
Payer: COMMERCIAL

## 2025-04-10 DIAGNOSIS — Z79.899 MEDICATION MANAGEMENT: ICD-10-CM

## 2025-04-10 LAB
ANION GAP SERPL CALCULATED.3IONS-SCNC: 11 MMOL/L (ref 3–16)
BUN SERPL-MCNC: 12 MG/DL (ref 7–20)
CALCIUM SERPL-MCNC: 9.6 MG/DL (ref 8.3–10.6)
CHLORIDE SERPL-SCNC: 108 MMOL/L (ref 99–110)
CO2 SERPL-SCNC: 21 MMOL/L (ref 21–32)
CREAT SERPL-MCNC: 0.7 MG/DL (ref 0.6–1.1)
DIGOXIN SERPL-MCNC: 0.5 NG/ML (ref 0.8–2)
GFR SERPLBLD CREATININE-BSD FMLA CKD-EPI: >90 ML/MIN/{1.73_M2}
GLUCOSE SERPL-MCNC: 99 MG/DL (ref 70–99)
POTASSIUM SERPL-SCNC: 4.4 MMOL/L (ref 3.5–5.1)
SODIUM SERPL-SCNC: 140 MMOL/L (ref 136–145)

## 2025-04-10 PROCEDURE — 36415 COLL VENOUS BLD VENIPUNCTURE: CPT

## 2025-04-10 PROCEDURE — 80048 BASIC METABOLIC PNL TOTAL CA: CPT

## 2025-04-10 PROCEDURE — 80162 ASSAY OF DIGOXIN TOTAL: CPT

## 2025-04-24 DIAGNOSIS — Z95.1 HISTORY OF CORONARY ARTERY BYPASS GRAFT X 1: ICD-10-CM

## 2025-04-24 DIAGNOSIS — I25.10 CORONARY ARTERY DISEASE INVOLVING NATIVE CORONARY ARTERY OF NATIVE HEART WITHOUT ANGINA PECTORIS: ICD-10-CM

## 2025-04-24 DIAGNOSIS — I21.3 ST ELEVATION MYOCARDIAL INFARCTION (STEMI), UNSPECIFIED ARTERY (HCC): ICD-10-CM

## 2025-04-25 RX ORDER — POTASSIUM CHLORIDE 1500 MG/1
20 TABLET, EXTENDED RELEASE ORAL 2 TIMES DAILY WITH MEALS
Qty: 180 TABLET | Refills: 3 | Status: SHIPPED | OUTPATIENT
Start: 2025-04-25

## 2025-04-25 RX ORDER — ASPIRIN 81 MG/1
81 TABLET, COATED ORAL DAILY
Qty: 90 TABLET | Refills: 3 | Status: SHIPPED | OUTPATIENT
Start: 2025-04-25

## 2025-04-25 RX ORDER — ROSUVASTATIN CALCIUM 40 MG/1
40 TABLET, COATED ORAL DAILY
Qty: 90 TABLET | Refills: 3 | Status: SHIPPED | OUTPATIENT
Start: 2025-04-25

## 2025-04-25 NOTE — TELEPHONE ENCOUNTER
Last Office Visit: 3/4/2025 Provider: BRIGETTE  **Is provider OOT? No      **If no OV, when does pt need to be seen? in 1 year(s)  **Has patient already had 30 day supply? No    Lab orders needed? no   Encounter provider correct? Yes If not, change provider  Script changes since last refill? no    LAST LABS:   CBC:  Lab Results   Component Value Date    WBC 9.0 04/07/2025    HGB 14.7 04/07/2025    HCT 44.5 04/07/2025    MCV 86.7 04/07/2025     04/07/2025    LYMPHOPCT 16.2 03/04/2025    RBC 5.13 04/07/2025    MCH 28.6 04/07/2025    MCHC 32.9 04/07/2025    RDW 15.7 (H) 04/07/2025           CMP:  Lab Results   Component Value Date     04/10/2025    K 4.4 04/10/2025     04/10/2025    CO2 21 04/10/2025    BUN 12 04/10/2025    CREATININE 0.7 04/10/2025    GLUCOSE 99 04/10/2025    CALCIUM 9.6 04/10/2025    BILITOT 0.5 02/14/2025    ALKPHOS 84 02/14/2025    AST 28 02/14/2025    ALT 18 02/14/2025    LABGLOM >90 04/10/2025    GFRAA >60 08/02/2022    AGRATIO 1.6 02/14/2025    GLOB 2.9 09/30/2021         Last 3 Lipids:  Lab Results   Component Value Date    CHOL 194 04/07/2025    CHOL 148 12/11/2024    CHOL 155 06/05/2024     Lab Results   Component Value Date    TRIG 139 04/07/2025    TRIG 84 12/11/2024    TRIG 92 06/05/2024     Lab Results   Component Value Date    HDL 61 (H) 04/07/2025    HDL 59 12/11/2024    HDL 59 06/05/2024     Lab Results   Component Value Date     (H) 04/07/2025    LDL 72 12/11/2024    LDL 78 06/05/2024     Lab Results   Component Value Date    VLDL 28 04/07/2025    VLDL 17 12/11/2024    VLDL 18 06/05/2024

## 2025-04-30 NOTE — PROGRESS NOTES
Coronary artery disease involving native heart without angina pectoris, unspecified vessel or lesion type         Ischemic cardiomyopathy  Toprol xl 25, digoxin 62.5  Echo 4/1/25 LVEF 35-40%  Echo 4/7/22 LVEF 50%  Echo 2/10/21 LVEF 40-45%  CAD  S/p CABG X1 2020  RHC/LHC 4/2025 patent LIMA to LAD grafts, LVEF 35%  Hyperlipidemia  Controled on crestor 40  4/2025       Plan:  Start entresto 24-26 mg half tablet twice daily for improved heart function  Samples provided today  Let me know if this causes lightheadedness, dizziness or low blood pressure  Labs 2 weeks: BMP, BNP  Follow up with SONDRA Pascual in 4-6 weeks       This note was scribed in the presence of Maggi Melton MD by Re Moses RN    The scribe's documentation has been prepared under my direction and personally reviewed by me in its entirety.  I confirm that the note above accurately reflects all work, treatment, procedures, and medical decision making performed by me.          Time Based Itemization  A total of 60 minutes was spent on today's patient encounter.  If applicable, non-patient-facing activities:  ( x)Preparing to see the patient and reviewing records  ( ) Individual interpretation of results  ( ) Discussion or coordination of care with other health care professionals  ( x) Ordering of unique tests, medications, or procedures  ( x) Documentation within the EHR      I appreciate the opportunity of cooperating in the care of this patient.    Maggi Melton M.D., [facility]

## 2025-05-05 NOTE — PROGRESS NOTES
[facility]   Electrophysiology Consult Note            Date: 25  Patient Name: Dasha Coleman  YOB: 1965    Primary Care Physician: Ayo Salguero MD    CHIEF COMPLAINT:   Chief Complaint   Patient presents with    New Patient    Other     Discuss ICD      HISTORY OF PRESENT ILLNESS: Dasha Coleman is a 59 y.o. female with a past medical history of CAD with hx of IWMI s/p RONDA to RCA (2020) followed by CABG X1 (2020, LIMA-LAD), ICM, s/d CHF, HLD as well as emphysema and former tobacco abuse.   Cardiac event monitor 3/4/2025-3/18/2025 demonstrated predominately SR with an average HR of 75 () BPM, 9% PVC burden (2 morphologies) and <1% PAC burden. Most recent LHC 2025 demonstrated findings consistent with compensated ischemic cardiomyopathy and patent LIMA to LAD graft. Echo 2025 demonstrated an LVEF of 35-40%. Patient is scheduled to see CHF team 2025. Cardiac MRI scheduled for 2025.              Today, 2025, ECG Sinus Rhythm 74 BPM w/frequent multiform ectopic ventricular beats. She reports fatigue and tightness in her chest. She denies palpitations. She reports she has a significant history of cardiac issues within her family. She reports she does not use tobacco products, alcohol, or illicit drug use. Patient is taking all cardiac medications as prescribed and tolerates them well. Patient denies current edema, shortness of breath, palpitations, dizziness or syncope.     Past Medical History:   has a past medical history of Arthritis, CAD (coronary artery disease), Cancer ([facility]), CHF (congestive heart failure) ([facility]), Depression, GERD (gastroesophageal reflux disease), Hyperlipidemia, On intra-aortic balloon pump assist, and Other emphysema ([facility]).    Past Surgical History:   has a past surgical history that includes sinus surgery;  section; Coronary artery bypass graft (N/A, 2020); CT GUIDED PLEURAL DRAINAGE W CATH PERC (2020);

## 2025-05-06 ENCOUNTER — OFFICE VISIT (OUTPATIENT)
Dept: CARDIOLOGY CLINIC | Age: 60
End: 2025-05-06
Payer: COMMERCIAL

## 2025-05-06 VITALS
BODY MASS INDEX: 25.19 KG/M2 | HEIGHT: 66 IN | HEART RATE: 80 BPM | SYSTOLIC BLOOD PRESSURE: 110 MMHG | OXYGEN SATURATION: 93 % | DIASTOLIC BLOOD PRESSURE: 70 MMHG

## 2025-05-06 VITALS
HEART RATE: 80 BPM | OXYGEN SATURATION: 93 % | BODY MASS INDEX: 25.07 KG/M2 | HEIGHT: 66 IN | DIASTOLIC BLOOD PRESSURE: 70 MMHG | WEIGHT: 156 LBS | SYSTOLIC BLOOD PRESSURE: 110 MMHG

## 2025-05-06 DIAGNOSIS — I49.3 PVC (PREMATURE VENTRICULAR CONTRACTION): Primary | ICD-10-CM

## 2025-05-06 DIAGNOSIS — I42.9 CARDIOMYOPATHY, UNSPECIFIED TYPE (HCC): ICD-10-CM

## 2025-05-06 DIAGNOSIS — I50.22 SYSTOLIC CHF, CHRONIC (HCC): Primary | ICD-10-CM

## 2025-05-06 DIAGNOSIS — E78.00 HYPERCHOLESTEREMIA: ICD-10-CM

## 2025-05-06 DIAGNOSIS — Z95.1 S/P CABG (CORONARY ARTERY BYPASS GRAFT): ICD-10-CM

## 2025-05-06 DIAGNOSIS — I25.10 CORONARY ARTERY DISEASE INVOLVING NATIVE HEART WITHOUT ANGINA PECTORIS, UNSPECIFIED VESSEL OR LESION TYPE: ICD-10-CM

## 2025-05-06 PROCEDURE — G8419 CALC BMI OUT NRM PARAM NOF/U: HCPCS | Performed by: INTERNAL MEDICINE

## 2025-05-06 PROCEDURE — 93000 ELECTROCARDIOGRAM COMPLETE: CPT | Performed by: INTERNAL MEDICINE

## 2025-05-06 PROCEDURE — 99205 OFFICE O/P NEW HI 60 MIN: CPT | Performed by: INTERNAL MEDICINE

## 2025-05-06 PROCEDURE — 99244 OFF/OP CNSLTJ NEW/EST MOD 40: CPT | Performed by: INTERNAL MEDICINE

## 2025-05-06 PROCEDURE — G8427 DOCREV CUR MEDS BY ELIG CLIN: HCPCS | Performed by: INTERNAL MEDICINE

## 2025-05-06 PROCEDURE — 1036F TOBACCO NON-USER: CPT | Performed by: INTERNAL MEDICINE

## 2025-05-06 PROCEDURE — 3017F COLORECTAL CA SCREEN DOC REV: CPT | Performed by: INTERNAL MEDICINE

## 2025-05-06 NOTE — PATIENT INSTRUCTIONS
Plan:  Start entresto 24-26 mg half tablet twice daily for improved heart function  Samples provided today  Let me know if this causes lightheadedness, dizziness or low blood pressure  Labs 2 weeks: BMP, BNP  Follow up with SONDRA Pascual in 4-6 weeks

## 2025-05-06 NOTE — PATIENT INSTRUCTIONS
RECOMMENDATIONS:  Discussed ICD- you are not a candidate at this time as your LVEF is >35%.    -Continue cardiac medications as prescribed.    -Continue to follow with CHF team.   2. Discussed PVCs:   -Consider clinical management vs. Ablation. Education provided.   3. Complete cardiac MRI as ordered.   4. Follow up with me in 6-8 weeks.

## 2025-05-07 RX ORDER — MAGNESIUM OXIDE 400 MG/1
1 TABLET ORAL 2 TIMES DAILY
Qty: 60 TABLET | Refills: 11 | Status: SHIPPED | OUTPATIENT
Start: 2025-05-07

## 2025-05-14 ENCOUNTER — HOSPITAL ENCOUNTER (OUTPATIENT)
Age: 60
Discharge: HOME | End: 2025-05-14
Attending: INTERNAL MEDICINE
Payer: COMMERCIAL

## 2025-05-14 ENCOUNTER — HOSPITAL ENCOUNTER (OUTPATIENT)
Dept: MRI IMAGING | Age: 60
Discharge: HOME | End: 2025-05-14
Attending: INTERNAL MEDICINE
Payer: COMMERCIAL

## 2025-05-14 DIAGNOSIS — I42.9 CARDIOMYOPATHY, UNSPECIFIED TYPE (HCC): ICD-10-CM

## 2025-05-14 DIAGNOSIS — I42.8 OTHER CARDIOMYOPATHY (HCC): ICD-10-CM

## 2025-05-14 PROCEDURE — 75565 CARD MRI VELOC FLOW MAPPING: CPT

## 2025-05-14 PROCEDURE — 36415 COLL VENOUS BLD VENIPUNCTURE: CPT

## 2025-05-14 PROCEDURE — 80048 BASIC METABOLIC PNL TOTAL CA: CPT

## 2025-05-14 PROCEDURE — 6360000004 HC RX CONTRAST MEDICATION: Performed by: INTERNAL MEDICINE

## 2025-05-14 PROCEDURE — 83880 ASSAY OF NATRIURETIC PEPTIDE: CPT

## 2025-05-14 PROCEDURE — A9585 GADOBUTROL INJECTION: HCPCS | Performed by: INTERNAL MEDICINE

## 2025-05-14 RX ORDER — GADOBUTROL 604.72 MG/ML
11 INJECTION INTRAVENOUS
Status: COMPLETED | OUTPATIENT
Start: 2025-05-14 | End: 2025-05-14

## 2025-05-14 RX ADMIN — GADOBUTROL 11 ML: 604.72 INJECTION INTRAVENOUS at 16:37

## 2025-05-15 LAB
ANION GAP SERPL CALCULATED.3IONS-SCNC: 10 MMOL/L (ref 3–16)
BUN SERPL-MCNC: 12 MG/DL (ref 7–20)
CALCIUM SERPL-MCNC: 9.4 MG/DL (ref 8.3–10.6)
CHLORIDE SERPL-SCNC: 110 MMOL/L (ref 99–110)
CO2 SERPL-SCNC: 20 MMOL/L (ref 21–32)
CREAT SERPL-MCNC: 0.7 MG/DL (ref 0.6–1.1)
GFR SERPLBLD CREATININE-BSD FMLA CKD-EPI: >90 ML/MIN/{1.73_M2}
GLUCOSE SERPL-MCNC: 79 MG/DL (ref 70–99)
NT-PROBNP SERPL-MCNC: 325 PG/ML (ref 0–124)
POTASSIUM SERPL-SCNC: 4.5 MMOL/L (ref 3.5–5.1)
SODIUM SERPL-SCNC: 140 MMOL/L (ref 136–145)

## 2025-05-15 NOTE — TELEPHONE ENCOUNTER
Patient is telling the pharmacy that she only takes the Mag Ox once a day not twice a day. Is it okay to put in new rx for once a day?

## 2025-05-16 RX ORDER — MAGNESIUM OXIDE 400 MG/1
1 TABLET ORAL DAILY
Qty: 90 TABLET | Refills: 1 | Status: SHIPPED | OUTPATIENT
Start: 2025-05-16

## 2025-05-19 ENCOUNTER — RESULTS FOLLOW-UP (OUTPATIENT)
Dept: CARDIOLOGY CLINIC | Age: 60
End: 2025-05-19

## 2025-05-27 ENCOUNTER — TELEPHONE (OUTPATIENT)
Dept: FAMILY MEDICINE CLINIC | Age: 60
End: 2025-05-27

## 2025-05-27 RX ORDER — ELECTROLYTES/DEXTROSE
1 SOLUTION, ORAL ORAL DAILY
Qty: 90 TABLET | Refills: 3 | Status: SHIPPED | OUTPATIENT
Start: 2025-05-27

## 2025-05-27 NOTE — TELEPHONE ENCOUNTER
[facility]  Mag oxide is currently on mfr backorder.   Unable to fill for patient.  Requesting a replacement therapy.   Please advise.

## 2025-06-04 ENCOUNTER — TELEPHONE (OUTPATIENT)
Dept: FAMILY MEDICINE CLINIC | Age: 60
End: 2025-06-04

## 2025-06-04 NOTE — TELEPHONE ENCOUNTER
Kailyn with [facility] pharmacy states magnesium chloride 64 MG script is unable to be processed. They do not manufacture that anymore. Is requesting a new script for Magnesium Chloride Delayed Release. Please advise.     [facility]

## 2025-06-05 NOTE — PROGRESS NOTES
[facility]  Cardiac Follow-up    Primary Care Doctor:  Ayo Salguero MD    Chief Complaint   Patient presents with    1 Month Follow-Up    Coronary Artery Disease    Cardiomyopathy    Congestive Heart Failure        History of Present Illness:  I had the pleasure of seeing Dasha Coleman as a follow up for HFrEF.     established with DR. Melton referred by Dr. Caldwell.   Hx of HLD, Emphysema, CAD s/p MI s/p RONDA to RCA , CABG x1 , LVEF 35-40% in , improved EF to 50% in .     Echo 25 noted and LVEF of 35-40%, grade I DD. Stress test 25 noted mixed ischemia and infarct. LV function is normal EF 66%. She underwent right and left heart cath on 25 that showed patent LIMA to LAD grafts, normal filling pressures consistent with compensated ischemic cardiomyopathy.      Last visit started on Entresto. Repeat labs showed stable renal function   Has palpitations. + shortness of breath with exertion but not worse.   Gets fatigue with trying to work in her yard.   Mild edema on the right foot/ankle. None on the left.   Here with her sister.     Dasha Coleman describes symptoms including dyspnea, palpitations, fatigue, edema but denies chest pain.     Home weights: not checking     Past Medical History:   has a past medical history of Arthritis, CAD (coronary artery disease), Cancer (HCC), CHF (congestive heart failure) ([facility]), Depression, GERD (gastroesophageal reflux disease), Hyperlipidemia, On intra-aortic balloon pump assist, and Other emphysema ([facility]).  Surgical History:   has a past surgical history that includes sinus surgery;  section; Coronary artery bypass graft (N/A, 2020); CT GUIDED PLEURAL DRAINAGE W CATH PERC (2020); thoracentesis (Left, 2020); transesophageal echocardiogram (2020); Cardiac catheterization (2020); Foot surgery (Right, 2024); Bunionectomy (Right, 2024); IR EMBOLIZATION HEMORRHAGE (02/15/2025);

## 2025-06-06 ENCOUNTER — TELEPHONE (OUTPATIENT)
Dept: CARDIOLOGY CLINIC | Age: 60
End: 2025-06-06

## 2025-06-06 ENCOUNTER — ANCILLARY PROCEDURE (OUTPATIENT)
Dept: CARDIOLOGY CLINIC | Age: 60
End: 2025-06-06

## 2025-06-06 ENCOUNTER — OFFICE VISIT (OUTPATIENT)
Dept: CARDIOLOGY CLINIC | Age: 60
End: 2025-06-06
Payer: COMMERCIAL

## 2025-06-06 VITALS
BODY MASS INDEX: 27.64 KG/M2 | OXYGEN SATURATION: 97 % | HEART RATE: 63 BPM | WEIGHT: 156 LBS | SYSTOLIC BLOOD PRESSURE: 94 MMHG | DIASTOLIC BLOOD PRESSURE: 52 MMHG | HEIGHT: 63 IN

## 2025-06-06 DIAGNOSIS — I49.3 PVC (PREMATURE VENTRICULAR CONTRACTION): ICD-10-CM

## 2025-06-06 DIAGNOSIS — R00.2 PALPITATIONS: ICD-10-CM

## 2025-06-06 DIAGNOSIS — I25.5 ISCHEMIC CARDIOMYOPATHY: ICD-10-CM

## 2025-06-06 DIAGNOSIS — I50.22 SYSTOLIC CHF, CHRONIC (HCC): Primary | ICD-10-CM

## 2025-06-06 LAB — ECHO BSA: 1.77 M2

## 2025-06-06 PROCEDURE — 3017F COLORECTAL CA SCREEN DOC REV: CPT | Performed by: NURSE PRACTITIONER

## 2025-06-06 PROCEDURE — 99214 OFFICE O/P EST MOD 30 MIN: CPT | Performed by: NURSE PRACTITIONER

## 2025-06-06 PROCEDURE — 1036F TOBACCO NON-USER: CPT | Performed by: NURSE PRACTITIONER

## 2025-06-06 PROCEDURE — G8419 CALC BMI OUT NRM PARAM NOF/U: HCPCS | Performed by: NURSE PRACTITIONER

## 2025-06-06 PROCEDURE — G2211 COMPLEX E/M VISIT ADD ON: HCPCS | Performed by: NURSE PRACTITIONER

## 2025-06-06 PROCEDURE — G8427 DOCREV CUR MEDS BY ELIG CLIN: HCPCS | Performed by: NURSE PRACTITIONER

## 2025-06-06 RX ORDER — SPIRONOLACTONE 25 MG/1
12.5 TABLET ORAL DAILY
Qty: 15 TABLET | Refills: 3 | Status: SHIPPED | OUTPATIENT
Start: 2025-06-06

## 2025-06-06 NOTE — PATIENT INSTRUCTIONS
Plan:   Continue 1/2 tab entresto 24/26mg twice a day for now with adding Spironolactone (aldactone)  Add 1/2 tab of the Spironolactone (aldactone)  Stop the potassium supplements  1/2 tab of the digoxin   Check labs in about 1-2 weeks- not fasting  Continue magnesium supplement per PCP  Repeat 1 week cardiac monitor to evaluate PVC burden (count the extra beats)  Follow up EP as planned  Follow up PCP as planned  Follow up 4-6 weeks

## 2025-06-06 NOTE — TELEPHONE ENCOUNTER
.Monitor placed by [place]  Monitor company [employer]  Length of monitor 7 DAYS  Monitor ordered by  [employer]  Serial number [id number]  Kit ID [id number]  Activation successful prior to pt leaving office? Yes

## 2025-06-11 ENCOUNTER — OFFICE VISIT (OUTPATIENT)
Dept: FAMILY MEDICINE CLINIC | Age: 60
End: 2025-06-11
Payer: COMMERCIAL

## 2025-06-11 VITALS
WEIGHT: 155 LBS | TEMPERATURE: 97.5 F | SYSTOLIC BLOOD PRESSURE: 94 MMHG | HEART RATE: 60 BPM | DIASTOLIC BLOOD PRESSURE: 61 MMHG | BODY MASS INDEX: 27.46 KG/M2 | OXYGEN SATURATION: 95 %

## 2025-06-11 DIAGNOSIS — I51.89 COMBINED SYSTOLIC AND DIASTOLIC CARDIAC DYSFUNCTION: Primary | ICD-10-CM

## 2025-06-11 DIAGNOSIS — E61.1 IRON DEFICIENCY: ICD-10-CM

## 2025-06-11 DIAGNOSIS — K21.9 GASTROESOPHAGEAL REFLUX DISEASE WITHOUT ESOPHAGITIS: ICD-10-CM

## 2025-06-11 DIAGNOSIS — I25.10 CORONARY ARTERY DISEASE INVOLVING NATIVE CORONARY ARTERY OF NATIVE HEART WITHOUT ANGINA PECTORIS: ICD-10-CM

## 2025-06-11 DIAGNOSIS — J43.8 OTHER EMPHYSEMA (HCC): ICD-10-CM

## 2025-06-11 DIAGNOSIS — D75.839 THROMBOCYTOSIS: ICD-10-CM

## 2025-06-11 PROCEDURE — 36415 COLL VENOUS BLD VENIPUNCTURE: CPT | Performed by: FAMILY MEDICINE

## 2025-06-11 PROCEDURE — G8427 DOCREV CUR MEDS BY ELIG CLIN: HCPCS | Performed by: FAMILY MEDICINE

## 2025-06-11 PROCEDURE — G8419 CALC BMI OUT NRM PARAM NOF/U: HCPCS | Performed by: FAMILY MEDICINE

## 2025-06-11 PROCEDURE — 3017F COLORECTAL CA SCREEN DOC REV: CPT | Performed by: FAMILY MEDICINE

## 2025-06-11 PROCEDURE — 99214 OFFICE O/P EST MOD 30 MIN: CPT | Performed by: FAMILY MEDICINE

## 2025-06-11 PROCEDURE — 3023F SPIROM DOC REV: CPT | Performed by: FAMILY MEDICINE

## 2025-06-11 PROCEDURE — 1036F TOBACCO NON-USER: CPT | Performed by: FAMILY MEDICINE

## 2025-06-11 NOTE — PROGRESS NOTES
quittin.[email or web address]   Smokeless Tobacco Never   Tobacco Comments    Per smoking history audit, patient smoked 10 yrs in , at heaviest smoked 1 ppd, currently 0.25 ppd, average 0.7 ppd   Allergies:     Patient has no known allergies.    Objective:  BP 94/61   Pulse 60   Temp 97.5 °F (36.4 °C) (Oral)   Wt 70.3 kg (155 lb)   LMP 2017 (Approximate)   SpO2 95%   BMI 27.46 kg/m²    Vitals noted, no acute distress, well developed, alert, oriented times three, judgement and insight normal for age, eye lids and conjunctiva normal, pupils and irises equil and round, no neck mass or thyromegaly, no abnormal heart sounds or murmers, lungs clear to auscultation with easy effort, abdomen soft and nontender without mass or organomegaly, skin warm and dry    Assessment:  1. Combined systolic and diastolic cardiac dysfunction    2. Coronary artery disease involving native coronary artery of native heart without angina pectoris    3. Iron deficiency    4. Other emphysema (HCC)            Plan:  Labs ordered  Continue current meds  Records reviewed with patient today as above  Continue plans for cardiac MRI as per EP cardiology  Continue work with other providers as advised  Follow-up in 6 months or as needed  The diagnoses listed in the assessment above are stable unless otherwise indicated.   Age-specific preventative health recommendations were reviewed and a \"Healthy Family Handout\" has been provided.  Avoid exposure to tobacco products.  Follow COVID-19 CDC guidelines.  Read and consider all information provided by the pharmacy regarding prescribed medications before use.    Call or return for any problems that arise before the next scheduled appointment.        Ayo Salguero MD    This note was transcribed using a voice recognition software system.  Proper technique and careful oversight were used to increase transcription accuracy but inadvertent errors may be present.

## 2025-06-12 ENCOUNTER — RESULTS FOLLOW-UP (OUTPATIENT)
Dept: FAMILY MEDICINE CLINIC | Age: 60
End: 2025-06-12

## 2025-06-12 DIAGNOSIS — R79.89 ABNORMAL LFTS: Primary | ICD-10-CM

## 2025-06-12 LAB
ALT SERPL-CCNC: 47 U/L (ref 10–40)
ANION GAP SERPL CALCULATED.3IONS-SCNC: 11 MMOL/L (ref 3–16)
AST SERPL-CCNC: 47 U/L (ref 15–37)
BUN SERPL-MCNC: 14 MG/DL (ref 7–20)
CALCIUM SERPL-MCNC: 9.9 MG/DL (ref 8.3–10.6)
CHLORIDE SERPL-SCNC: 109 MMOL/L (ref 99–110)
CHOLEST SERPL-MCNC: 155 MG/DL (ref 0–199)
CO2 SERPL-SCNC: 21 MMOL/L (ref 21–32)
CREAT SERPL-MCNC: 0.9 MG/DL (ref 0.6–1.1)
DEPRECATED RDW RBC AUTO: 15.8 % (ref 12.4–15.4)
FERRITIN SERPL IA-MCNC: 97.4 NG/ML (ref 15–150)
GFR SERPLBLD CREATININE-BSD FMLA CKD-EPI: 74 ML/MIN/{1.73_M2}
GLUCOSE SERPL-MCNC: 93 MG/DL (ref 70–99)
HCT VFR BLD AUTO: 43.6 % (ref 36–48)
HDLC SERPL-MCNC: 55 MG/DL (ref 40–60)
HGB BLD-MCNC: 14.5 G/DL (ref 12–16)
IRON SERPL-MCNC: 117 UG/DL (ref 37–145)
LDLC SERPL CALC-MCNC: 78 MG/DL
MAGNESIUM SERPL-MCNC: 2.28 MG/DL (ref 1.8–2.4)
MCH RBC QN AUTO: 28.9 PG (ref 26–34)
MCHC RBC AUTO-ENTMCNC: 33.3 G/DL (ref 31–36)
MCV RBC AUTO: 86.9 FL (ref 80–100)
PLATELET # BLD AUTO: 250 K/UL (ref 135–450)
PMV BLD AUTO: 11.1 FL (ref 5–10.5)
POTASSIUM SERPL-SCNC: 4.5 MMOL/L (ref 3.5–5.1)
RBC # BLD AUTO: 5.02 M/UL (ref 4–5.2)
SODIUM SERPL-SCNC: 141 MMOL/L (ref 136–145)
TRIGL SERPL-MCNC: 108 MG/DL (ref 0–150)
VLDLC SERPL CALC-MCNC: 22 MG/DL
WBC # BLD AUTO: 7.6 K/UL (ref 4–11)

## 2025-06-13 DIAGNOSIS — R89.9 ABNORMAL LABORATORY TEST RESULT: Primary | ICD-10-CM

## 2025-06-16 ENCOUNTER — HOSPITAL ENCOUNTER (OUTPATIENT)
Age: 60
Discharge: HOME | End: 2025-06-16
Payer: COMMERCIAL

## 2025-06-16 DIAGNOSIS — I50.22 SYSTOLIC CHF, CHRONIC (HCC): ICD-10-CM

## 2025-06-16 DIAGNOSIS — I25.5 ISCHEMIC CARDIOMYOPATHY: ICD-10-CM

## 2025-06-16 LAB
ANION GAP SERPL CALCULATED.3IONS-SCNC: 12 MMOL/L (ref 3–16)
BUN SERPL-MCNC: 13 MG/DL (ref 7–20)
CALCIUM SERPL-MCNC: 10 MG/DL (ref 8.3–10.6)
CHLORIDE SERPL-SCNC: 107 MMOL/L (ref 99–110)
CO2 SERPL-SCNC: 21 MMOL/L (ref 21–32)
CREAT SERPL-MCNC: 0.8 MG/DL (ref 0.6–1.1)
GFR SERPLBLD CREATININE-BSD FMLA CKD-EPI: 86 ML/MIN/{1.73_M2}
GLUCOSE SERPL-MCNC: 107 MG/DL (ref 70–99)
MAGNESIUM SERPL-MCNC: 2 MG/DL (ref 1.8–2.4)
NT-PROBNP SERPL-MCNC: 293 PG/ML (ref 0–124)
POTASSIUM SERPL-SCNC: 4.7 MMOL/L (ref 3.5–5.1)
SODIUM SERPL-SCNC: 140 MMOL/L (ref 136–145)

## 2025-06-16 PROCEDURE — 83735 ASSAY OF MAGNESIUM: CPT

## 2025-06-16 PROCEDURE — 80048 BASIC METABOLIC PNL TOTAL CA: CPT

## 2025-06-16 PROCEDURE — 36415 COLL VENOUS BLD VENIPUNCTURE: CPT

## 2025-06-16 PROCEDURE — 83880 ASSAY OF NATRIURETIC PEPTIDE: CPT

## 2025-06-17 ENCOUNTER — RESULTS FOLLOW-UP (OUTPATIENT)
Dept: CARDIOLOGY CLINIC | Age: 60
End: 2025-06-17

## 2025-06-17 LAB — ECHO BSA: 1.77 M2

## 2025-06-24 DIAGNOSIS — J43.8 OTHER EMPHYSEMA (HCC): ICD-10-CM

## 2025-06-25 RX ORDER — ALBUTEROL SULFATE 90 UG/1
AEROSOL, METERED RESPIRATORY (INHALATION)
Qty: 18 G | Refills: 5 | Status: SHIPPED | OUTPATIENT
Start: 2025-06-25

## 2025-06-25 RX ORDER — TIOTROPIUM BROMIDE AND OLODATEROL 3.124; 2.736 UG/1; UG/1
SPRAY, METERED RESPIRATORY (INHALATION)
Qty: 4 G | Refills: 5 | Status: SHIPPED | OUTPATIENT
Start: 2025-06-25

## 2025-06-25 NOTE — TELEPHONE ENCOUNTER
PEND'd orders. Please advise.     Last office visit 3/31/2025     Last written 12/27/2024      Next office visit scheduled 04/06/2026    Requested Prescriptions     Pending Prescriptions Disp Refills    VENTOLIN  (90 Base) MCG/ACT inhaler [Pharmacy Med Name: VENTOLIN HFA 90MCG INH X1 108 (90 BAS Aerosol] 18 g 11     Sig: INHALE TWO (2) PUFFS BY MOUTH EVERY 6 HOURS AS NEEDED FOR WHEEZING    STIOLTO RESPIMAT 2.5-2.5 MCG/ACT AERS [Pharmacy Med Name: STIOLTO RESPIMAT INHAL SPRY 2.5-2.5 Aerosol] 4 g 11     Sig: INHALE TWO (2) PUFFS BY MOUTH DAILY

## 2025-07-01 ENCOUNTER — OFFICE VISIT (OUTPATIENT)
Dept: CARDIOLOGY CLINIC | Age: 60
End: 2025-07-01
Payer: COMMERCIAL

## 2025-07-01 VITALS
HEIGHT: 63 IN | HEART RATE: 67 BPM | BODY MASS INDEX: 28.03 KG/M2 | OXYGEN SATURATION: 97 % | SYSTOLIC BLOOD PRESSURE: 122 MMHG | WEIGHT: 158.2 LBS | DIASTOLIC BLOOD PRESSURE: 62 MMHG

## 2025-07-01 DIAGNOSIS — I49.3 PVC (PREMATURE VENTRICULAR CONTRACTION): ICD-10-CM

## 2025-07-01 DIAGNOSIS — I49.9 IRREGULAR HEART RATE: Primary | ICD-10-CM

## 2025-07-01 PROCEDURE — G8419 CALC BMI OUT NRM PARAM NOF/U: HCPCS | Performed by: INTERNAL MEDICINE

## 2025-07-01 PROCEDURE — 1036F TOBACCO NON-USER: CPT | Performed by: INTERNAL MEDICINE

## 2025-07-01 PROCEDURE — G8427 DOCREV CUR MEDS BY ELIG CLIN: HCPCS | Performed by: INTERNAL MEDICINE

## 2025-07-01 PROCEDURE — G2211 COMPLEX E/M VISIT ADD ON: HCPCS | Performed by: INTERNAL MEDICINE

## 2025-07-01 PROCEDURE — 93000 ELECTROCARDIOGRAM COMPLETE: CPT | Performed by: INTERNAL MEDICINE

## 2025-07-01 PROCEDURE — 3017F COLORECTAL CA SCREEN DOC REV: CPT | Performed by: INTERNAL MEDICINE

## 2025-07-01 PROCEDURE — 99214 OFFICE O/P EST MOD 30 MIN: CPT | Performed by: INTERNAL MEDICINE

## 2025-07-01 NOTE — PATIENT INSTRUCTIONS
RECOMMENDATIONS:  Revisited options for PVCs (ablation vs. Medications)  -Patient reports palpitations are not bothersome and would like to monitor clinically.   Continue cardiac medications as prescribed.   Follow up with EP NP in 1 year.

## 2025-07-01 NOTE — PROGRESS NOTES
undergoes a cardiac MRI.  Will have her follow-up with us in 6 to 8 weeks to revisit.  Should she decline medication or ablative therapy for her PVCs we will repeat a echocardiogram here to ensure no worsening of left ventricular ejection fraction.  - Consider ablation vs antiarrhythmic.  - Continue metoprolol succinate.  - Complete cardiac MRI.  - Follow up with us in 6-8 weeks.    07/01/2025  Patient presents for follow-up.  No PVCs on EKG today.  We discussed pros and cons of treatment with antiarrhythmics versus ablative therapy.  Given her symptoms are tolerable and her left trigger ejection fraction is improving patient decided to continue to monitor for now.  Will have her follow-up with us in 1 year.  All questions were addressed.  Continue to follow with interventional cardiology.  - Follow up with EP NP in 1 year unless/until procedure/discussion required or PRN.    Ischemic cardiomyopathy status post CABG and PCI.  05/06/2025- present   Patient wore a cardiac monitor showed no ventricular tachycardia.  Her left trigger ejection fraction is greater than 35% on goal-directed medical therapy.  No indication for ICD at this point or electrophysiology's testing.  Continue to follow with interventional cardiology.  - Continue to follow with IC.    RECOMMENDATIONS:  Revisited options for PVCs (ablation vs. Medications)  -Patient reports palpitations are not bothersome and would like to monitor clinically.   Continue cardiac medications as prescribed.   Follow up with EP NP in 1 year.     QUALITY MEASURES  1. Tobacco Cessation Counseling: NA  2. Retake of BP if >140/90:   NA  3. Documentation to PCP/referring for new patient:  Sent to PCP at close of office visit  4. CAD patient on anti-platelet: Yes  5. CAD patient on STATIN therapy:  Yes  6. Patient with CHF and aFib on anticoagulation:  NA     All questions and concerns were addressed to the patient/family. Alternatives to my treatment were discussed.

## 2025-07-15 ENCOUNTER — CLINICAL SUPPORT (OUTPATIENT)
Dept: FAMILY MEDICINE CLINIC | Age: 60
End: 2025-07-15
Payer: COMMERCIAL

## 2025-07-15 DIAGNOSIS — R79.89 ABNORMAL LFTS: ICD-10-CM

## 2025-07-15 LAB
ALBUMIN SERPL-MCNC: 4.4 G/DL (ref 3.4–5)
ALP SERPL-CCNC: 79 U/L (ref 40–129)
ALT SERPL-CCNC: 41 U/L (ref 10–40)
AST SERPL-CCNC: 40 U/L (ref 15–37)
BILIRUB DIRECT SERPL-MCNC: 0.2 MG/DL (ref 0–0.3)
BILIRUB INDIRECT SERPL-MCNC: 0.1 MG/DL (ref 0–1)
BILIRUB SERPL-MCNC: 0.3 MG/DL (ref 0–1)
PROT SERPL-MCNC: 6.7 G/DL (ref 6.4–8.2)

## 2025-07-15 PROCEDURE — 36415 COLL VENOUS BLD VENIPUNCTURE: CPT | Performed by: FAMILY MEDICINE

## 2025-07-25 ENCOUNTER — OFFICE VISIT (OUTPATIENT)
Dept: CARDIOLOGY CLINIC | Age: 60
End: 2025-07-25
Payer: COMMERCIAL

## 2025-07-25 VITALS
HEART RATE: 72 BPM | DIASTOLIC BLOOD PRESSURE: 58 MMHG | OXYGEN SATURATION: 97 % | BODY MASS INDEX: 28.17 KG/M2 | SYSTOLIC BLOOD PRESSURE: 96 MMHG | HEIGHT: 63 IN | WEIGHT: 159 LBS

## 2025-07-25 DIAGNOSIS — Z79.899 MEDICATION MANAGEMENT: ICD-10-CM

## 2025-07-25 DIAGNOSIS — R73.9 ELEVATED BLOOD SUGAR: ICD-10-CM

## 2025-07-25 DIAGNOSIS — Z95.1 S/P CABG (CORONARY ARTERY BYPASS GRAFT): ICD-10-CM

## 2025-07-25 DIAGNOSIS — I50.22 SYSTOLIC CHF, CHRONIC (HCC): Primary | ICD-10-CM

## 2025-07-25 PROCEDURE — G8419 CALC BMI OUT NRM PARAM NOF/U: HCPCS | Performed by: NURSE PRACTITIONER

## 2025-07-25 PROCEDURE — 99214 OFFICE O/P EST MOD 30 MIN: CPT | Performed by: NURSE PRACTITIONER

## 2025-07-25 PROCEDURE — 1036F TOBACCO NON-USER: CPT | Performed by: NURSE PRACTITIONER

## 2025-07-25 PROCEDURE — 3017F COLORECTAL CA SCREEN DOC REV: CPT | Performed by: NURSE PRACTITIONER

## 2025-07-25 PROCEDURE — G8427 DOCREV CUR MEDS BY ELIG CLIN: HCPCS | Performed by: NURSE PRACTITIONER

## 2025-07-25 PROCEDURE — G2211 COMPLEX E/M VISIT ADD ON: HCPCS | Performed by: NURSE PRACTITIONER

## 2025-07-25 NOTE — PATIENT INSTRUCTIONS
Plan:   Continue 1/2 tab entresto 24/26mg twice a day  Continue 1/2 tab of the Spironolactone (aldactone)  Continue 1/2 tab of the digoxin   Recommend adding jardiance 10mg daily- monitor for dizziness, or skin rash or UTI symptoms  Continue toprol   Repeat BMP and BNP in September  Follow up in 3 months

## 2025-07-25 NOTE — PROGRESS NOTES
[facility]  Cardiac Follow-up    Primary Care Doctor:  Ayo Salguero MD    Chief Complaint   Patient presents with    Follow-up    Coronary Artery Disease    Cardiomyopathy    Congestive Heart Failure    Hyperlipidemia        History of Present Illness:  I had the pleasure of seeing Dasha Coleman as a follow up for HFrEF.     established with DR. Melton referred by Dr. Caldwell.   Hx of HLD, Emphysema, CAD s/p MI s/p RONDA to RCA , CABG x1 , LVEF 35-40% in , improved EF to 50% in .     Echo 25 noted and LVEF of 35-40%, grade I DD. Stress test 25 noted mixed ischemia and infarct. LV function is normal EF 66%. She underwent right and left heart cath on 25 that showed patent LIMA to LAD grafts, normal filling pressures consistent with compensated ischemic cardiomyopathy.     Since last visit, Spironolactone (aldactone) added. Potassium supplements stopped.   Breahting is stable.   No dizziness/lightheadedness.  Tolerating medications without side effects.  Very mild edema of the right foot/ankle which is improved.  She is here by herself today.  Was seen by EP last month-no changes in medications or additional recommendations for the PVCs.    Home weights: 159 lbs    Taking medications as prescribed: Yes  Able to afford medications: Yes    Past Medical History:   has a past medical history of Arthritis, CAD (coronary artery disease), Cancer ([facility]), CHF (congestive heart failure) ([facility]), Depression, GERD (gastroesophageal reflux disease), Hyperlipidemia, On intra-aortic balloon pump assist, and Other emphysema ([facility]).  Surgical History:   has a past surgical history that includes sinus surgery;  section; Coronary artery bypass graft (N/A, 2020); CT GUIDED PLEURAL DRAINAGE W CATH PERC (2020); thoracentesis (Left, 2020); transesophageal echocardiogram (2020); Cardiac catheterization (2020); Foot surgery (Right, 2024); Bunionectomy (Right,

## 2025-08-06 ENCOUNTER — TELEPHONE (OUTPATIENT)
Dept: FAMILY MEDICINE CLINIC | Age: 60
End: 2025-08-06

## (undated) DEVICE — GLIDESHEATH SLENDER ACCESS KIT: Brand: GLIDESHEATH SLENDER

## (undated) DEVICE — PUNCH AORT DIA4MM LNG HNDL

## (undated) DEVICE — Z DISCONTINUED NEEDLE HYPO 22GA L1.5IN BLK POLYPR HUB S STL REG BVL STR - SEE COMMENT

## (undated) DEVICE — SUTURE MCRYL + SZ 4-0 L18IN ABSRB UD L19MM PS-2 3/8 CIR MCP496G

## (undated) DEVICE — STABILIZER SURG TISS W/ CANSTR TBNG EVOLUTION OCTPS

## (undated) DEVICE — SUTURE VICRYL SZ 2-0 L27IN ABSRB UD L26MM CT-2 1/2 CIR J269H

## (undated) DEVICE — SYRINGE MED 10ML LUERLOCK TIP W/O SFTY DISP

## (undated) DEVICE — STANDARD HYPODERMIC NEEDLE,POLYPROPYLENE HUB: Brand: MONOJECT

## (undated) DEVICE — WAX SURG 2.5GM HEMSTAT BNE BEESWAX PARAFFIN ISO PALMITATE

## (undated) DEVICE — SHIELD RAD ANGIO FEM ENTRY W/ ABSORBENT ORNG STRL RADPAD LTX

## (undated) DEVICE — KIT DIAG CATH 5FR L110CM 145DEG COR NYL JUDKINS R 4 JUDKINS

## (undated) DEVICE — 3M™ TEGADERM™ TRANSPARENT FILM DRESSING FRAME STYLE, 1626W, 4 IN X 4-3/4 IN (10 CM X 12 CM), 50/CT 4CT/CASE: Brand: 3M™ TEGADERM™

## (undated) DEVICE — CANNULATED DRILL BIT: Brand: MINI

## (undated) DEVICE — SUTURE ETHBND EXCEL SZ 0 L18IN NONABSORBABLE GRN L36MM CT-1 CX21D

## (undated) DEVICE — SHUNT CV L14MM DIA1.5MM IC SIL TAPR TIP RADPQ CLRVW

## (undated) DEVICE — KIT BLWR MISTER 5P 15L W/ TBNG SET IRRIG MIST TO IMPROVE

## (undated) DEVICE — CATH LAB PACK: Brand: MEDLINE INDUSTRIES, INC.

## (undated) DEVICE — Z CONVERTED USE 2273164 BANDAGE COMPR W4INXL4 1/2YD E EC SGL LAYERED CLP CLSR ECONO

## (undated) DEVICE — AGENT HEMSTAT W3XL4IN OXIDIZED REGENERATED CELOS ABSRB FOR

## (undated) DEVICE — MEDI-VAC NON-CONDUCTIVE SUCTION TUBING: Brand: CARDINAL HEALTH

## (undated) DEVICE — Device

## (undated) DEVICE — STANDARD DRILL BIT , AO

## (undated) DEVICE — CATHETER PULM ART 5FR INFL 0.75CC L110CM BAL DIA8MM SGL WDG

## (undated) DEVICE — SMALL OSC. SAW BLADE, 9MM X 24.6MM X 0.38MM: Brand: MICROAIRE®

## (undated) DEVICE — PADDING CAST W4INXL4YD NONSTERILE COT RAYON MICROPLEATED

## (undated) DEVICE — RADIFOCUS GLIDEWIRE: Brand: GLIDEWIRE

## (undated) DEVICE — 3M™ TEGADERM™ TRANSPARENT FILM DRESSING FRAME STYLE, 1624W, 2-3/8 IN X 2-3/4 IN (6 CM X 7 CM), 100/CT 4CT/CASE: Brand: 3M™ TEGADERM™

## (undated) DEVICE — SUREFIT, DUAL DISPERSIVE ELECTRODE, CONTACT QUALITY MONITOR: Brand: SUREFIT

## (undated) DEVICE — SOLUTION IV HEPARIN SODIUM SODIUM CHL 0.9% 500 ML INJ VIAFLX

## (undated) DEVICE — SUTURE PROL SZ 6-0 L24IN NONABSORBABLE BLU L13MM C-1 3/8 8726H

## (undated) DEVICE — SUTURE VICRYL SZ 4-0 L18IN ABSRB UD L19MM PS-2 3/8 CIR PRIM J496H

## (undated) DEVICE — GLIDESHEATH SLENDER STAINLESS STEEL KIT: Brand: GLIDESHEATH SLENDER

## (undated) DEVICE — LARGE TEAR CROSS CUT RASP (14.0 X 7.0MM)

## (undated) DEVICE — Z INACTIVE NO ACTIVE SUPPLIER APPLICATOR MEDICATED 26 CC TINT HI-LITE ORNG STRL CHLORAPREP

## (undated) DEVICE — CUFF TRNQT STD W4XL18IN ARM RED LUERLOCK 1 FILL LN DISP

## (undated) DEVICE — SUTURE VCRL SZ 3-0 L27IN ABSRB UD L26MM SH 1/2 CIR J416H

## (undated) DEVICE — CATHETER COR DIAG 4.0 5FR 100CM 2 SIDE H

## (undated) DEVICE — SUTURE VICRYL COAT SZ 4-0 L18IN ABSRB UD L19MM PS-2 1/2 CIR J496G

## (undated) DEVICE — SUTURE ETHBND EXCEL SZ 5 L30IN NONABSORBABLE GRN L40MM V-37 MB66G

## (undated) DEVICE — GUIDEWIRE VASC L260CM DIA0.035IN RAD 3MM J TIP L7CM PTFE

## (undated) DEVICE — CATHETER THOR L21IN DIA28FR R ANG SFT RADPQ STRP SIL

## (undated) DEVICE — GOWN SIRUS NONREIN XL W/TWL: Brand: MEDLINE INDUSTRIES, INC.

## (undated) DEVICE — Z DISCONTINUED USE 2516375 APPLICATOR MEDICATED 3 CC CLR STRL CHLORAPREP

## (undated) DEVICE — Z INACTIVE USE 2635508 SOLUTION IRRIG 500ML 0.9% SOD CHL USP POUR PLAS BTL

## (undated) DEVICE — POSITIONING PIN

## (undated) DEVICE — STERILE LATEX POWDER-FREE SURGICAL GLOVESWITH NITRILE COATING: Brand: PROTEXIS

## (undated) DEVICE — GOWN,SIRUS,NONRNF,XLN/XL,20/CS: Brand: MEDLINE

## (undated) DEVICE — SYSTEM GWIRE L260CM DIA0035IN STD STEER HYDROPHOBIC STR TIP

## (undated) DEVICE — BLADE SAW W10XL54MM FOR PRI REPEAT STRNOTMY

## (undated) DEVICE — BLADE SAW W9XL25MM THK0.38MM CUT THK0.43MM REPL SAG OSC THN

## (undated) DEVICE — SUTURE NONABSORBABLE MONOFILAMENT 4-0 RB-1 36 IN BLU PROLENE 8557H

## (undated) DEVICE — Z INACTIVE USE 2540311 LEAD PACE L475MM CHN A OR V MYOCARDIAL STEROID ELUT SIL

## (undated) DEVICE — Z DISCONTINUED USE 2220190 SUTURE VICRYL SZ 3-0 L27IN ABSRB UD L26MM SH 1/2 CIR J416H

## (undated) DEVICE — SUTURE ETHBND EXCEL SZ 2-0 L36IN NONABSORBABLE GRN SH-2 X559H

## (undated) DEVICE — COTTON UNDERCAST PADDING,CRIMPED FINISH: Brand: WEBRIL

## (undated) DEVICE — SUTURE NONABSORBABLE MONOFILAMENT 7-0 BV-1 1X24 IN PROLENE 8702H

## (undated) DEVICE — Z INACTIVE USE 2641838 CLIP INT L ORNG TI TRNSVRS GRV CHEVRON SHP W/ PRECIS TIP TO

## (undated) DEVICE — PINNACLE INTRODUCER SHEATH: Brand: PINNACLE

## (undated) DEVICE — CATHETER THORACENTHESIS 9 FRX20 IN EYES TOP

## (undated) DEVICE — SUTURE PDS II SZ 0 L36IN ABSRB VLT L36MM CT-1 1/2 CIR Z346H

## (undated) DEVICE — 3.0MM DEPTH GAUGE/ COUNTERSINK: Brand: MINI

## (undated) DEVICE — SUTURE ETHILON 4-0 L18IN NONABSORBABLE GRN PS-2 L19MM 3/8 CIR G667G

## (undated) DEVICE — TR BAND RADIAL ARTERY COMPRESSION DEVICE: Brand: TR BAND

## (undated) DEVICE — TEMP PACING WIRE: Brand: MYO/WIRE

## (undated) DEVICE — SUTURE SZ 7 L18IN NONABSORBABLE SIL CCS L48MM 1/2 CIR STRNM M655G

## (undated) DEVICE — SUTURE VICRYL SZ 2-0 L27IN ABSRB VLT L22MM CT-3 1/2 CIR J328H

## (undated) DEVICE — BANDAGE COMPR W6INXL4.5YD LTWT E EC SGL LAYERED CLP CLSR

## (undated) DEVICE — 3M™ STERI-STRIP™ REINFORCED ADHESIVE SKIN CLOSURES, R1549, 1/2 IN X 2 IN (12 MM X 50 MM), 6 STRIPS/ENVELOPE: Brand: 3M™ STERI-STRIP™

## (undated) DEVICE — GUIDEWIRE VASC L150CM DIA0.025IN TIP L7CM J RAD 3MM PTFE

## (undated) DEVICE — CHLORAPREP 26ML ORANGE

## (undated) DEVICE — Z DISCONTINUED USE 2132709 NEEDLE HYPO 18GA L1.5IN PNK POLYPR HUB S STL THN WALL FILL

## (undated) DEVICE — NON-THREADED KWIRE
Type: IMPLANTABLE DEVICE | Site: FOOT | Status: NON-FUNCTIONAL
Brand: MINI
Removed: 2024-07-25

## (undated) DEVICE — ADHESIVE SKIN CLSR 0.7ML TOP DERMBND ADV

## (undated) DEVICE — PAD, GROUNDING, UNIVERSAL, SPLIT, 9': Brand: MEDLINE

## (undated) DEVICE — SUTURE VICRYL + SZ 3-0 L27IN ABSRB UD L26MM SH 1/2 CIR VCP416H

## (undated) DEVICE — PACK PROCEDURE SURG OPN HRT A BASIC